# Patient Record
Sex: MALE | Race: OTHER | Employment: OTHER | URBAN - METROPOLITAN AREA
[De-identification: names, ages, dates, MRNs, and addresses within clinical notes are randomized per-mention and may not be internally consistent; named-entity substitution may affect disease eponyms.]

---

## 2017-03-14 ENCOUNTER — HOSPITAL ENCOUNTER (OUTPATIENT)
Dept: NON INVASIVE DIAGNOSTICS | Age: 62
Discharge: HOME | End: 2017-03-14
Payer: COMMERCIAL

## 2017-03-14 PROCEDURE — 93306 TTE W/DOPPLER COMPLETE: CPT

## 2017-05-11 ENCOUNTER — OFFICE VISIT (OUTPATIENT)
Dept: GASTROENTEROLOGY | Age: 62
End: 2017-05-11

## 2017-05-11 DIAGNOSIS — Z80.0 FAMILY HISTORY OF COLON CANCER: ICD-10-CM

## 2017-05-11 DIAGNOSIS — Z86.010 PERSONAL HISTORY OF COLONIC POLYPS: Primary | ICD-10-CM

## 2017-05-11 DIAGNOSIS — Z83.71 FAMILY HISTORY OF COLONIC POLYPS: ICD-10-CM

## 2017-05-11 PROCEDURE — 99203 OFFICE O/P NEW LOW 30 MIN: CPT | Performed by: INTERNAL MEDICINE

## 2017-05-16 VITALS — DIASTOLIC BLOOD PRESSURE: 89 MMHG | HEART RATE: 66 BPM | WEIGHT: 232 LBS | SYSTOLIC BLOOD PRESSURE: 152 MMHG

## 2017-05-17 ENCOUNTER — HOSPITAL ENCOUNTER (OUTPATIENT)
Age: 62
Setting detail: OUTPATIENT SURGERY
Discharge: HOME | End: 2017-05-17
Attending: INTERNAL MEDICINE | Admitting: INTERNAL MEDICINE
Payer: COMMERCIAL

## 2017-05-17 ENCOUNTER — ANESTHESIA (OUTPATIENT)
Dept: ENDOSCOPY | Age: 62
End: 2017-05-17
Payer: COMMERCIAL

## 2017-05-17 ENCOUNTER — ANESTHESIA EVENT (OUTPATIENT)
Dept: ENDOSCOPY | Age: 62
End: 2017-05-17
Payer: COMMERCIAL

## 2017-05-17 VITALS
DIASTOLIC BLOOD PRESSURE: 85 MMHG | TEMPERATURE: 98.6 F | RESPIRATION RATE: 14 BRPM | SYSTOLIC BLOOD PRESSURE: 140 MMHG | OXYGEN SATURATION: 97 %

## 2017-05-17 VITALS
WEIGHT: 230 LBS | SYSTOLIC BLOOD PRESSURE: 140 MMHG | BODY MASS INDEX: 38.32 KG/M2 | TEMPERATURE: 97.9 F | OXYGEN SATURATION: 99 % | HEIGHT: 65 IN | HEART RATE: 68 BPM | RESPIRATION RATE: 16 BRPM | DIASTOLIC BLOOD PRESSURE: 90 MMHG

## 2017-05-17 PROCEDURE — 88305 TISSUE EXAM BY PATHOLOGIST: CPT

## 2017-05-17 PROCEDURE — 2500000003 HC RX 250 WO HCPCS: Performed by: NURSE ANESTHETIST, CERTIFIED REGISTERED

## 2017-05-17 PROCEDURE — 3609027000 HC COLONOSCOPY: Performed by: INTERNAL MEDICINE

## 2017-05-17 PROCEDURE — 2580000003 HC RX 258: Performed by: STUDENT IN AN ORGANIZED HEALTH CARE EDUCATION/TRAINING PROGRAM

## 2017-05-17 PROCEDURE — 3700000001 HC ADD 15 MINUTES (ANESTHESIA): Performed by: INTERNAL MEDICINE

## 2017-05-17 PROCEDURE — 7100000010 HC PHASE II RECOVERY - FIRST 15 MIN: Performed by: INTERNAL MEDICINE

## 2017-05-17 PROCEDURE — 3700000000 HC ANESTHESIA ATTENDED CARE: Performed by: INTERNAL MEDICINE

## 2017-05-17 PROCEDURE — 6360000002 HC RX W HCPCS: Performed by: NURSE ANESTHETIST, CERTIFIED REGISTERED

## 2017-05-17 RX ORDER — NAPROXEN 500 MG/1
500 TABLET ORAL 2 TIMES DAILY WITH MEALS
Status: ON HOLD | COMMUNITY
End: 2019-04-22 | Stop reason: SDDI

## 2017-05-17 RX ORDER — SODIUM CHLORIDE 9 MG/ML
INJECTION, SOLUTION INTRAVENOUS CONTINUOUS
Status: DISCONTINUED | OUTPATIENT
Start: 2017-05-17 | End: 2017-05-17 | Stop reason: HOSPADM

## 2017-05-17 RX ORDER — PROPOFOL 10 MG/ML
INJECTION, EMULSION INTRAVENOUS PRN
Status: DISCONTINUED | OUTPATIENT
Start: 2017-05-17 | End: 2017-05-17 | Stop reason: SDUPTHER

## 2017-05-17 RX ORDER — LIDOCAINE HYDROCHLORIDE 20 MG/ML
INJECTION, SOLUTION EPIDURAL; INFILTRATION; INTRACAUDAL; PERINEURAL PRN
Status: DISCONTINUED | OUTPATIENT
Start: 2017-05-17 | End: 2017-05-17 | Stop reason: SDUPTHER

## 2017-05-17 RX ADMIN — PROPOFOL 30 MG: 10 INJECTION, EMULSION INTRAVENOUS at 11:06

## 2017-05-17 RX ADMIN — PROPOFOL 20 MG: 10 INJECTION, EMULSION INTRAVENOUS at 11:13

## 2017-05-17 RX ADMIN — PROPOFOL 20 MG: 10 INJECTION, EMULSION INTRAVENOUS at 11:09

## 2017-05-17 RX ADMIN — SODIUM CHLORIDE: 9 INJECTION, SOLUTION INTRAVENOUS at 10:08

## 2017-05-17 RX ADMIN — PROPOFOL 20 MG: 10 INJECTION, EMULSION INTRAVENOUS at 11:10

## 2017-05-17 RX ADMIN — LIDOCAINE HYDROCHLORIDE 40 MG: 20 INJECTION, SOLUTION EPIDURAL; INFILTRATION; INTRACAUDAL; PERINEURAL at 11:04

## 2017-05-17 RX ADMIN — PROPOFOL 110 MG: 10 INJECTION, EMULSION INTRAVENOUS at 11:04

## 2017-05-17 RX ADMIN — PROPOFOL 20 MG: 10 INJECTION, EMULSION INTRAVENOUS at 11:07

## 2017-05-17 RX ADMIN — PROPOFOL 20 MG: 10 INJECTION, EMULSION INTRAVENOUS at 11:05

## 2017-05-17 RX ADMIN — PROPOFOL 30 MG: 10 INJECTION, EMULSION INTRAVENOUS at 11:08

## 2017-05-17 RX ADMIN — PROPOFOL 20 MG: 10 INJECTION, EMULSION INTRAVENOUS at 11:11

## 2017-05-17 RX ADMIN — PROPOFOL 20 MG: 10 INJECTION, EMULSION INTRAVENOUS at 11:15

## 2017-05-17 ASSESSMENT — PAIN - FUNCTIONAL ASSESSMENT: PAIN_FUNCTIONAL_ASSESSMENT: 0-10

## 2017-05-17 ASSESSMENT — COPD QUESTIONNAIRES: CAT_SEVERITY: NO INTERVAL CHANGE

## 2017-11-21 ENCOUNTER — OFFICE VISIT (OUTPATIENT)
Dept: PULMONOLOGY | Age: 62
End: 2017-11-21

## 2017-11-21 VITALS
DIASTOLIC BLOOD PRESSURE: 80 MMHG | OXYGEN SATURATION: 97 % | HEIGHT: 65 IN | BODY MASS INDEX: 36.65 KG/M2 | HEART RATE: 64 BPM | SYSTOLIC BLOOD PRESSURE: 130 MMHG | WEIGHT: 220 LBS | TEMPERATURE: 96.6 F

## 2017-11-21 DIAGNOSIS — F17.200 SMOKING: ICD-10-CM

## 2017-11-21 DIAGNOSIS — J44.9 CHRONIC OBSTRUCTIVE PULMONARY DISEASE, UNSPECIFIED COPD TYPE (HCC): Primary | ICD-10-CM

## 2017-11-21 PROCEDURE — 3023F SPIROM DOC REV: CPT | Performed by: INTERNAL MEDICINE

## 2017-11-21 PROCEDURE — G8417 CALC BMI ABV UP PARAM F/U: HCPCS | Performed by: INTERNAL MEDICINE

## 2017-11-21 PROCEDURE — G8484 FLU IMMUNIZE NO ADMIN: HCPCS | Performed by: INTERNAL MEDICINE

## 2017-11-21 PROCEDURE — G8926 SPIRO NO PERF OR DOC: HCPCS | Performed by: INTERNAL MEDICINE

## 2017-11-21 PROCEDURE — 4004F PT TOBACCO SCREEN RCVD TLK: CPT | Performed by: INTERNAL MEDICINE

## 2017-11-21 PROCEDURE — 99214 OFFICE O/P EST MOD 30 MIN: CPT | Performed by: INTERNAL MEDICINE

## 2017-11-21 PROCEDURE — 3017F COLORECTAL CA SCREEN DOC REV: CPT | Performed by: INTERNAL MEDICINE

## 2017-11-21 PROCEDURE — G8427 DOCREV CUR MEDS BY ELIG CLIN: HCPCS | Performed by: INTERNAL MEDICINE

## 2017-11-21 RX ORDER — TIOTROPIUM BROMIDE AND OLODATEROL 3.124; 2.736 UG/1; UG/1
SPRAY, METERED RESPIRATORY (INHALATION)
Refills: 5 | COMMUNITY
Start: 2017-11-03 | End: 2018-03-21 | Stop reason: SDUPTHER

## 2017-11-21 RX ORDER — GUAIFENESIN 1200 MG/1
TABLET, EXTENDED RELEASE ORAL
Refills: 2 | COMMUNITY
Start: 2017-11-06 | End: 2018-08-30 | Stop reason: SDUPTHER

## 2017-11-21 RX ORDER — SIMVASTATIN 40 MG
40 TABLET ORAL DAILY
Refills: 2 | Status: ON HOLD | COMMUNITY
Start: 2017-11-03 | End: 2019-04-22 | Stop reason: SDDI

## 2017-11-21 ASSESSMENT — ENCOUNTER SYMPTOMS
VOMITING: 0
SHORTNESS OF BREATH: 1
ABDOMINAL PAIN: 0
VOICE CHANGE: 0
EYE ITCHING: 0
RHINORRHEA: 0
COUGH: 1
WHEEZING: 0
SORE THROAT: 0
NAUSEA: 0
DIARRHEA: 0
CHEST TIGHTNESS: 0

## 2017-11-21 NOTE — PROGRESS NOTES
shortness of breath. Negative for chest tightness and wheezing. Cardiovascular: Negative. Negative for chest pain, palpitations and leg swelling. Gastrointestinal: Negative for abdominal pain, diarrhea, nausea and vomiting. Genitourinary: Negative for difficulty urinating and hematuria. Musculoskeletal: Negative for arthralgias, joint swelling and myalgias. Skin: Negative for rash. Allergic/Immunologic: Negative for environmental allergies. Neurological: Negative for dizziness, tremors, weakness and headaches. Psychiatric/Behavioral: Negative for behavioral problems and sleep disturbance. Objective:     Vitals:    11/21/17 1136   BP: 130/80   Site: Left Arm   Position: Sitting   Cuff Size: Large Adult   Pulse: 64   Temp: 96.6 °F (35.9 °C)   TempSrc: Temporal   SpO2: 97%   Weight: 220 lb (99.8 kg)   Height: 5' 5\" (1.651 m)         Physical Exam   Constitutional: He is oriented to person, place, and time. He appears well-developed and well-nourished. obesity   HENT:   Head: Normocephalic and atraumatic. Nose: Nose normal.   Mouth/Throat: Oropharynx is clear and moist.   Eyes: Conjunctivae and EOM are normal. Pupils are equal, round, and reactive to light. Neck: No JVD present. No tracheal deviation present. No thyromegaly present. Cardiovascular: Normal rate and regular rhythm. Exam reveals no gallop and no friction rub. No murmur heard. Pulmonary/Chest: Effort normal and breath sounds normal. No respiratory distress. He has no wheezes. He has no rales. He exhibits no tenderness. Abdominal: He exhibits no distension. Musculoskeletal: Normal range of motion. Lymphadenopathy:     He has no cervical adenopathy. Neurological: He is alert and oriented to person, place, and time. No cranial nerve deficit. Skin: Skin is warm and dry. No rash noted. Psychiatric: He has a normal mood and affect.  His behavior is normal.       Current Outpatient Prescriptions   Medication Sig WITH PRE AND POST; Future    2. Smoking  Patient advised try to quit smoking. Risks related to smoking explained to the patient. Return in about 4 months (around 3/21/2018) for COPD.       Kayleigh Salgado MD

## 2017-12-21 ENCOUNTER — HOSPITAL ENCOUNTER (OUTPATIENT)
Dept: PULMONOLOGY | Age: 62
Discharge: HOME | End: 2017-12-21
Payer: COMMERCIAL

## 2017-12-21 DIAGNOSIS — J44.9 CHRONIC OBSTRUCTIVE PULMONARY DISEASE, UNSPECIFIED COPD TYPE (HCC): ICD-10-CM

## 2017-12-21 PROCEDURE — 94726 PLETHYSMOGRAPHY LUNG VOLUMES: CPT

## 2017-12-21 PROCEDURE — 94060 EVALUATION OF WHEEZING: CPT

## 2017-12-21 PROCEDURE — 94729 DIFFUSING CAPACITY: CPT

## 2017-12-21 PROCEDURE — 6360000002 HC RX W HCPCS: Performed by: INTERNAL MEDICINE

## 2017-12-21 RX ORDER — ALBUTEROL SULFATE 2.5 MG/3ML
2.5 SOLUTION RESPIRATORY (INHALATION) ONCE
Status: COMPLETED | OUTPATIENT
Start: 2017-12-21 | End: 2017-12-21

## 2017-12-21 RX ADMIN — ALBUTEROL SULFATE 2.5 MG: 2.5 SOLUTION RESPIRATORY (INHALATION) at 12:31

## 2017-12-26 NOTE — PROCEDURES
[address]                       [address], [address] [facility]                                PULMONARY FUNCTION    PATIENT NAME: Marisa Chicas                      :        1955  MED REC NO:   [id number]                            ROOM:  ACCOUNT NO:   [de-identified]                           ADMIT DATE: 2017  PROVIDER:     Cass Knox MD    DATE OF PROCEDURE:  2017    PFTs were done on this 68-year-old patient who is 5 feet 5 inches, weighs  225 pounds with 47-year smoking history, presenting with mild dyspnea and  nonproductive cough. Spirometry showed a forced vital capacity of 2.77 L which is 70% of  predicted. FEV1 was 2.11 L which is 70% of predicted. FEV1/FVC ratio was  normal at 76%. FEF 25-75% was equal or reduced to 1.78 L per second which  is 71% of predicted. Significant improvement noted in forced vital  capacity, FEV1, and terminal airflow after bronchodilator therapy. MVV was  severely reduced. Lung volumes done by body plethysmography showed a total lung capacity of  6.83 L which is 113% of predicted. Residual volume was 3.62 L which is  182% of predicted. RV/TLC ratio was increased to 53%. Diffusion capacity was normal at 21.29 which is 102% of predicted. Airway resistance was normal.  Specific airway conductance was normal as  well. OVERALL IMPRESSION:  This study shows no definite baseline obstructive  pattern but marked reversibility after bronchodilator therapy suggests  reactive airway disease or mild airflow obstruction with complete  reversibility. This also was confirmed by finding of significant  overinflation on lung volumes. This suggests underlying bronchial asthma  or reactive airway disease. Clinical correlation is needed.       Kiet Morse MD    D: 2017 12:34:19       T: 2017 16:06:35     [email or web address]  Job#: [id number]     Doc#: [id number]    CC:

## 2017-12-27 PROCEDURE — 94060 EVALUATION OF WHEEZING: CPT | Performed by: INTERNAL MEDICINE

## 2017-12-27 PROCEDURE — 94727 GAS DIL/WSHOT DETER LNG VOL: CPT | Performed by: INTERNAL MEDICINE

## 2017-12-27 PROCEDURE — 94729 DIFFUSING CAPACITY: CPT | Performed by: INTERNAL MEDICINE

## 2018-03-12 ENCOUNTER — HOSPITAL ENCOUNTER (OUTPATIENT)
Dept: GENERAL RADIOLOGY | Age: 63
Discharge: HOME | End: 2018-03-14
Payer: COMMERCIAL

## 2018-03-12 DIAGNOSIS — M25.552 LEFT HIP PAIN: ICD-10-CM

## 2018-03-12 PROCEDURE — 73502 X-RAY EXAM HIP UNI 2-3 VIEWS: CPT

## 2018-03-19 ENCOUNTER — HOSPITAL ENCOUNTER (OUTPATIENT)
Dept: WOMENS IMAGING | Age: 63
Discharge: HOME | End: 2018-03-21
Payer: COMMERCIAL

## 2018-03-19 DIAGNOSIS — M19.90 GENERALIZED ARTHRITIS: ICD-10-CM

## 2018-03-19 PROCEDURE — 77080 DXA BONE DENSITY AXIAL: CPT

## 2018-03-21 ENCOUNTER — HOSPITAL ENCOUNTER (OUTPATIENT)
Dept: GENERAL RADIOLOGY | Age: 63
Discharge: HOME | End: 2018-03-23
Payer: COMMERCIAL

## 2018-03-21 ENCOUNTER — OFFICE VISIT (OUTPATIENT)
Dept: PULMONOLOGY | Age: 63
End: 2018-03-21
Payer: COMMERCIAL

## 2018-03-21 VITALS
TEMPERATURE: 96.7 F | HEIGHT: 65 IN | WEIGHT: 227.4 LBS | DIASTOLIC BLOOD PRESSURE: 80 MMHG | BODY MASS INDEX: 37.89 KG/M2 | HEART RATE: 97 BPM | OXYGEN SATURATION: 97 % | SYSTOLIC BLOOD PRESSURE: 126 MMHG

## 2018-03-21 DIAGNOSIS — J44.9 CHRONIC OBSTRUCTIVE PULMONARY DISEASE, UNSPECIFIED COPD TYPE (HCC): ICD-10-CM

## 2018-03-21 DIAGNOSIS — J44.9 CHRONIC OBSTRUCTIVE PULMONARY DISEASE, UNSPECIFIED COPD TYPE (HCC): Primary | ICD-10-CM

## 2018-03-21 DIAGNOSIS — Z72.0 TOBACCO ABUSE: ICD-10-CM

## 2018-03-21 DIAGNOSIS — E66.9 OBESITY (BMI 30-39.9): ICD-10-CM

## 2018-03-21 PROCEDURE — G8427 DOCREV CUR MEDS BY ELIG CLIN: HCPCS | Performed by: INTERNAL MEDICINE

## 2018-03-21 PROCEDURE — G8417 CALC BMI ABV UP PARAM F/U: HCPCS | Performed by: INTERNAL MEDICINE

## 2018-03-21 PROCEDURE — 4004F PT TOBACCO SCREEN RCVD TLK: CPT | Performed by: INTERNAL MEDICINE

## 2018-03-21 PROCEDURE — 99214 OFFICE O/P EST MOD 30 MIN: CPT | Performed by: INTERNAL MEDICINE

## 2018-03-21 PROCEDURE — 3023F SPIROM DOC REV: CPT | Performed by: INTERNAL MEDICINE

## 2018-03-21 PROCEDURE — 71046 X-RAY EXAM CHEST 2 VIEWS: CPT

## 2018-03-21 PROCEDURE — 3017F COLORECTAL CA SCREEN DOC REV: CPT | Performed by: INTERNAL MEDICINE

## 2018-03-21 PROCEDURE — G8484 FLU IMMUNIZE NO ADMIN: HCPCS | Performed by: INTERNAL MEDICINE

## 2018-03-21 PROCEDURE — G8926 SPIRO NO PERF OR DOC: HCPCS | Performed by: INTERNAL MEDICINE

## 2018-03-21 RX ORDER — IBUPROFEN 800 MG/1
TABLET ORAL
Refills: 2 | Status: ON HOLD | COMMUNITY
Start: 2018-03-15 | End: 2019-04-22 | Stop reason: SDDI

## 2018-03-21 RX ORDER — TIOTROPIUM BROMIDE AND OLODATEROL 3.124; 2.736 UG/1; UG/1
SPRAY, METERED RESPIRATORY (INHALATION)
Refills: 5 | Status: CANCELLED | OUTPATIENT
Start: 2018-03-21

## 2018-03-21 RX ORDER — TIOTROPIUM BROMIDE AND OLODATEROL 3.124; 2.736 UG/1; UG/1
SPRAY, METERED RESPIRATORY (INHALATION)
Qty: 1 INHALER | Refills: 5 | Status: SHIPPED | OUTPATIENT
Start: 2018-03-21 | End: 2018-08-30 | Stop reason: SDUPTHER

## 2018-03-21 RX ORDER — CYCLOBENZAPRINE HCL 10 MG
TABLET ORAL
Refills: 2 | Status: ON HOLD | COMMUNITY
Start: 2018-03-15 | End: 2019-04-22 | Stop reason: SDDI

## 2018-03-21 ASSESSMENT — ENCOUNTER SYMPTOMS
VOICE CHANGE: 0
CHEST TIGHTNESS: 0
WHEEZING: 0
EYE ITCHING: 0
NAUSEA: 0
SORE THROAT: 0
RHINORRHEA: 0
SHORTNESS OF BREATH: 1
COUGH: 0
ABDOMINAL PAIN: 0
DIARRHEA: 0
VOMITING: 0

## 2018-03-21 NOTE — PROGRESS NOTES
Subjective: Jeanne Alcantar is a 63 y.o. male who complains today of:     Chief Complaint   Patient presents with    COPD     pt here for 4 month f/u for copd       HPI  Patient has PFT done in past, no CXr done  Patient is using stiolto, ventolin HFA and nebulizer with albuterol  C/o shortness of breath  with exertion. No  Wheezing   no Cough with Sputum  No Hemoptysis  No Chest pain or pleuritic pain  No Fever or chills. No Rhinorrhea or postnasal drip.     He is still  smoking 1 ppd  Every 3 rd day. Allergies:  Patient has no known allergies. Past Medical History:   Diagnosis Date    Asthma     Depression     Hypercholesteremia     Hypertension     Osteoarthritis      Past Surgical History:   Procedure Laterality Date    COLONOSCOPY  5/2/14    JARMOSZUK    EYE SURGERY Bilateral     cataracts    FINGER SURGERY Right     [place] COLON CA SCRN NOT HI RSK IND N/A 5/17/2017    [facility] Toshia East [facility]     Family History   Problem Relation Age of Onset    Colon Cancer Mother     High Blood Pressure Father     Other Brother      Sepsis    Other Brother      Killed     Social History     Social History    Marital status:      Spouse name: N/A    Number of children: N/A    Years of education: N/A     Occupational History    Not on file. Social History Main Topics    Smoking status: Current Every Day Smoker     Packs/day: 0.30     Years: 46.00     Types: Cigarettes    Smokeless tobacco: Never Used    Alcohol use No    Drug use: No    Sexual activity: Not on file     Other Topics Concern    Not on file     Social History Narrative    No narrative on file         Review of Systems   Constitutional: Negative for chills, diaphoresis, fatigue and fever. HENT: Negative for congestion, mouth sores, nosebleeds, postnasal drip, rhinorrhea, sneezing, sore throat and voice change. Eyes: Negative for itching and visual disturbance. Respiratory: Positive for shortness of breath. Negative for cough, chest tightness and wheezing. Cardiovascular: Negative. Negative for chest pain, palpitations and leg swelling. Gastrointestinal: Negative for abdominal pain, diarrhea, nausea and vomiting. Genitourinary: Negative for difficulty urinating and hematuria. Musculoskeletal: Negative for arthralgias, joint swelling and myalgias. Skin: Negative for rash. Allergic/Immunologic: Negative for environmental allergies. Neurological: Negative for dizziness, tremors, weakness and headaches. Psychiatric/Behavioral: Negative for behavioral problems and sleep disturbance. Objective:     Vitals:    03/21/18 1500   BP: 126/80   Pulse: 97   Temp: 96.7 °F (35.9 °C)   TempSrc: Tympanic   SpO2: 97%   Weight: 227 lb 6.4 oz (103.1 kg)   Height: 5' 5\" (1.651 m)         Physical Exam  Constitutional: He is oriented to person, place, and time. He appears well-developed and well-nourished. obesity   HENT:   Head: Normocephalic and atraumatic. Nose: Nose normal.   Mouth/Throat: Oropharynx is clear and moist.   Eyes: Conjunctivae and EOM are normal. Pupils are equal, round, and reactive to light. Neck: No JVD present. No tracheal deviation present. No thyromegaly present. Cardiovascular: Normal rate and regular rhythm. Exam reveals no gallop and no friction rub. No murmur heard. Pulmonary/Chest: Effort normal and breath sounds normal. No respiratory distress. He has no wheezes. He has no rales. He exhibits no tenderness. Abdominal: He exhibits no distension. Musculoskeletal: Normal range of motion. Lymphadenopathy:     He has no cervical adenopathy. Neurological: He is alert and oriented to person, place, and time. No cranial nerve deficit. Skin: Skin is warm and dry. No rash noted. Psychiatric: He has a normal mood and affect.  His behavior is normal.       Current Outpatient Prescriptions   Medication Sig Dispense Refill    resistance was normal.  Specific airway conductance was normal as  well.     OVERALL IMPRESSION:  This study shows no definite baseline obstructive  pattern but marked reversibility after bronchodilator therapy suggests  reactive airway disease or mild airflow obstruction with complete  reversibility. This also was confirmed by finding of significant  overinflation on lung volumes. This suggests underlying bronchial asthma  or reactive airway disease. Clinical correlation is needed.        Ruiz Barrow MD      Assessment/Plan:     1. Chronic obstructive pulmonary disease, unspecified COPD type ([address]  C/o shortness of breath  with exertion. No  Wheezing   Patient is using stiolto, ventolin HFA and nebulizer with albuterol  PFT done , reviewed with patient , continue bronchodilator as before. He has no CXR done even requested. He said he will do it today and sent request for CXR today. 2. Tobacco abuse  Patient advised try to quit smoking. Risks related to smoking explained to the patient. Different ways to help him quit smoking were discussed today. 3. Obesity (BMI 30-39. 9)  Patient patient is advised try to lose weight. obesity related risk explained to the patient ,  Current weight:  227 lb 6.4 oz (103.1 kg) Lbs. BMI:  Body mass index is 37.84 kg/m². Return in about 4 months (around 7/21/2018) for COPD.       Skylar Salmeron MD

## 2018-07-25 ENCOUNTER — OFFICE VISIT (OUTPATIENT)
Dept: PULMONOLOGY | Age: 63
End: 2018-07-25
Payer: COMMERCIAL

## 2018-07-25 VITALS
DIASTOLIC BLOOD PRESSURE: 80 MMHG | WEIGHT: 218 LBS | RESPIRATION RATE: 16 BRPM | OXYGEN SATURATION: 95 % | SYSTOLIC BLOOD PRESSURE: 134 MMHG | TEMPERATURE: 97.9 F | HEIGHT: 65 IN | BODY MASS INDEX: 36.32 KG/M2 | HEART RATE: 80 BPM

## 2018-07-25 DIAGNOSIS — E66.9 OBESITY (BMI 30-39.9): ICD-10-CM

## 2018-07-25 DIAGNOSIS — J44.9 ASTHMA-COPD OVERLAP SYNDROME (HCC): Primary | ICD-10-CM

## 2018-07-25 DIAGNOSIS — Z72.0 TOBACCO ABUSE: ICD-10-CM

## 2018-07-25 PROCEDURE — G8417 CALC BMI ABV UP PARAM F/U: HCPCS | Performed by: INTERNAL MEDICINE

## 2018-07-25 PROCEDURE — 99214 OFFICE O/P EST MOD 30 MIN: CPT | Performed by: INTERNAL MEDICINE

## 2018-07-25 PROCEDURE — 3017F COLORECTAL CA SCREEN DOC REV: CPT | Performed by: INTERNAL MEDICINE

## 2018-07-25 PROCEDURE — G8926 SPIRO NO PERF OR DOC: HCPCS | Performed by: INTERNAL MEDICINE

## 2018-07-25 PROCEDURE — G8427 DOCREV CUR MEDS BY ELIG CLIN: HCPCS | Performed by: INTERNAL MEDICINE

## 2018-07-25 PROCEDURE — 4004F PT TOBACCO SCREEN RCVD TLK: CPT | Performed by: INTERNAL MEDICINE

## 2018-07-25 PROCEDURE — 3023F SPIROM DOC REV: CPT | Performed by: INTERNAL MEDICINE

## 2018-07-25 ASSESSMENT — ENCOUNTER SYMPTOMS
CHEST TIGHTNESS: 0
ABDOMINAL PAIN: 0
VOMITING: 0
NAUSEA: 0
VOICE CHANGE: 0
RHINORRHEA: 0
SHORTNESS OF BREATH: 1
SORE THROAT: 0
EYE ITCHING: 0
DIARRHEA: 0
WHEEZING: 0
COUGH: 0

## 2018-07-25 NOTE — PROGRESS NOTES
Subjective: Mindy Edgar is a 66 y.o. male who complains today of:     Chief Complaint   Patient presents with    Follow-up     four month f/u for Chest Xray and PFT results. HPI  Patient is using stiolto, ventolin HFA and nebulizer with albuterol  C/o shortness of breath , worse with exertion. No  Wheezing   No Cough with  Sputum  No Hemoptysis  No Chest tightness   No Chest pain with radiation  or pleuritic pain  No Fever or chills. No Rhinorrhea and postnasal drip. CXR done      patient has c/o snoring  and daytime tiredness   occasionally takes nap    he wakes up with gasping for air. Allergies:  Patient has no known allergies. Past Medical History:   Diagnosis Date    Asthma     Depression     Hypercholesteremia     Hypertension     Osteoarthritis      Past Surgical History:   Procedure Laterality Date    COLONOSCOPY  5/2/14    JARMOSZUK    EYE SURGERY Bilateral     cataracts    FINGER SURGERY Right     [place] COLON CA SCRN NOT HI RSK IND N/A 5/17/2017    COLONOSCOPY performed by Celi Valiente MD at [facility]     Family History   Problem Relation Age of Onset    Colon Cancer Mother     High Blood Pressure Father     Other Brother         Sepsis    Other Brother         Killed     Social History     Social History    Marital status:      Spouse name: N/A    Number of children: N/A    Years of education: N/A     Occupational History    Not on file. Social History Main Topics    Smoking status: Current Every Day Smoker     Packs/day: 0.30     Years: 46.00     Types: Cigarettes    Smokeless tobacco: Never Used    Alcohol use No    Drug use: No    Sexual activity: Not on file     Other Topics Concern    Not on file     Social History Narrative    No narrative on file         Review of Systems   Constitutional: Negative for chills, diaphoresis, fatigue and fever.    HENT: Negative for congestion, mouth sores, nosebleeds, postnasal drip,

## 2018-08-10 ENCOUNTER — HOSPITAL ENCOUNTER (OUTPATIENT)
Dept: SLEEP CENTER | Age: 63
Discharge: HOME | End: 2018-08-12
Payer: COMMERCIAL

## 2018-08-10 PROCEDURE — 95810 POLYSOM 6/> YRS 4/> PARAM: CPT

## 2018-08-30 ENCOUNTER — OFFICE VISIT (OUTPATIENT)
Dept: PULMONOLOGY | Age: 63
End: 2018-08-30
Payer: COMMERCIAL

## 2018-08-30 VITALS
RESPIRATION RATE: 16 BRPM | HEIGHT: 65 IN | BODY MASS INDEX: 36.49 KG/M2 | OXYGEN SATURATION: 98 % | SYSTOLIC BLOOD PRESSURE: 136 MMHG | WEIGHT: 219 LBS | DIASTOLIC BLOOD PRESSURE: 80 MMHG | HEART RATE: 73 BPM | TEMPERATURE: 97.2 F

## 2018-08-30 DIAGNOSIS — Z72.0 TOBACCO ABUSE: ICD-10-CM

## 2018-08-30 DIAGNOSIS — E66.9 OBESITY (BMI 30-39.9): ICD-10-CM

## 2018-08-30 DIAGNOSIS — J44.9 CHRONIC OBSTRUCTIVE PULMONARY DISEASE, UNSPECIFIED COPD TYPE (HCC): ICD-10-CM

## 2018-08-30 DIAGNOSIS — G47.33 OSA (OBSTRUCTIVE SLEEP APNEA): Primary | ICD-10-CM

## 2018-08-30 PROCEDURE — G8417 CALC BMI ABV UP PARAM F/U: HCPCS | Performed by: INTERNAL MEDICINE

## 2018-08-30 PROCEDURE — 3023F SPIROM DOC REV: CPT | Performed by: INTERNAL MEDICINE

## 2018-08-30 PROCEDURE — G8427 DOCREV CUR MEDS BY ELIG CLIN: HCPCS | Performed by: INTERNAL MEDICINE

## 2018-08-30 PROCEDURE — 4004F PT TOBACCO SCREEN RCVD TLK: CPT | Performed by: INTERNAL MEDICINE

## 2018-08-30 PROCEDURE — 99214 OFFICE O/P EST MOD 30 MIN: CPT | Performed by: INTERNAL MEDICINE

## 2018-08-30 PROCEDURE — G8926 SPIRO NO PERF OR DOC: HCPCS | Performed by: INTERNAL MEDICINE

## 2018-08-30 PROCEDURE — 3017F COLORECTAL CA SCREEN DOC REV: CPT | Performed by: INTERNAL MEDICINE

## 2018-08-30 RX ORDER — TIOTROPIUM BROMIDE AND OLODATEROL 3.124; 2.736 UG/1; UG/1
SPRAY, METERED RESPIRATORY (INHALATION)
Qty: 1 INHALER | Refills: 5 | Status: SHIPPED | OUTPATIENT
Start: 2018-08-30

## 2018-08-30 RX ORDER — GUAIFENESIN 1200 MG/1
TABLET, EXTENDED RELEASE ORAL
Qty: 14 TABLET | Refills: 2 | Status: ON HOLD | OUTPATIENT
Start: 2018-08-30 | End: 2019-04-22 | Stop reason: SDDI

## 2018-08-30 RX ORDER — ALBUTEROL SULFATE 2.5 MG/3ML
2.5 SOLUTION RESPIRATORY (INHALATION) EVERY 6 HOURS PRN
Qty: 120 EACH | Refills: 5 | Status: SHIPPED | OUTPATIENT
Start: 2018-08-30

## 2018-08-30 ASSESSMENT — ENCOUNTER SYMPTOMS
RHINORRHEA: 0
DIARRHEA: 0
SORE THROAT: 0
COUGH: 0
SHORTNESS OF BREATH: 1
CHEST TIGHTNESS: 0
NAUSEA: 0
EYE ITCHING: 0
VOMITING: 0
VOICE CHANGE: 0
WHEEZING: 0
ABDOMINAL PAIN: 0

## 2018-08-30 NOTE — PROGRESS NOTES
chills, diaphoresis, fatigue and fever. HENT: Negative for congestion, mouth sores, nosebleeds, postnasal drip, rhinorrhea, sneezing, sore throat and voice change. Eyes: Negative for itching and visual disturbance. Respiratory: Positive for shortness of breath. Negative for cough, chest tightness and wheezing. Cardiovascular: Negative. Negative for chest pain, palpitations and leg swelling. Gastrointestinal: Negative for abdominal pain, diarrhea, nausea and vomiting. Genitourinary: Negative for difficulty urinating and hematuria. Musculoskeletal: Negative for arthralgias, joint swelling and myalgias. Skin: Negative for rash. Allergic/Immunologic: Negative for environmental allergies. Neurological: Negative for dizziness, tremors, weakness and headaches. Psychiatric/Behavioral: Positive for sleep disturbance. Negative for behavioral problems. Objective:     Vitals:    08/30/18 1456   BP: 136/80   Pulse: 73   Resp: 16   Temp: 97.2 °F (36.2 °C)   TempSrc: Tympanic   SpO2: 98%   Weight: 219 lb (99.3 kg)   Height: 5' 5\" (1.651 m)         Physical Exam   Constitutional: He is oriented to person, place, and time. He appears well-developed and well-nourished. obesity   HENT:   Head: Normocephalic and atraumatic. Nose: Nose normal.   Mouth/Throat: Oropharynx is clear and moist.   Eyes: Pupils are equal, round, and reactive to light. Conjunctivae and EOM are normal.   Neck: No JVD present. No tracheal deviation present. No thyromegaly present. Cardiovascular: Normal rate and regular rhythm. Exam reveals no gallop and no friction rub. No murmur heard. Pulmonary/Chest: Effort normal and breath sounds normal. No respiratory distress. He has no wheezes. He has no rales. He exhibits no tenderness. diminished Breath sound bilaterally. Abdominal: He exhibits no distension. Musculoskeletal: Normal range of motion. Lymphadenopathy:     He has no cervical adenopathy.    Neurological: He is alert and oriented to person, place, and time. No cranial nerve deficit. Skin: Skin is warm and dry. No rash noted. Psychiatric: He has a normal mood and affect. His behavior is normal.       Current Outpatient Prescriptions   Medication Sig Dispense Refill    albuterol (PROVENTIL) (2.5 MG/3ML) 0.083% nebulizer solution Take 3 mLs by nebulization every 6 hours as needed for Wheezing 120 each 5    STIOLTO RESPIMAT 2.5-2.5 MCG/ACT AERS Inhale 2 puff in AM 1 Inhaler 5    MUCINEX MAXIMUM STRENGTH 1200 MG TB12 1 tab BID 14 tablet 2    ipratropium (ATROVENT) 0.02 % nebulizer solution QID prn 2.5 mL 5    ibuprofen (ADVIL;MOTRIN) 800 MG tablet TK 1 T PO TID WF  2    cyclobenzaprine (FLEXERIL) 10 MG tablet TK 1 T PO BID  2    simvastatin (ZOCOR) 40 MG tablet   2    naproxen (NAPROSYN) 500 MG tablet Take 500 mg by mouth 2 times daily (with meals)      guaiFENesin 100 MG PACK Take by mouth      OLANZapine (ZYPREXA PO) Take by mouth      lisinopril (PRINIVIL;ZESTRIL) 20 MG tablet       aspirin 325 MG tablet Take 325 mg by mouth daily. No current facility-administered medications for this visit. Results for orders placed during the hospital encounter of 03/21/18   XR CHEST STANDARD (2 VW)    Narrative EXAMINATION: XR CHEST (2 VW)     CLINICAL HISTORY: J44.9 Chronic obstructive pulmonary disease, unspecified COPD type ([facility] [address] ICD10    COMPARISONS: September 17, 2013     FINDINGS:    Two views of the chest are submitted. The cardiac silhouette is of normal size configuration. The mediastinum is unremarkable. Pulmonary vascular unremarkable. Right sided trachea. No focal infiltrates. No effusions. No Pneumothoraces.                                                                                     Impression NO ACUTE ACTIVE CARDIOPULMONARY PROCESS       Assessment/Plan:     1. RANDI (obstructive sleep apnea)  Patient has sleep study done on August 10, 2018 shows sleep apnea with AHI of 6.7 and

## 2018-08-31 ENCOUNTER — TELEPHONE (OUTPATIENT)
Dept: PULMONOLOGY | Age: 63
End: 2018-08-31

## 2018-09-10 ENCOUNTER — HOSPITAL ENCOUNTER (OUTPATIENT)
Dept: SLEEP CENTER | Age: 63
Discharge: HOME | End: 2018-09-12
Payer: COMMERCIAL

## 2018-09-10 PROCEDURE — 95811 POLYSOM 6/>YRS CPAP 4/> PARM: CPT

## 2018-09-17 DIAGNOSIS — G47.33 OSA (OBSTRUCTIVE SLEEP APNEA): ICD-10-CM

## 2018-10-10 ENCOUNTER — OFFICE VISIT (OUTPATIENT)
Dept: PULMONOLOGY | Age: 63
End: 2018-10-10
Payer: COMMERCIAL

## 2018-10-10 VITALS
BODY MASS INDEX: 37.15 KG/M2 | SYSTOLIC BLOOD PRESSURE: 132 MMHG | WEIGHT: 223 LBS | HEART RATE: 90 BPM | OXYGEN SATURATION: 97 % | HEIGHT: 65 IN | DIASTOLIC BLOOD PRESSURE: 60 MMHG | RESPIRATION RATE: 16 BRPM | TEMPERATURE: 98.2 F

## 2018-10-10 DIAGNOSIS — J44.9 CHRONIC OBSTRUCTIVE PULMONARY DISEASE, UNSPECIFIED COPD TYPE (HCC): ICD-10-CM

## 2018-10-10 DIAGNOSIS — E66.9 OBESITY (BMI 30-39.9): ICD-10-CM

## 2018-10-10 DIAGNOSIS — G47.33 OSA (OBSTRUCTIVE SLEEP APNEA): Primary | ICD-10-CM

## 2018-10-10 PROCEDURE — 4004F PT TOBACCO SCREEN RCVD TLK: CPT | Performed by: INTERNAL MEDICINE

## 2018-10-10 PROCEDURE — G8926 SPIRO NO PERF OR DOC: HCPCS | Performed by: INTERNAL MEDICINE

## 2018-10-10 PROCEDURE — 3023F SPIROM DOC REV: CPT | Performed by: INTERNAL MEDICINE

## 2018-10-10 PROCEDURE — 3017F COLORECTAL CA SCREEN DOC REV: CPT | Performed by: INTERNAL MEDICINE

## 2018-10-10 PROCEDURE — 99214 OFFICE O/P EST MOD 30 MIN: CPT | Performed by: INTERNAL MEDICINE

## 2018-10-10 PROCEDURE — G8427 DOCREV CUR MEDS BY ELIG CLIN: HCPCS | Performed by: INTERNAL MEDICINE

## 2018-10-10 PROCEDURE — G8484 FLU IMMUNIZE NO ADMIN: HCPCS | Performed by: INTERNAL MEDICINE

## 2018-10-10 PROCEDURE — G8417 CALC BMI ABV UP PARAM F/U: HCPCS | Performed by: INTERNAL MEDICINE

## 2018-10-10 ASSESSMENT — ENCOUNTER SYMPTOMS
WHEEZING: 0
COUGH: 0
EYE ITCHING: 0
ABDOMINAL PAIN: 0
VOICE CHANGE: 0
NAUSEA: 0
SHORTNESS OF BREATH: 1
DIARRHEA: 0
SORE THROAT: 0
CHEST TIGHTNESS: 0
RHINORRHEA: 0
VOMITING: 0

## 2018-12-17 ENCOUNTER — HOSPITAL ENCOUNTER (OUTPATIENT)
Dept: GENERAL RADIOLOGY | Age: 63
Discharge: HOME | End: 2018-12-19
Payer: COMMERCIAL

## 2018-12-17 VITALS — DIASTOLIC BLOOD PRESSURE: 107 MMHG | HEART RATE: 76 BPM | SYSTOLIC BLOOD PRESSURE: 161 MMHG

## 2018-12-17 DIAGNOSIS — M16.12 OSTEOARTHRITIS OF LEFT HIP, UNSPECIFIED OSTEOARTHRITIS TYPE: ICD-10-CM

## 2018-12-17 PROCEDURE — 6360000004 HC RX CONTRAST MEDICATION: Performed by: ORTHOPAEDIC SURGERY

## 2018-12-17 PROCEDURE — 20610 DRAIN/INJ JOINT/BURSA W/O US: CPT

## 2018-12-17 PROCEDURE — 2500000003 HC RX 250 WO HCPCS: Performed by: ORTHOPAEDIC SURGERY

## 2018-12-17 PROCEDURE — 6360000002 HC RX W HCPCS: Performed by: ORTHOPAEDIC SURGERY

## 2018-12-17 RX ORDER — LIDOCAINE HYDROCHLORIDE 20 MG/ML
15 INJECTION, SOLUTION INFILTRATION; PERINEURAL ONCE
Status: COMPLETED | OUTPATIENT
Start: 2018-12-17 | End: 2018-12-17

## 2018-12-17 RX ORDER — BUPIVACAINE HYDROCHLORIDE 5 MG/ML
30 INJECTION, SOLUTION EPIDURAL; INTRACAUDAL ONCE
Status: COMPLETED | OUTPATIENT
Start: 2018-12-17 | End: 2018-12-17

## 2018-12-17 RX ORDER — TRIAMCINOLONE ACETONIDE 40 MG/ML
80 INJECTION, SUSPENSION INTRA-ARTICULAR; INTRAMUSCULAR ONCE
Status: COMPLETED | OUTPATIENT
Start: 2018-12-17 | End: 2018-12-17

## 2018-12-17 RX ADMIN — LIDOCAINE HYDROCHLORIDE 10 ML: 20 INJECTION, SOLUTION INFILTRATION; PERINEURAL at 10:15

## 2018-12-17 RX ADMIN — IOVERSOL 3 ML: 678 INJECTION INTRA-ARTERIAL; INTRAVENOUS at 10:15

## 2018-12-17 RX ADMIN — BUPIVACAINE HYDROCHLORIDE 4 ML: 5 INJECTION, SOLUTION EPIDURAL; INTRACAUDAL at 10:15

## 2018-12-17 RX ADMIN — TRIAMCINOLONE ACETONIDE 80 MG: 40 INJECTION, SUSPENSION INTRA-ARTICULAR; INTRAMUSCULAR at 10:15

## 2018-12-17 ASSESSMENT — PAIN SCALES - GENERAL: PAINLEVEL_OUTOF10: 0

## 2019-01-09 ENCOUNTER — OFFICE VISIT (OUTPATIENT)
Dept: PULMONOLOGY | Age: 64
End: 2019-01-09
Payer: COMMERCIAL

## 2019-01-09 VITALS
OXYGEN SATURATION: 100 % | HEIGHT: 65 IN | SYSTOLIC BLOOD PRESSURE: 138 MMHG | RESPIRATION RATE: 16 BRPM | TEMPERATURE: 97.8 F | HEART RATE: 80 BPM | BODY MASS INDEX: 37.15 KG/M2 | DIASTOLIC BLOOD PRESSURE: 80 MMHG | WEIGHT: 223 LBS

## 2019-01-09 DIAGNOSIS — G47.33 OSA ON CPAP: Primary | ICD-10-CM

## 2019-01-09 DIAGNOSIS — E66.9 OBESITY (BMI 30-39.9): ICD-10-CM

## 2019-01-09 DIAGNOSIS — J44.9 CHRONIC OBSTRUCTIVE PULMONARY DISEASE, UNSPECIFIED COPD TYPE (HCC): ICD-10-CM

## 2019-01-09 DIAGNOSIS — Z99.89 OSA ON CPAP: Primary | ICD-10-CM

## 2019-01-09 PROCEDURE — 4004F PT TOBACCO SCREEN RCVD TLK: CPT | Performed by: INTERNAL MEDICINE

## 2019-01-09 PROCEDURE — G8484 FLU IMMUNIZE NO ADMIN: HCPCS | Performed by: INTERNAL MEDICINE

## 2019-01-09 PROCEDURE — 3017F COLORECTAL CA SCREEN DOC REV: CPT | Performed by: INTERNAL MEDICINE

## 2019-01-09 PROCEDURE — G8417 CALC BMI ABV UP PARAM F/U: HCPCS | Performed by: INTERNAL MEDICINE

## 2019-01-09 PROCEDURE — 99214 OFFICE O/P EST MOD 30 MIN: CPT | Performed by: INTERNAL MEDICINE

## 2019-01-09 PROCEDURE — G8427 DOCREV CUR MEDS BY ELIG CLIN: HCPCS | Performed by: INTERNAL MEDICINE

## 2019-01-09 PROCEDURE — 3023F SPIROM DOC REV: CPT | Performed by: INTERNAL MEDICINE

## 2019-01-09 PROCEDURE — G8926 SPIRO NO PERF OR DOC: HCPCS | Performed by: INTERNAL MEDICINE

## 2019-01-09 RX ORDER — AMOXICILLIN AND CLAVULANATE POTASSIUM 875; 125 MG/1; MG/1
TABLET, FILM COATED ORAL
Refills: 0 | Status: ON HOLD | COMMUNITY
Start: 2018-12-27 | End: 2019-04-22 | Stop reason: SDDI

## 2019-01-09 ASSESSMENT — ENCOUNTER SYMPTOMS
VOMITING: 0
SHORTNESS OF BREATH: 1
VOICE CHANGE: 0
WHEEZING: 1
ABDOMINAL PAIN: 0
DIARRHEA: 0
NAUSEA: 0
RHINORRHEA: 0
EYE ITCHING: 0
CHEST TIGHTNESS: 0
SORE THROAT: 0
COUGH: 0

## 2019-02-15 ENCOUNTER — TELEPHONE (OUTPATIENT)
Dept: PULMONOLOGY | Age: 64
End: 2019-02-15

## 2019-02-28 ENCOUNTER — OFFICE VISIT (OUTPATIENT)
Dept: PULMONOLOGY | Age: 64
End: 2019-02-28
Payer: COMMERCIAL

## 2019-02-28 VITALS
DIASTOLIC BLOOD PRESSURE: 80 MMHG | TEMPERATURE: 98.1 F | RESPIRATION RATE: 16 BRPM | HEIGHT: 65 IN | HEART RATE: 86 BPM | BODY MASS INDEX: 37.15 KG/M2 | OXYGEN SATURATION: 97 % | WEIGHT: 223 LBS | SYSTOLIC BLOOD PRESSURE: 136 MMHG

## 2019-02-28 DIAGNOSIS — Z72.0 TOBACCO ABUSE: ICD-10-CM

## 2019-02-28 DIAGNOSIS — Z99.89 OSA ON CPAP: Primary | ICD-10-CM

## 2019-02-28 DIAGNOSIS — E66.9 OBESITY (BMI 30-39.9): ICD-10-CM

## 2019-02-28 DIAGNOSIS — G47.33 OSA ON CPAP: Primary | ICD-10-CM

## 2019-02-28 DIAGNOSIS — J44.9 CHRONIC OBSTRUCTIVE PULMONARY DISEASE, UNSPECIFIED COPD TYPE (HCC): ICD-10-CM

## 2019-02-28 PROCEDURE — 4004F PT TOBACCO SCREEN RCVD TLK: CPT | Performed by: INTERNAL MEDICINE

## 2019-02-28 PROCEDURE — G8417 CALC BMI ABV UP PARAM F/U: HCPCS | Performed by: INTERNAL MEDICINE

## 2019-02-28 PROCEDURE — 3017F COLORECTAL CA SCREEN DOC REV: CPT | Performed by: INTERNAL MEDICINE

## 2019-02-28 PROCEDURE — G8427 DOCREV CUR MEDS BY ELIG CLIN: HCPCS | Performed by: INTERNAL MEDICINE

## 2019-02-28 PROCEDURE — 99214 OFFICE O/P EST MOD 30 MIN: CPT | Performed by: INTERNAL MEDICINE

## 2019-02-28 PROCEDURE — G8926 SPIRO NO PERF OR DOC: HCPCS | Performed by: INTERNAL MEDICINE

## 2019-02-28 PROCEDURE — G8484 FLU IMMUNIZE NO ADMIN: HCPCS | Performed by: INTERNAL MEDICINE

## 2019-02-28 PROCEDURE — 3023F SPIROM DOC REV: CPT | Performed by: INTERNAL MEDICINE

## 2019-02-28 RX ORDER — FENOFIBRATE 150 MG/1
CAPSULE ORAL
Refills: 2 | Status: ON HOLD | COMMUNITY
Start: 2019-02-13 | End: 2019-04-22 | Stop reason: SDDI

## 2019-02-28 RX ORDER — LISINOPRIL 40 MG/1
TABLET ORAL
Refills: 2 | COMMUNITY
Start: 2019-02-13 | End: 2019-02-28 | Stop reason: DRUGHIGH

## 2019-02-28 RX ORDER — OLANZAPINE 10 MG/1
TABLET ORAL
Refills: 2 | Status: ON HOLD | COMMUNITY
Start: 2019-02-18 | End: 2019-04-22 | Stop reason: SDDI

## 2019-02-28 RX ORDER — HYDROCHLOROTHIAZIDE 25 MG/1
TABLET ORAL
Refills: 2 | Status: ON HOLD | COMMUNITY
Start: 2019-02-13 | End: 2019-04-22 | Stop reason: SDDI

## 2019-02-28 ASSESSMENT — ENCOUNTER SYMPTOMS
ABDOMINAL PAIN: 0
RHINORRHEA: 0
SHORTNESS OF BREATH: 1
VOICE CHANGE: 0
VOMITING: 0
NAUSEA: 0
EYE ITCHING: 0
SORE THROAT: 0
WHEEZING: 1
DIARRHEA: 0
COUGH: 0
CHEST TIGHTNESS: 0

## 2019-04-09 ENCOUNTER — HOSPITAL ENCOUNTER (OUTPATIENT)
Dept: GENERAL RADIOLOGY | Age: 64
Discharge: HOME | End: 2019-04-11
Payer: COMMERCIAL

## 2019-04-09 ENCOUNTER — HOSPITAL ENCOUNTER (OUTPATIENT)
Dept: PREADMISSION TESTING | Age: 64
Discharge: HOME | End: 2019-04-13
Payer: COMMERCIAL

## 2019-04-09 VITALS
OXYGEN SATURATION: 98 % | SYSTOLIC BLOOD PRESSURE: 162 MMHG | HEART RATE: 62 BPM | DIASTOLIC BLOOD PRESSURE: 93 MMHG | HEIGHT: 65 IN | RESPIRATION RATE: 16 BRPM | WEIGHT: 213 LBS | TEMPERATURE: 97.6 F | BODY MASS INDEX: 35.49 KG/M2

## 2019-04-09 DIAGNOSIS — J40 BRONCHITIS, NOT SPECIFIED AS ACUTE OR CHRONIC: ICD-10-CM

## 2019-04-09 DIAGNOSIS — Z99.89 OSA ON CPAP: Chronic | ICD-10-CM

## 2019-04-09 DIAGNOSIS — Z72.0 TOBACCO USE: Chronic | ICD-10-CM

## 2019-04-09 DIAGNOSIS — G47.33 OSA ON CPAP: Chronic | ICD-10-CM

## 2019-04-09 DIAGNOSIS — J11.1 INFLUENZA: Chronic | ICD-10-CM

## 2019-04-09 PROBLEM — M16.12 OSTEOARTHRITIS OF LEFT HIP: Status: ACTIVE | Noted: 2019-04-09

## 2019-04-09 LAB
ABO/RH: NORMAL
ANION GAP SERPL CALCULATED.3IONS-SCNC: 13 MEQ/L (ref 9–15)
ANTIBODY SCREEN: NORMAL
BILIRUBIN URINE: NEGATIVE
BLOOD, URINE: NEGATIVE
BUN BLDV-MCNC: 11 MG/DL (ref 8–23)
CALCIUM SERPL-MCNC: 9.9 MG/DL (ref 8.5–9.9)
CHLORIDE BLD-SCNC: 94 MEQ/L (ref 95–107)
CLARITY: CLEAR
CO2: 27 MEQ/L (ref 20–31)
COLOR: YELLOW
CREAT SERPL-MCNC: 0.78 MG/DL (ref 0.7–1.2)
EKG ATRIAL RATE: 60 BPM
EKG P AXIS: 30 DEGREES
EKG P-R INTERVAL: 160 MS
EKG Q-T INTERVAL: 384 MS
EKG QRS DURATION: 104 MS
EKG QTC CALCULATION (BAZETT): 384 MS
EKG R AXIS: -11 DEGREES
EKG T AXIS: 36 DEGREES
EKG VENTRICULAR RATE: 60 BPM
GFR AFRICAN AMERICAN: >60
GFR NON-AFRICAN AMERICAN: >60
GLUCOSE BLD-MCNC: 102 MG/DL (ref 70–99)
GLUCOSE URINE: NEGATIVE MG/DL
HCT VFR BLD CALC: 41.1 % (ref 42–52)
HEMOGLOBIN: 13.6 G/DL (ref 14–18)
INR BLD: 1
KETONES, URINE: NEGATIVE MG/DL
LEUKOCYTE ESTERASE, URINE: NEGATIVE
MCH RBC QN AUTO: 32.7 PG (ref 27–31.3)
MCHC RBC AUTO-ENTMCNC: 33.2 % (ref 33–37)
MCV RBC AUTO: 98.7 FL (ref 80–100)
NITRITE, URINE: NEGATIVE
PDW BLD-RTO: 13.4 % (ref 11.5–14.5)
PH UA: 7.5 (ref 5–9)
PLATELET # BLD: 370 K/UL (ref 130–400)
POTASSIUM SERPL-SCNC: 4.2 MEQ/L (ref 3.4–4.9)
PROTEIN UA: NEGATIVE MG/DL
PROTHROMBIN TIME: 10 SEC (ref 9–11.5)
RBC # BLD: 4.17 M/UL (ref 4.7–6.1)
SODIUM BLD-SCNC: 134 MEQ/L (ref 135–144)
SPECIFIC GRAVITY UA: 1.01 (ref 1–1.03)
URINE REFLEX TO CULTURE: NORMAL
UROBILINOGEN, URINE: 0.2 E.U./DL
WBC # BLD: 12.7 K/UL (ref 4.8–10.8)

## 2019-04-09 PROCEDURE — 86900 BLOOD TYPING SEROLOGIC ABO: CPT

## 2019-04-09 PROCEDURE — 71046 X-RAY EXAM CHEST 2 VIEWS: CPT

## 2019-04-09 PROCEDURE — 93005 ELECTROCARDIOGRAM TRACING: CPT

## 2019-04-09 PROCEDURE — 81003 URINALYSIS AUTO W/O SCOPE: CPT

## 2019-04-09 PROCEDURE — 80048 BASIC METABOLIC PNL TOTAL CA: CPT

## 2019-04-09 PROCEDURE — 86850 RBC ANTIBODY SCREEN: CPT

## 2019-04-09 PROCEDURE — 85027 COMPLETE CBC AUTOMATED: CPT

## 2019-04-09 PROCEDURE — 85610 PROTHROMBIN TIME: CPT

## 2019-04-09 PROCEDURE — 86901 BLOOD TYPING SEROLOGIC RH(D): CPT

## 2019-04-09 RX ORDER — SODIUM CHLORIDE 0.9 % (FLUSH) 0.9 %
10 SYRINGE (ML) INJECTION EVERY 12 HOURS SCHEDULED
Status: CANCELLED | OUTPATIENT
Start: 2019-04-22

## 2019-04-09 RX ORDER — SODIUM CHLORIDE, SODIUM LACTATE, POTASSIUM CHLORIDE, CALCIUM CHLORIDE 600; 310; 30; 20 MG/100ML; MG/100ML; MG/100ML; MG/100ML
INJECTION, SOLUTION INTRAVENOUS CONTINUOUS
Status: CANCELLED | OUTPATIENT
Start: 2019-04-22

## 2019-04-09 RX ORDER — SODIUM CHLORIDE 0.9 % (FLUSH) 0.9 %
10 SYRINGE (ML) INJECTION PRN
Status: CANCELLED | OUTPATIENT
Start: 2019-04-22

## 2019-04-09 RX ORDER — DOXYCYCLINE 100 MG/1
100 TABLET ORAL DAILY
COMMUNITY
Start: 2019-04-04 | End: 2019-04-11

## 2019-04-09 RX ORDER — LIDOCAINE HYDROCHLORIDE 10 MG/ML
1 INJECTION, SOLUTION EPIDURAL; INFILTRATION; INTRACAUDAL; PERINEURAL
Status: CANCELLED | OUTPATIENT
Start: 2019-04-22 | End: 2019-04-22

## 2019-04-09 RX ORDER — BENZONATATE 100 MG/1
200 CAPSULE ORAL PRN
COMMUNITY
Start: 2019-04-04 | End: 2019-04-09

## 2019-04-09 NOTE — PROGRESS NOTES
Hibiclens wash neck to toes, front & back, left hip on Friday 4/19/2019, Saturday 4/20/2019, Sunday 4/21/2019. TXA Therapy protocol completed & paper copy on chart. H & PE / medical clearance done by Dr. Pippa Morales 3/22/2019 & paper copy on chart.

## 2019-04-10 PROCEDURE — 93010 ELECTROCARDIOGRAM REPORT: CPT | Performed by: INTERNAL MEDICINE

## 2019-04-21 ENCOUNTER — ANESTHESIA EVENT (OUTPATIENT)
Dept: OPERATING ROOM | Age: 64
DRG: 301 | End: 2019-04-21
Payer: COMMERCIAL

## 2019-04-21 ASSESSMENT — COPD QUESTIONNAIRES: CAT_SEVERITY: MODERATE

## 2019-04-22 ENCOUNTER — APPOINTMENT (OUTPATIENT)
Dept: GENERAL RADIOLOGY | Age: 64
DRG: 301 | End: 2019-04-22
Attending: ORTHOPAEDIC SURGERY
Payer: COMMERCIAL

## 2019-04-22 ENCOUNTER — HOSPITAL ENCOUNTER (INPATIENT)
Age: 64
LOS: 2 days | Discharge: HOME HEALTH | DRG: 301 | End: 2019-04-24
Attending: ORTHOPAEDIC SURGERY | Admitting: ORTHOPAEDIC SURGERY
Payer: COMMERCIAL

## 2019-04-22 ENCOUNTER — ANESTHESIA (OUTPATIENT)
Dept: OPERATING ROOM | Age: 64
DRG: 301 | End: 2019-04-22
Payer: COMMERCIAL

## 2019-04-22 VITALS — OXYGEN SATURATION: 100 % | TEMPERATURE: 64.8 F | DIASTOLIC BLOOD PRESSURE: 57 MMHG | SYSTOLIC BLOOD PRESSURE: 95 MMHG

## 2019-04-22 DIAGNOSIS — Z96.642 STATUS POST TOTAL HIP REPLACEMENT, LEFT: Primary | ICD-10-CM

## 2019-04-22 DIAGNOSIS — M16.12 PRIMARY OSTEOARTHRITIS OF LEFT HIP: ICD-10-CM

## 2019-04-22 LAB
ABO/RH: NORMAL
ANTIBODY SCREEN: NORMAL

## 2019-04-22 PROCEDURE — 2500000003 HC RX 250 WO HCPCS: Performed by: NURSE ANESTHETIST, CERTIFIED REGISTERED

## 2019-04-22 PROCEDURE — 97166 OT EVAL MOD COMPLEX 45 MIN: CPT

## 2019-04-22 PROCEDURE — 94664 DEMO&/EVAL PT USE INHALER: CPT

## 2019-04-22 PROCEDURE — 3600000004 HC SURGERY LEVEL 4 BASE: Performed by: ORTHOPAEDIC SURGERY

## 2019-04-22 PROCEDURE — C1713 ANCHOR/SCREW BN/BN,TIS/BN: HCPCS | Performed by: ORTHOPAEDIC SURGERY

## 2019-04-22 PROCEDURE — 3700000000 HC ANESTHESIA ATTENDED CARE: Performed by: ORTHOPAEDIC SURGERY

## 2019-04-22 PROCEDURE — 1210000000 HC MED SURG R&B

## 2019-04-22 PROCEDURE — 6370000000 HC RX 637 (ALT 250 FOR IP): Performed by: ORTHOPAEDIC SURGERY

## 2019-04-22 PROCEDURE — 3600000014 HC SURGERY LEVEL 4 ADDTL 15MIN: Performed by: ORTHOPAEDIC SURGERY

## 2019-04-22 PROCEDURE — 6360000002 HC RX W HCPCS: Performed by: NURSE ANESTHETIST, CERTIFIED REGISTERED

## 2019-04-22 PROCEDURE — 2709999900 HC NON-CHARGEABLE SUPPLY: Performed by: ORTHOPAEDIC SURGERY

## 2019-04-22 PROCEDURE — 86901 BLOOD TYPING SEROLOGIC RH(D): CPT

## 2019-04-22 PROCEDURE — 6360000002 HC RX W HCPCS: Performed by: ORTHOPAEDIC SURGERY

## 2019-04-22 PROCEDURE — 0SRB0JA REPLACEMENT OF LEFT HIP JOINT WITH SYNTHETIC SUBSTITUTE, UNCEMENTED, OPEN APPROACH: ICD-10-PCS | Performed by: ORTHOPAEDIC SURGERY

## 2019-04-22 PROCEDURE — 94150 VITAL CAPACITY TEST: CPT

## 2019-04-22 PROCEDURE — 97535 SELF CARE MNGMENT TRAINING: CPT

## 2019-04-22 PROCEDURE — 2500000003 HC RX 250 WO HCPCS: Performed by: STUDENT IN AN ORGANIZED HEALTH CARE EDUCATION/TRAINING PROGRAM

## 2019-04-22 PROCEDURE — 73502 X-RAY EXAM HIP UNI 2-3 VIEWS: CPT

## 2019-04-22 PROCEDURE — 7100000001 HC PACU RECOVERY - ADDTL 15 MIN: Performed by: ORTHOPAEDIC SURGERY

## 2019-04-22 PROCEDURE — 2580000003 HC RX 258: Performed by: NURSE ANESTHETIST, CERTIFIED REGISTERED

## 2019-04-22 PROCEDURE — 7100000000 HC PACU RECOVERY - FIRST 15 MIN: Performed by: ORTHOPAEDIC SURGERY

## 2019-04-22 PROCEDURE — 3700000001 HC ADD 15 MINUTES (ANESTHESIA): Performed by: ORTHOPAEDIC SURGERY

## 2019-04-22 PROCEDURE — 72170 X-RAY EXAM OF PELVIS: CPT

## 2019-04-22 PROCEDURE — 97162 PT EVAL MOD COMPLEX 30 MIN: CPT

## 2019-04-22 PROCEDURE — 6360000002 HC RX W HCPCS: Performed by: NURSE PRACTITIONER

## 2019-04-22 PROCEDURE — 86850 RBC ANTIBODY SCREEN: CPT

## 2019-04-22 PROCEDURE — C1776 JOINT DEVICE (IMPLANTABLE): HCPCS | Performed by: ORTHOPAEDIC SURGERY

## 2019-04-22 PROCEDURE — 86900 BLOOD TYPING SEROLOGIC ABO: CPT

## 2019-04-22 PROCEDURE — 2580000003 HC RX 258: Performed by: ORTHOPAEDIC SURGERY

## 2019-04-22 PROCEDURE — 2580000003 HC RX 258: Performed by: NURSE PRACTITIONER

## 2019-04-22 PROCEDURE — 2500000003 HC RX 250 WO HCPCS: Performed by: ORTHOPAEDIC SURGERY

## 2019-04-22 PROCEDURE — 6360000002 HC RX W HCPCS: Performed by: STUDENT IN AN ORGANIZED HEALTH CARE EDUCATION/TRAINING PROGRAM

## 2019-04-22 DEVICE — HEAD FEM DIA36MM -2.5MM OFFSET HIP BIOLOX DELT CERAMIC TAPR: Type: IMPLANTABLE DEVICE | Site: HIP | Status: FUNCTIONAL

## 2019-04-22 DEVICE — SHELL ACET SZ E DIA52MM HIP TRIDENT HA CLUS H HMSPHR MOD 2: Type: IMPLANTABLE DEVICE | Site: HIP | Status: FUNCTIONAL

## 2019-04-22 DEVICE — SCREW BNE L20MM DIA6.5MM STD CANC HIP TI HMSPHR THRD DRVR: Type: IMPLANTABLE DEVICE | Site: HIP | Status: FUNCTIONAL

## 2019-04-22 DEVICE — COMPONENT TOT HIP CAPPED ADV STRYHIPA] STRYKER CORP]: Type: IMPLANTABLE DEVICE | Site: HIP | Status: FUNCTIONAL

## 2019-04-22 DEVICE — LINER ACET SZ E ID36MM THK5.9MM 0DEG HIP X3 LOK RNG FOR: Type: IMPLANTABLE DEVICE | Site: HIP | Status: FUNCTIONAL

## 2019-04-22 DEVICE — SCREW BNE CANC STD 6.5X30 MM HIP ST PART THRD CANN NLCK TORX: Type: IMPLANTABLE DEVICE | Site: HIP | Status: FUNCTIONAL

## 2019-04-22 DEVICE — STEM FEM SZ 5 L108MM NK L35MM 40MM OFFSET 132DEG HIP TI: Type: IMPLANTABLE DEVICE | Site: HIP | Status: FUNCTIONAL

## 2019-04-22 RX ORDER — PROPOFOL 10 MG/ML
INJECTION, EMULSION INTRAVENOUS PRN
Status: DISCONTINUED | OUTPATIENT
Start: 2019-04-22 | End: 2019-04-22 | Stop reason: SDUPTHER

## 2019-04-22 RX ORDER — SODIUM CHLORIDE 0.9 % (FLUSH) 0.9 %
10 SYRINGE (ML) INJECTION EVERY 12 HOURS SCHEDULED
Status: DISCONTINUED | OUTPATIENT
Start: 2019-04-22 | End: 2019-04-22 | Stop reason: HOSPADM

## 2019-04-22 RX ORDER — SODIUM CHLORIDE 0.9 % (FLUSH) 0.9 %
10 SYRINGE (ML) INJECTION PRN
Status: DISCONTINUED | OUTPATIENT
Start: 2019-04-22 | End: 2019-04-24 | Stop reason: HOSPADM

## 2019-04-22 RX ORDER — ONDANSETRON 2 MG/ML
4 INJECTION INTRAMUSCULAR; INTRAVENOUS
Status: DISCONTINUED | OUTPATIENT
Start: 2019-04-22 | End: 2019-04-22 | Stop reason: HOSPADM

## 2019-04-22 RX ORDER — HYDROCODONE BITARTRATE AND ACETAMINOPHEN 5; 325 MG/1; MG/1
1 TABLET ORAL PRN
Status: DISCONTINUED | OUTPATIENT
Start: 2019-04-22 | End: 2019-04-22 | Stop reason: HOSPADM

## 2019-04-22 RX ORDER — MAGNESIUM HYDROXIDE 1200 MG/15ML
LIQUID ORAL CONTINUOUS PRN
Status: COMPLETED | OUTPATIENT
Start: 2019-04-22 | End: 2019-04-22

## 2019-04-22 RX ORDER — NAPROXEN 500 MG/1
500 TABLET ORAL 2 TIMES DAILY WITH MEALS
Status: DISCONTINUED | OUTPATIENT
Start: 2019-04-22 | End: 2019-04-22

## 2019-04-22 RX ORDER — ASPIRIN 325 MG
325 TABLET ORAL DAILY
Status: DISCONTINUED | OUTPATIENT
Start: 2019-04-22 | End: 2019-04-22

## 2019-04-22 RX ORDER — SODIUM CHLORIDE, SODIUM LACTATE, POTASSIUM CHLORIDE, CALCIUM CHLORIDE 600; 310; 30; 20 MG/100ML; MG/100ML; MG/100ML; MG/100ML
INJECTION, SOLUTION INTRAVENOUS CONTINUOUS
Status: DISCONTINUED | OUTPATIENT
Start: 2019-04-22 | End: 2019-04-22

## 2019-04-22 RX ORDER — ALBUTEROL SULFATE 2.5 MG/3ML
2.5 SOLUTION RESPIRATORY (INHALATION) EVERY 6 HOURS PRN
Status: DISCONTINUED | OUTPATIENT
Start: 2019-04-22 | End: 2019-04-24 | Stop reason: HOSPADM

## 2019-04-22 RX ORDER — HYDROCHLOROTHIAZIDE 25 MG/1
25 TABLET ORAL DAILY
Status: DISCONTINUED | OUTPATIENT
Start: 2019-04-22 | End: 2019-04-24 | Stop reason: HOSPADM

## 2019-04-22 RX ORDER — DIAZEPAM 5 MG/1
5 TABLET ORAL EVERY 6 HOURS PRN
Status: DISCONTINUED | OUTPATIENT
Start: 2019-04-22 | End: 2019-04-24 | Stop reason: HOSPADM

## 2019-04-22 RX ORDER — FENTANYL CITRATE 50 UG/ML
50 INJECTION, SOLUTION INTRAMUSCULAR; INTRAVENOUS EVERY 10 MIN PRN
Status: DISCONTINUED | OUTPATIENT
Start: 2019-04-22 | End: 2019-04-22 | Stop reason: HOSPADM

## 2019-04-22 RX ORDER — PROPOFOL 10 MG/ML
INJECTION, EMULSION INTRAVENOUS CONTINUOUS PRN
Status: DISCONTINUED | OUTPATIENT
Start: 2019-04-22 | End: 2019-04-22 | Stop reason: SDUPTHER

## 2019-04-22 RX ORDER — DIPHENHYDRAMINE HYDROCHLORIDE 50 MG/ML
12.5 INJECTION INTRAMUSCULAR; INTRAVENOUS
Status: DISCONTINUED | OUTPATIENT
Start: 2019-04-22 | End: 2019-04-22 | Stop reason: HOSPADM

## 2019-04-22 RX ORDER — GLYCOPYRROLATE 1 MG/5 ML
SYRINGE (ML) INTRAVENOUS PRN
Status: DISCONTINUED | OUTPATIENT
Start: 2019-04-22 | End: 2019-04-22 | Stop reason: SDUPTHER

## 2019-04-22 RX ORDER — TRANEXAMIC ACID 100 MG/ML
INJECTION, SOLUTION INTRAVENOUS PRN
Status: DISCONTINUED | OUTPATIENT
Start: 2019-04-22 | End: 2019-04-22 | Stop reason: SDUPTHER

## 2019-04-22 RX ORDER — HYDRALAZINE HYDROCHLORIDE 20 MG/ML
10 INJECTION INTRAMUSCULAR; INTRAVENOUS EVERY 6 HOURS PRN
Status: DISCONTINUED | OUTPATIENT
Start: 2019-04-22 | End: 2019-04-24 | Stop reason: HOSPADM

## 2019-04-22 RX ORDER — MORPHINE SULFATE 2 MG/ML
2 INJECTION, SOLUTION INTRAMUSCULAR; INTRAVENOUS
Status: DISCONTINUED | OUTPATIENT
Start: 2019-04-22 | End: 2019-04-24 | Stop reason: HOSPADM

## 2019-04-22 RX ORDER — HYDROCODONE BITARTRATE AND ACETAMINOPHEN 5; 325 MG/1; MG/1
2 TABLET ORAL PRN
Status: DISCONTINUED | OUTPATIENT
Start: 2019-04-22 | End: 2019-04-22 | Stop reason: HOSPADM

## 2019-04-22 RX ORDER — LIDOCAINE HYDROCHLORIDE 10 MG/ML
1 INJECTION, SOLUTION EPIDURAL; INFILTRATION; INTRACAUDAL; PERINEURAL
Status: DISCONTINUED | OUTPATIENT
Start: 2019-04-22 | End: 2019-04-22 | Stop reason: HOSPADM

## 2019-04-22 RX ORDER — ASPIRIN 81 MG/1
81 TABLET ORAL 2 TIMES DAILY
Status: DISCONTINUED | OUTPATIENT
Start: 2019-04-23 | End: 2019-04-24 | Stop reason: HOSPADM

## 2019-04-22 RX ORDER — BUPIVACAINE HYDROCHLORIDE 5 MG/ML
INJECTION, SOLUTION EPIDURAL; INTRACAUDAL PRN
Status: DISCONTINUED | OUTPATIENT
Start: 2019-04-22 | End: 2019-04-22 | Stop reason: SDUPTHER

## 2019-04-22 RX ORDER — SIMVASTATIN 40 MG
40 TABLET ORAL DAILY
Status: DISCONTINUED | OUTPATIENT
Start: 2019-04-22 | End: 2019-04-24 | Stop reason: HOSPADM

## 2019-04-22 RX ORDER — ONDANSETRON 2 MG/ML
4 INJECTION INTRAMUSCULAR; INTRAVENOUS EVERY 6 HOURS PRN
Status: DISCONTINUED | OUTPATIENT
Start: 2019-04-22 | End: 2019-04-24 | Stop reason: HOSPADM

## 2019-04-22 RX ORDER — ACETAMINOPHEN 325 MG/1
650 TABLET ORAL EVERY 6 HOURS SCHEDULED
Status: DISCONTINUED | OUTPATIENT
Start: 2019-04-22 | End: 2019-04-24 | Stop reason: HOSPADM

## 2019-04-22 RX ORDER — OXYCODONE HYDROCHLORIDE 5 MG/1
5 TABLET ORAL EVERY 4 HOURS PRN
Status: DISCONTINUED | OUTPATIENT
Start: 2019-04-22 | End: 2019-04-24 | Stop reason: HOSPADM

## 2019-04-22 RX ORDER — MEPERIDINE HYDROCHLORIDE 25 MG/ML
12.5 INJECTION INTRAMUSCULAR; INTRAVENOUS; SUBCUTANEOUS EVERY 5 MIN PRN
Status: DISCONTINUED | OUTPATIENT
Start: 2019-04-22 | End: 2019-04-22 | Stop reason: HOSPADM

## 2019-04-22 RX ORDER — MIDAZOLAM HYDROCHLORIDE 1 MG/ML
INJECTION INTRAMUSCULAR; INTRAVENOUS PRN
Status: DISCONTINUED | OUTPATIENT
Start: 2019-04-22 | End: 2019-04-22 | Stop reason: SDUPTHER

## 2019-04-22 RX ORDER — MORPHINE SULFATE 4 MG/ML
4 INJECTION, SOLUTION INTRAMUSCULAR; INTRAVENOUS
Status: DISCONTINUED | OUTPATIENT
Start: 2019-04-22 | End: 2019-04-24 | Stop reason: HOSPADM

## 2019-04-22 RX ORDER — METOCLOPRAMIDE HYDROCHLORIDE 5 MG/ML
10 INJECTION INTRAMUSCULAR; INTRAVENOUS
Status: DISCONTINUED | OUTPATIENT
Start: 2019-04-22 | End: 2019-04-22 | Stop reason: HOSPADM

## 2019-04-22 RX ORDER — SENNA AND DOCUSATE SODIUM 50; 8.6 MG/1; MG/1
1 TABLET, FILM COATED ORAL 2 TIMES DAILY
Status: DISCONTINUED | OUTPATIENT
Start: 2019-04-22 | End: 2019-04-24 | Stop reason: HOSPADM

## 2019-04-22 RX ORDER — TRAMADOL HYDROCHLORIDE 50 MG/1
50 TABLET ORAL EVERY 6 HOURS SCHEDULED
Status: DISCONTINUED | OUTPATIENT
Start: 2019-04-22 | End: 2019-04-24 | Stop reason: HOSPADM

## 2019-04-22 RX ORDER — SODIUM CHLORIDE, SODIUM LACTATE, POTASSIUM CHLORIDE, CALCIUM CHLORIDE 600; 310; 30; 20 MG/100ML; MG/100ML; MG/100ML; MG/100ML
INJECTION, SOLUTION INTRAVENOUS CONTINUOUS PRN
Status: DISCONTINUED | OUTPATIENT
Start: 2019-04-22 | End: 2019-04-22 | Stop reason: SDUPTHER

## 2019-04-22 RX ORDER — SODIUM CHLORIDE 0.9 % (FLUSH) 0.9 %
10 SYRINGE (ML) INJECTION PRN
Status: DISCONTINUED | OUTPATIENT
Start: 2019-04-22 | End: 2019-04-22 | Stop reason: HOSPADM

## 2019-04-22 RX ORDER — PHENYLEPHRINE HYDROCHLORIDE 10 MG/ML
INJECTION INTRAVENOUS PRN
Status: DISCONTINUED | OUTPATIENT
Start: 2019-04-22 | End: 2019-04-22 | Stop reason: SDUPTHER

## 2019-04-22 RX ORDER — KETOROLAC TROMETHAMINE 30 MG/ML
30 INJECTION, SOLUTION INTRAMUSCULAR; INTRAVENOUS EVERY 6 HOURS
Status: COMPLETED | OUTPATIENT
Start: 2019-04-22 | End: 2019-04-22

## 2019-04-22 RX ORDER — OLANZAPINE 10 MG/1
10 TABLET ORAL NIGHTLY
Status: DISCONTINUED | OUTPATIENT
Start: 2019-04-22 | End: 2019-04-24 | Stop reason: HOSPADM

## 2019-04-22 RX ORDER — MELOXICAM 7.5 MG/1
7.5 TABLET ORAL DAILY
Status: DISCONTINUED | OUTPATIENT
Start: 2019-04-23 | End: 2019-04-24 | Stop reason: HOSPADM

## 2019-04-22 RX ORDER — TRAMADOL HYDROCHLORIDE 50 MG/1
50 TABLET ORAL 2 TIMES DAILY
Status: ON HOLD | COMMUNITY
End: 2019-04-24 | Stop reason: HOSPADM

## 2019-04-22 RX ORDER — SODIUM CHLORIDE 9 MG/ML
INJECTION, SOLUTION INTRAVENOUS CONTINUOUS
Status: DISCONTINUED | OUTPATIENT
Start: 2019-04-22 | End: 2019-04-24 | Stop reason: HOSPADM

## 2019-04-22 RX ORDER — LISINOPRIL 20 MG/1
40 TABLET ORAL DAILY
Status: DISCONTINUED | OUTPATIENT
Start: 2019-04-22 | End: 2019-04-24 | Stop reason: HOSPADM

## 2019-04-22 RX ORDER — SODIUM CHLORIDE 0.9 % (FLUSH) 0.9 %
10 SYRINGE (ML) INJECTION EVERY 12 HOURS SCHEDULED
Status: DISCONTINUED | OUTPATIENT
Start: 2019-04-22 | End: 2019-04-24 | Stop reason: HOSPADM

## 2019-04-22 RX ADMIN — MIDAZOLAM HYDROCHLORIDE 2 MG: 1 INJECTION, SOLUTION INTRAMUSCULAR; INTRAVENOUS at 07:29

## 2019-04-22 RX ADMIN — TRANEXAMIC ACID 1000 MG: 1 INJECTION, SOLUTION INTRAVENOUS at 10:00

## 2019-04-22 RX ADMIN — BUPIVACAINE HYDROCHLORIDE 10 MG: 5 INJECTION, SOLUTION EPIDURAL; INTRACAUDAL; PERINEURAL at 07:44

## 2019-04-22 RX ADMIN — PHENYLEPHRINE HYDROCHLORIDE 100 MCG: 10 INJECTION INTRAVENOUS at 09:16

## 2019-04-22 RX ADMIN — MEPERIDINE HYDROCHLORIDE 12.5 MG: 25 INJECTION INTRAMUSCULAR; INTRAVENOUS; SUBCUTANEOUS at 09:55

## 2019-04-22 RX ADMIN — SODIUM CHLORIDE: 9 INJECTION, SOLUTION INTRAVENOUS at 12:12

## 2019-04-22 RX ADMIN — TRAMADOL HYDROCHLORIDE 50 MG: 50 TABLET, FILM COATED ORAL at 18:57

## 2019-04-22 RX ADMIN — SODIUM CHLORIDE, POTASSIUM CHLORIDE, SODIUM LACTATE AND CALCIUM CHLORIDE: 600; 310; 30; 20 INJECTION, SOLUTION INTRAVENOUS at 07:12

## 2019-04-22 RX ADMIN — SENNOSIDES,DOCUSATE SODIUM 1 TABLET: 50; 8.6 TABLET, FILM COATED ORAL at 20:01

## 2019-04-22 RX ADMIN — PROPOFOL 10 MG: 10 INJECTION, EMULSION INTRAVENOUS at 07:36

## 2019-04-22 RX ADMIN — LISINOPRIL 40 MG: 20 TABLET ORAL at 16:15

## 2019-04-22 RX ADMIN — KETOROLAC TROMETHAMINE 30 MG: 30 INJECTION, SOLUTION INTRAMUSCULAR at 12:11

## 2019-04-22 RX ADMIN — PHENYLEPHRINE HYDROCHLORIDE 100 MCG: 10 INJECTION INTRAVENOUS at 08:25

## 2019-04-22 RX ADMIN — Medication 0.2 MG: at 10:08

## 2019-04-22 RX ADMIN — KETOROLAC TROMETHAMINE 30 MG: 30 INJECTION, SOLUTION INTRAMUSCULAR at 18:57

## 2019-04-22 RX ADMIN — SODIUM CHLORIDE, POTASSIUM CHLORIDE, SODIUM LACTATE AND CALCIUM CHLORIDE: 600; 310; 30; 20 INJECTION, SOLUTION INTRAVENOUS at 07:08

## 2019-04-22 RX ADMIN — Medication 2 G: at 07:39

## 2019-04-22 RX ADMIN — CEFAZOLIN SODIUM 2 G: 1 INJECTION, SOLUTION INTRAVENOUS at 16:19

## 2019-04-22 RX ADMIN — ACETAMINOPHEN 650 MG: 325 TABLET ORAL at 12:11

## 2019-04-22 RX ADMIN — PHENYLEPHRINE HYDROCHLORIDE 100 MCG: 10 INJECTION INTRAVENOUS at 08:20

## 2019-04-22 RX ADMIN — PROPOFOL 10 MG: 10 INJECTION, EMULSION INTRAVENOUS at 07:42

## 2019-04-22 RX ADMIN — PHENYLEPHRINE HYDROCHLORIDE 100 MCG: 10 INJECTION INTRAVENOUS at 08:13

## 2019-04-22 RX ADMIN — OXYCODONE HYDROCHLORIDE 5 MG: 5 TABLET ORAL at 16:15

## 2019-04-22 RX ADMIN — SODIUM CHLORIDE, POTASSIUM CHLORIDE, SODIUM LACTATE AND CALCIUM CHLORIDE: 600; 310; 30; 20 INJECTION, SOLUTION INTRAVENOUS at 08:20

## 2019-04-22 RX ADMIN — DIAZEPAM 5 MG: 5 TABLET ORAL at 13:24

## 2019-04-22 RX ADMIN — SIMVASTATIN 40 MG: 40 TABLET, FILM COATED ORAL at 20:01

## 2019-04-22 RX ADMIN — SODIUM CHLORIDE, POTASSIUM CHLORIDE, SODIUM LACTATE AND CALCIUM CHLORIDE: 600; 310; 30; 20 INJECTION, SOLUTION INTRAVENOUS at 09:32

## 2019-04-22 RX ADMIN — PHENYLEPHRINE HYDROCHLORIDE 100 MCG: 10 INJECTION INTRAVENOUS at 07:57

## 2019-04-22 RX ADMIN — TRAMADOL HYDROCHLORIDE 50 MG: 50 TABLET, FILM COATED ORAL at 12:48

## 2019-04-22 RX ADMIN — OLANZAPINE 10 MG: 10 TABLET, FILM COATED ORAL at 20:01

## 2019-04-22 RX ADMIN — MEPERIDINE HYDROCHLORIDE 12.5 MG: 25 INJECTION INTRAMUSCULAR; INTRAVENOUS; SUBCUTANEOUS at 10:05

## 2019-04-22 RX ADMIN — DIAZEPAM 5 MG: 5 TABLET ORAL at 20:01

## 2019-04-22 RX ADMIN — PROPOFOL 40 MCG/KG/MIN: 10 INJECTION, EMULSION INTRAVENOUS at 07:53

## 2019-04-22 RX ADMIN — OXYCODONE HYDROCHLORIDE 5 MG: 5 TABLET ORAL at 21:14

## 2019-04-22 RX ADMIN — ACETAMINOPHEN 650 MG: 325 TABLET ORAL at 18:57

## 2019-04-22 RX ADMIN — TRANEXAMIC ACID 1000 MG: 1 INJECTION, SOLUTION INTRAVENOUS at 07:59

## 2019-04-22 RX ADMIN — PROPOFOL 10 MG: 10 INJECTION, EMULSION INTRAVENOUS at 07:32

## 2019-04-22 ASSESSMENT — PULMONARY FUNCTION TESTS
PIF_VALUE: 1
PIF_VALUE: 0
PIF_VALUE: 1
PIF_VALUE: 0
PIF_VALUE: 1
PIF_VALUE: 0
PIF_VALUE: 1
PIF_VALUE: 3
PIF_VALUE: 1
PIF_VALUE: 0
PIF_VALUE: 1
PIF_VALUE: 0
PIF_VALUE: 1
PIF_VALUE: 0
PIF_VALUE: 1

## 2019-04-22 ASSESSMENT — PAIN SCALES - GENERAL
PAINLEVEL_OUTOF10: 6
PAINLEVEL_OUTOF10: 7
PAINLEVEL_OUTOF10: 6
PAINLEVEL_OUTOF10: 0
PAINLEVEL_OUTOF10: 0
PAINLEVEL_OUTOF10: 7
PAINLEVEL_OUTOF10: 6
PAINLEVEL_OUTOF10: 6
PAINLEVEL_OUTOF10: 2

## 2019-04-22 ASSESSMENT — PAIN DESCRIPTION - LOCATION
LOCATION: HIP
LOCATION: HIP

## 2019-04-22 ASSESSMENT — PAIN DESCRIPTION - PAIN TYPE
TYPE: CHRONIC PAIN
TYPE: CHRONIC PAIN

## 2019-04-22 ASSESSMENT — PAIN DESCRIPTION - DESCRIPTORS: DESCRIPTORS: ACHING;SORE

## 2019-04-22 ASSESSMENT — PAIN DESCRIPTION - ORIENTATION
ORIENTATION: LEFT
ORIENTATION: LEFT

## 2019-04-22 ASSESSMENT — PAIN - FUNCTIONAL ASSESSMENT: PAIN_FUNCTIONAL_ASSESSMENT: 0-10

## 2019-04-22 NOTE — PROGRESS NOTES
[facility] OCCUPATIONAL THERAPY EVALUATION - ACUTE     Date: 2019  Patient Name: Adrienne Mcintyre        MRN: [id number]  Account: [de-identified]   : 1955  (66 y.o.)  Room: [place] [address]    Chart Review:  Diagnosis:  The primary encounter diagnosis was Status post total hip replacement, left. A diagnosis of Primary osteoarthritis of left hip was also pertinent to this visit. Past Medical History:   Diagnosis Date    Asthma     COPD (chronic obstructive pulmonary disease) ([address]     Depression     Hypercholesteremia     past trx > 5 yrs    Hypertension     meds > 3 yrs    Lung disease     Osteoarthritis     left hip     Past Surgical History:   Procedure Laterality Date    COLONOSCOPY  14    JARMOSELENITA    ENDOSCOPY, COLON, DIAGNOSTIC      EYE SURGERY Bilateral     cataracts    FINGER SURGERY Right  ? index finger laceration repair    HERNIA REPAIR  2004    repair [facility]     [place] COLON CA SCRN NOT  [address] [place] [address] N/A 2017    COLONOSCOPY performed by Chelly Choe MD at [facility]       Restrictions  Restrictions/Precautions: Weight Bearing  Lower Extremity Weight Bearing Restrictions  Left Lower Extremity Weight Bearing: Weight Bearing As Tolerated  Position Activity Restriction  Hip Precautions: Posterior hip precautions     Safety Devices: Safety Devices  Safety Devices in place: Yes  Type of devices:  All fall risk precautions in place    Subjective  Pre Treatment Pain Screening  Pain at present: 6  Scale Used: Numeric Score  Intervention List: Patient able to continue with treatment, Patient declined any intervention    Pain Reassessment:   Pain Assessment  Patient Currently in Pain: Yes  Pain Assessment: 0-10  Pain Level: 6  Pain Type: Chronic pain  Pain Location: Hip  Pain Orientation: Left  Pain Descriptors: Aching, Sore       Orientation  Orientation  Overall Orientation Status: Within Functional Limits    Prior Level of Function:  Social/Functional History  Lives With: Alone  Type of Home: Apartment  Home Layout: One level  Home Access: Level entry  Bathroom Shower/Tub: Walk-in shower  Bathroom Toilet: Standard  Bathroom Equipment: Grab bars in shower, Shower chair  ADL Assistance: Independent  Homemaking Assistance: Independent  Homemaking Responsibilities: Yes  Ambulation Assistance: Independent(no AD)  Transfer Assistance: Independent  Active : Yes  Occupation: On disability    OBJECTIVE:     Orientation Status:  Orientation  Overall Orientation Status: Within Functional Limits    Observation:  Observation/Palpation  Posture: Good  Observation: Pt. alert and attentive    Cognition Status:  Cognition  Overall Cognitive Status: WFL    Perception Status:  Perception  Overall Perceptual Status: WFL    Sensation Status:  Sensation  Overall Sensation Status: WFL    Vision and Hearing Status:  Vision  Vision: Within Functional Limits  Hearing  Hearing: Within functional limits     ROM:   LUE AROM (degrees)  LUE AROM : WFL  Left Hand AROM (degrees)  Left Hand AROM: WFL  RUE AROM (degrees)  RUE AROM : WFL  Right Hand AROM (degrees)  Right Hand AROM: WFL    Strength:  LUE Strength  Gross LUE Strength: WFL  L Hand Grasp: 4/5  L Hand Release: 4/5  LUE Strength Comment: 4/5 all planes  RUE Strength  Gross RUE Strength: WFL  R Hand Grasp: 4/5  R Hand Release: 4/5  RUE Strength Comment: 4/5 all planes    Coordination, Tone, Quality of Movement: Tone RUE  RUE Tone: Normotonic  Tone LUE  LUE Tone: Normotonic  Coordination  Movements Are Fluid And Coordinated: No  Coordination and Movement description: Fine motor impairments, Tremors, Decreased speed, Decreased accuracy, Right UE, Left UE    Hand Dominance:  Hand Dominance  Hand Dominance: Right    ADL Status:  ADL  Feeding: Independent  Grooming: Setup  UE Bathing: Setup  LE Bathing: Moderate assistance  UE Dressing: Setup  LE Dressing: Maximum assistance  Toileting: Moderate assistance  Additional Comments: Simulated ADLs as above. Educated pt. on how to manage ADLs while maintaining hip precautions. Pt. with F understanding demo'd on hip precautions; limited due to limited [place] proficiency.  phone not available at this time. Toilet Transfers  Toilet Transfer: Unable to assess  Toilet Transfers Comments: Anticipate CGA, based on other mobility       Functional Mobility:  Functional Mobility  Functional - Mobility Device: Rolling Walker  Activity: Other  Assist Level: Contact guard assistance  Functional Mobility Comments: Pt. demonstrates F balance, heavy use of UEs during ambulation. Transfers  Sit to stand: Contact guard assistance  Stand to sit: Contact guard assistance  Transfer Comments: Verbal and visual cues for maintaining hip precautions    Bed Mobility  Bed mobility  Supine to Sit: Minimal assistance  Sit to Supine: Unable to assess(DNT- up to chair)    Seated and Standing Balance:  Balance  Sitting Balance: Modified independent   Standing Balance: Contact guard assistance    Functional Endurance:  Activity Tolerance  Activity Tolerance: Patient Tolerated treatment well     Educated pt. on hip precautions for dressing and bathing. Pt. reports understanding but may be limited due to decreased English. No interpretation service available at time of eval. Pt. educated on AE availability. D/C Recommendations:    Equipment Recommendations:  Hip kit    OT Follow Up:  OT D/C RECOMMENDATIONS  REQUIRES OT FOLLOW UP: Yes       Assessment/Discharge Disposition:  Assessment: Pt. is a 66year old man from home alone who presents to [facility] with the above deficits which impact his ability to perform his ADLs and IADLs. Pt. would benefit from skilled OT to maximize independence and safety for ADL tasks.    Performance deficits / Impairments: Decreased functional mobility , Decreased ADL status, Decreased endurance, Decreased balance, Decreased high-level IADLs, Decreased fine motor control, Decreased coordination, Decreased safe

## 2019-04-22 NOTE — PROGRESS NOTES
Physical Therapy Med Surg Initial Assessment  Facility/Department: [facility]  Room: N223/N223-       NAME: Cassius Shook  : 1955 (66 y.o.)  MRN: [id number]  CODE STATUS: Full Code    Date of Service: 2019    Patient Diagnosis(es): Osteoarthritis of left hip, unspecified osteoarthritis type [M16.12]   No chief complaint on file. Patient Active Problem List    Diagnosis Date Noted    Tobacco use 2019    Influenza 2019    RANDI on CPAP 2019    Osteoarthritis of left hip 2019    Chronic obstructive pulmonary disease ([facility] [address] 2017    Osteoarthritis     Hypertension     Hypercholesteremia     Depression     Asthma         Past Medical History:   Diagnosis Date    Asthma     COPD (chronic obstructive pulmonary disease) ([facility] [address]     Depression     Hypercholesteremia     past trx > 5 yrs    Hypertension     meds > 3 yrs    Lung disease     Osteoarthritis     left hip     Past Surgical History:   Procedure Laterality Date    COLONOSCOPY  14    JENNIFER    ENDOSCOPY, COLON, DIAGNOSTIC      EYE SURGERY Bilateral     cataracts    FINGER SURGERY Right  ?     index finger laceration repair    HERNIA REPAIR  2004    repair [facility]     AR COLON CA SCRN NOT  [address] [place] [address] N/A 2017    COLONOSCOPY performed by Edilson Werner MD at [facility]       Chart Reviewed: Yes  Patient assessed for rehabilitation services?: Yes  Family / Caregiver Present: No  General Comment  Comments: Pt awake in bed, agreeable to PT eval    Restrictions:  Restrictions/Precautions: Weight Bearing  Lower Extremity Weight Bearing Restrictions  Left Lower Extremity Weight Bearing: Weight Bearing As Tolerated  Position Activity Restriction  Hip Precautions: Posterior hip precautions     SUBJECTIVE:    Pre Treatment Pain Screening  Pain at present: 6  Intervention List: Patient able to continue with treatment    Post Treatment Pain Screening:   Pain Screening  Patient Currently in high  Exam: high  Clinical Presentation: medium    Prognosis: Good  Patient Education: PT POC, hip precautions, HEP, falls precautions and use of call light    DISCHARGE RECOMMENDATIONS:  Discharge Recommendations: Continue to assess pending progress, Patient would benefit from continued therapy after discharge    Assessment: Pt demonstrates the above deficits and decline in functional mobility s/p L NANO placing them at increased risk for falls. Pt would benefit from physical therapy to address above deficits and allow for safe return home at highest level of function, decrease risk for falls, and improve QOL.     REQUIRES PT FOLLOW UP: Yes      PLAN OF CARE:  Plan  Times per week: 5-7  Times per day: Twice a day  Current Treatment Recommendations: Strengthening, Functional Mobility Training, Neuromuscular Re-education, Home Exercise Program, Equipment Evaluation, Education, & procurement, Transfer Training, Gait Training, Safety Education & Training, Modalities, Balance Training, Endurance Training, Stair training, Pain Management, Patient/Caregiver Education & Training  Safety Devices  Type of devices: Call light within reach, Chair alarm in place, Nurse notified    Goals:  Patient goals : agreeable to PT POC  Short term goals  Short term goal 1: indep with HEP  Short term goal 2: recall 3/3 hip precautions  Long term goals  Long term goal 1: supervision bed mobility  Long term goal 2: supervision with transfers within hip precautions  Long term goal 3: pt to ambulate 150 ft FWW supervision    [facility] (6 CLICK) [id number] Mobility Raw Score : 17     Therapy Time:   Individual   Time In 1335   Time Out 1358   [address] PT, 04/22/19 at 2:13 PM

## 2019-04-22 NOTE — PROGRESS NOTES
[facility] [place] Respiratory Therapy Evaluation   Current Order:  Albuterol q6 prn  Home Regimen: yes     Ordering Physician:  yahaira  Re-evaluation Date:  no    Diagnosis: post op     Patient Status: Stable / Unstable + Physician notified    The following MDI Criteria must be met in order to convert aerosol to MDI with spacer. If unable to meet, MDI will be converted to aerosol:  []  Patient able to demonstrate the ability to use MDI effectively  []  Patient alert and cooperative  []  Patient able to take deep breath with 5-10 second hold  []  Medication(s) available in this delivery method   []  Peak flow greater than or equal to 200 ml/min            Current Order Substituted To  (same drug, same frequency)   Aerosol to MDI [] Albuterol Sulfate 0.083% unit dose by aerosol Albuterol Sulfate MDI 2 puffs by inhalation with spacer    [] Levalbuterol 1.25 mg unit dose by aerosol Levalbuterol MDI 2 puffs by inhalation with spacer    [] Levalbuterol 0.63 mg unit dose by aerosol Levalbuterol MDI 2 puffs by inhalation with spacer    [] Ipratropium Bromide 0.02% unit dose by aerosol Ipratropium Bromide MDI 2 puffs by inhalation with spacer    [] Duoneb (Ipratropium + Albuterol) unit dose by aerosol Ipratropium MDI + Albuterol MDI 2 puffs by inhalation w/spacer   MDI to Aerosol [] Albuterol Sulfate MDI Albuterol Sulfate 0.083% unit dose by aerosol    [] Levalbuterol MDI 2 puffs by inhalation Levalbuterol 1.25 mg unit dose by aerosol    [] Ipratropium Bromide MDI by inhalation Ipratropium Bromide 0.02% unit dose by aerosol    [] Combivent (Ipratropium + Albuterol) MDI by inhalation Duoneb (Ipratropium + Albuterol) unit dose by aerosol   Treatment Assessment [Frequency/Schedule]:  Change frequency to: _________no change_________________________________________per Protocol, P&T, MEC      Points 0 1 2 3 4   Pulmonary Status  Non-Smoker  []   Smoking history   < 20 pack years  []   Smoking history  ?  20 pack years  [x]   Pulmonary

## 2019-04-22 NOTE — H&P
History and physical note from Dr. Ariana Donaldson on 3/22/2019 was reviewed. The patient was re-evaluated and there are no changes to the exam or medical history. The procedure and expected postoperative course were discussed and reviewed with the patient. The risks and benefits were discussed and reviewed. The consent was reviewed and confirmed.     Gill Hernandez MD

## 2019-04-22 NOTE — ANESTHESIA PROCEDURE NOTES
Spinal Block    Patient location during procedure: OR  Start time: 4/22/2019 7:33 AM  End time: 4/22/2019 7:44 AM  Reason for block: primary anesthetic  Staffing  Anesthesiologist: Jose Willams DO  Performed: anesthesiologist   Preanesthetic Checklist  Completed: patient identified, site marked, surgical consent, pre-op evaluation, timeout performed, IV checked, risks and benefits discussed, monitors and equipment checked, anesthesia consent given, oxygen available and patient being monitored  Spinal Block  Patient position: sitting  Prep: Betadine  Patient monitoring: cardiac monitor, continuous pulse ox and frequent blood pressure checks  Approach: right paramedian  Location: L4/L5  Provider prep: mask and sterile gloves  Local infiltration: lidocaine  Agent: bupivacaine (Isobaric bupivicain 0.5%)  Dose: 2  Dose: 2  Needle  Needle type: Qu[place]ke   Needle gauge: 24 G  Needle length: 5 in  Assessment  Sensory level: T6  Events: cerebrospinal fluid  Lysis level: CSF aspirated. CSF: clear  Attempts: 3+  Hemodynamics: stable  Additional Notes  Free flowing CSF. Negative heme. Negative paresthesia. Difficult positioning for spinal.  Changed approach to far right paramedian and 5 in 22G [place]. Gail Camacho

## 2019-04-22 NOTE — CARE COORDINATION
I MET WITH PATIENT WHO STATES HE IS FROM HOME ALONE IN AN APARTMENT. HE STATES HE HAS A CANE. HE LIKELY WILL NEED A WALKER. DC PLAN WILL BE TO RETURN HOME WHEN MEDICALLY CLEARED.   CARE TEAM WILL CONTINUE TO FOLLOW

## 2019-04-22 NOTE — PLAN OF CARE
See OT evaluation for all goals and OT POC.  Electronically signed by JOSE ANGEL Clements/L on 4/22/2019 at 2:15 PM

## 2019-04-22 NOTE — ANESTHESIA POSTPROCEDURE EVALUATION
Department of Anesthesiology  Postprocedure Note    Patient: Zonia Devine  MRN: [id number]  YOB: 1955  Date of evaluation: 4/22/2019  Time:  2:52 PM     Procedure Summary     Date:  04/22/19 Room / Location:  [place] [address] / [facility] OR    Anesthesia Start:  0729 Anesthesia Stop:      Procedure:  LEFT HIP TOTAL HIP ARTHROPLASTY, LATERAL DECUB, GRACIE (Left ) Diagnosis:  (LEFT HIP OSTEOARTHRITIS)    Surgeon:  Gabrielle Shaver MD Responsible Provider:  Miriam Pérez DO    Anesthesia Type:  spinal ASA Status:  3          Anesthesia Type: spinal    Edson Phase I: Edson Score: 9    Edson Phase II:      Last vitals: Reviewed and per EMR flowsheets. Anesthesia Post Evaluation    Patient location during evaluation: PACU  Patient participation: complete - patient participated  Level of consciousness: awake and alert  Pain score: 0  Airway patency: patent  Nausea & Vomiting: no nausea and no vomiting  Complications: no  Cardiovascular status: blood pressure returned to baseline and hemodynamically stable  Respiratory status: acceptable and face mask  Hydration status: euvolemic  Comments: Patient acutely bradycardic in PACU. TXA stopped.   Glycopyrolate 0.2 mg.  HR now 74

## 2019-04-22 NOTE — ANESTHESIA PRE PROCEDURE
500 mg by mouth 2 times daily (with meals)    Historical Provider, MD   guaiFENesin 100 MG PACK Take by mouth daily     Historical Provider, MD   aspirin 325 MG tablet Take 325 mg by mouth daily. Historical Provider, MD       Current medications:    Current Facility-Administered Medications   Medication Dose Route Frequency Provider Last Rate Last Dose    lactated ringers infusion   Intravenous Continuous Cristela Vikram APRN -  mL/hr at 04/22/19 0708      lidocaine PF 1 % injection 1 mL  1 mL Intradermal Once PRN Cristela Feeler, APRN - CNP        sodium chloride flush 0.9 % injection 10 mL  10 mL Intravenous 2 times per day Cristela Feelemmanuel, APRN - CNP        sodium chloride flush 0.9 % injection 10 mL  10 mL Intravenous PRN Cristela Sue APRN - CNP        ceFAZolin (ANCEF) 2 g in dextrose 5 % 100 mL IVPB  2 g Intravenous Once Cristela Feeler, APRN - CNP           Allergies:  No Known Allergies    Problem List:    Patient Active Problem List   Diagnosis Code    Osteoarthritis M19.90    Hypertension I10    Hypercholesteremia E78.00    Depression F32.9    Asthma J45.909    Chronic obstructive pulmonary disease ([facility] [address] J44.9    Tobacco use Z72.0    Influenza J11.1    RANDI on CPAP G47.33, Z99.89    Osteoarthritis of left hip M16.12       Past Medical History:        Diagnosis Date    Asthma     COPD (chronic obstructive pulmonary disease) ([facility] [address]     Depression     Hypercholesteremia     past trx > 5 yrs    Hypertension     meds > 3 yrs    Lung disease     Osteoarthritis     left hip       Past Surgical History:        Procedure Laterality Date    COLONOSCOPY  5/2/14    JARMOSZULETY    ENDOSCOPY, COLON, DIAGNOSTIC      EYE SURGERY Bilateral     cataracts    FINGER SURGERY Right 1999 ?     index finger laceration repair    HERNIA REPAIR  2004    repair [facility]     CT COLON CA SCRN NOT  [address]  IND N/A 5/17/2017    COLONOSCOPY performed [facility] Arsenio Miller MD [facility] Date    PROTIME 10.0 04/09/2019    INR 1.0 04/09/2019    APTT 28.3 09/17/2013       HCG (If Applicable): No results found for: PREGTESTUR, PREGSERUM, HCG, HCGQUANT     ABGs: No results found for: PHART, PO2ART, PPF8VYP, LSY7TNL, BEART, H8APLJOQ     Type & Screen (If Applicable):  No results found for: LABABO, [address]    Anesthesia Evaluation  Patient summary reviewed and Nursing notes reviewed no history of anesthetic complications:   Airway: Mallampati: II  TM distance: >3 FB   Neck ROM: full  Mouth opening: > = 3 FB Dental: normal exam         Pulmonary:normal exam  breath sounds clear to auscultation  (+) COPD: moderate,  sleep apnea:  asthma:                            Cardiovascular:  Exercise tolerance: good (>4 METS),   (+) hypertension:, hyperlipidemia      ECG reviewed  Rhythm: regular  Rate: normal           Beta Blocker:  Not on Beta Blocker      ROS comment: Normal sinus rhythm  Incomplete right bundle branch block  Borderline ECG  No previous ECGs available     Neuro/Psych:   (+) psychiatric history:            GI/Hepatic/Renal: Neg GI/Hepatic/Renal ROS            Endo/Other: Negative Endo/Other ROS   (+) : arthritis: OA., . Pt had PAT visit. Abdominal:           Vascular: negative vascular ROS. Anesthesia Plan      spinal     ASA 3     (Bupivicaine 0.5% 10 mg )      MIPS: Prophylactic antiemetics administered. Anesthetic plan and risks discussed with patient. Plan discussed with CRNA.     Attending anesthesiologist reviewed and agrees with DO Jamel   4/22/2019

## 2019-04-22 NOTE — ADDENDUM NOTE
Addendum  created 04/22/19 1450 by Stephani Wright,     Actions taken from a BestPractice Advisory, Order sets accessed, Sign clinical note

## 2019-04-22 NOTE — H&P
Orthopaedic Surgery History and Physical Note    No chief complaint on file. History of Present Illness: Malaika Thacker is a 66year-old male with medical history significant for COPD and tobacco abuse. Severe pain in the left hip with ambulation, improved with injection and rest. Denies any numbness or tingling. Physical Exam:    General:  Alert and oriented x 3, NAD, well kempt, and of stated age. Vital Signs:  BP (!) 133/94   Pulse 79   Temp 98.2 °F (36.8 °C) (Temporal)   Resp 18   Ht 5' 5\" (1.651 m)   Wt 219 lb (99.3 kg)   SpO2 97%   BMI 36.44 kg/m²  , Body mass index is 36.44 kg/m². HEENT:  Head is normocephalic and atraumatic. Extraocular muscles are grossly intact. Neck:  Supple, no visible swelling. Cardiovascular:  Hemodynamically stable. Respiratory:  No audible wheezing, unlabored respirations. Skin:  Clean and dry, well kempt. Psychiatric:  Good affect, stable, cooperative  Left hip: Skin intact with mild trochanteric tenderness. Painful hip ROM. Positive Stinchfield. Motor and sensory intact distally. Toes warm and perfused. Assessment/Plan: 66year-old male with left hip osteoarthritis. 1. Plan for total hip arthroplasty today. Preoperative medical clearance provided by Dr. Jade Hebert on 3/22/2019.  2. Please refer to office note for further details. Past Medical History    Past Medical History:   Diagnosis Date    Asthma     COPD (chronic obstructive pulmonary disease) ([facility] [address]     Depression     Hypercholesteremia     past trx > 5 yrs    Hypertension     meds > 3 yrs    Lung disease     Osteoarthritis     left hip       Past Surgical History    Past Surgical History:   Procedure Laterality Date    COLONOSCOPY  5/2/14    JENNIFER    ENDOSCOPY, COLON, DIAGNOSTIC      EYE SURGERY Bilateral     cataracts    FINGER SURGERY Right 1999 ?     index finger laceration repair    HERNIA REPAIR  2004    repair [facility]     [place] COLON [place] SCRN NOT  [address]  [place] N/A 5/17/2017  No Known Problems Sister     No Known Problems Sister     No Known Problems Son        Social History:    Social History     Socioeconomic History    Marital status:      Spouse name: Not on file    Number of children: Not on file    Years of education: Not on file    Highest education level: Not on file   Occupational History    Not on file   Social Needs    Financial resource strain: Not on file    Food insecurity:     Worry: Not on file     Inability: Not on file    Transportation needs:     Medical: Not on file     Non-medical: Not on file   Tobacco Use    Smoking status: Former Smoker     Packs/day: 1.50     Years: 46.00     Pack years: 69.00     Types: Cigarettes     Last attempt to quit: 3/20/2019     Years since quittin.0    Smokeless tobacco: Never Used   Substance and Sexual Activity    Alcohol use: Yes     Comment: last beer > 1 month    Drug use: No    Sexual activity: Not on file   Lifestyle    Physical activity:     Days per week: Not on file     Minutes per session: Not on file    Stress: Not on file   Relationships    Social connections:     Talks on phone: Not on file     Gets together: Not on file     Attends Confucianism service: Not on file     Active member of club or organization: Not on file     Attends meetings of clubs or organizations: Not on file     Relationship status: Not on file    Intimate partner violence:     Fear of current or ex partner: Not on file     Emotionally abused: Not on file     Physically abused: Not on file     Forced sexual activity: Not on file   Other Topics Concern    Not on file   Social History Narrative    Not on file       Review of Systems:  A 14 point review of systems was completed, reviewed and placed in the patients chart. It is otherwise negative except for the pertinent information in the HPI. Constitutional: Negative for fever, appetite change and unexpected weight change.    HENT: Negative for hearing loss, trouble normal size configuration. The mediastinum is unremarkable. Pulmonary vascular unremarkable. Right sided trachea. No focal infiltrates. No effusions. No Pneumothoraces. NO ACUTE ACTIVE CARDIOPULMONARY PROCESS        Problem List    Patient Active Problem List   Diagnosis    Osteoarthritis    Hypertension    Hypercholesteremia    Depression    Asthma    Chronic obstructive pulmonary disease ([facility] [address]    Tobacco use    Influenza    RANDI on CPAP    Osteoarthritis of left hip          Note - This record has been created using [employer]. Chart creation errors have been sought, but may not always have been located. Such creation errors do not reflect on the standard of medical care.

## 2019-04-22 NOTE — OP NOTE
Orthopaedic Surgery Operative Note         Date of Surgery: 4/22/2019    Admit Date: 4/22/2019    Location: [place] [address]    Performing Service: Orthopaedic Surgery    Patient Name:  Prasanna Whitney    Preoperative Diagnosis:  Left hip osteoarthritis    Postoperative Diagnosis:  Same    Operative Procedure:  Left total hip arthroplasty    Surgeon:  Karishma Dove MD    Assistant:  Reema Bautista PA-C    Anesthesia: Spinal    Estimated Blood Loss: 250 mL    Specimens: * No specimens in log *     Drains: None    Implants:   Implant Name Type Inv. Item Serial No.  Lot No. LRB No. Used Action   IMPL HIP FEM SHLL ACET TRIDENT ISMA 52MM Hip IMPL HIP FEM SHLL ACET TRIDENT ISMA 52MM  GRACIE: ORTHOPA[employer]ICS [id number] Left 1 Implanted   SCREW HIP CANC SLFTP PTHRD 6.5X30MM Screw/Plate/Nail/Randall SCREW HIP CANC SLFTP PTHRD 6.5X30MM  GRACIE: ORTHOPAEDICS [id number] Left 1 Implanted   SCREW HIP CANC SLFTP PTHRD 6.5X20MM Screw/Plate/Nail/Randall SCREW HIP CANC SLFTP PTHRD 6.5X20MM  GRACIE: ORTHOPAEDICS [id number] Left 1 Implanted   IMPL HIP FEM HEAD NECK ALUMINA -2.5MM Hip IMPL HIP FEM HEAD NECK ALUMINA 2.5MM  GRACIE: ORTHOPAEDICS [id number] Left 1 Implanted   STEM HIP NO 5 [id number] Hip STEM HIP NO 5 [id number]  GRACIE: ORTHOPAEDICS [id number] Left 1 Implanted   IMPL HIP FEM INSRT ACET TRIDENT X3 0DEG Hip IMPL HIP FEM INSRT ACET TRIDENT X3 0DEG  GRACIE: ORTHOPAEDICS [id number] Left 1 Implanted       Complications: None known    Disposition: To PACU in stable condition. Indications: Prasanna Whitney is a 66 y.o. male with left hip osteoarthritis. The patient had attempted conservative treatment which ultimately failed to control their symptoms and they were having persistent dysfunction with activities of daily living. Benefits and risks of operative and nonoperative management were discussed prior to surgery.  The risks, benefits, alternatives, and possible complications of the procedure including but not limited to the risk of infection, bleeding, injury to nerves, tendons, and surrounding structures, and anesthesia risks were discussed. The possibilities of persistent pain, fracture, dislocation, component loosening, limb length discrepancy, deep venous thrombosis, pulmonary embolism, need for further surgery, and loss of life or limb were also discussed. The patient indicated an understanding of these risks, benefits, alternatives, and complications of the procedure and provided written consent. Please see the office note for full details regarding consent. Technique: The patient was identified in the preoperative holding area where the surgical site was marked with indelible ink. All preoperative questions were addressed and answered to the patient's satisfaction. The patient was then transferred to the operating room and positioned appropriately on the operative bed. Spinal anesthetic was administered by the anesthesiologist. Preoperative antibiotics were given for prophylaxis. Tranexamic acid was provided preoperatively. The patient was placed into the right lateral decubitus position and bony prominences were well-padded. An axillary roll was placed. The pelvis was secured. The left lower extremity was prepped and draped in a standard sterile fashion. Preoperative timeout was called and correct patient, correct procedure, correct operative site, and correct surgeon were confirmed by all present. Posterior approach to the hip was utilized. Dissected sharply through skin and Bovie electrocautery was used for hemostasis. The fascia was encountered and split in line with the incision. External rotators were identified and dissected off the femur and a posterior capsulotomy was performed. A tag suture was placed. The hip was dislocated. Femoral neck cut was performed based on preoperative templating. Version of the femur was slightly increased than expected.  The acetabulum was then exposed and the anterior and posteroinferior acetabular retractors were placed. The labrum and foveal tissue were removed. Progressive reaming was performed up to a size 51 mm reamer. There was good bleeding bone all around. The acetabulum was thoroughly irrigated. A 52 mm Trident cup was impacted into position with good press-fit. Two bone screws were placed with good purchase. Curved osteotome was used to remove anterior and posterior osteophytes. A trial liner was placed and the femur was produced. Box osteotome was used to access the intramedullary canal and a canal finder was placed. Progressive broaching up to a size 4 broach was performed with a good fit. Trial components were placed and the hip was reduced. There was good stability in all positions with a 127 degree neck and a +2.5 mm head, although the hip did feel tight. An intraoperative x-ray was performed and it did appear that leg lengths were reasonable in comparison to preoperative imaging, perhaps slightly long on the operative side. Additionally I felt that I could fit a size 5 femur. Trials were removed and the acetabular shell was irrigated and the 36 mm polyethylene component was impacted into position. There was some axial instability with the size 4 broach, so the femur was broached up to a size 5 and the calcar planar was utilized. There was good stability this time with a 132 degree stem and -2.5 mm head trial. The femur was then irrigated and the size 5 132 degree femoral stem was press-fit into position. The trunnion was irrigated and dried and the final -2.5 mm head was impacted onto the Gricelda Cheeks taper. The cup was thoroughly irrigated and the hip was reduced. There was good stability in the sleeper position to greater than 85° of internal rotation and at least 80° of internal rotation at 90° of flexion. There was no external impingement. Leg lengths felt appropriate. There was no significant shuck with axial traction. Combined version was approximately 30°.  Periarticular anesthetic injection was placed. Dilute Betadine lavage was performed. The hip was copiously irrigated with normal saline. #5 Ticron suture was used to repair the posterior capsule and external rotators to the femur through a drill hole as well as to the abductor tendon. The fascia was closed with #1 looped Maxon suture. The subcutaneous tissue was thoroughly irrigated. The subcutaneous layer was closed with 3-0 Vicryl in an interrupted fashion. The skin was closed with 4-0 Monocryl suture. All counts were determined to be correct. Sterile Aquacel dressing was applied. Anesthesia was reversed. A hip abduction pillow was placed. The patient was brought to the PACU in stable condition having tolerated the procedure well. Sonal Quiroga PA-C was present throughout the procedure and was integral in patient positioning and draping, surgical assisting and limb manipulation, component placement, and wound closure. Postoperative Plan:  The patient will be transferred to the general medical-surgical unit. They will be weight-bearing as tolerated on the left lower extremity with posterior hip precautions. Postoperative DVT prophylaxis and postoperative antibiotics prophylaxis will be provided. Follow up in the office in 10-14 days for wound check and X-ray.       Sharon Hernandez   Date: 4/22/2019  Time: 9:17 AM

## 2019-04-22 NOTE — CONSULTS
Department of Internal Medicine  General Internal Medicine  Attending Consult Note      Reason for Consult:  Hypertension, hyperlipidemia, copd  Requesting Physician:  Mary Johnson    CHIEF COMPLAINT:  Left hip pain    History Obtained From:  patient    HISTORY OF PRESENT ILLNESS:                The patient is a 66 y.o. male with significant past medical history of hypertension, hyperlipidemia, COPD, depression, arthritis who presents with a 10 month history of left hip pain. He was admitted for planned left hip arthroplasty. He is postop day 0. Past Medical History:        Diagnosis Date    Asthma     COPD (chronic obstructive pulmonary disease) ([address]     Depression     Hypercholesteremia     past trx > 5 yrs    Hypertension     meds > 3 yrs    Lung disease     Osteoarthritis     left hip     Past Surgical History:        Procedure Laterality Date    COLONOSCOPY  5/2/14    JENNIFER    ENDOSCOPY, COLON, DIAGNOSTIC      EYE SURGERY Bilateral     cataracts    FINGER SURGERY Right 1999 ?     index finger laceration repair    HERNIA REPAIR  2004    repair [facility]     IN COLON CA SCRN NOT  [address] [place] [address] N/A 5/17/2017    COLON[facility]COPY performed by Darell Wesley MD at [facility]     Current Medications:    Current Facility-Administered Medications: lisinopril (PRINIVIL;ZESTRIL) tablet 40 mg, 40 mg, Oral, Daily  simvastatin (ZOCOR) tablet 40 mg, 40 mg, Oral, Daily  albuterol (PROVENTIL) nebulizer solution 2.5 mg, 2.5 mg, Nebulization, Q6H PRN  glycopyrrolate-formoterol (BEVESPI) 9-4.8 MCG/ACT inhaler 2 puff, 2 puff, Inhalation, Daily  ipratropium (ATROVENT) 0.02 % nebulizer solution 0.5 mg, 0.5 mg, Nebulization, 4x daily  OLANZapine (ZYPREXA) tablet 10 mg, 10 mg, Oral, Nightly  hydrochlorothiazide (HYDRODIURIL) tablet 25 mg, 25 mg, Oral, Daily  0.9 % sodium chloride infusion, , Intravenous, Continuous  sodium chloride flush 0.9 % injection 10 mL, 10 mL, Intravenous, 2 times per day  sodium chloride flush 0.9 % injection 10 mL, 10 mL, Intravenous, PRN  ceFAZolin (ANCEF) 2 g in dextrose 5 % 100 mL IVPB, 2 g, Intravenous, Q8H  acetaminophen (TYLENOL) tablet 650 mg, 650 mg, Oral, 4 times per day  oxyCODONE (ROXICODONE) immediate release tablet 5 mg, 5 mg, Oral, Q4H PRN  morphine (PF) injection 2 mg, 2 mg, Intravenous, Q2H PRN **OR** morphine injection 4 mg, 4 mg, Intravenous, Q2H PRN  sennosides-docusate sodium (SENOKOT-S) 8.6-50 MG tablet 1 tablet, 1 tablet, Oral, BID  magnesium hydroxide (MILK OF MAGNESIA) 400 MG/5ML suspension 30 mL, 30 mL, Oral, Daily PRN  ondansetron (ZOFRAN) injection 4 mg, 4 mg, Intravenous, Q6H PRN  [START ON 4/23/2019] aspirin EC tablet 81 mg, 81 mg, Oral, BID  traMADol (ULTRAM) tablet 50 mg, 50 mg, Oral, 4 times per day  diazepam (VALIUM) tablet 5 mg, 5 mg, Oral, Q6H PRN  [START ON 4/23/2019] meloxicam (MOBIC) tablet 7.5 mg, 7.5 mg, Oral, Daily  ketorolac (TORADOL) injection 30 mg, 30 mg, Intravenous, Q6H  Allergies:  Patient has no known allergies. Social History:    TOBACCO:  Former smoker. Type of tobacco used:  Cigarettes. Quantity per day:  1 pack(s). Duration of tobacco use:  30+ years. Approximate Quit Date:  Less than one year ago, 1 month ago. Smoking cessation education provided?  not applicable. Family History:       Problem Relation Age of Onset    Colon Cancer Mother     High Blood Pressure Father     Diabetes Sister     No Known Problems Brother     No Known Problems Brother     No Known Problems Brother     Other Brother         Sepsis    Other Brother         Killed    No Known Problems Sister     No Known Problems Sister     No Known Problems Son      REVIEW OF SYSTEMS:    10 point systems reviewed and negative, other than left hip pain and headache.   PHYSICAL EXAM:      Vitals:    /88   Pulse 92   Temp 97.7 °F (36.5 °C) (Temporal)   Resp (!) 6   Ht 5' 5\" (1.651 m)   Wt 219 lb (99.3 kg)   SpO2 100%   BMI 36.44 kg/m²     CONSTITUTIONAL: awake, alert, cooperative, no apparent distress, and appears stated age  EYES:  Lids and lashes normal, pupils equal, round and reactive to light, extra ocular muscles intact, sclera clear, conjunctiva normal  ENT:  Normocephalic, without obvious abnormality, atraumatic, sinuses nontender on palpation, external ears without lesions, oral pharynx with moist mucus membranes, tonsils without erythema or exudates, gums normal and good dentition. NECK:  Supple, symmetrical, trachea midline, no adenopathy, thyroid symmetric, not enlarged and no tenderness, skin normal  LUNGS:  No increased work of breathing, good air exchange, clear to auscultation bilaterally, no crackles or wheezing  CARDIOVASCULAR:  Normal apical impulse, regular rate and rhythm, normal S1 and S2, no S3 or S4, and no murmur noted  ABDOMEN:  No scars, normal bowel sounds, soft, non-distended, non-tender, no masses palpated, no hepatosplenomegally  MUSCULOSKELETAL:  there is no redness, warmth, or swelling of the joints  LEFT HIP:  range of motion decreased s/p surgery  NEUROLOGIC:  Awake, alert, oriented to name, place and time. Cranial nerves II-XII are grossly intact. Motor is 5 out of 5 bilaterally. Cerebellar finger to nose, heel to shin intact. Sensory is intact. Babinski down going, Romberg negative, and gait is normal.  SKIN:  no bruising or bleeding, normal skin color, texture, turgor and no redness, warmth, or swelling  DATA:    Labs reviewed    IMPRESSION/RECOMMENDATIONS:      1. Hypertension-patient denies taking medications at home. Home medications will be held, hydralazine when necessary will be given for systolic blood pressure greater than 180.    2.  COPD-patient denies taking medications at home and is on room air at baseline. Monitor respiratory status. 3. Hyperlipidemia-patient currently not on medication. Outpatient follow-up    4.   Left hip arthroplasty-physical and occupational therapy, DVT prophylaxis, discharge planning. Orthopedic surgery to manage. Thank you for allowing us to participate in this patient's care.

## 2019-04-23 LAB
ANION GAP SERPL CALCULATED.3IONS-SCNC: 11 MEQ/L (ref 9–15)
BUN BLDV-MCNC: 8 MG/DL (ref 8–23)
CALCIUM SERPL-MCNC: 9.3 MG/DL (ref 8.5–9.9)
CHLORIDE BLD-SCNC: 103 MEQ/L (ref 95–107)
CO2: 25 MEQ/L (ref 20–31)
CREAT SERPL-MCNC: 0.82 MG/DL (ref 0.7–1.2)
GFR AFRICAN AMERICAN: >60
GFR NON-AFRICAN AMERICAN: >60
GLUCOSE BLD-MCNC: 110 MG/DL (ref 70–99)
HCT VFR BLD CALC: 30.4 % (ref 42–52)
HEMOGLOBIN: 10.1 G/DL (ref 14–18)
MCH RBC QN AUTO: 32.5 PG (ref 27–31.3)
MCHC RBC AUTO-ENTMCNC: 33.2 % (ref 33–37)
MCV RBC AUTO: 97.7 FL (ref 80–100)
PDW BLD-RTO: 13.3 % (ref 11.5–14.5)
PLATELET # BLD: 254 K/UL (ref 130–400)
POTASSIUM REFLEX MAGNESIUM: 4.8 MEQ/L (ref 3.4–4.9)
RBC # BLD: 3.11 M/UL (ref 4.7–6.1)
SODIUM BLD-SCNC: 139 MEQ/L (ref 135–144)
WBC # BLD: 12.7 K/UL (ref 4.8–10.8)

## 2019-04-23 PROCEDURE — 80048 BASIC METABOLIC PNL TOTAL CA: CPT

## 2019-04-23 PROCEDURE — 6370000000 HC RX 637 (ALT 250 FOR IP): Performed by: ORTHOPAEDIC SURGERY

## 2019-04-23 PROCEDURE — 85027 COMPLETE CBC AUTOMATED: CPT

## 2019-04-23 PROCEDURE — 6360000002 HC RX W HCPCS: Performed by: ORTHOPAEDIC SURGERY

## 2019-04-23 PROCEDURE — 94640 AIRWAY INHALATION TREATMENT: CPT

## 2019-04-23 PROCEDURE — 36415 COLL VENOUS BLD VENIPUNCTURE: CPT

## 2019-04-23 PROCEDURE — 97535 SELF CARE MNGMENT TRAINING: CPT

## 2019-04-23 PROCEDURE — 97116 GAIT TRAINING THERAPY: CPT

## 2019-04-23 PROCEDURE — 2580000003 HC RX 258: Performed by: ORTHOPAEDIC SURGERY

## 2019-04-23 PROCEDURE — 1210000000 HC MED SURG R&B

## 2019-04-23 RX ADMIN — ASPIRIN 81 MG: 81 TABLET, COATED ORAL at 08:27

## 2019-04-23 RX ADMIN — OXYCODONE HYDROCHLORIDE 5 MG: 5 TABLET ORAL at 15:12

## 2019-04-23 RX ADMIN — ASPIRIN 81 MG: 81 TABLET, COATED ORAL at 21:10

## 2019-04-23 RX ADMIN — ACETAMINOPHEN 650 MG: 325 TABLET ORAL at 19:11

## 2019-04-23 RX ADMIN — ACETAMINOPHEN 650 MG: 325 TABLET ORAL at 06:26

## 2019-04-23 RX ADMIN — LISINOPRIL 40 MG: 20 TABLET ORAL at 08:27

## 2019-04-23 RX ADMIN — OXYCODONE HYDROCHLORIDE 5 MG: 5 TABLET ORAL at 02:20

## 2019-04-23 RX ADMIN — TRAMADOL HYDROCHLORIDE 50 MG: 50 TABLET, FILM COATED ORAL at 19:11

## 2019-04-23 RX ADMIN — DIAZEPAM 5 MG: 5 TABLET ORAL at 23:03

## 2019-04-23 RX ADMIN — DIAZEPAM 5 MG: 5 TABLET ORAL at 04:50

## 2019-04-23 RX ADMIN — Medication 10 ML: at 21:10

## 2019-04-23 RX ADMIN — MELOXICAM 7.5 MG: 7.5 TABLET ORAL at 08:27

## 2019-04-23 RX ADMIN — ACETAMINOPHEN 650 MG: 325 TABLET ORAL at 12:27

## 2019-04-23 RX ADMIN — SENNOSIDES,DOCUSATE SODIUM 1 TABLET: 50; 8.6 TABLET, FILM COATED ORAL at 08:28

## 2019-04-23 RX ADMIN — OLANZAPINE 10 MG: 10 TABLET, FILM COATED ORAL at 21:10

## 2019-04-23 RX ADMIN — OXYCODONE HYDROCHLORIDE 5 MG: 5 TABLET ORAL at 21:33

## 2019-04-23 RX ADMIN — GLYCOPYRROLATE AND FORMOTEROL FUMARATE 2 PUFF: 9; 4.8 AEROSOL, METERED RESPIRATORY (INHALATION) at 07:25

## 2019-04-23 RX ADMIN — ACETAMINOPHEN 650 MG: 325 TABLET ORAL at 00:28

## 2019-04-23 RX ADMIN — SENNOSIDES,DOCUSATE SODIUM 1 TABLET: 50; 8.6 TABLET, FILM COATED ORAL at 21:10

## 2019-04-23 RX ADMIN — TRAMADOL HYDROCHLORIDE 50 MG: 50 TABLET, FILM COATED ORAL at 12:27

## 2019-04-23 RX ADMIN — OXYCODONE HYDROCHLORIDE 5 MG: 5 TABLET ORAL at 06:28

## 2019-04-23 RX ADMIN — TRAMADOL HYDROCHLORIDE 50 MG: 50 TABLET, FILM COATED ORAL at 00:28

## 2019-04-23 RX ADMIN — Medication 10 ML: at 08:28

## 2019-04-23 RX ADMIN — CEFAZOLIN SODIUM 2 G: 1 INJECTION, SOLUTION INTRAVENOUS at 00:28

## 2019-04-23 RX ADMIN — SIMVASTATIN 40 MG: 40 TABLET, FILM COATED ORAL at 21:10

## 2019-04-23 RX ADMIN — TRAMADOL HYDROCHLORIDE 50 MG: 50 TABLET, FILM COATED ORAL at 06:26

## 2019-04-23 RX ADMIN — HYDROCHLOROTHIAZIDE 25 MG: 25 TABLET ORAL at 08:27

## 2019-04-23 ASSESSMENT — PAIN SCALES - GENERAL
PAINLEVEL_OUTOF10: 8
PAINLEVEL_OUTOF10: 8
PAINLEVEL_OUTOF10: 7
PAINLEVEL_OUTOF10: 6
PAINLEVEL_OUTOF10: 6
PAINLEVEL_OUTOF10: 7
PAINLEVEL_OUTOF10: 6
PAINLEVEL_OUTOF10: 10
PAINLEVEL_OUTOF10: 7
PAINLEVEL_OUTOF10: 10

## 2019-04-23 ASSESSMENT — PAIN DESCRIPTION - DESCRIPTORS: DESCRIPTORS: ACHING;SORE

## 2019-04-23 ASSESSMENT — PAIN DESCRIPTION - ORIENTATION
ORIENTATION: LEFT

## 2019-04-23 ASSESSMENT — PAIN DESCRIPTION - PAIN TYPE
TYPE: SURGICAL PAIN

## 2019-04-23 ASSESSMENT — PAIN DESCRIPTION - PROGRESSION: CLINICAL_PROGRESSION: GRADUALLY IMPROVING

## 2019-04-23 ASSESSMENT — PAIN DESCRIPTION - LOCATION
LOCATION: HIP

## 2019-04-23 ASSESSMENT — PAIN DESCRIPTION - FREQUENCY: FREQUENCY: CONTINUOUS

## 2019-04-23 NOTE — CARE COORDINATION
MET WITH PATIENT DURING QUALITY ROUNDS AND CONFIRMED DC PLAN OF HOME WITH [facility]. HE WILL NEED A WALKER.   PLAN IS DISCHARGE HOME TOMORROW

## 2019-04-23 NOTE — PROGRESS NOTES
Score  Intervention List: Patient able to continue with treatment  Pain Screening  Patient Currently in Pain: Yes       Post Pain Assessment:   Pain Assessment  Pain Assessment: 0-10  Pain Level: 6  Pain Type: Surgical pain  Pain Location: Hip  Pain Orientation: Left       OBJECTIVE:         Bed mobility  Comment: DNT pt in chair before and after tx. Transfers  Sit to Stand: Stand by assistance  Stand to sit: Stand by assistance  Comment: pt does great job of following precautions during transfers. Ambulation  Ambulation?: Yes  Ambulation 1  Surface: level tile  Device: Rolling Walker  Assistance: Contact guard assistance  Quality of Gait: antalgic gait, step-to pattern, decreased LLE clearance, slight circumduction on LLE. Distance: 150'   Comments: pt steady. Stairs/Curb  Stairs?: No(pt has level entry and a one level home. )          Exercises  Knee Long Arc Quad: x 15  Ankle Pumps: x 20  Comments: pt given written HEP for supine and seated therex. Education given on technique dosage and frequency, pt and family both verbalize understanding. ASSESSMENT:  Body structures, Functions, Activity limitations: Decreased functional mobility ; Decreased strength;Decreased endurance;Decreased balance; Increased Pain    Assessment: pt shows good technique of maintaining hip precautions throughout tx.      Activity Tolerance  Activity Tolerance: Patient Tolerated treatment well       Discharge Recommendations:  Continue to assess pending progress, Patient would benefit from continued therapy after discharge    Goals:  Short term goals  Short term goal 1: indep with HEP  Short term goal 2: recall 3/3 hip precautions  Long term goals  Long term goal 1: supervision bed mobility  Long term goal 2: supervision with transfers within hip precautions  Long term goal 3: pt to ambulate 150 ft FWW supervision  Patient Goals   Patient goals : agreeable to PT POC    PLAN:   Plan  Times per week: 5-7  Times per day: Twice a day  Current Treatment Recommendations: Strengthening, Functional Mobility Training, Neuromuscular Re-education, Home Exercise Program, Equipment Evaluation, Education, & procurement, Transfer Training, Gait Training, Safety Education & Training, Modalities, Balance Training, Endurance Training, Stair training, Pain Management, Patient/Caregiver Education & Training  Safety Devices  Type of devices:  All fall risk precautions in place, Call light within reach, Chair alarm in place, Left in chair     [facility] (6 CLICK) [address] Raw Score : 18      Therapy Time   Individual   Time In 0955   Time Out 1018   Minutes 23      Gait: 12  Trsf: 8  Therex: 118 SRyan Gross, PTA, 04/23/19 at 10:25 AM

## 2019-04-23 NOTE — PROGRESS NOTES
Physical Therapy Missed Treatment   Facility/Department: [facility] MED SURG N223/N223-01    NAME: China Wells    : 1955 (66 y.o.)  MRN: [id number]    Account: [de-identified]  Gender: male    Chart reviewed, attempted PT at 08:35. Patient unavailable 2° to:    [] Hold per nsg request    [] Pt declined     [] Pt. . off floor for test/procedure. [x] Pt. Unavailable pt working with OT       Will attempt PT treatment again at earliest convenience.       Electronically signed by Doe Rodrigues PTA on 19 at 8:42 AM

## 2019-04-23 NOTE — PROGRESS NOTES
Physical Therapy Med Surg Daily Treatment Note  Facility/Department: [facility] NEURO  Room: [facility]/I429-68       NAME: Yolis Chapman  : 1955 (66 y.o.)  MRN: [id number]  CODE STATUS: Full Code    Date of Service: 2019    Patient Diagnosis(es): Osteoarthritis of left hip, unspecified osteoarthritis type [M16.12]   No chief complaint on file. Patient Active Problem List    Diagnosis Date Noted    Tobacco use 2019    Influenza 2019    RANDI on CPAP 2019    Osteoarthritis of left hip 2019    Chronic obstructive pulmonary disease ([facility] 2017    Osteoarthritis     Hypertension     Hypercholesteremia     Depression     Asthma         Past Medical History:   Diagnosis Date    Asthma     COPD (chronic obstructive pulmonary disease) ([facility]     Depression     Hypercholesteremia     past trx > 5 yrs    Hypertension     meds > 3 yrs    Lung disease     Osteoarthritis     left hip     Past Surgical History:   Procedure Laterality Date    COLONOSCOPY  14    JARMOTOMAS    ENDOSCOPY, COLON, DIAGNOSTIC      EYE SURGERY Bilateral     cataracts    FINGER SURGERY Right  ? index finger laceration repair    HERNIA REPAIR  2004    repair [facility]     CA COLON CA SCRN NOT  W [address] [place] [address] N/A 2017    COLONOSCOPY performed by Michaela Miller MD at [address] [place] [address] Left 2019    LEFT HIP TOTAL HIP ARTHROPLASTY, LATERAL DECUB, GRACIE performed by Good Feliciano MD at [facility]          Restrictions:  Restrictions/Precautions: Weight Bearing  Lower Extremity Weight Bearing Restrictions  Left Lower Extremity Weight Bearing: Weight Bearing As Tolerated  Position Activity Restriction  Hip Precautions: Posterior hip precautions    SUBJECTIVE:  General  Chart Reviewed: Yes  Family / Caregiver Present: No  Subjective  Subjective: Okay, I can walk.    General Comment  Comments: pt able to converse in [address] and seems to understand vast majority of what I say to him. Pre Pain Assessment:  Pre Treatment Pain Screening  Pain at present: 6  Scale Used: Numeric Score  Intervention List: Patient able to continue with treatment  Pain Screening  Patient Currently in Pain: Yes       Post Pain Assessment:   Pain Assessment  Pain Assessment: 0-10  Pain Level: 6  Pain Type: Surgical pain  Pain Location: Hip  Pain Orientation: Left       OBJECTIVE:         Bed mobility  Supine to Sit: Contact guard assistance  Sit to Supine: Contact guard assistance  Comment: pt does good job maintaining hip precautions throughout. Transfers  Sit to Stand: Stand by assistance  Stand to sit: Stand by assistance  Comment: pt does great job of following precautions during transfers. Ambulation  Ambulation?: Yes  Ambulation 1  Surface: level tile  Device: Rolling Walker  Assistance: Contact guard assistance  Quality of Gait: antalgic gait, step-through pattern, decreased LLE clearance, slight circumduction on LLE. Distance: 150'   Comments: pt steady. Stairs/Curb  Stairs?: No(pt has level entry and a one level home. )          Exercises  Comments: pt given written HEP for supine and seated therex. Education given on technique dosage and frequency, pt verbalizes understanding. ASSESSMENT:  Body structures, Functions, Activity limitations: Decreased functional mobility ; Decreased strength;Decreased endurance;Decreased balance; Increased Pain    Assessment: pt shows good technique of maintaining hip precautions throughout tx. no issues getting in or out of the bed.      Activity Tolerance  Activity Tolerance: Patient Tolerated treatment well       Discharge Recommendations:  Continue to assess pending progress, Patient would benefit from continued therapy after discharge    Goals:  Short term goals  Short term goal 1: indep with HEP  Short term goal 2: recall 3/3 hip precautions  Long term goals  Long term goal 1: supervision bed mobility  Long term goal 2:

## 2019-04-23 NOTE — DISCHARGE INSTR - COC
Continuity of Care Form    Patient Name: Aracely Robles   :  1955  MRN:  [id number]    Admit date:  2019  Discharge date:  2019    Code Status Order: Full Code   Advance Directives:   [facility] Documentation     Date/Time Healthcare Directive Type of Healthcare Directive Copy in [facility] [address] Agent's Name Healthcare Agent's Phone Number    19 1236  No, patient does not have an advance directive for healthcare treatment -- -- -- -- --    19 0607  No, patient does not have an advance directive for healthcare treatment -- -- -- -- --          Admitting Physician:  Megan Alfaro MD  PCP: Cody Murphy MD    Discharging Nurse: [facility] Unit/Room#: E677/O325-10  Discharging Unit Phone Number: [phone]    Emergency Contact:   Extended Emergency Contact Information  Primary Emergency Contact: [facility]  Address: [address] Phone: [phone]  Work Phone: [phone]  Mobile Phone: [phone]  Relation: Spouse  Secondary Emergency Contact: [address] [place] [address] Phone: [phone]  Work Phone: [phone]  Mobile Phone: [phone]  Relation: Other    Past Surgical History:  Past Surgical History:   Procedure Laterality Date    COLONOSCOPY  14    JENNIFER    ENDOSCOPY, COLON, DIAGNOSTIC      EYE SURGERY Bilateral     cataracts    FINGER SURGERY Right  ? index finger laceration repair    HERNIA REPAIR  2004    repair [facility]     [place] COLON CA SCRN NOT  W [address] [place] [address] IND N/A 2017    COLONOSCOPY performed by Alejandra Zavala MD at [facility]       Immunization History: There is no immunization history on file for this patient.     Active Problems:  Patient Active Problem List   Diagnosis Code    Osteoarthritis M19.90    Hypertension I10    Hypercholesteremia E78.00    Depression F32.9    Asthma J45.909    Chronic obstructive pulmonary disease (HCC) J44.9    Tobacco use Z72.0    Influenza J11.1    RANDI on CPAP G47.33, Z99.89    Osteoarthritis of left hip M16.12       Isolation/Infection:   Isolation          No Isolation            Nurse Assessment:  Last Vital Signs: BP (!) 165/90   Pulse 67   Temp 98.1 °F (36.7 °C)   Resp 16   Ht 5' 5\" (1.651 m)   Wt 219 lb (99.3 kg)   SpO2 100%   BMI 36.44 kg/m²     Last documented pain score (0-10 scale): Pain Level: 10  Last Weight:   Wt Readings from Last 1 Encounters:   04/22/19 219 lb (99.3 kg)     Mental Status:  oriented and alert    IV Access:  - None    Nursing Mobility/ADLs:  Walking   Assisted  Transfer  Assisted  Bathing  Assisted  Dressing  Assisted  Toileting  Assisted  Feeding  Assisted  Med Admin  Assisted  Med Delivery   whole    Wound Care Documentation and Therapy:        Elimination:  Continence:   · Bowel: Yes  · Bladder: Yes  Urinary Catheter: None   Colostomy/Ileostomy/Ileal Conduit: No       Date of Last BM: 4/24/2019    Intake/Output Summary (Last 24 hours) at 4/23/2019 0707  Last data filed at 4/22/2019 1100  Gross per 24 hour   Intake 2400 ml   Output --   Net 2400 ml     I/O last 3 completed shifts: In: 2400 [I.V.:2300; IV Piggyback:100]  Out: -     Safety Concerns: At Risk for Falls    Impairments/Disabilities:      None    Nutrition Therapy:  Current Nutrition Therapy:   - Oral Diet:  General    Routes of Feeding: Oral  Liquids: Thin Liquids  Daily Fluid Restriction: no  Last Modified Barium Swallow with Video (Video Swallowing Test): not done    Treatments at the Time of Hospital Discharge:   Respiratory Treatments: ***  Oxygen Therapy:  is not on home oxygen therapy.   Ventilator:    - No ventilator support    Rehab Therapies: Physical Therapy and Occupational Therapy  Weight Bearing Status/Restrictions: No weight bearing restirctions  Other Medical Equipment (for information only, NOT a DME order):  walker  Other Treatments: ***    Patient's personal belongings (please select all that are sent with patient):  {CHP DME Belongings:605799019}    RN SIGNATURE:  {Esignature:973153395}    CASE MANAGEMENT/SOCIAL WORK SECTION    Inpatient Status Date: ***    Readmission Risk Assessment Score:  Readmission Risk              Risk of Unplanned Readmission:        6           Discharging to Facility/ Agency   · Name:   · Address:  · Phone:  · Fax:    Dialysis Facility (if applicable)   · Name:  · Address:  · Dialysis Schedule:  · Phone:  · Fax:    / signature: {Esignature:427534367}    PHYSICIAN SECTION    Prognosis: {Prognosis:8736562823}    Condition at Discharge: [facility] Patient Condition:861794458}    Rehab Potential (if transferring to Rehab): {Prognosis:5824371621}    Recommended Labs or Other Treatments After Discharge: ***    Physician Certification: I certify the above information and transfer of Everet Dear  is necessary for the continuing treatment of the diagnosis listed and that he requires {Admit to Appropriate Level of Care:94008} for {GREATER/LESS:887299307} 30 days.      Update Admission H&P: [facility]}    PHYSICIAN SIGNATURE:  Electronically signed by Elijah Cooper MD on 4/23/19 at 7:07 AM

## 2019-04-23 NOTE — PROGRESS NOTES
Hip surgery    Progress Note    Subjective:     Post-Operative Day: 1 Status Post left Hip Arthroplasty  Systemic or Specific Complaints:No Complaints    Objective:     CURRENT VITALS:  BP (!) 146/90   Pulse 77   Temp 98.2 °F (36.8 °C)   Resp 16   Ht 5' 5\" (1.651 m)   Wt 219 lb (99.3 kg)   SpO2 99%   BMI 36.44 kg/m²     General: alert, appears stated age and cooperative   Wound: Wound clean and dry no evidence of infection. Motion: Painful range of Motion   DVT Exam: No evidence of DVT seen on physical exam.       NVI in lower extremity. Thigh swollen but soft. Moving foot and ankle. Data Review  Recent Labs     04/23/19  0526   WBC 12.7*   RBC 3.11*   HGB 10.1*   HCT 30.4*   MCV 97.7   MCH 32.5*   MCHC 33.2   RDW 13.3          Assessment:     Status Post left Hip Arthroplasty. Doing well postoperatively. Pt does live home alone and he is Setswana speaking mainly.  We spoke with him and family- we will monitor pt's responses to pain meds- and safety of ambulation- plan [place] [address] tomorrow if pt continues to progress through recovery    Plan:      1: Continues current post-op course :  2:  Continue Deep venous thrombosis prophylaxis  3:  Continue physical therapy  4:  Continue Pain Control

## 2019-04-23 NOTE — PROGRESS NOTES
% injection 10 mL, 10 mL, Intravenous, 2 times per day  sodium chloride flush 0.9 % injection 10 mL, 10 mL, Intravenous, PRN  acetaminophen (TYLENOL) tablet 650 mg, 650 mg, Oral, 4 times per day  oxyCODONE (ROXICODONE) immediate release tablet 5 mg, 5 mg, Oral, Q4H PRN  morphine (PF) injection 2 mg, 2 mg, Intravenous, Q2H PRN **OR** morphine injection 4 mg, 4 mg, Intravenous, Q2H PRN  sennosides-docusate sodium (SENOKOT-S) 8.6-50 MG tablet 1 tablet, 1 tablet, Oral, BID  magnesium hydroxide (MILK OF MAGNESIA) 400 MG/5ML suspension 30 mL, 30 mL, Oral, Daily PRN  ondansetron (ZOFRAN) injection 4 mg, 4 mg, Intravenous, Q6H PRN  aspirin EC tablet 81 mg, 81 mg, Oral, BID  traMADol (ULTRAM) tablet 50 mg, 50 mg, Oral, 4 times per day  diazepam (VALIUM) tablet 5 mg, 5 mg, Oral, Q6H PRN  meloxicam (MOBIC) tablet 7.5 mg, 7.5 mg, Oral, Daily  hydrALAZINE (APRESOLINE) injection 10 mg, 10 mg, Intravenous, Q6H PRN  ipratropium (ATROVENT) 0.02 % nebulizer solution 0.5 mg, 0.5 mg, Nebulization, 4x Daily PRN  Allergies:  Patient has no known allergies. Social History:    TOBACCO:  Former smoker. Type of tobacco used:  Cigarettes. Quantity per day:  1 pack(s). Duration of tobacco use:  30+ years. Approximate Quit Date:  Less than one year ago, 1 month ago. Smoking cessation education provided?  not applicable. Family History:       Problem Relation Age of Onset    Colon Cancer Mother     High Blood Pressure Father     Diabetes Sister     No Known Problems Brother     No Known Problems Brother     No Known Problems Brother     Other Brother         Sepsis    Other Brother         Killed    No Known Problems Sister     No Known Problems Sister     No Known Problems Son      REVIEW OF SYSTEMS:    10 point systems reviewed and negative, other than left hip pain and headache.   PHYSICAL EXAM:      Vitals:    /81   Pulse 91   Temp 98.2 °F (36.8 °C) (Oral)   Resp 18   Ht 5' 5\" (1.651 m)   Wt 219 lb (99.3 kg)

## 2019-04-23 NOTE — PROGRESS NOTES
[facility] OCCUPATIONAL THERAPY ORTHOPEDIC TREATMENT NOTE     Date: 2019  Patient Name: Rosemarie Umana        MRN: [id number]  Account: [de-identified]   : 1955  (66 y.o.)  Room: [place] [address]    Chart Review:  Diagnosis:  The primary encounter diagnosis was Status post total hip replacement, left. A diagnosis of Primary osteoarthritis of left hip was also pertinent to this visit. Restrictions:    Restrictions/Precautions  Restrictions/Precautions: Weight Bearing  Position Activity Restriction  Hip Precautions: Posterior hip precautions      Subjective:  Patient states: \"The shower really helped. \"   Pain:  Start of tx:  Pre Treatment Pain Screening  Pain at present: 8  Scale Used: Numeric Score  Intervention List: Patient able to continue with treatment  Comments / Details: Pt recieved pain medication at start of session     End of tx:  Pain Assessment  Patient Currently in Pain: Yes  Pain Assessment: 0-10  Pain Level: 6  Pain Type: Surgical pain  Pain Location: Hip  Pain Orientation: Left  Pain Descriptors: Aching, Sore  Pain Frequency: Continuous  Clinical Progression: Gradually improving  Response to Pain Intervention: Patient Satisfied    Objective:    ADL: ADL shower session completed as follows. ADL  Equipment Provided: Reacher, Sock aid, Dressing stick  Grooming: Setup (to perform oral care and shave while seated at sink )  UE Bathing: Modified independent (use of detachable shower head )  LE Bathing: Supervision, Verbal cueing (VCs to maintain precautions )  UE Dressing: Setup (to don t-shirt )  LE Dressing: Stand by assistance, Verbal cueing (Educated pt in use of AE to don LB clothing. Pt was able to return demonstration with no difficulty.)      Shower Transfers  Shower - Transfer From: Pati Sell - Transfer Type: To and From  Shower - Transfer To:  Other(shower bench built into the wall )  Shower - Technique: Ambulating  Shower Transfers: Supervision  Shower Transfers Comments: Pt used grab bars during transfer        1 LOB incurred during mobility in bathroom with pt able to correct. Pt reports that his foot got caught on something. Pt demonstrated Fair understanding of precautions and frequently verbalized them throughout session. Pt required occasional verbal cues ti maintain precautions when pt attempted to bend to wash his non surgical foot.      SAHRA Hose donned:  [x]  Yes  []  No       Treatment consisted of:   [x] ADL Training  [] Strengthening   [x] Transfer Training    [] DME Education  [] HEP   [] Manual Therapy   [x] Patient Education  [] Other:      Assessment/Discharge Disposition:  Performance deficits / Impairments: Decreased functional mobility , Decreased ADL status, Decreased endurance, Decreased balance, Decreased high-level IADLs, Decreased fine motor control, Decreased coordination, Decreased safe awareness  Prognosis: Good  Discharge Recommendations: Continue to assess pending progress  History: Pt's medical history is moderately complex  Exam: Pt. has 9 performance deficits  Assistance / Modification: Pt. requires max A    [employer]  AM-PAC Daily Activity Inpatient   How much help for putting on and taking off regular lower body clothing?: A Little  How much help for Bathing?: A Little  How much help for Toileting?: None  How much help for putting on and taking off regular upper body clothing?: None  How much help for taking care of personal grooming?: None  How much help for eating meals?: None  AM-[facility] Inpatient Daily Activity Raw Score: 22  AM-PAC Inpatient ADL T-Scale Score : 47.1  ADL Inpatient CMS 0-100% Score: 25.8  ADL Inpatient CMS G-Code Modifier : CJ    Plan:  [x] Continue OT per POC  [] D/C OT  [] Other:     Goals/Plan of care addressed during this session:   [] Improve balance  [] Improve Strength   [x] Improve [place] with functional transfers   [x]  Improve [place] with ADLs     Minutes:  OT Individual Minutes  Time In: 0830  Time Out: 0915  Minutes: 45      Electronically signed by:    SEGUNDO Causey    4/23/2019, 9:24 AM

## 2019-04-23 NOTE — HOME CARE
Active with [facility]  End of Cert: 9/4/26  Active Disciplines SN PT  Specific care being provided:   Concerns/Special Considerations:1 FLOOR RANCH WHERE HE  LIVES ALONE.   HE HAS 1 THRESHOLD STEP TO ENTER AND HIS S.O. LIVES A FEW HOUSES AWAY AND ASSISTS MULTIPLE TIMES DAILY

## 2019-04-23 NOTE — PROGRESS NOTES
Monitor room called to notify RN of patients heart rate in the 130s. Notified Dr Akira Trammell order placed to increase NS to 75/HR Will continue to monitor patient.

## 2019-04-24 VITALS
WEIGHT: 219 LBS | DIASTOLIC BLOOD PRESSURE: 88 MMHG | BODY MASS INDEX: 36.49 KG/M2 | OXYGEN SATURATION: 98 % | HEIGHT: 65 IN | SYSTOLIC BLOOD PRESSURE: 149 MMHG | TEMPERATURE: 98.4 F | HEART RATE: 81 BPM | RESPIRATION RATE: 20 BRPM

## 2019-04-24 LAB
HCT VFR BLD CALC: 28.6 % (ref 42–52)
HEMOGLOBIN: 9.6 G/DL (ref 14–18)
MCH RBC QN AUTO: 32.7 PG (ref 27–31.3)
MCHC RBC AUTO-ENTMCNC: 33.5 % (ref 33–37)
MCV RBC AUTO: 97.6 FL (ref 80–100)
PDW BLD-RTO: 13.5 % (ref 11.5–14.5)
PLATELET # BLD: 255 K/UL (ref 130–400)
RBC # BLD: 2.93 M/UL (ref 4.7–6.1)
WBC # BLD: 11.1 K/UL (ref 4.8–10.8)

## 2019-04-24 PROCEDURE — 97535 SELF CARE MNGMENT TRAINING: CPT

## 2019-04-24 PROCEDURE — 97116 GAIT TRAINING THERAPY: CPT

## 2019-04-24 PROCEDURE — 2580000003 HC RX 258: Performed by: ORTHOPAEDIC SURGERY

## 2019-04-24 PROCEDURE — 6370000000 HC RX 637 (ALT 250 FOR IP): Performed by: ORTHOPAEDIC SURGERY

## 2019-04-24 PROCEDURE — 36415 COLL VENOUS BLD VENIPUNCTURE: CPT

## 2019-04-24 PROCEDURE — 85027 COMPLETE CBC AUTOMATED: CPT

## 2019-04-24 PROCEDURE — 94640 AIRWAY INHALATION TREATMENT: CPT

## 2019-04-24 RX ORDER — OLANZAPINE 10 MG/1
10 TABLET ORAL NIGHTLY
Qty: 30 TABLET | Refills: 3 | Status: ON HOLD | OUTPATIENT
Start: 2019-04-24 | End: 2020-02-25 | Stop reason: SDUPTHER

## 2019-04-24 RX ORDER — LISINOPRIL 20 MG/1
40 TABLET ORAL DAILY
Qty: 30 TABLET | Refills: 3 | Status: ON HOLD | OUTPATIENT
Start: 2019-04-24 | End: 2022-07-22

## 2019-04-24 RX ORDER — HYDROCHLOROTHIAZIDE 25 MG/1
25 TABLET ORAL DAILY
Qty: 30 TABLET | Refills: 3 | Status: ON HOLD | OUTPATIENT
Start: 2019-04-25 | End: 2020-03-19 | Stop reason: HOSPADM

## 2019-04-24 RX ORDER — SENNA AND DOCUSATE SODIUM 50; 8.6 MG/1; MG/1
1 TABLET, FILM COATED ORAL 2 TIMES DAILY
Qty: 60 TABLET | Refills: 0 | Status: SHIPPED | OUTPATIENT
Start: 2019-04-24 | End: 2019-05-24

## 2019-04-24 RX ORDER — ASPIRIN 81 MG/1
81 TABLET ORAL 2 TIMES DAILY
Qty: 60 TABLET | Refills: 0 | Status: SHIPPED | OUTPATIENT
Start: 2019-04-24 | End: 2019-05-24

## 2019-04-24 RX ORDER — TRAMADOL HYDROCHLORIDE 50 MG/1
50 TABLET ORAL EVERY 6 HOURS PRN
Qty: 28 TABLET | Refills: 0 | Status: SHIPPED | OUTPATIENT
Start: 2019-04-24 | End: 2019-05-01

## 2019-04-24 RX ORDER — DIAZEPAM 5 MG/1
5 TABLET ORAL EVERY 6 HOURS PRN
Qty: 28 TABLET | Refills: 0 | Status: SHIPPED | OUTPATIENT
Start: 2019-04-24 | End: 2019-05-01

## 2019-04-24 RX ORDER — OXYCODONE HYDROCHLORIDE 5 MG/1
5 TABLET ORAL EVERY 4 HOURS PRN
Qty: 40 TABLET | Refills: 0 | Status: SHIPPED | OUTPATIENT
Start: 2019-04-24 | End: 2019-05-01

## 2019-04-24 RX ADMIN — ACETAMINOPHEN 650 MG: 325 TABLET ORAL at 06:52

## 2019-04-24 RX ADMIN — MAGNESIUM HYDROXIDE 30 ML: 400 SUSPENSION ORAL at 03:22

## 2019-04-24 RX ADMIN — ACETAMINOPHEN 650 MG: 325 TABLET ORAL at 14:41

## 2019-04-24 RX ADMIN — SENNOSIDES,DOCUSATE SODIUM 1 TABLET: 50; 8.6 TABLET, FILM COATED ORAL at 07:46

## 2019-04-24 RX ADMIN — TRAMADOL HYDROCHLORIDE 50 MG: 50 TABLET, FILM COATED ORAL at 06:52

## 2019-04-24 RX ADMIN — ASPIRIN 81 MG: 81 TABLET, COATED ORAL at 07:47

## 2019-04-24 RX ADMIN — MELOXICAM 7.5 MG: 7.5 TABLET ORAL at 07:46

## 2019-04-24 RX ADMIN — Medication 10 ML: at 07:49

## 2019-04-24 RX ADMIN — DIAZEPAM 5 MG: 5 TABLET ORAL at 05:07

## 2019-04-24 RX ADMIN — OXYCODONE HYDROCHLORIDE 5 MG: 5 TABLET ORAL at 08:08

## 2019-04-24 RX ADMIN — HYDROCHLOROTHIAZIDE 25 MG: 25 TABLET ORAL at 07:47

## 2019-04-24 RX ADMIN — ACETAMINOPHEN 650 MG: 325 TABLET ORAL at 01:09

## 2019-04-24 RX ADMIN — DIAZEPAM 5 MG: 5 TABLET ORAL at 11:03

## 2019-04-24 RX ADMIN — TRAMADOL HYDROCHLORIDE 50 MG: 50 TABLET, FILM COATED ORAL at 01:09

## 2019-04-24 RX ADMIN — GLYCOPYRROLATE AND FORMOTEROL FUMARATE 2 PUFF: 9; 4.8 AEROSOL, METERED RESPIRATORY (INHALATION) at 08:02

## 2019-04-24 RX ADMIN — OXYCODONE HYDROCHLORIDE 5 MG: 5 TABLET ORAL at 02:55

## 2019-04-24 RX ADMIN — LISINOPRIL 40 MG: 20 TABLET ORAL at 07:46

## 2019-04-24 RX ADMIN — OXYCODONE HYDROCHLORIDE 5 MG: 5 TABLET ORAL at 14:41

## 2019-04-24 ASSESSMENT — PAIN SCALES - GENERAL
PAINLEVEL_OUTOF10: 6
PAINLEVEL_OUTOF10: 4
PAINLEVEL_OUTOF10: 5
PAINLEVEL_OUTOF10: 7
PAINLEVEL_OUTOF10: 6
PAINLEVEL_OUTOF10: 6
PAINLEVEL_OUTOF10: 7
PAINLEVEL_OUTOF10: 7

## 2019-04-24 ASSESSMENT — PAIN DESCRIPTION - ORIENTATION
ORIENTATION: LEFT
ORIENTATION: LEFT

## 2019-04-24 ASSESSMENT — PAIN DESCRIPTION - PAIN TYPE: TYPE: SURGICAL PAIN

## 2019-04-24 ASSESSMENT — PAIN DESCRIPTION - DESCRIPTORS: DESCRIPTORS: ACHING

## 2019-04-24 ASSESSMENT — PAIN DESCRIPTION - LOCATION
LOCATION: HIP
LOCATION: HIP

## 2019-04-24 NOTE — PROGRESS NOTES
% injection 10 mL, 10 mL, Intravenous, 2 times per day  sodium chloride flush 0.9 % injection 10 mL, 10 mL, Intravenous, PRN  acetaminophen (TYLENOL) tablet 650 mg, 650 mg, Oral, 4 times per day  oxyCODONE (ROXICODONE) immediate release tablet 5 mg, 5 mg, Oral, Q4H PRN  morphine (PF) injection 2 mg, 2 mg, Intravenous, Q2H PRN **OR** morphine injection 4 mg, 4 mg, Intravenous, Q2H PRN  sennosides-docusate sodium (SENOKOT-S) 8.6-50 MG tablet 1 tablet, 1 tablet, Oral, BID  magnesium hydroxide (MILK OF MAGNESIA) 400 MG/5ML suspension 30 mL, 30 mL, Oral, Daily PRN  ondansetron (ZOFRAN) injection 4 mg, 4 mg, Intravenous, Q6H PRN  aspirin EC tablet 81 mg, 81 mg, Oral, BID  traMADol (ULTRAM) tablet 50 mg, 50 mg, Oral, 4 times per day  diazepam (VALIUM) tablet 5 mg, 5 mg, Oral, Q6H PRN  meloxicam (MOBIC) tablet 7.5 mg, 7.5 mg, Oral, Daily  hydrALAZINE (APRESOLINE) injection 10 mg, 10 mg, Intravenous, Q6H PRN  ipratropium (ATROVENT) 0.02 % nebulizer solution 0.5 mg, 0.5 mg, Nebulization, 4x Daily PRN  Allergies:  Patient has no known allergies. Social History:    TOBACCO:  Former smoker. Type of tobacco used:  Cigarettes. Quantity per day:  1 pack(s). Duration of tobacco use:  30+ years. Approximate Quit Date:  Less than one year ago, 1 month ago. Smoking cessation education provided?  not applicable. Family History:       Problem Relation Age of Onset    Colon Cancer Mother     High Blood Pressure Father     Diabetes Sister     No Known Problems Brother     No Known Problems Brother     No Known Problems Brother     Other Brother         Sepsis    Other Brother         Killed    No Known Problems Sister     No Known Problems Sister     No Known Problems Son      REVIEW OF SYSTEMS:    10 point systems reviewed and negative, other than left hip pain and headache.   PHYSICAL EXAM:      Vitals:    BP (!) 149/88   Pulse 81   Temp 98.4 °F (36.9 °C) (Oral)   Resp 20   Ht 5' 5\" (1.651 m)   Wt 219 lb (99.3 kg)   SpO2 98%   BMI 36.44 kg/m²     CONSTITUTIONAL:  awake, alert, cooperative, no apparent distress, and appears stated age  EYES:  Lids and lashes normal, pupils equal, round and reactive to light, extra ocular muscles intact, sclera clear, conjunctiva normal  ENT:  Normocephalic, without obvious abnormality, atraumatic, sinuses nontender on palpation, external ears without lesions, oral pharynx with moist mucus membranes, tonsils without erythema or exudates, gums normal and good dentition. NECK:  Supple, symmetrical, trachea midline, no adenopathy, thyroid symmetric, not enlarged and no tenderness, skin normal  LUNGS:  No increased work of breathing, good air exchange, clear to auscultation bilaterally, no crackles or wheezing  CARDIOVASCULAR:  Normal apical impulse, regular rate and rhythm, normal S1 and S2, no S3 or S4, and no murmur noted  ABDOMEN:  No scars, normal bowel sounds, soft, non-distended, non-tender, no masses palpated, no hepatosplenomegally  MUSCULOSKELETAL:  there is no redness, warmth, or swelling of the joints  LEFT HIP:  range of motion decreased s/p surgery  NEUROLOGIC:  Awake, alert, oriented to name, place and time. Cranial nerves II-XII are grossly intact. Motor is 5 out of 5 bilaterally. Cerebellar finger to nose, heel to shin intact. Sensory is intact. Babinski down going, Romberg negative, and gait is normal.  SKIN:  no bruising or bleeding, normal skin color, texture, turgor and no redness, warmth, or swelling  DATA:    Labs reviewed    IMPRESSION/RECOMMENDATIONS:      1. Hypertension-patient denies taking medications at home. Home medications will be held, hydralazine when necessary will be given for systolic blood pressure greater than 180.  - BP controlled at this time    2. COPD-patient denies taking medications at home and is on room air at baseline. Monitor respiratory status. 3. Hyperlipidemia-patient currently not on medication. Outpatient follow-up    4. Left hip arthroplasty-physical and occupational therapy, DVT prophylaxis, discharge planning. Orthopedic surgery to manage. 4/24 - plan is for orthopedic surgery to discharge home today. Thank you for allowing us to participate in this patient's care.

## 2019-04-24 NOTE — PROGRESS NOTES
Physical Therapy Med Surg Daily Treatment Note  [facility]  [facility]       NAME: Devin Freeman  : 1955 (66 y.o.)  MRN: [id number]  CODE STATUS: Full Code    Date of Service: 2019    Patient Diagnosis(es): Osteoarthritis of left hip, unspecified osteoarthritis type [M16.12]   No chief complaint on file. Patient Active Problem List    Diagnosis Date Noted    Tobacco use 2019    Influenza 2019    RANDI on CPAP 2019    Osteoarthritis of left hip 2019    Chronic obstructive pulmonary disease ([facility] [address] 2017    Osteoarthritis     Hypertension     Hypercholesteremia     Depression     Asthma         Past Medical History:   Diagnosis Date    Asthma     COPD [facility]chronic obstructive pulmonary disease) ([facility]     Depression     Hypercholesteremia     past trx > 5 yrs    Hypertension     meds > 3 yrs    Lung disease     Osteoarthritis     left hip     Past Surgical History:   Procedure Laterality Date    COLONOSCOPY  14    JENNIFER    ENDOSCOPY, COLON, DIAGNOSTIC      EYE SURGERY Bilateral     cataracts    FINGER SURGERY Right  ? index finger laceration repair    HERNIA REPAIR  2004    repair [facility]     [place] COLON CA SCRN NOT  W [address] [place] [address] N/A 2017    COLONOSCOPY performed by Cindy Daily MD at [address] [facility] [address] Left 2019    LEFT HIP TOTAL HIP ARTHROPLASTY, LATERAL DECUB, GRACIE performed by Kelsie Perdomo MD at [address] [place] [address] Rd:  Restrictions/Precautions: Weight Bearing  Lower Extremity Weight Bearing Restrictions  Left Lower Extremity Weight Bearing: Weight Bearing As Tolerated  Position Activity Restriction  Hip Precautions: Posterior hip precautions    SUBJECTIVE:  General  Chart Reviewed: Yes  Family / Caregiver Present: No  Subjective  Subjective: I get to go home today.    General Comment  Comments: pt able to converse in [place] and seems to understand vast majority of what I say to him. Pre Pain Assessment:  Pre Treatment Pain Screening  Pain at present: 6  Scale Used: Numeric Score  Intervention List: Patient able to continue with treatment  Pain Screening  Patient Currently in Pain: Yes       Post Pain Assessment:   Pain Assessment  Pain Assessment: 0-10  Pain Level: 6  Pain Type: Surgical pain  Pain Location: Hip  Pain Orientation: Left       OBJECTIVE:         Bed mobility  Supine to Sit: Stand by assistance  Sit to Supine: Stand by assistance(uses compensatory strategy to complete. )  Comment: good job maintaining hip precautions. Transfers  Sit to Stand: Stand by assistance  Stand to sit: Stand by assistance  Car Transfer: Stand by assistance  Comment: follows precautions without cues. Ambulation  Ambulation?: Yes  Ambulation 1  Surface: level tile  Device: Rolling Walker  Assistance: Stand by assistance  Quality of Gait: antalgic gait, step-through pattern, decreased LLE clearance, slight circumduction on LLE. Distance: 300'  Comments: pt steady. pt's arms fatigue quickly while using [employer]  Stairs/Curb  Stairs?: No(pt has level entry and a one level home. )          Exercises  Comments: pt demos independence with HEP                     ASSESSMENT:  Body structures, Functions, Activity limitations: Decreased functional mobility ; Decreased strength;Decreased endurance;Decreased balance; Increased Pain    Assessment: pt shows good technique of maintaining hip precautions throughout tx. no issues getting in or out of the car.     Activity Tolerance  Activity Tolerance: Patient Tolerated treatment well       Discharge Recommendations:  Continue to assess pending progress, Patient would benefit from continued therapy after discharge    Goals:  Short term goals  Short term goal 1: indep with HEP  Short term goal 2: recall 3/3 hip precautions  Long term goals  Long term goal 1: supervision bed mobility  Long term goal 2: supervision with transfers within hip precautions  Long term goal 3: pt to ambulate 150 ft FWW supervision  Patient Goals   Patient goals : agreeable to PT POC    PLAN:   Plan  Times per week: 5-7  Times per day: Twice a day  Current Treatment Recommendations: Strengthening, Functional Mobility Training, Neuromuscular Re-education, Home Exercise Program, Equipment Evaluation, Education, & procurement, Transfer Training, Gait Training, Safety Education & Training, Modalities, Balance Training, Endurance Training, Stair training, Pain Management, Patient/Caregiver Education & Training  Safety Devices  Type of devices:  All fall risk precautions in place, Bed alarm in place, Left in bed, Call light within reach     [facility] (6 CLICK) [address] Raw Score : 18      Therapy Time   Individual   Time In 1300   Time Out 1315   Minutes 15      Gait: 10  BM/Trsf: 5        Aparna Pires PTA, 04/24/19 at 1:17 PM

## 2019-04-24 NOTE — PROGRESS NOTES
Occupational Therapy  Facility/Department: [facility]  Daily Treatment Note  NAME: Mike Daugherty  : 1955  MRN: [id number]    Date of Service: 2019    Discharge Recommendations:  Continue to assess pending progress       Assessment      Safety Devices  Safety Devices in place: Yes  Type of devices: All fall risk precautions in place         Patient Diagnosis(es): The primary encounter diagnosis was Status post total hip replacement, left. A diagnosis of Primary osteoarthritis of left hip was also pertinent to this visit. has a past medical history of Asthma, COPD (chronic obstructive pulmonary disease) ([address] Depression, Hypercholesteremia, Hypertension, Lung disease, and Osteoarthritis. has a past surgical history that includes Colonoscopy (14); Finger surgery (Right,  ?); pr colon ca scrn not hi rsk ind (N/A, 2017); Endoscopy, colon, diagnostic; eye surgery (Bilateral); hernia repair (); and Total hip arthroplasty (Left, 2019). Restrictions  Restrictions/Precautions  Restrictions/Precautions: Weight Bearing  Lower Extremity Weight Bearing Restrictions  Left Lower Extremity Weight Bearing: Weight Bearing As Tolerated  Position Activity Restriction  Hip Precautions: Posterior hip precautions  Subjective   General  Chart Reviewed: Yes  Patient assessed for rehabilitation services?: Yes  Response to previous treatment: Patient with no complaints from previous session  Family / Caregiver Present: No  Pain Assessment  Pain Level: 5  Pain Location: Hip  Pain Orientation: Left  Pain Descriptors: Aching   Orientation     Objective    ADL  LE Dressing: Other   Therapist answered pt's call light. Pt requested B compression socks be donned. Pt unable to recall location of compression socks. Therapist searched and located them underneath the pt. Therapist donned pt's B thigh high compression socks. Pt able to reach to B knees to complete pulling up.   Therapist donned pt's B non slip socks 2° sock aid unavailable at the time. Pt left seated EOB, call light in reach, all needs met.                Plan   Plan  Times per week: 1-3x  Plan weeks: Length of acute stay  Current Treatment Recommendations: Balance Training, Functional Mobility Training, Endurance Training, Pain Management, Safety Education & Training, Patient/Caregiver Education & Training, Equipment Evaluation, Education, & procurement, Self-Care / ADL, Home Management Training  G-Code     OutComes Score                                                  AM-PAC Score             Goals  Patient Goals   Patient goals : \"I want to get moving, go home\"       Therapy Time   Individual Concurrent Group Co-treatment   Time In 1139         Time Out 1151         Minutes 12               Electronically signed by SEGUNDO Stauffer on 4/24/19 at 12:13 PM    SEGUNDO Stauffer

## 2019-04-24 NOTE — CARE COORDINATION
PATIENT CHOSE [facility]. REFERRAL CALLED TO Asha Ochoa. SHE IS AWARE THAT PATIENT TO BE DISCHARGED TODAY. THEY HAVE HAD PATIENT IN THE PAST. I VERIFIED THAT ADDRESS IN Epic CORRECT.

## 2019-04-24 NOTE — PROGRESS NOTES
Physical Therapy Med Surg Daily Treatment Note  Facility/Department: [facility]  Room: E300/Y124-47       NAME: Malaika Thacker  : 1955 (66 y.o.)  MRN: [id number]  CODE STATUS: Full Code    Date of Service: 2019    Patient Diagnosis(es): Osteoarthritis of left hip, unspecified osteoarthritis type [M16.12]   No chief complaint on file. Patient Active Problem List    Diagnosis Date Noted    Tobacco use 2019    Influenza 2019    RANDI on CPAP 2019    Osteoarthritis of left hip 2019    Chronic obstructive pulmonary disease ([facility] 2017    Osteoarthritis     Hypertension     Hypercholesteremia     Depression     Asthma         Past Medical History:   Diagnosis Date    Asthma     COPD (chronic obstructive pulmonary disease) ([facility]     Depression     Hypercholesteremia     past trx > 5 yrs    Hypertension     meds > 3 yrs    Lung disease     Osteoarthritis     left hip     Past Surgical History:   Procedure Laterality Date    COLONOSCOPY  14    JENNIFER    ENDOSCOPY, COLON, DIAGNOSTIC      EYE SURGERY Bilateral     cataracts    FINGER SURGERY Right  ? index finger laceration repair    HERNIA REPAIR  2004    repair [facility]     SD COLON CA SCRN NOT  W [address] [place] [address] N/A 2017    COLONOSCOPY performed by Conner Cardenas MD at [address] Left 2019    LEFT HIP TOTAL HIP ARTHROPLASTY, LATERAL DECUB, GRACIE performed by Aida Cartwright MD at [address]:  Restrictions/Precautions: Weight Bearing  Lower Extremity Weight Bearing Restrictions  Left Lower Extremity Weight Bearing: Weight Bearing As Tolerated  Position Activity Restriction  Hip Precautions: Posterior hip precautions    SUBJECTIVE:  General  Chart Reviewed: Yes  Family / Caregiver Present: No  Subjective  Subjective: I get to go home today.    General Comment  Comments: pt able to converse in [address] and seems to understand vast majority of what I say to him. Pre Pain Assessment:  Pre Treatment Pain Screening  Pain at present: 6  Scale Used: Numeric Score  Intervention List: Patient able to continue with treatment  Pain Screening  Patient Currently in Pain: Yes       Post Pain Assessment:   Pain Assessment  Pain Assessment: 0-10  Pain Level: 6  Pain Type: Surgical pain  Pain Location: Hip  Pain Orientation: Left       OBJECTIVE:         Bed mobility  Supine to Sit: Stand by assistance  Sit to Supine: (up to chair)  Comment: good job maintaining hip precautions. Transfers  Sit to Stand: Stand by assistance  Stand to sit: Stand by assistance  Comment: follows precautions without cues. Ambulation  Ambulation?: Yes  Ambulation 1  Surface: level tile  Device: Rolling Walker  Assistance: Stand by assistance  Quality of Gait: antalgic gait, step-through pattern, decreased LLE clearance, slight circumduction on LLE. Distance: 300'  Comments: pt steady. pt's arms fatigue quickly while using [employer]  Stairs/Curb  Stairs?: No(pt has level entry and a one level home. )          Exercises  Comments: pt demos independence with HEP                     ASSESSMENT:  Body structures, Functions, Activity limitations: Decreased functional mobility ; Decreased strength;Decreased endurance;Decreased balance; Increased Pain    Assessment: pt shows good technique of maintaining hip precautions throughout tx. no issues getting in or out of the bed. Able to increase ambulation distance without issue.      Activity Tolerance  Activity Tolerance: Patient Tolerated treatment well       Discharge Recommendations:  Continue to assess pending progress, Patient would benefit from continued therapy after discharge    Goals:  Short term goals  Short term goal 1: indep with HEP  Short term goal 2: recall 3/3 hip precautions  Long term goals  Long term goal 1: supervision bed mobility  Long term goal 2: supervision with transfers within hip precautions  Long term goal 3: pt to ambulate 150 ft FWW supervision  Patient Goals   Patient goals : agreeable to PT POC    PLAN:   Plan  Times per week: 5-7  Times per day: Twice a day  Current Treatment Recommendations: Strengthening, Functional Mobility Training, Neuromuscular Re-education, Home Exercise Program, Equipment Evaluation, Education, & procurement, Transfer Training, Gait Training, Safety Education & Training, Modalities, Balance Training, Endurance Training, Stair training, Pain Management, Patient/Caregiver Education & Training  Safety Devices  Type of devices:  All fall risk precautions in place, Call light within reach, Chair alarm in place, Left in chair     [facility] (6 CLICK) [address] Raw Score : 18      Therapy Time   Individual   Time In 0832   Time Out 0843   Minutes 11      Gait: [facility], 04/24/19 at 8:47 AM

## 2019-04-24 NOTE — PROGRESS NOTES
Hip surgery    Progress Note    Subjective:     Post-Operative Day: 2 Status Post left Hip Arthroplasty  Systemic or Specific Complaints:No Complaints    Objective:     CURRENT VITALS:  BP (!) 138/91   Pulse 73   Temp 98.2 °F (36.8 °C) (Oral)   Resp 18   Ht 5' 5\" (1.651 m)   Wt 219 lb (99.3 kg)   SpO2 100%   BMI 36.44 kg/m²     General: alert, appears stated age and cooperative   Wound: Wound clean and dry no evidence of infection. Motion: Painful range of Motion   DVT Exam: No evidence of DVT seen on physical exam.       NVI in lower extremity. Thigh swollen but soft. Moving foot and ankle. Data Review  Recent Labs     04/23/19  0526 04/24/19  0534   WBC 12.7* 11.1*   RBC 3.11* 2.93*   HGB 10.1* 9.6*   HCT 30.4* 28.6*   MCV 97.7 97.6   MCH 32.5* 32.7*   MCHC 33.2 33.5   RDW 13.3 13.5    255       Assessment:     Status Post left Hip Arthroplasty. Doing well postoperatively. Pt progressing through recovery well. Spoke with family who will be helping pt, he does live home alone- but he is moving well at this time/. He did receive a laxative and stool softens and has been ambulating to bathroom multiple times this am. His pain is  Controlled.     Plan:      1: Discharge today, Return to Clinic:  :  2:  Continue Deep venous thrombosis prophylaxis  3:  Continue physical therapy  4:  Continue Pain Control

## 2019-04-25 NOTE — FLOWSHEET NOTE
Hep lock d/c'ed earlier today, pt given discharge instructions and scripts, pt and family verbalize understanding,  spoke to wife on phone she picked up walker earlier today, awaiting wheelchair for discharge.

## 2019-06-06 ENCOUNTER — APPOINTMENT (OUTPATIENT)
Dept: CT IMAGING | Age: 64
End: 2019-06-06
Payer: COMMERCIAL

## 2019-06-06 ENCOUNTER — HOSPITAL ENCOUNTER (EMERGENCY)
Age: 64
Discharge: HOME | End: 2019-06-06
Attending: EMERGENCY MEDICINE
Payer: COMMERCIAL

## 2019-06-06 ENCOUNTER — APPOINTMENT (OUTPATIENT)
Dept: ULTRASOUND IMAGING | Age: 64
End: 2019-06-06
Payer: COMMERCIAL

## 2019-06-06 VITALS
OXYGEN SATURATION: 98 % | DIASTOLIC BLOOD PRESSURE: 82 MMHG | HEART RATE: 68 BPM | RESPIRATION RATE: 16 BRPM | BODY MASS INDEX: 36.44 KG/M2 | WEIGHT: 219 LBS | SYSTOLIC BLOOD PRESSURE: 142 MMHG | TEMPERATURE: 98.7 F

## 2019-06-06 DIAGNOSIS — R60.0 LEG EDEMA: Primary | ICD-10-CM

## 2019-06-06 DIAGNOSIS — S29.019A: ICD-10-CM

## 2019-06-06 LAB
ALBUMIN SERPL-MCNC: 4.7 G/DL (ref 3.5–4.6)
ALP BLD-CCNC: 56 U/L (ref 35–104)
ALT SERPL-CCNC: 15 U/L (ref 0–41)
ANION GAP SERPL CALCULATED.3IONS-SCNC: 14 MEQ/L (ref 9–15)
AST SERPL-CCNC: 20 U/L (ref 0–40)
BASOPHILS ABSOLUTE: 0.1 K/UL (ref 0–0.2)
BASOPHILS RELATIVE PERCENT: 0.6 %
BILIRUB SERPL-MCNC: 0.3 MG/DL (ref 0.2–0.7)
BUN BLDV-MCNC: 11 MG/DL (ref 8–23)
CALCIUM SERPL-MCNC: 9.6 MG/DL (ref 8.5–9.9)
CHLORIDE BLD-SCNC: 95 MEQ/L (ref 95–107)
CO2: 23 MEQ/L (ref 20–31)
CREAT SERPL-MCNC: 0.79 MG/DL (ref 0.7–1.2)
EOSINOPHILS ABSOLUTE: 0.1 K/UL (ref 0–0.7)
EOSINOPHILS RELATIVE PERCENT: 0.8 %
GFR AFRICAN AMERICAN: >60
GFR NON-AFRICAN AMERICAN: >60
GLOBULIN: 2.6 G/DL (ref 2.3–3.5)
GLUCOSE BLD-MCNC: 121 MG/DL (ref 70–99)
HCT VFR BLD CALC: 36.4 % (ref 42–52)
HEMOGLOBIN: 12.7 G/DL (ref 14–18)
LYMPHOCYTES ABSOLUTE: 1.1 K/UL (ref 1–4.8)
LYMPHOCYTES RELATIVE PERCENT: 11.7 %
MCH RBC QN AUTO: 33.1 PG (ref 27–31.3)
MCHC RBC AUTO-ENTMCNC: 34.8 % (ref 33–37)
MCV RBC AUTO: 95.2 FL (ref 80–100)
MONOCYTES ABSOLUTE: 0.9 K/UL (ref 0.2–0.8)
MONOCYTES RELATIVE PERCENT: 9.3 %
NEUTROPHILS ABSOLUTE: 7.4 K/UL (ref 1.4–6.5)
NEUTROPHILS RELATIVE PERCENT: 77.6 %
PDW BLD-RTO: 14.1 % (ref 11.5–14.5)
PLATELET # BLD: 317 K/UL (ref 130–400)
POC CREATININE WHOLE BLOOD: 0.8
POTASSIUM SERPL-SCNC: 4.3 MEQ/L (ref 3.4–4.9)
RBC # BLD: 3.83 M/UL (ref 4.7–6.1)
SODIUM BLD-SCNC: 132 MEQ/L (ref 135–144)
TOTAL PROTEIN: 7.3 G/DL (ref 6.3–8)
WBC # BLD: 9.6 K/UL (ref 4.8–10.8)

## 2019-06-06 PROCEDURE — 96375 TX/PRO/DX INJ NEW DRUG ADDON: CPT

## 2019-06-06 PROCEDURE — 93971 EXTREMITY STUDY: CPT

## 2019-06-06 PROCEDURE — 6370000000 HC RX 637 (ALT 250 FOR IP): Performed by: EMERGENCY MEDICINE

## 2019-06-06 PROCEDURE — 6360000002 HC RX W HCPCS: Performed by: EMERGENCY MEDICINE

## 2019-06-06 PROCEDURE — 6360000004 HC RX CONTRAST MEDICATION: Performed by: EMERGENCY MEDICINE

## 2019-06-06 PROCEDURE — 85025 COMPLETE CBC W/AUTO DIFF WBC: CPT

## 2019-06-06 PROCEDURE — 74177 CT ABD & PELVIS W/CONTRAST: CPT

## 2019-06-06 PROCEDURE — 2580000003 HC RX 258: Performed by: EMERGENCY MEDICINE

## 2019-06-06 PROCEDURE — 36415 COLL VENOUS BLD VENIPUNCTURE: CPT

## 2019-06-06 PROCEDURE — 99284 EMERGENCY DEPT VISIT MOD MDM: CPT

## 2019-06-06 PROCEDURE — 96374 THER/PROPH/DIAG INJ IV PUSH: CPT

## 2019-06-06 PROCEDURE — 80053 COMPREHEN METABOLIC PANEL: CPT

## 2019-06-06 RX ORDER — MORPHINE SULFATE 2 MG/ML
4 INJECTION, SOLUTION INTRAMUSCULAR; INTRAVENOUS ONCE
Status: COMPLETED | OUTPATIENT
Start: 2019-06-06 | End: 2019-06-06

## 2019-06-06 RX ORDER — 0.9 % SODIUM CHLORIDE 0.9 %
1000 INTRAVENOUS SOLUTION INTRAVENOUS ONCE
Status: COMPLETED | OUTPATIENT
Start: 2019-06-06 | End: 2019-06-06

## 2019-06-06 RX ORDER — DIAZEPAM 5 MG/1
5 TABLET ORAL EVERY 12 HOURS PRN
Qty: 10 TABLET | Refills: 0 | Status: SHIPPED | OUTPATIENT
Start: 2019-06-06 | End: 2019-06-11

## 2019-06-06 RX ORDER — ACETAMINOPHEN 500 MG
1000 TABLET ORAL ONCE
Status: COMPLETED | OUTPATIENT
Start: 2019-06-06 | End: 2019-06-06

## 2019-06-06 RX ORDER — ONDANSETRON 2 MG/ML
4 INJECTION INTRAMUSCULAR; INTRAVENOUS ONCE
Status: COMPLETED | OUTPATIENT
Start: 2019-06-06 | End: 2019-06-06

## 2019-06-06 RX ADMIN — IOPAMIDOL 100 ML: 755 INJECTION, SOLUTION INTRAVENOUS at 10:51

## 2019-06-06 RX ADMIN — MORPHINE SULFATE 4 MG: 2 INJECTION, SOLUTION INTRAMUSCULAR; INTRAVENOUS at 10:30

## 2019-06-06 RX ADMIN — ACETAMINOPHEN 1000 MG: 500 TABLET ORAL at 12:15

## 2019-06-06 RX ADMIN — SODIUM CHLORIDE 1000 ML: 9 INJECTION, SOLUTION INTRAVENOUS at 10:30

## 2019-06-06 RX ADMIN — ONDANSETRON 4 MG: 2 INJECTION INTRAMUSCULAR; INTRAVENOUS at 10:30

## 2019-06-06 ASSESSMENT — PAIN SCALES - GENERAL
PAINLEVEL_OUTOF10: 6
PAINLEVEL_OUTOF10: 10
PAINLEVEL_OUTOF10: 6
PAINLEVEL_OUTOF10: 4
PAINLEVEL_OUTOF10: 10

## 2019-06-06 ASSESSMENT — ENCOUNTER SYMPTOMS
VOMITING: 0
COUGH: 1
NAUSEA: 0

## 2019-06-06 NOTE — ED NOTES
Pt continues to refuse to  Leave without more pain medication. And insists on returning to [facility].  Pt calling  Mr Granados Hamper himself     Maricruz Carmen RN  06/06/19 5455

## 2019-06-06 NOTE — ED PROVIDER NOTES
reviewed and negative. PAST MEDICAL HISTORY     Past Medical History:   Diagnosis Date    Asthma     [facility] ([facility] disease) [address]     Depression     Hypercholesteremia     past trx > 5 yrs    Hypertension     meds > 3 yrs    Lung disease     Osteoarthritis     left hip         SURGICAL HISTORY       Past Surgical History:   Procedure Laterality Date    COLONOSCOPY  5/2/14    JENNIFER    ENDOSCOPY, COLON, DIAGNOSTIC      EYE SURGERY Bilateral     cataracts    FINGER SURGERY Right 1999 ? index finger laceration repair    HERNIA REPAIR  2004    repair [facility]     [facility] N/A 5/17/2017    COLONOSCOPY performed by Alejandra Zavala MD at [address] [facility] Left 4/22/2019    LEFT HIP TOTAL HIP ARTHROPLASTY, LATERAL DECUB, GRACIE performed by Megan Alfaro MD at [address] [facility]     Previous Medications    ALBUTEROL (PROVENTIL) (2.5 MG/3ML) 0.083% NEBULIZER SOLUTION    Take 3 mLs by nebulization every 6 hours as needed for Wheezing    ASPIRIN 81 MG EC TABLET    Take 1 tablet by mouth 2 times daily    CPAP MACHINE MISC    by Does not apply route New CPAP with 6 cm    HYDROCHLOROTHIAZIDE (HYDRODIURIL) 25 MG TABLET    Take 1 tablet by mouth daily    IPRATROPIUM (ATROVENT) 0.02 % NEBULIZER SOLUTION    QID prn    LISINOPRIL (PRINIVIL;ZESTRIL) 20 MG TABLET    Take 2 tablets by mouth daily    OLANZAPINE (ZYPREXA) 10 MG TABLET    Take 1 tablet by mouth nightly    RESPIRATORY THERAPY SUPPLIES SANDRA    Full face CPAP mask and supplies    STIOLTO RESPIMAT 2.5-2.5 MCG/ACT AERS    Inhale 2 puff in AM    TRAZODONE HCL PO    Take by mouth       ALLERGIES     Patient has no known allergies.     FAMILY HISTORY       Family History   Problem Relation Age of Onset    Colon Cancer Mother     High Blood Pressure Father     Diabetes Sister     No Known Problems Brother     No Known Problems Brother     No Known Problems Brother     Other Brother         Sepsis    Other Brother         Killed    No Known Problems Sister     No Known Problems Sister     No Known Problems Son           SOCIAL HISTORY       Social History     Socioeconomic History    Marital status:      Spouse name: None    Number of children: None    Years of education: None    Highest education level: None   Occupational History    None   Social Needs    Financial resource strain: None    Food insecurity:     Worry: None     Inability: None    Transportation needs:     Medical: None     Non-medical: None   Tobacco Use    Smoking status: Former Smoker     Packs/day: 1.50     Years: 46.00     Pack years: 69.00     Types: Cigarettes     Last attempt to quit: 3/20/2019     Years since quittin.2    Smokeless tobacco: Never Used   Substance and Sexual Activity    Alcohol use: Yes     Comment: last beer > 1 month    Drug use: No    Sexual activity: None   Lifestyle    Physical activity:     Days per week: None     Minutes per session: None    Stress: None   Relationships    Social connections:     Talks on phone: None     Gets together: None     Attends Jehovah's witness service: None     Active member of club or organization: None     Attends meetings of clubs or organizations: None     Relationship status: None    Intimate partner violence:     Fear of current or ex partner: None     Emotionally abused: None     Physically abused: None     Forced sexual activity: None   Other Topics Concern    None   Social History Narrative    None       SCREENINGS             PHYSICAL EXAM    (up to 7 for level 4, 8 or more for level 5)     ED Triage Vitals [19 0948]   BP Temp Temp Source Pulse Resp SpO2 Height Weight   (!) 162/93 98.7 °F (37.1 °C) Oral 77 20 99 % -- 219 lb (99.3 kg)       Physical Exam   Constitutional: He appears well-developed and well-nourished. No distress. HENT:   Head: Normocephalic and atraumatic.    Mouth/Throat: Oropharynx is clear and moist.   Eyes: Conjunctivae are normal.   Pupils are equally round and reacting normally. Extraoccular muscles are grossly intact. Neck: Normal range of motion. No tracheal deviation present. Cardiovascular: Normal rate, regular rhythm, normal heart sounds and intact distal pulses. Exam reveals no friction rub. No murmur heard. Pulmonary/Chest: Effort normal and breath sounds normal. No stridor. No respiratory distress. He has no wheezes. He has no rales. He exhibits tenderness. Abdominal: Soft. He exhibits no distension and no mass. There is no tenderness. There is no rebound and no guarding. Musculoskeletal: Normal range of motion. He exhibits edema and tenderness. He exhibits no deformity. Patient has point tenderness just above the left costovertebral angle in between ribs 10 and 11. There is no swelling or hematoma noted. Patient does have edema in the left lower extremity, it is brawny in nature, it is nonpitting. Good distal pulses noted in both lower extremities   Neurological: He is alert. Answering questions appropriately. No gaze deficit. No gait abnormality. Moving all extremities. Skin: Skin is warm and dry. Capillary refill takes 2 to 3 seconds. Psychiatric: He has a normal mood and affect. Judgment normal.   Nursing note and vitals reviewed. EMERGENCY DEPARTMENT COURSE and DIFFERENTIAL DIAGNOSIS/MDM:   Vitals:    Vitals:    06/06/19 0948 06/06/19 1035 06/06/19 1144 06/06/19 1250   BP: (!) 162/93 (!) 127/93 (!) 149/89 (!) 142/82   Pulse: 77 87 72 68   Resp: 20 18 18 16   Temp: 98.7 °F (37.1 °C)      TempSrc: Oral      SpO2: 99% 99% 99% 98%   Weight: 219 lb (99.3 kg)          Patient presents to the emergency department with the complaint described above. Vital signs show hypertension but are otherwise unremarkable. Patient has point tenderness on the left lower ribs and I suspect most likely he pulled a muscle when changing a tire.   Patient does have some swelling in his left leg he did recently have surgery. In speaking with [facility] [place], it appears that he left without being seen when he was there other day and there was no workup done. At this time, I am going to get some routine labs, I will get a contrast enhanced CT of the abdomen and pelvis, I'm going to ultrasound the left lower extremity. I will give the patient some IV pain medication and I will then reevaluate. DIAGNOSTIC RESULTS     LABS:  Labs Reviewed   CBC WITH AUTO DIFFERENTIAL - Abnormal; Notable for the following components:       Result Value    RBC 3.83 (*)     Hemoglobin 12.7 (*)     Hematocrit 36.4 (*)     MCH 33.1 (*)     Neutrophils # 7.4 (*)     Monocytes # 0.9 (*)     All other components within normal limits   COMPREHENSIVE METABOLIC PANEL - Abnormal; Notable for the following components:    Sodium 132 (*)     Glucose 121 (*)     Alb 4.7 (*)     All other components within normal limits   POCT CREATININE - Normal       All other labs were within normal range or not returned as of this dictation. RADIOLOGY:  CT ABDOMEN PELVIS W IV CONTRAST Additional Contrast? None   Final Result      No acute intra-abdominal process. Bilateral hydroceles. All CT scans at this facility use dose modulation, iterative reconstruction, and/or weight based dosing when appropriate to reduce radiation dose to as low as reasonably achievable. US DUP LOWER EXTREMITY LEFT NASH    (Results Pending)       CT was unremarkable, ultrasound negative and lab work unremarkable. I reviewed the patient's record as well as his prescription drug report. He has been following up with orthopedics, it appears they've been tapering him down from Percocet, to [address] [facility] [facility], now he is on tramadol which was just filled a few days ago. Pain control seems to be the patient's major issue. I have discussed with him what I'm going to do to help and the importance of follow-up.   At this time on going to add

## 2019-06-06 NOTE — ED NOTES
Patient left AMA from [facility] prior to testing.       Karyn Velazquez RN  06/06/19 1016

## 2019-06-06 NOTE — ED NOTES
Pt given  Discharge instructions but refusing to leave. Pt states  He  [facility] to come get him so that he may speak to his dr again and get admitted for the pain.   This RN spoke with Mr Gwynne Oppenheim at the  [facility] and he verified that the  Pt does not  Need to return there and that he can go home     Maricruz Thompson RN  06/06/19 5593

## 2019-06-06 NOTE — ED NOTES
Dr Zenon Lozano at bedside. Patient assisted into gown. Patient is alert and oriented, patient is mainly Italian speaking. Patient has history of left hip surgery on 4/23. Incision is clean, dry, and intact. Left leg is swollen, warm, skin is taught, and painful to the touch. Right leg has 2+ edema. Patient was seen at [facility] on 6/4 for a left flank injury.          Edith Lizarraga RN  06/06/19 1007

## 2019-06-06 NOTE — ED NOTES
Patient continues to moan out. Patient talking to wife on phone.       Tenzin Olivarez RN  06/06/19 9336

## 2019-06-06 NOTE — ED TRIAGE NOTES
information received with  Use of [employer]  Interpretor michael [id number]. Pt states he  Had a surgery to his left  Hip 1 1/2 months ago and  Although he was ok in the hospital, he has been unable to  Sleep  More than 2 hours  Since his discharge d/t pain  In left leg and foot.   Pt asks that he  Be admitted to the hospital to find out  What is wrong with him

## 2019-06-06 NOTE — ED NOTES
Pt again  Told  By [facility] staff that he does not  Need to return to them today and needs to go home.  Pt called his wife to pick him up     April Roland Cordero RN  06/06/19 9900

## 2019-06-08 LAB
GFR AFRICAN AMERICAN: >60
GFR NON-AFRICAN AMERICAN: >60
PERFORMED ON: NORMAL
POC CREATININE: 0.8 MG/DL (ref 0.8–1.3)
POC SAMPLE TYPE: NORMAL

## 2019-10-10 ENCOUNTER — OFFICE VISIT (OUTPATIENT)
Dept: GASTROENTEROLOGY | Age: 64
End: 2019-10-10

## 2019-10-10 VITALS
WEIGHT: 233.4 LBS | RESPIRATION RATE: 16 BRPM | BODY MASS INDEX: 38.84 KG/M2 | OXYGEN SATURATION: 98 % | HEART RATE: 68 BPM

## 2019-10-10 DIAGNOSIS — Z12.11 ENCOUNTER FOR SCREENING COLONOSCOPY: Primary | ICD-10-CM

## 2019-10-10 PROCEDURE — 99999 PR OFFICE/OUTPT VISIT,PROCEDURE ONLY: CPT | Performed by: SPECIALIST

## 2019-10-10 RX ORDER — POLYETHYLENE GLYCOL 3350, SODIUM CHLORIDE, SODIUM BICARBONATE, POTASSIUM CHLORIDE 420; 11.2; 5.72; 1.48 G/4L; G/4L; G/4L; G/4L
4000 POWDER, FOR SOLUTION ORAL ONCE
Qty: 1 BOTTLE | Refills: 0 | Status: SHIPPED | OUTPATIENT
Start: 2019-10-10 | End: 2019-10-10

## 2019-10-10 ASSESSMENT — ENCOUNTER SYMPTOMS
RECTAL PAIN: 0
RESPIRATORY NEGATIVE: 1
EYES NEGATIVE: 1
ABDOMINAL DISTENTION: 0
NAUSEA: 0
CONSTIPATION: 0
DIARRHEA: 0
ABDOMINAL PAIN: 0
GASTROINTESTINAL NEGATIVE: 1
ANAL BLEEDING: 0
BLOOD IN STOOL: 0
VOMITING: 0

## 2019-11-10 ENCOUNTER — APPOINTMENT (OUTPATIENT)
Dept: CT IMAGING | Age: 64
End: 2019-11-10
Payer: COMMERCIAL

## 2019-11-10 ENCOUNTER — HOSPITAL ENCOUNTER (EMERGENCY)
Age: 64
Discharge: HOME | End: 2019-11-10
Payer: COMMERCIAL

## 2019-11-10 VITALS
HEART RATE: 76 BPM | BODY MASS INDEX: 37.49 KG/M2 | HEIGHT: 65 IN | OXYGEN SATURATION: 97 % | DIASTOLIC BLOOD PRESSURE: 92 MMHG | SYSTOLIC BLOOD PRESSURE: 155 MMHG | WEIGHT: 225 LBS | TEMPERATURE: 98.8 F | RESPIRATION RATE: 18 BRPM

## 2019-11-10 DIAGNOSIS — G93.89 BRAIN MASS: ICD-10-CM

## 2019-11-10 DIAGNOSIS — R19.7 DIARRHEA, UNSPECIFIED TYPE: Primary | ICD-10-CM

## 2019-11-10 LAB
ACETAMINOPHEN LEVEL: <5 UG/ML (ref 10–30)
ALBUMIN SERPL-MCNC: 4.3 G/DL (ref 3.5–4.6)
ALP BLD-CCNC: 51 U/L (ref 35–104)
ALT SERPL-CCNC: 19 U/L (ref 0–41)
ANION GAP SERPL CALCULATED.3IONS-SCNC: 10 MEQ/L (ref 9–15)
AST SERPL-CCNC: 20 U/L (ref 0–40)
BASOPHILS ABSOLUTE: 0.1 K/UL (ref 0–0.2)
BASOPHILS RELATIVE PERCENT: 1.1 %
BILIRUB SERPL-MCNC: 0.5 MG/DL (ref 0.2–0.7)
BILIRUBIN URINE: NEGATIVE
BLOOD, URINE: NEGATIVE
BUN BLDV-MCNC: 8 MG/DL (ref 8–23)
CALCIUM SERPL-MCNC: 9.1 MG/DL (ref 8.5–9.9)
CHLORIDE BLD-SCNC: 94 MEQ/L (ref 95–107)
CK MB: 7.4 NG/ML (ref 0–6.7)
CLARITY: CLEAR
CO2: 23 MEQ/L (ref 20–31)
COLOR: YELLOW
CREAT SERPL-MCNC: 0.74 MG/DL (ref 0.7–1.2)
CREATINE KINASE-MB INDEX: 2.9 % (ref 0–3.5)
EOSINOPHILS ABSOLUTE: 0.1 K/UL (ref 0–0.7)
EOSINOPHILS RELATIVE PERCENT: 1.6 %
GFR AFRICAN AMERICAN: >60
GFR NON-AFRICAN AMERICAN: >60
GLOBULIN: 2.4 G/DL (ref 2.3–3.5)
GLUCOSE BLD-MCNC: 102 MG/DL (ref 70–99)
GLUCOSE URINE: NEGATIVE MG/DL
HCT VFR BLD CALC: 37 % (ref 42–52)
HEMOGLOBIN: 12.9 G/DL (ref 14–18)
KETONES, URINE: NEGATIVE MG/DL
LEUKOCYTE ESTERASE, URINE: NEGATIVE
LIPASE: 25 U/L (ref 12–95)
LYMPHOCYTES ABSOLUTE: 1.5 K/UL (ref 1–4.8)
LYMPHOCYTES RELATIVE PERCENT: 15.1 %
MAGNESIUM: 1.9 MG/DL (ref 1.7–2.4)
MCH RBC QN AUTO: 32.3 PG (ref 27–31.3)
MCHC RBC AUTO-ENTMCNC: 34.7 % (ref 33–37)
MCV RBC AUTO: 93.1 FL (ref 80–100)
MONOCYTES ABSOLUTE: 1 K/UL (ref 0.2–0.8)
MONOCYTES RELATIVE PERCENT: 10.7 %
NEUTROPHILS ABSOLUTE: 6.9 K/UL (ref 1.4–6.5)
NEUTROPHILS RELATIVE PERCENT: 71.5 %
NITRITE, URINE: NEGATIVE
PDW BLD-RTO: 14.3 % (ref 11.5–14.5)
PH UA: 7.5 (ref 5–9)
PLATELET # BLD: 293 K/UL (ref 130–400)
POC CREATININE WHOLE BLOOD: 0.7
POTASSIUM SERPL-SCNC: 4.4 MEQ/L (ref 3.4–4.9)
PROTEIN UA: NEGATIVE MG/DL
RBC # BLD: 3.98 M/UL (ref 4.7–6.1)
SALICYLATE, SERUM: <0.3 MG/DL (ref 15–30)
SODIUM BLD-SCNC: 127 MEQ/L (ref 135–144)
SPECIFIC GRAVITY UA: 1 (ref 1–1.03)
TOTAL CK: 254 U/L (ref 0–190)
TOTAL PROTEIN: 6.7 G/DL (ref 6.3–8)
TSH REFLEX: 1.11 UIU/ML (ref 0.44–3.86)
URINE REFLEX TO CULTURE: NORMAL
UROBILINOGEN, URINE: 0.2 E.U./DL
WBC # BLD: 9.6 K/UL (ref 4.8–10.8)

## 2019-11-10 PROCEDURE — 36415 COLL VENOUS BLD VENIPUNCTURE: CPT

## 2019-11-10 PROCEDURE — 85025 COMPLETE CBC W/AUTO DIFF WBC: CPT

## 2019-11-10 PROCEDURE — 83690 ASSAY OF LIPASE: CPT

## 2019-11-10 PROCEDURE — 96374 THER/PROPH/DIAG INJ IV PUSH: CPT

## 2019-11-10 PROCEDURE — 82550 ASSAY OF CK (CPK): CPT

## 2019-11-10 PROCEDURE — 81003 URINALYSIS AUTO W/O SCOPE: CPT

## 2019-11-10 PROCEDURE — 6360000002 HC RX W HCPCS: Performed by: NURSE PRACTITIONER

## 2019-11-10 PROCEDURE — 74177 CT ABD & PELVIS W/CONTRAST: CPT

## 2019-11-10 PROCEDURE — G0480 DRUG TEST DEF 1-7 CLASSES: HCPCS

## 2019-11-10 PROCEDURE — 82553 CREATINE MB FRACTION: CPT

## 2019-11-10 PROCEDURE — 99284 EMERGENCY DEPT VISIT MOD MDM: CPT

## 2019-11-10 PROCEDURE — 80053 COMPREHEN METABOLIC PANEL: CPT

## 2019-11-10 PROCEDURE — 70450 CT HEAD/BRAIN W/O DYE: CPT

## 2019-11-10 PROCEDURE — 6360000004 HC RX CONTRAST MEDICATION: Performed by: NURSE PRACTITIONER

## 2019-11-10 PROCEDURE — 84443 ASSAY THYROID STIM HORMONE: CPT

## 2019-11-10 PROCEDURE — 83735 ASSAY OF MAGNESIUM: CPT

## 2019-11-10 RX ORDER — CIPROFLOXACIN 500 MG/1
500 TABLET, FILM COATED ORAL 2 TIMES DAILY
Qty: 14 TABLET | Refills: 0 | Status: SHIPPED | OUTPATIENT
Start: 2019-11-10 | End: 2019-11-13

## 2019-11-10 RX ORDER — KETOROLAC TROMETHAMINE 30 MG/ML
30 INJECTION, SOLUTION INTRAMUSCULAR; INTRAVENOUS ONCE
Status: COMPLETED | OUTPATIENT
Start: 2019-11-10 | End: 2019-11-10

## 2019-11-10 RX ADMIN — IOPAMIDOL 100 ML: 612 INJECTION, SOLUTION INTRAVENOUS at 14:09

## 2019-11-10 RX ADMIN — KETOROLAC TROMETHAMINE 30 MG: 30 INJECTION, SOLUTION INTRAMUSCULAR; INTRAVENOUS at 13:38

## 2019-11-10 ASSESSMENT — ENCOUNTER SYMPTOMS
BACK PAIN: 0
COUGH: 0
VOMITING: 0
RHINORRHEA: 0
PHOTOPHOBIA: 0
SORE THROAT: 0
DIARRHEA: 1
ABDOMINAL PAIN: 0
EYE PAIN: 0
NAUSEA: 0
SHORTNESS OF BREATH: 0

## 2019-11-10 ASSESSMENT — PAIN SCALES - GENERAL: PAINLEVEL_OUTOF10: 6

## 2019-11-10 ASSESSMENT — PAIN DESCRIPTION - LOCATION: LOCATION: ABDOMEN

## 2019-11-10 ASSESSMENT — PAIN SCALES - WONG BAKER: WONGBAKER_NUMERICALRESPONSE: 4

## 2019-11-13 ENCOUNTER — TELEPHONE (OUTPATIENT)
Dept: GASTROENTEROLOGY | Age: 64
End: 2019-11-13

## 2019-12-03 ENCOUNTER — OFFICE VISIT (OUTPATIENT)
Dept: ENDOCRINOLOGY | Age: 64
End: 2019-12-03
Payer: COMMERCIAL

## 2019-12-03 VITALS
BODY MASS INDEX: 38.32 KG/M2 | HEIGHT: 65 IN | DIASTOLIC BLOOD PRESSURE: 87 MMHG | HEART RATE: 77 BPM | SYSTOLIC BLOOD PRESSURE: 138 MMHG | WEIGHT: 230 LBS

## 2019-12-03 DIAGNOSIS — D35.2 PITUITARY ADENOMA (HCC): ICD-10-CM

## 2019-12-03 DIAGNOSIS — D35.2 PITUITARY ADENOMA (HCC): Primary | ICD-10-CM

## 2019-12-03 DIAGNOSIS — D35.2 PITUITARY MACROADENOMA (HCC): ICD-10-CM

## 2019-12-03 LAB
CORTISOL TOTAL: 11.7 UG/DL
PROLACTIN: 21.7 NG/ML (ref 4–15.2)
T4 FREE: 1.3 NG/DL (ref 0.84–1.68)
TSH SERPL DL<=0.05 MIU/L-ACNC: 1.66 UIU/ML (ref 0.44–3.86)

## 2019-12-03 PROCEDURE — G8427 DOCREV CUR MEDS BY ELIG CLIN: HCPCS | Performed by: INTERNAL MEDICINE

## 2019-12-03 PROCEDURE — G8417 CALC BMI ABV UP PARAM F/U: HCPCS | Performed by: INTERNAL MEDICINE

## 2019-12-03 PROCEDURE — G8484 FLU IMMUNIZE NO ADMIN: HCPCS | Performed by: INTERNAL MEDICINE

## 2019-12-03 PROCEDURE — 99243 OFF/OP CNSLTJ NEW/EST LOW 30: CPT | Performed by: INTERNAL MEDICINE

## 2019-12-03 RX ORDER — FENOFIBRATE 160 MG/1
150 TABLET ORAL DAILY
COMMUNITY

## 2019-12-03 ASSESSMENT — ENCOUNTER SYMPTOMS
EYES NEGATIVE: 1
DIARRHEA: 1
VISUAL CHANGE: 0
ABDOMINAL PAIN: 0
SWOLLEN GLANDS: 0

## 2019-12-04 LAB — ADRENOCORTICOTROPIC HORMONE: 154.1 PG/ML (ref 7.2–63.3)

## 2019-12-05 ENCOUNTER — OFFICE VISIT (OUTPATIENT)
Dept: GASTROENTEROLOGY | Age: 64
End: 2019-12-05
Payer: COMMERCIAL

## 2019-12-05 VITALS
BODY MASS INDEX: 38.15 KG/M2 | HEART RATE: 84 BPM | WEIGHT: 229 LBS | HEIGHT: 65 IN | OXYGEN SATURATION: 96 % | RESPIRATION RATE: 12 BRPM

## 2019-12-05 DIAGNOSIS — K62.5 RECTAL BLEEDING: Primary | ICD-10-CM

## 2019-12-05 LAB — GROWTH HORMONE: 0.2 NG/ML (ref 0.05–3)

## 2019-12-05 PROCEDURE — G8417 CALC BMI ABV UP PARAM F/U: HCPCS | Performed by: SPECIALIST

## 2019-12-05 PROCEDURE — G8427 DOCREV CUR MEDS BY ELIG CLIN: HCPCS | Performed by: SPECIALIST

## 2019-12-05 PROCEDURE — 1036F TOBACCO NON-USER: CPT | Performed by: SPECIALIST

## 2019-12-05 PROCEDURE — 99212 OFFICE O/P EST SF 10 MIN: CPT | Performed by: SPECIALIST

## 2019-12-05 PROCEDURE — 3017F COLORECTAL CA SCREEN DOC REV: CPT | Performed by: SPECIALIST

## 2019-12-05 PROCEDURE — G8484 FLU IMMUNIZE NO ADMIN: HCPCS | Performed by: SPECIALIST

## 2019-12-05 RX ORDER — POLYETHYLENE GLYCOL 3350, SODIUM CHLORIDE, SODIUM BICARBONATE, POTASSIUM CHLORIDE 420; 11.2; 5.72; 1.48 G/4L; G/4L; G/4L; G/4L
4000 POWDER, FOR SOLUTION ORAL ONCE
Qty: 1 BOTTLE | Refills: 0 | Status: SHIPPED | OUTPATIENT
Start: 2019-12-05 | End: 2019-12-05

## 2019-12-05 ASSESSMENT — ENCOUNTER SYMPTOMS
RECTAL PAIN: 0
VOMITING: 0
RESPIRATORY NEGATIVE: 1
NAUSEA: 0
DIARRHEA: 0
ABDOMINAL DISTENTION: 0
EYES NEGATIVE: 1
ANAL BLEEDING: 0
CONSTIPATION: 0
ABDOMINAL PAIN: 0
BLOOD IN STOOL: 1

## 2019-12-06 LAB
IGF-1 (INSULIN-LIKE GROWTH I): 144 NG/ML (ref 49–214)
INSULIN-LIKE GROWTH FACTOR-1 Z-SCORE: 0.6

## 2019-12-12 ENCOUNTER — ANESTHESIA (OUTPATIENT)
Dept: ENDOSCOPY | Age: 64
End: 2019-12-12
Payer: COMMERCIAL

## 2019-12-12 ENCOUNTER — ANESTHESIA EVENT (OUTPATIENT)
Dept: ENDOSCOPY | Age: 64
End: 2019-12-12
Payer: COMMERCIAL

## 2019-12-12 ENCOUNTER — HOSPITAL ENCOUNTER (OUTPATIENT)
Age: 64
Setting detail: OUTPATIENT SURGERY
Discharge: HOME | End: 2019-12-12
Attending: SPECIALIST | Admitting: SPECIALIST
Payer: COMMERCIAL

## 2019-12-12 VITALS
SYSTOLIC BLOOD PRESSURE: 142 MMHG | OXYGEN SATURATION: 100 % | RESPIRATION RATE: 14 BRPM | DIASTOLIC BLOOD PRESSURE: 79 MMHG

## 2019-12-12 VITALS
HEIGHT: 65 IN | TEMPERATURE: 98.1 F | BODY MASS INDEX: 38.32 KG/M2 | SYSTOLIC BLOOD PRESSURE: 139 MMHG | HEART RATE: 73 BPM | WEIGHT: 230 LBS | DIASTOLIC BLOOD PRESSURE: 75 MMHG | OXYGEN SATURATION: 100 % | RESPIRATION RATE: 18 BRPM

## 2019-12-12 PROCEDURE — 3700000000 HC ANESTHESIA ATTENDED CARE: Performed by: SPECIALIST

## 2019-12-12 PROCEDURE — 88342 IMHCHEM/IMCYTCHM 1ST ANTB: CPT

## 2019-12-12 PROCEDURE — 3609017100 HC EGD: Performed by: SPECIALIST

## 2019-12-12 PROCEDURE — 2580000003 HC RX 258: Performed by: NURSE ANESTHETIST, CERTIFIED REGISTERED

## 2019-12-12 PROCEDURE — 6360000002 HC RX W HCPCS: Performed by: NURSE ANESTHETIST, CERTIFIED REGISTERED

## 2019-12-12 PROCEDURE — 3700000001 HC ADD 15 MINUTES (ANESTHESIA): Performed by: SPECIALIST

## 2019-12-12 PROCEDURE — 88305 TISSUE EXAM BY PATHOLOGIST: CPT

## 2019-12-12 PROCEDURE — 43239 EGD BIOPSY SINGLE/MULTIPLE: CPT | Performed by: SPECIALIST

## 2019-12-12 PROCEDURE — 2580000003 HC RX 258: Performed by: SPECIALIST

## 2019-12-12 PROCEDURE — 7100000011 HC PHASE II RECOVERY - ADDTL 15 MIN: Performed by: SPECIALIST

## 2019-12-12 PROCEDURE — 7100000010 HC PHASE II RECOVERY - FIRST 15 MIN: Performed by: SPECIALIST

## 2019-12-12 PROCEDURE — 45380 COLONOSCOPY AND BIOPSY: CPT | Performed by: SPECIALIST

## 2019-12-12 PROCEDURE — 2500000003 HC RX 250 WO HCPCS: Performed by: NURSE ANESTHETIST, CERTIFIED REGISTERED

## 2019-12-12 PROCEDURE — 3609027000 HC COLONOSCOPY: Performed by: SPECIALIST

## 2019-12-12 RX ORDER — SODIUM CHLORIDE 9 MG/ML
INJECTION, SOLUTION INTRAVENOUS CONTINUOUS
Status: DISCONTINUED | OUTPATIENT
Start: 2019-12-12 | End: 2019-12-12 | Stop reason: HOSPADM

## 2019-12-12 RX ORDER — LIDOCAINE HYDROCHLORIDE 20 MG/ML
INJECTION, SOLUTION INFILTRATION; PERINEURAL PRN
Status: DISCONTINUED | OUTPATIENT
Start: 2019-12-12 | End: 2019-12-12 | Stop reason: SDUPTHER

## 2019-12-12 RX ORDER — SODIUM CHLORIDE 9 MG/ML
INJECTION, SOLUTION INTRAVENOUS CONTINUOUS PRN
Status: DISCONTINUED | OUTPATIENT
Start: 2019-12-12 | End: 2019-12-12 | Stop reason: SDUPTHER

## 2019-12-12 RX ORDER — ONDANSETRON 2 MG/ML
4 INJECTION INTRAMUSCULAR; INTRAVENOUS
Status: DISCONTINUED | OUTPATIENT
Start: 2019-12-12 | End: 2019-12-12 | Stop reason: HOSPADM

## 2019-12-12 RX ORDER — ONDANSETRON 2 MG/ML
INJECTION INTRAMUSCULAR; INTRAVENOUS PRN
Status: DISCONTINUED | OUTPATIENT
Start: 2019-12-12 | End: 2019-12-12 | Stop reason: SDUPTHER

## 2019-12-12 RX ORDER — SODIUM CHLORIDE 0.9 % (FLUSH) 0.9 %
10 SYRINGE (ML) INJECTION PRN
Status: DISCONTINUED | OUTPATIENT
Start: 2019-12-12 | End: 2019-12-12 | Stop reason: HOSPADM

## 2019-12-12 RX ORDER — PROPOFOL 10 MG/ML
INJECTION, EMULSION INTRAVENOUS PRN
Status: DISCONTINUED | OUTPATIENT
Start: 2019-12-12 | End: 2019-12-12 | Stop reason: SDUPTHER

## 2019-12-12 RX ORDER — LIDOCAINE HYDROCHLORIDE 10 MG/ML
1 INJECTION, SOLUTION EPIDURAL; INFILTRATION; INTRACAUDAL; PERINEURAL
Status: DISCONTINUED | OUTPATIENT
Start: 2019-12-12 | End: 2019-12-12 | Stop reason: HOSPADM

## 2019-12-12 RX ORDER — LABETALOL 20 MG/4 ML (5 MG/ML) INTRAVENOUS SYRINGE
PRN
Status: DISCONTINUED | OUTPATIENT
Start: 2019-12-12 | End: 2019-12-12 | Stop reason: SDUPTHER

## 2019-12-12 RX ORDER — SODIUM CHLORIDE 0.9 % (FLUSH) 0.9 %
10 SYRINGE (ML) INJECTION EVERY 12 HOURS SCHEDULED
Status: DISCONTINUED | OUTPATIENT
Start: 2019-12-12 | End: 2019-12-12 | Stop reason: HOSPADM

## 2019-12-12 RX ADMIN — ONDANSETRON HYDROCHLORIDE 4 MG: 2 INJECTION, SOLUTION INTRAMUSCULAR; INTRAVENOUS at 09:45

## 2019-12-12 RX ADMIN — SODIUM CHLORIDE: 9 INJECTION, SOLUTION INTRAVENOUS at 08:22

## 2019-12-12 RX ADMIN — SODIUM CHLORIDE: 9 INJECTION, SOLUTION INTRAVENOUS at 08:50

## 2019-12-12 RX ADMIN — LIDOCAINE HYDROCHLORIDE 50 MG: 20 INJECTION, SOLUTION INFILTRATION; PERINEURAL at 09:42

## 2019-12-12 RX ADMIN — PROPOFOL 250 MG: 10 INJECTION, EMULSION INTRAVENOUS at 09:42

## 2019-12-12 RX ADMIN — LABETALOL HYDROCHLORIDE 5 MG: 5 INJECTION, SOLUTION INTRAVENOUS at 09:44

## 2019-12-12 RX ADMIN — PROPOFOL 200 MG: 10 INJECTION, EMULSION INTRAVENOUS at 09:50

## 2019-12-12 ASSESSMENT — PAIN - FUNCTIONAL ASSESSMENT: PAIN_FUNCTIONAL_ASSESSMENT: 0-10

## 2019-12-13 DIAGNOSIS — K62.5 RECTAL BLEEDING: ICD-10-CM

## 2019-12-27 ENCOUNTER — HOSPITAL ENCOUNTER (OUTPATIENT)
Dept: MRI IMAGING | Age: 64
Discharge: HOME | End: 2019-12-29
Payer: COMMERCIAL

## 2019-12-27 ENCOUNTER — TELEPHONE (OUTPATIENT)
Dept: INTERNAL MEDICINE CLINIC | Age: 64
End: 2019-12-27

## 2019-12-27 DIAGNOSIS — D35.2 PITUITARY ADENOMA (HCC): ICD-10-CM

## 2020-01-13 ENCOUNTER — HOSPITAL ENCOUNTER (EMERGENCY)
Age: 65
Discharge: HOME | End: 2020-01-13
Payer: COMMERCIAL

## 2020-01-13 VITALS
RESPIRATION RATE: 19 BRPM | BODY MASS INDEX: 38.27 KG/M2 | SYSTOLIC BLOOD PRESSURE: 143 MMHG | DIASTOLIC BLOOD PRESSURE: 90 MMHG | OXYGEN SATURATION: 97 % | HEART RATE: 87 BPM | TEMPERATURE: 97.9 F | WEIGHT: 230 LBS

## 2020-01-13 PROCEDURE — 6360000002 HC RX W HCPCS: Performed by: PHYSICIAN ASSISTANT

## 2020-01-13 PROCEDURE — 96372 THER/PROPH/DIAG INJ SC/IM: CPT

## 2020-01-13 PROCEDURE — 99283 EMERGENCY DEPT VISIT LOW MDM: CPT

## 2020-01-13 PROCEDURE — 6370000000 HC RX 637 (ALT 250 FOR IP): Performed by: PHYSICIAN ASSISTANT

## 2020-01-13 RX ORDER — TRAMADOL HYDROCHLORIDE 50 MG/1
50 TABLET ORAL EVERY 6 HOURS PRN
Qty: 12 TABLET | Refills: 0 | Status: SHIPPED | OUTPATIENT
Start: 2020-01-13 | End: 2020-01-16

## 2020-01-13 RX ORDER — HYDROCODONE BITARTRATE AND ACETAMINOPHEN 5; 325 MG/1; MG/1
1 TABLET ORAL ONCE
Status: COMPLETED | OUTPATIENT
Start: 2020-01-13 | End: 2020-01-13

## 2020-01-13 RX ORDER — KETOROLAC TROMETHAMINE 30 MG/ML
30 INJECTION, SOLUTION INTRAMUSCULAR; INTRAVENOUS ONCE
Status: COMPLETED | OUTPATIENT
Start: 2020-01-13 | End: 2020-01-13

## 2020-01-13 RX ORDER — NAPROXEN 500 MG/1
500 TABLET ORAL 2 TIMES DAILY WITH MEALS
Qty: 10 TABLET | Refills: 3 | Status: ON HOLD | OUTPATIENT
Start: 2020-01-13 | End: 2020-02-25 | Stop reason: HOSPADM

## 2020-01-13 RX ADMIN — HYDROCODONE BITARTRATE AND ACETAMINOPHEN 1 TABLET: 5; 325 TABLET ORAL at 14:23

## 2020-01-13 RX ADMIN — KETOROLAC TROMETHAMINE 30 MG: 30 INJECTION, SOLUTION INTRAMUSCULAR at 14:23

## 2020-01-13 ASSESSMENT — PAIN DESCRIPTION - ORIENTATION: ORIENTATION: LEFT

## 2020-01-13 ASSESSMENT — PAIN DESCRIPTION - LOCATION: LOCATION: LEG

## 2020-01-13 ASSESSMENT — PAIN SCALES - GENERAL
PAINLEVEL_OUTOF10: 10
PAINLEVEL_OUTOF10: 8

## 2020-01-13 ASSESSMENT — ENCOUNTER SYMPTOMS
EYES NEGATIVE: 1
GASTROINTESTINAL NEGATIVE: 1
RESPIRATORY NEGATIVE: 1

## 2020-01-13 NOTE — ED PROVIDER NOTES
[facility] ED  eMERGENCY dEPARTMENT eNCOUnter      Pt Name: Sarah Razo  MRN: [id number]  [place] 1955  Date of evaluation: 1/13/2020  Provider: Kayy Lopez PA-C      HISTORY OF PRESENT ILLNESS    Sarah Razo is a 60 y.o. male who presents to the Emergency Department with chief complaint of chronic left hip pain that is been worse over the last few days. Patient has a history of a total hip replacement of the left hip performed by Dr. Augustina aaron in spring 2019. Patient states he has never really been pain-free since his surgery. Patient has not been taking any over-the-counter medication for pain on a regular basis. He also states he has not been to pain management. He denies any specific injury or trauma and has no other concerns at this time. REVIEW OF SYSTEMS       Review of Systems   Constitutional: Negative. HENT: Negative. Eyes: Negative. Respiratory: Negative. Cardiovascular: Negative. Gastrointestinal: Negative. Endocrine: Negative. Genitourinary: Negative. Musculoskeletal: Positive for arthralgias. Left hip   Skin: Negative. Neurological: Negative. Psychiatric/Behavioral: Negative.           PAST MEDICAL HISTORY     Past Medical History:   Diagnosis Date    Abdominal pain     Asthma     Bloating symptom     COPD (chronic obstructive pulmonary disease) (HCC)     Depression     Depression     Diarrhea     History of rectal bleeding     Hypercholesteremia     past trx > 5 yrs    Hypercholesteremia     Hypercholesteremia     Hypertension     meds > 3 yrs    Lung disease     RANDI on CPAP     patient relates he doesn't use cpap at this time    Osteoarthritis     left hip    Osteoarthritis     Osteoarthritis of left hip     Pituitary macroadenoma [facility])          SURGICAL HISTORY       Past Surgical History:   Procedure Laterality Date    COLONOSCOPY  5/2/14    JENNIFER    COLONOSCOPY N/A 12/12/2019    COLONOSCOPY DIAGNOSTIC Problems Son           SOCIAL HISTORY       Social History     Socioeconomic History    Marital status:      Spouse name: None    Number of children: None    Years of education: None    Highest education level: None   Occupational History    None   Social Needs    Financial resource strain: None    Food insecurity:     Worry: None     Inability: None    Transportation needs:     Medical: None     Non-medical: None   Tobacco Use    Smoking status: Former Smoker     Packs/day: 1.50     Years: 46.00     Pack years: 69.00     Types: Cigarettes     Last attempt to quit: 3/20/2019     Years since quittin.8    Smokeless tobacco: Never Used   Substance and Sexual Activity    Alcohol use: Yes     Comment: last beer > 1 month    Drug use: No    Sexual activity: None   Lifestyle    Physical activity:     Days per week: None     Minutes per session: None    Stress: None   Relationships    Social connections:     Talks on phone: None     Gets together: None     Attends [employer] service: None     Active member of club or organization: None     Attends meetings of clubs or organizations: None     Relationship status: None    Intimate partner violence:     Fear of current or ex partner: None     Emotionally abused: None     Physically abused: None     Forced sexual activity: None   Other Topics Concern    None   Social History Narrative    None       SCREENINGS      @FLOW([id number])@      PHYSICAL EXAM    (up to 7 for level 4, 8 or more for level 5)     ED Triage Vitals [20 1231]   BP Temp Temp Source Pulse Resp SpO2 Height Weight   (!) 143/90 97.9 °F (36.6 °C) Oral 87 19 97 % -- 230 lb (104.3 kg)       Physical Exam  Constitutional:       General: He is not in acute distress. Appearance: He is well-developed. HENT:      Head: Normocephalic and atraumatic. Eyes:      Conjunctiva/sclera: Conjunctivae normal.      Pupils: Pupils are equal, round, and reactive to light.    Neck: Mynor Palm PA-C  01/13/20 1451

## 2020-02-03 ENCOUNTER — HOSPITAL ENCOUNTER (EMERGENCY)
Age: 65
Discharge: HOME | End: 2020-02-03
Payer: COMMERCIAL

## 2020-02-03 ENCOUNTER — HOSPITAL ENCOUNTER (EMERGENCY)
Age: 65
Discharge: HOME | End: 2020-02-03
Attending: STUDENT IN AN ORGANIZED HEALTH CARE EDUCATION/TRAINING PROGRAM
Payer: COMMERCIAL

## 2020-02-03 VITALS
DIASTOLIC BLOOD PRESSURE: 102 MMHG | WEIGHT: 230 LBS | BODY MASS INDEX: 38.32 KG/M2 | OXYGEN SATURATION: 98 % | SYSTOLIC BLOOD PRESSURE: 154 MMHG | HEIGHT: 65 IN | HEART RATE: 86 BPM | TEMPERATURE: 98 F | RESPIRATION RATE: 18 BRPM

## 2020-02-03 VITALS
RESPIRATION RATE: 17 BRPM | WEIGHT: 230 LBS | OXYGEN SATURATION: 99 % | SYSTOLIC BLOOD PRESSURE: 130 MMHG | HEART RATE: 72 BPM | HEIGHT: 65 IN | TEMPERATURE: 98 F | BODY MASS INDEX: 38.32 KG/M2 | DIASTOLIC BLOOD PRESSURE: 72 MMHG

## 2020-02-03 PROCEDURE — 99282 EMERGENCY DEPT VISIT SF MDM: CPT

## 2020-02-03 PROCEDURE — 96372 THER/PROPH/DIAG INJ SC/IM: CPT

## 2020-02-03 PROCEDURE — 2500000003 HC RX 250 WO HCPCS: Performed by: STUDENT IN AN ORGANIZED HEALTH CARE EDUCATION/TRAINING PROGRAM

## 2020-02-03 PROCEDURE — 4500000002 HC ER NO CHARGE

## 2020-02-03 PROCEDURE — 6360000002 HC RX W HCPCS: Performed by: STUDENT IN AN ORGANIZED HEALTH CARE EDUCATION/TRAINING PROGRAM

## 2020-02-03 RX ORDER — ACETAMINOPHEN 500 MG
500 TABLET ORAL EVERY 6 HOURS PRN
Qty: 30 TABLET | Refills: 0 | Status: ON HOLD | OUTPATIENT
Start: 2020-02-03 | End: 2020-03-18 | Stop reason: HOSPADM

## 2020-02-03 RX ORDER — KETOROLAC TROMETHAMINE 30 MG/ML
30 INJECTION, SOLUTION INTRAMUSCULAR; INTRAVENOUS ONCE
Status: COMPLETED | OUTPATIENT
Start: 2020-02-03 | End: 2020-02-03

## 2020-02-03 RX ORDER — TRAMADOL HYDROCHLORIDE 50 MG/1
50 TABLET ORAL EVERY 6 HOURS PRN
Qty: 12 TABLET | Refills: 0 | Status: SHIPPED | OUTPATIENT
Start: 2020-02-03 | End: 2020-02-06

## 2020-02-03 RX ORDER — KETAMINE HYDROCHLORIDE 50 MG/ML
10 INJECTION, SOLUTION, CONCENTRATE INTRAMUSCULAR; INTRAVENOUS ONCE
Status: COMPLETED | OUTPATIENT
Start: 2020-02-03 | End: 2020-02-03

## 2020-02-03 RX ADMIN — KETAMINE HYDROCHLORIDE 10 MG: 50 INJECTION, SOLUTION INTRAMUSCULAR; INTRAVENOUS at 13:52

## 2020-02-03 RX ADMIN — KETOROLAC TROMETHAMINE 30 MG: 30 INJECTION, SOLUTION INTRAMUSCULAR at 13:52

## 2020-02-03 ASSESSMENT — ENCOUNTER SYMPTOMS
SINUS PRESSURE: 0
BACK PAIN: 0
ABDOMINAL PAIN: 0
VOMITING: 0
TROUBLE SWALLOWING: 0
DIARRHEA: 0
CHEST TIGHTNESS: 0
COUGH: 0
SHORTNESS OF BREATH: 0

## 2020-02-03 ASSESSMENT — PAIN DESCRIPTION - PAIN TYPE
TYPE: ACUTE PAIN
TYPE: CHRONIC PAIN

## 2020-02-03 ASSESSMENT — PAIN SCALES - GENERAL
PAINLEVEL_OUTOF10: 10

## 2020-02-03 ASSESSMENT — PAIN DESCRIPTION - ORIENTATION
ORIENTATION: LEFT
ORIENTATION: LEFT

## 2020-02-03 ASSESSMENT — PAIN DESCRIPTION - LOCATION
LOCATION: HIP
LOCATION: HIP

## 2020-02-03 NOTE — ED PROVIDER NOTES
[facility] ED  eMERGENCY dEPARTMENT eNCOUnter      Pt Name: Wiliam Johnson  MRN: [id number]  [place] 1955  Date of evaluation: 2/3/2020  Provider: Leonila Muñoz DO    CHIEF COMPLAINT       Chief Complaint   Patient presents with    Hip Pain     Chronic, has pain medication at home but states not working. Pt signed out AMA due to not being seen fast enough and now signed back in         HISTORY OF PRESENT ILLNESS   (Location/Symptom, Timing/Onset,Context/Setting, Quality, Duration, Modifying Factors, Severity)  Note limiting factors. Wiliam Johnson is a 60 y.o. male who presents to the emergency department with c/o pain radiating from his left buttock down his leg for more than 1 year. Patient states that his orthopedic surgeon will not write him pain medicines anymore. The patient has not seen pain management. Patient came earlier today but was angry with the provider feeling that she was taking too long and he signed out [address] Patient denies any problems with bowel or bladder control. Patient denies any numbness or tingling to his inner thighs or anogenital region. Patient has chronic low back pain. Patient has had recent x-rays in the past couple of weeks and there is no fracture seen in his hip or his pelvis. Patient denies any fever, chills, cough, nausea or vomiting. He states that he is here for his chronic pain that he always has had. Describes the pain as a dull ache that goes from his left buttocks and he points down the posterior lateral aspect of his thigh and leg down to his foot. There is no weakness of the leg. It is the same pain that he is always had and it is no different. The history is provided by the patient. NursingNotes were reviewed.     REVIEW OF SYSTEMS    (2-9 systems for level 4, 10 or more for level 5)     Review of Systems   Constitutional: Negative for activity change, appetite change, chills, fever and unexpected weight change. HENT: Negative for drooling, ear pain, nosebleeds, sinus pressure and trouble swallowing. Respiratory: Negative for cough, chest tightness and shortness of breath. Cardiovascular: Negative for chest pain and leg swelling. Gastrointestinal: Negative for abdominal pain, diarrhea and vomiting. Endocrine: Negative for polydipsia and polyphagia. Genitourinary: Negative for dysuria, flank pain and frequency. Musculoskeletal: Negative for back pain and myalgias. Skin: Negative for pallor and rash. Neurological: Negative for syncope, weakness and headaches. Hematological: Does not bruise/bleed easily. All other systems reviewed and are negative. Except as noted above the remainder of the review of systems was reviewed and negative. PAST MEDICAL HISTORY     Past Medical History:   Diagnosis Date    Abdominal pain     Asthma     Bloating symptom     COPD (chronic obstructive pulmonary disease) (HCC)     Depression     Depression     Diarrhea     History of rectal bleeding     Hypercholesteremia     past trx > 5 yrs    Hypercholesteremia     Hypercholesteremia     Hypertension     meds > 3 yrs    Lung disease     RANDI on CPAP     patient relates he doesn't use cpap at this time    Osteoarthritis     left hip    Osteoarthritis     Osteoarthritis of left hip     Pituitary macroadenoma ([address]          SURGICALHISTORY       Past Surgical History:   Procedure Laterality Date    COLONOSCOPY  5/2/14    JARMOSMARLOK    COLONOSCOPY N/A 12/12/2019    COLONOSCOPY DIAGNOSTIC performed by Anisa Calvert MD at [address], COLON, DIAGNOSTIC      EYE SURGERY Bilateral     cataracts   [address] Right 1999 ?     index finger laceration repair    HERNIA REPAIR  2004    repair [facility]     JOINT REPLACEMENT Left     left hip    [place] COLON CA SCRN NOT HI RSK IND N/A 5/17/2017    COLONOSCOPY performed by Paolo Marcelo MD at [address] [place] [address] oropharyngeal erythema. Eyes:      General: No scleral icterus. Right eye: No foreign body or discharge. Left eye: No discharge. Extraocular Movements: Extraocular movements intact. Conjunctiva/sclera: Conjunctivae normal.      Left eye: No exudate. Pupils: Pupils are equal, round, and reactive to light. Neck:      Musculoskeletal: Normal range of motion and neck supple. No neck rigidity. Vascular: No JVD. Trachea: No tracheal deviation. Comments: No meningismus. Cardiovascular:      Rate and Rhythm: Normal rate and regular rhythm. Heart sounds: Normal heart sounds. Heart sounds not distant. No murmur. No friction rub. No gallop. Pulmonary:      Effort: Pulmonary effort is normal. No respiratory distress. Breath sounds: Normal breath sounds. No stridor. No wheezing, rhonchi or rales. Chest:      Chest wall: No tenderness. Abdominal:      General: Abdomen is flat. Bowel sounds are normal. There is no distension. Palpations: Abdomen is soft. There is no mass. Tenderness: There is no abdominal tenderness. There is no right CVA tenderness, left CVA tenderness, guarding or rebound. Hernia: No hernia is present. Musculoskeletal: Normal range of motion. General: No swelling, tenderness, deformity or signs of injury. Comments:   No Tenderness to palpation of C, T or L-spine. No step-off of the C, T or L-spine. Positive laying straight leg raise on the left at 30 degrees. Lymphadenopathy:      Head:      Right side of head: No submental adenopathy. Left side of head: No submental adenopathy. Skin:     General: Skin is warm and dry. Capillary Refill: Capillary refill takes less than 2 seconds. Coloration: Skin is not jaundiced or pale. Findings: No erythema, lesion or rash. Neurological:      General: No focal deficit present. Mental Status: He is alert and oriented to person, place, and time. Mental status is at baseline. Cranial Nerves: No cranial nerve deficit. Sensory: No sensory deficit. Motor: No weakness. Coordination: Coordination normal.      Deep Tendon Reflexes: Reflexes are normal and symmetric. Psychiatric:         Mood and Affect: Mood normal.         Behavior: Behavior normal. Behavior is cooperative. Thought Content: Thought content normal.         Judgment: Judgment normal.         DIAGNOSTIC RESULTS     EKG: All EKG's are interpreted by the Emergency Department Physician who either signs or Co-signsthis chart in the absence of a cardiologist.        RADIOLOGY:   Barbcat Lavender such as CT, Ultrasound and MRI are read by the radiologist. Plain radiographic images are visualized and preliminarily interpreted by the emergency physician with the below findings:        Interpretation per the Radiologist below, if available at the time ofthis note:    No orders to display         ED BEDSIDE ULTRASOUND:   Performed by ED Physician - none    LABS:  Labs Reviewed - No data to display    All other labs were within normal range or not returned as of this dictation. EMERGENCY DEPARTMENT COURSE and DIFFERENTIAL DIAGNOSIS/MDM:   Vitals:    Vitals:    02/03/20 1241 02/03/20 1350   BP: 136/75 130/72   Pulse: 75 72   Resp: 16 17   Temp: 98 °F (36.7 °C)    TempSrc: Oral    SpO2: 98% 99%   Weight: 230 lb (104.3 kg)    Height: 5' 5\" (1.651 m)            MDM  The patient has chronic hip pain that is been ongoing for more than a year and is no different. Patient had imaging done less than 1 month ago. No fractures or dislocations. Patient has an orthopedist and he states that the orthopedic surgeon cut him off from writing any further pain medicines. The patient has not followed up with pain management. The patient's last prescription by the OARRS report written by the emergency room within the last 2 weeks.     The patient had already been seen earlier today but felt Isrrael Hernandez, [facility] [phone]    Call today  Call today to make follow up appointment. Clarissa Bob MD  [address] [place] [place] [address]  [phone]    Schedule an appointment as soon as possible for a visit in 1 day        DISCHARGE MEDICATIONS:  New Prescriptions    ACETAMINOPHEN (APAP EXTRA STRENGTH) 500 MG TABLET    Take 1 tablet by mouth every 6 hours as needed for Pain    TRAMADOL (ULTRAM) 50 MG TABLET    Take 1 tablet by mouth every 6 hours as needed for Pain for up to 3 days. Intended supply: 3 days.  Take lowest dose possible to manage pain          (Please note that portions of this note were completed with a voice recognition program.  Efforts were made to edit the dictations but occasionally words are mis-transcribed.)    Kamaljit Merritt DO (electronically signed)  Attending Emergency Physician          Kamaljit Merritt DO  02/03/20 0892

## 2020-02-03 NOTE — CARE COORDINATION
Addendum 2/3/2020 16:15 Set up transportation home with [id number], ETA between now and 19:26, confirmation number [id number]. Pt informed. Remains in ED waiting room.

## 2020-02-05 PROBLEM — F20.0 PARANOID SCHIZOPHRENIA (HCC): Status: ACTIVE | Noted: 2019-09-19

## 2020-02-05 PROBLEM — I10 HTN (HYPERTENSION): Status: ACTIVE | Noted: 2019-09-19

## 2020-02-05 PROBLEM — G93.89 SUPRASELLAR MASS: Status: ACTIVE | Noted: 2019-09-18

## 2020-02-05 PROBLEM — M79.662 PAIN IN LEFT LOWER LEG: Status: ACTIVE | Noted: 2019-05-07

## 2020-02-05 PROBLEM — J43.9 PULMONARY EMPHYSEMA (HCC): Status: ACTIVE | Noted: 2017-11-21

## 2020-02-05 PROBLEM — S23.41XA SPRAIN OF RIBS, INITIAL ENCOUNTER: Status: ACTIVE | Noted: 2019-06-04

## 2020-02-05 PROBLEM — M79.89 OTHER SPECIFIED SOFT TISSUE DISORDERS: Status: ACTIVE | Noted: 2019-05-07

## 2020-02-05 PROBLEM — R41.0 DELIRIUM: Status: ACTIVE | Noted: 2019-09-18

## 2020-02-05 PROBLEM — M16.12 UNILATERAL PRIMARY OSTEOARTHRITIS, LEFT HIP: Status: ACTIVE | Noted: 2018-12-13

## 2020-02-05 PROBLEM — H53.8 BLURRY VISION: Status: ACTIVE | Noted: 2019-09-19

## 2020-02-05 PROBLEM — M17.12 UNILATERAL PRIMARY OSTEOARTHRITIS, LEFT KNEE: Status: ACTIVE | Noted: 2019-05-30

## 2020-02-05 PROBLEM — Z47.1 AFTERCARE FOLLOWING JOINT REPLACEMENT SURGERY: Status: ACTIVE | Noted: 2019-05-30

## 2020-02-05 PROBLEM — E78.00 HYPERCHOLESTEREMIA: Status: ACTIVE | Noted: 2019-09-19

## 2020-02-05 PROBLEM — G89.18 OTHER ACUTE POSTPROCEDURAL PAIN: Status: ACTIVE | Noted: 2019-05-07

## 2020-02-05 PROBLEM — Z96.642 PRESENCE OF LEFT ARTIFICIAL HIP JOINT: Status: ACTIVE | Noted: 2019-05-30

## 2020-02-05 PROBLEM — E66.9 CLASS 2 OBESITY IN ADULT: Status: ACTIVE | Noted: 2019-09-19

## 2020-02-17 RX ORDER — AMOXICILLIN 500 MG/1
1000 CAPSULE ORAL 2 TIMES DAILY
Qty: 56 CAPSULE | Refills: 0 | Status: ON HOLD | OUTPATIENT
Start: 2020-02-17 | End: 2020-02-25 | Stop reason: HOSPADM

## 2020-02-17 RX ORDER — PANTOPRAZOLE SODIUM 40 MG/1
40 TABLET, DELAYED RELEASE ORAL
Qty: 28 TABLET | Refills: 0 | Status: ON HOLD | OUTPATIENT
Start: 2020-02-17 | End: 2020-02-25 | Stop reason: HOSPADM

## 2020-02-17 RX ORDER — CLARITHROMYCIN 500 MG/1
500 TABLET, COATED ORAL 2 TIMES DAILY
Qty: 28 TABLET | Refills: 0 | Status: ON HOLD | OUTPATIENT
Start: 2020-02-17 | End: 2020-02-25 | Stop reason: HOSPADM

## 2020-02-18 ENCOUNTER — APPOINTMENT (OUTPATIENT)
Dept: CT IMAGING | Age: 65
DRG: 426 | End: 2020-02-18
Payer: COMMERCIAL

## 2020-02-18 ENCOUNTER — HOSPITAL ENCOUNTER (INPATIENT)
Age: 65
LOS: 2 days | Discharge: PSYCHIATRIC OR SUBSTANCE USE FACILITY | DRG: 426 | End: 2020-02-20
Attending: INTERNAL MEDICINE | Admitting: INTERNAL MEDICINE
Payer: COMMERCIAL

## 2020-02-18 ENCOUNTER — APPOINTMENT (OUTPATIENT)
Dept: GENERAL RADIOLOGY | Age: 65
DRG: 426 | End: 2020-02-18
Payer: COMMERCIAL

## 2020-02-18 PROBLEM — E87.1 HYPONATREMIA: Status: ACTIVE | Noted: 2020-02-18

## 2020-02-18 LAB
ACETAMINOPHEN LEVEL: <5 UG/ML (ref 10–30)
ALBUMIN SERPL-MCNC: 4.3 G/DL (ref 3.5–4.6)
ALP BLD-CCNC: 58 U/L (ref 35–104)
ALT SERPL-CCNC: 30 U/L (ref 0–41)
AMPHETAMINE SCREEN, URINE: NORMAL
ANION GAP SERPL CALCULATED.3IONS-SCNC: 14 MEQ/L (ref 9–15)
ANISOCYTOSIS: ABNORMAL
AST SERPL-CCNC: 40 U/L (ref 0–40)
BARBITURATE SCREEN URINE: NORMAL
BASOPHILS ABSOLUTE: 0 K/UL (ref 0–0.2)
BASOPHILS RELATIVE PERCENT: 0.8 %
BENZODIAZEPINE SCREEN, URINE: NORMAL
BILIRUB SERPL-MCNC: 0.5 MG/DL (ref 0.2–0.7)
BILIRUBIN URINE: NEGATIVE
BLOOD, URINE: NEGATIVE
BUN BLDV-MCNC: 8 MG/DL (ref 8–23)
CALCIUM SERPL-MCNC: 9.3 MG/DL (ref 8.5–9.9)
CANNABINOID SCREEN URINE: NORMAL
CHLORIDE BLD-SCNC: 79 MEQ/L (ref 95–107)
CK MB: 15.3 NG/ML (ref 0–6.7)
CLARITY: CLEAR
CO2: 26 MEQ/L (ref 20–31)
COCAINE METABOLITE SCREEN URINE: NORMAL
COLOR: YELLOW
CREAT SERPL-MCNC: 0.67 MG/DL (ref 0.7–1.2)
CREATINE KINASE-MB INDEX: 2.1 % (ref 0–3.5)
CREATININE URINE: 24 MG/DL
EOSINOPHILS ABSOLUTE: 0.8 K/UL (ref 0–0.7)
EOSINOPHILS RELATIVE PERCENT: 6 %
ETHANOL PERCENT: NORMAL G/DL
ETHANOL: <10 MG/DL (ref 0–0.08)
GFR AFRICAN AMERICAN: >60
GFR NON-AFRICAN AMERICAN: >60
GLOBULIN: 2.7 G/DL (ref 2.3–3.5)
GLUCOSE BLD-MCNC: 119 MG/DL (ref 70–99)
GLUCOSE BLD-MCNC: 235 MG/DL (ref 60–115)
GLUCOSE URINE: NEGATIVE MG/DL
HCT VFR BLD CALC: 38.1 % (ref 42–52)
HEMOGLOBIN: 12.7 G/DL (ref 14–18)
KETONES, URINE: NEGATIVE MG/DL
LEUKOCYTE ESTERASE, URINE: NEGATIVE
LYMPHOCYTES ABSOLUTE: 1.1 K/UL (ref 1–4.8)
LYMPHOCYTES RELATIVE PERCENT: 8 %
Lab: NORMAL
MAGNESIUM: 2.2 MG/DL (ref 1.7–2.4)
MCH RBC QN AUTO: 31 PG (ref 27–31.3)
MCHC RBC AUTO-ENTMCNC: 33.2 % (ref 33–37)
MCV RBC AUTO: 93.4 FL (ref 80–100)
METHADONE SCREEN, URINE: NORMAL
MONOCYTES ABSOLUTE: 1.2 K/UL (ref 0.2–0.8)
MONOCYTES RELATIVE PERCENT: 9.3 %
NEUTROPHILS ABSOLUTE: 10.2 K/UL (ref 1.4–6.5)
NEUTROPHILS RELATIVE PERCENT: 76 %
NITRITE, URINE: NEGATIVE
OPIATE SCREEN URINE: NORMAL
OSMOLALITY URINE: 129 MOSM/KG (ref 300–900)
OSMOLALITY: 258 MOSM/KG (ref 280–303)
OXYCODONE URINE: NORMAL
PDW BLD-RTO: 13.3 % (ref 11.5–14.5)
PERFORMED ON: ABNORMAL
PH UA: 7.5 (ref 5–9)
PHENCYCLIDINE SCREEN URINE: NORMAL
PLATELET # BLD: 287 K/UL (ref 130–400)
PLATELET SLIDE REVIEW: ADEQUATE
POTASSIUM REFLEX MAGNESIUM: 3 MEQ/L (ref 3.4–4.9)
PROPOXYPHENE SCREEN: NORMAL
PROTEIN UA: NEGATIVE MG/DL
RBC # BLD: 4.09 M/UL (ref 4.7–6.1)
SALICYLATE, SERUM: <0.3 MG/DL (ref 15–30)
SLIDE REVIEW: ABNORMAL
SODIUM BLD-SCNC: 119 MEQ/L (ref 135–144)
SODIUM URINE: <20 MEQ/L
SPECIFIC GRAVITY UA: 1 (ref 1–1.03)
TOTAL CK: 716 U/L (ref 0–190)
TOTAL PROTEIN: 7 G/DL (ref 6.3–8)
TSH SERPL DL<=0.05 MIU/L-ACNC: 0.88 UIU/ML (ref 0.44–3.86)
URINE REFLEX TO CULTURE: NORMAL
UROBILINOGEN, URINE: 0.2 E.U./DL
WBC # BLD: 13.4 K/UL (ref 4.8–10.8)

## 2020-02-18 PROCEDURE — G0480 DRUG TEST DEF 1-7 CLASSES: HCPCS

## 2020-02-18 PROCEDURE — 80307 DRUG TEST PRSMV CHEM ANLYZR: CPT

## 2020-02-18 PROCEDURE — 82570 ASSAY OF URINE CREATININE: CPT

## 2020-02-18 PROCEDURE — 83935 ASSAY OF URINE OSMOLALITY: CPT

## 2020-02-18 PROCEDURE — 84443 ASSAY THYROID STIM HORMONE: CPT

## 2020-02-18 PROCEDURE — 84300 ASSAY OF URINE SODIUM: CPT

## 2020-02-18 PROCEDURE — 1210000000 HC MED SURG R&B

## 2020-02-18 PROCEDURE — 82553 CREATINE MB FRACTION: CPT

## 2020-02-18 PROCEDURE — 71045 X-RAY EXAM CHEST 1 VIEW: CPT

## 2020-02-18 PROCEDURE — 6370000000 HC RX 637 (ALT 250 FOR IP): Performed by: INTERNAL MEDICINE

## 2020-02-18 PROCEDURE — 6360000002 HC RX W HCPCS: Performed by: INTERNAL MEDICINE

## 2020-02-18 PROCEDURE — 83735 ASSAY OF MAGNESIUM: CPT

## 2020-02-18 PROCEDURE — 81003 URINALYSIS AUTO W/O SCOPE: CPT

## 2020-02-18 PROCEDURE — 80053 COMPREHEN METABOLIC PANEL: CPT

## 2020-02-18 PROCEDURE — 85025 COMPLETE CBC W/AUTO DIFF WBC: CPT

## 2020-02-18 PROCEDURE — 99285 EMERGENCY DEPT VISIT HI MDM: CPT

## 2020-02-18 PROCEDURE — 2580000003 HC RX 258: Performed by: INTERNAL MEDICINE

## 2020-02-18 PROCEDURE — 70450 CT HEAD/BRAIN W/O DYE: CPT

## 2020-02-18 PROCEDURE — 82550 ASSAY OF CK (CPK): CPT

## 2020-02-18 PROCEDURE — 83930 ASSAY OF BLOOD OSMOLALITY: CPT

## 2020-02-18 PROCEDURE — 36415 COLL VENOUS BLD VENIPUNCTURE: CPT

## 2020-02-18 RX ORDER — MELOXICAM 15 MG/1
15 TABLET ORAL DAILY
Status: ON HOLD | COMMUNITY
End: 2022-07-22

## 2020-02-18 RX ORDER — ALBUTEROL SULFATE 2.5 MG/3ML
2.5 SOLUTION RESPIRATORY (INHALATION) EVERY 6 HOURS PRN
Status: DISCONTINUED | OUTPATIENT
Start: 2020-02-18 | End: 2020-02-20 | Stop reason: HOSPADM

## 2020-02-18 RX ORDER — OLANZAPINE 10 MG/1
10 TABLET ORAL NIGHTLY
Status: DISCONTINUED | OUTPATIENT
Start: 2020-02-18 | End: 2020-02-20 | Stop reason: HOSPADM

## 2020-02-18 RX ORDER — SODIUM CHLORIDE 0.9 % (FLUSH) 0.9 %
10 SYRINGE (ML) INJECTION PRN
Status: DISCONTINUED | OUTPATIENT
Start: 2020-02-18 | End: 2020-02-20 | Stop reason: HOSPADM

## 2020-02-18 RX ORDER — LANOLIN ALCOHOL/MO/W.PET/CERES
10 CREAM (GRAM) TOPICAL NIGHTLY PRN
Status: ON HOLD | COMMUNITY
End: 2020-03-18 | Stop reason: HOSPADM

## 2020-02-18 RX ORDER — FUROSEMIDE 40 MG/1
40 TABLET ORAL DAILY
Status: ON HOLD | COMMUNITY
End: 2022-07-22

## 2020-02-18 RX ORDER — POTASSIUM CHLORIDE 750 MG/1
10 CAPSULE, EXTENDED RELEASE ORAL DAILY
Status: ON HOLD | COMMUNITY
End: 2022-07-22

## 2020-02-18 RX ORDER — POTASSIUM CHLORIDE 7.45 MG/ML
10 INJECTION INTRAVENOUS PRN
Status: DISCONTINUED | OUTPATIENT
Start: 2020-02-18 | End: 2020-02-20 | Stop reason: HOSPADM

## 2020-02-18 RX ORDER — POTASSIUM CHLORIDE 1.5 G/1.77G
40 POWDER, FOR SOLUTION ORAL PRN
Status: DISCONTINUED | OUTPATIENT
Start: 2020-02-18 | End: 2020-02-20 | Stop reason: HOSPADM

## 2020-02-18 RX ORDER — ACETAMINOPHEN 325 MG/1
650 TABLET ORAL EVERY 4 HOURS PRN
Status: DISCONTINUED | OUTPATIENT
Start: 2020-02-18 | End: 2020-02-20 | Stop reason: HOSPADM

## 2020-02-18 RX ORDER — LISINOPRIL 20 MG/1
40 TABLET ORAL DAILY
Status: DISCONTINUED | OUTPATIENT
Start: 2020-02-19 | End: 2020-02-20 | Stop reason: HOSPADM

## 2020-02-18 RX ORDER — OXYCODONE HYDROCHLORIDE AND ACETAMINOPHEN 5; 325 MG/1; MG/1
1 TABLET ORAL EVERY 4 HOURS PRN
Status: DISCONTINUED | OUTPATIENT
Start: 2020-02-18 | End: 2020-02-20 | Stop reason: HOSPADM

## 2020-02-18 RX ORDER — SODIUM CHLORIDE 9 MG/ML
INJECTION, SOLUTION INTRAVENOUS CONTINUOUS
Status: DISCONTINUED | OUTPATIENT
Start: 2020-02-18 | End: 2020-02-18

## 2020-02-18 RX ORDER — 0.9 % SODIUM CHLORIDE 0.9 %
1000 INTRAVENOUS SOLUTION INTRAVENOUS ONCE
Status: DISCONTINUED | OUTPATIENT
Start: 2020-02-18 | End: 2020-02-18

## 2020-02-18 RX ORDER — NABUMETONE 750 MG/1
750 TABLET, FILM COATED ORAL DAILY
Status: ON HOLD | COMMUNITY
End: 2020-03-19 | Stop reason: HOSPADM

## 2020-02-18 RX ORDER — POTASSIUM CHLORIDE 20 MEQ/1
40 TABLET, EXTENDED RELEASE ORAL PRN
Status: DISCONTINUED | OUTPATIENT
Start: 2020-02-18 | End: 2020-02-20 | Stop reason: HOSPADM

## 2020-02-18 RX ORDER — SODIUM CHLORIDE 0.9 % (FLUSH) 0.9 %
10 SYRINGE (ML) INJECTION EVERY 12 HOURS SCHEDULED
Status: DISCONTINUED | OUTPATIENT
Start: 2020-02-18 | End: 2020-02-20 | Stop reason: HOSPADM

## 2020-02-18 RX ORDER — ONDANSETRON 2 MG/ML
4 INJECTION INTRAMUSCULAR; INTRAVENOUS EVERY 6 HOURS PRN
Status: DISCONTINUED | OUTPATIENT
Start: 2020-02-18 | End: 2020-02-20 | Stop reason: HOSPADM

## 2020-02-18 RX ORDER — SIMVASTATIN 40 MG
40 TABLET ORAL NIGHTLY
COMMUNITY
End: 2022-06-29

## 2020-02-18 RX ORDER — SIMVASTATIN 40 MG
40 TABLET ORAL NIGHTLY
Status: DISCONTINUED | OUTPATIENT
Start: 2020-02-18 | End: 2020-02-20 | Stop reason: HOSPADM

## 2020-02-18 RX ADMIN — OXYCODONE HYDROCHLORIDE AND ACETAMINOPHEN 1 TABLET: 5; 325 TABLET ORAL at 22:15

## 2020-02-18 RX ADMIN — POTASSIUM CHLORIDE 10 MEQ: 7.46 INJECTION, SOLUTION INTRAVENOUS at 22:22

## 2020-02-18 RX ADMIN — POTASSIUM CHLORIDE 10 MEQ: 7.46 INJECTION, SOLUTION INTRAVENOUS at 23:14

## 2020-02-18 RX ADMIN — OLANZAPINE 10 MG: 10 TABLET, FILM COATED ORAL at 23:20

## 2020-02-18 RX ADMIN — ENOXAPARIN SODIUM 40 MG: 40 INJECTION SUBCUTANEOUS at 22:15

## 2020-02-18 RX ADMIN — Medication 10 MG: at 22:15

## 2020-02-18 RX ADMIN — Medication 10 ML: at 22:17

## 2020-02-18 RX ADMIN — SIMVASTATIN 40 MG: 40 TABLET, FILM COATED ORAL at 22:20

## 2020-02-18 ASSESSMENT — PAIN SCALES - GENERAL
PAINLEVEL_OUTOF10: 10
PAINLEVEL_OUTOF10: 10
PAINLEVEL_OUTOF10: 0

## 2020-02-18 ASSESSMENT — PAIN DESCRIPTION - LOCATION: LOCATION: HEAD

## 2020-02-18 ASSESSMENT — PAIN DESCRIPTION - FREQUENCY: FREQUENCY: CONTINUOUS

## 2020-02-18 ASSESSMENT — PAIN DESCRIPTION - PAIN TYPE: TYPE: ACUTE PAIN

## 2020-02-18 ASSESSMENT — PAIN DESCRIPTION - DESCRIPTORS: DESCRIPTORS: ACHING

## 2020-02-18 NOTE — ED PROVIDER NOTES
[address] ENCOUNTER      Pt Name: Snehal Pendleton  MRN: [id number]  [place] 1955  Date of evaluation: 2/18/2020  Provider: MARICRUZ White 6689       Chief Complaint   Patient presents with    Headache     Pt has co headache, cough needs psych eval.          HISTORY OF PRESENT ILLNESS   (Location/Symptom, Timing/Onset,Context/Setting, Quality, Duration, Modifying Factors, Severity)  Note limiting factors. Snehal Pendleton is a 60 y.o. male who presents to the emergency department from [address] [place] [address] for psychiatric evaluation. [facility] clinician reports generalized decline in schizophrenia/delusions for the past months but worse since stopping all meds. Pt with chronic delusions of toxic gas fumes from closet. He also has chronic paranoia. Pt sent for medical clearance and potential admit. Pt denies current c/o. Location/Symptom - mental health evaluation  Onset - chronic  Context/Setting - as above  Quality - schizophrenia  Duration -chronic but with gradual decompensation. Modifying Factors - stopped katrin meds. Severity - mild to moderate        Nursing Notes were reviewed. REVIEW OF SYSTEMS    (2-9 systems for level 4, 10 or more for level 5)     Review of Systems   Unable to perform ROS: Psychiatric disorder       Except as noted above the remainder of the review of systems was reviewed and negative.        PAST MEDICAL HISTORY     Past Medical History:   Diagnosis Date    Abdominal pain     Asthma     Bloating symptom     COPD (chronic obstructive pulmonary disease) (HCC)     Depression     Depression     Diarrhea     Drug-seeking behavior 02/03/2020    History of rectal bleeding     Hypercholesteremia     past trx > 5 yrs    Hypercholesteremia     Hypercholesteremia     Hypertension     meds > 3 yrs    Lung disease     RANDI on CPAP     patient relates he doesn't use cpap at this time    Osteoarthritis     left hip    (*)     All other components within normal limits    Narrative:     [phone]  Potassium & Sodium results called to and read back by Holly Cohen, 02/18/2020  16:57, by DANIELLE   CKMB & RELATIVE PERCENT - Abnormal; Notable for the following components:    CK-MB 15.3 (*)     All other components within normal limits    Narrative:     [phone],  Potassium & Sodium results called to and read back by Holly Cohen, 02/18/2020  16:57, by [facility] W/ REFLEX TO MG FOR LOW K - Abnormal; Notable for the following components:    Sodium 121 (*)     Chloride 81 (*)     Glucose 101 (*)     BUN 7 (*)     All other components within normal limits   BASIC METABOLIC PANEL W/ REFLEX TO MG FOR LOW K - Abnormal; Notable for the following components:    Sodium 126 (*)     Chloride 86 (*)     BUN 7 (*)     All other components within normal limits   CBC WITH AUTO DIFFERENTIAL - Abnormal; Notable for the following components:    WBC 12.2 (*)     RBC 4.15 (*)     Hemoglobin 12.9 (*)     Hematocrit 38.6 (*)     Neutrophils Absolute 9.8 (*)     Monocytes Absolute 1.3 (*)     All other components within normal limits   OSMOLALITY, URINE - Abnormal; Notable for the following components:    Osmolality, Ur 129 (*)     All other components within normal limits   OSMOLALITY - Abnormal; Notable for the following components:    Osmolality 258 (*)     All other components within normal limits   BASIC METABOLIC PANEL W/ REFLEX TO MG FOR LOW K - Abnormal; Notable for the following components:    Sodium 129 (*)     Chloride 89 (*)     Glucose 111 (*)     All other components within normal limits    Narrative:     Collection has been rescheduled by Sal Lynn at 02/19/2020 11:46 Reason:   Retime 1430 q 8 per sr bescak   BASIC METABOLIC PANEL W/ REFLEX TO MG FOR LOW K - Abnormal; Notable for the following components:    Sodium 131 (*)     Chloride 93 (*)     Glucose 136 (*)     All other MDM on exam pt nontoxic and in no distress. No c/o headache, chest pain but does admit to blue phlegm and cough. He is expressing severe paranoia which is apparently worse than baseline per [facility] clinician in ED. Plan is to obtain medical clearance and determine need for psych admit follwing eval.  Labs/rad reviewed and patient cannot be medically cleared for psychiatric evaluation. Found to have hyponatremia. 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 Brain scan does demonstrate pituitary adenoma consistent with past.  Patient has had MRI and plan is for surgical excision at the [facility]. Patient will need further evaluation and management here medically before being evaluated by psychiatry for his psychiatric decompensation. I was able to call discussed the case with Dr. Dennise Lam who accepts the patient to his service. CRITICAL CARE TIME       CONSULTS:  IP CONSULT TO NEPHROLOGY  IP CONSULT TO PSYCHIATRY  IP CONSULT TO NEUROLOGY    PROCEDURES:  Unless otherwise noted below, none     Procedures    FINAL IMPRESSION      1. Non-traumatic rhabdomyolysis    2. Hyponatremia    3. Schizophrenia, unspecified type ([facility] [address]    4.  Brain mass          DISPOSITION/PLAN   DISPOSITION Admitted 02/18/2020 06:10:34 PM      PATIENT REFERRED TO:  Cj Merino, [address] [facility] [address]  [phone]            DISCHARGE MEDICATIONS:  Discharge Medication List as of 2/20/2020  3:04 PM             (Please notethat portions of this note were completed with a voice recognition program.  Efforts were made to edit the dictations but occasionally words are mis-transcribed.)    MARICRUZ Oneal CNP (electronically signed)  Attending Emergency Physician          MARICRUZ Oneal CNP  02/18/20 [address] [place] [address], APRN - CNP  03/02/20 0154

## 2020-02-18 NOTE — ED NOTES
Bed: 10  Expected date: 2/18/20  Expected time:   Means of arrival:   Comments:  80year old  Female with general weakness and sob duonebs x 2 given refused line.  100/64 and 100%     April AMY Thompson RN  02/18/20 4182

## 2020-02-18 NOTE — H&P
CA SCRN NOT HI RSK IND N/A 5/17/2017    COLONOSCOPY performed by Kwame Azar MD at [address] Left 4/22/2019    LEFT HIP TOTAL HIP ARTHROPLASTY, LATERAL DECUB, GRACIE performed by Martin Young MD at [address]  12/12/2019    EGD DIAGNOSTIC ONLY performed by Isaac Allison MD at [facility]       Medications Prior to Admission:    Prior to Admission medications    Medication Sig Start Date End Date Taking?  Authorizing Provider   clarithromycin (BIAXIN) 500 MG tablet Take 1 tablet by mouth 2 times daily for 14 days 2/17/20 3/2/20  Isaac Allison MD   amoxicillin (AMOXIL) 500 MG capsule Take 2 capsules by mouth 2 times daily for 14 days 2/17/20 3/2/20  Isaac Allison MD   pantoprazole (PROTONIX) 40 MG tablet Take 1 tablet by mouth 2 times daily (before meals) for 14 days 2/17/20 3/2/20  Isaac Allison MD   acetaminophen (APAP EXTRA STRENGTH) 500 MG tablet Take 1 tablet by mouth every 6 hours as needed for Pain 2/3/20   Marisela Villela DO   naproxen (EC-NAPROSYN) 500 MG EC tablet Take 1 tablet by mouth 2 times daily (with meals) 1/13/20   Katlin Castro PA-C   fenofibrate 160 MG tablet Take 150 mg by mouth daily    Historical Provider, MD   aspirin 81 MG EC tablet Take 1 tablet by mouth 2 times daily 4/24/19 5/24/19  Ehsan Moraes APRN - CNP   lisinopril (PRINIVIL;ZESTRIL) 20 MG tablet Take 2 tablets by mouth daily 4/24/19   Stuart Judd MD   OLANZapine (ZYPREXA) 10 MG tablet Take 1 tablet by mouth nightly 4/24/19   Stuart Judd MD   hydrochlorothiazide (HYDRODIURIL) 25 MG tablet Take 1 tablet by mouth daily 4/25/19   Stuart Judd MD   TRAZODONE HCL PO Take by mouth    Historical Provider, MD   Respiratory Therapy Supplies SANDRA Full face CPAP mask and supplies 2/28/19   Gabbie Brooks MD   CPAP Machine MISC by Does not apply route New CPAP with 6 cm 10/10/18   Gabbie rBooks MD   albuterol (PROVENTIL) (2.5 MG/3ML) 0.083% nebulizer solution Take 3 mLs by nebulization every 6 hours as needed for Wheezing 8/30/18   Serena Wakefield MD   STIOLTO RESPIMAT 2.5-2.5 MCG/ACT AERS Inhale 2 puff in AM 8/30/18   Serena Wakefield MD   ipratropium (ATROVENT) 0.02 % nebulizer solution QID prn 8/30/18   Serena Wakefield MD       Allergies:  Patient has no known allergies. Social History:   TOBACCO:   reports that he quit smoking about 11 months ago. His smoking use included cigarettes. He has a 69.00 pack-year smoking history. He has never used smokeless tobacco.  ETOH:   reports current alcohol use. Family History:       Problem Relation Age of Onset    Colon Cancer Mother     High Blood Pressure Father     Diabetes Sister     No Known Problems Brother     No Known Problems Brother     No Known Problems Brother     Other Brother         Sepsis    Other Brother         Killed    No Known Problems Sister     No Known Problems Sister     No Known Problems Son        REVIEW OF SYSTEMS:  Ten systems reviewed and negative except for stated in HPI    Physical Exam:    Vitals: BP (!) 161/93   Pulse 88   Temp 98.1 °F (36.7 °C)   Resp 18   Ht 5' 5\" (1.651 m)   Wt 228 lb (103.4 kg)   SpO2 97%   BMI 37.94 kg/m²   General appearance: alert, appears stated age and cooperative  Skin: Skin color, texture, turgor normal. No rashes or lesions  HEENT: Head: Normocephalic, no lesions, without obvious abnormality. . No dental issues   Neck: no adenopathy, no carotid bruit, no JVD, supple, symmetrical, trachea midline and thyroid not enlarged, symmetric, no tenderness/mass/nodules  Lungs: clear to auscultation bilaterally  Heart: regular rate and rhythm, S1, S2 normal, no murmur, click, rub or gallop  Abdomen: soft, non-tender; bowel sounds normal; no masses,  no organomegaly  Extremities: extremities normal, atraumatic, no cyanosis or edema  Neurologic: Mental status: Alert, oriented, thought content appropriate.    CN 2-12 intact     Recent Labs     02/18/20  1611   WBC 13.4*   HGB 12.7*        Recent Labs     02/18/20  1611   *   K 3.0*   CL 79*   CO2 26   BUN 8   CREATININE 0.67*   GLUCOSE 119*   AST 40   ALT 30   BILITOT 0.5   ALKPHOS 58     Troponin T: No results for input(s): TROPONINI in the last 72 hours. ABGs: No results found for: PHART, PO2ART, PCF0AGK  INR: No results for input(s): INR in the last 72 hours. URINALYSIS:  Recent Labs     02/18/20  1545   COLORU Yellow   PHUR 7.5   CLARITYU Clear   SPECGRAV 1.004   LEUKOCYTESUR Negative   UROBILINOGEN 0.2   BILIRUBINUR Negative   BLOODU Negative   GLUCOSEU Negative     -----------------------------------------------------------------   Ct Head Without Contrast    Result Date: 2/18/2020  CT HEAD WO CONTRAST CLINICAL HISTORY:  hallucinations, schizophrenia off meds but need of medical clearance Comparison: November 10, 2019. Clinical note: Please see MRI report from [facility] for additional details. TECHNIQUE: Multiple unenhanced serial axial images of the brain from the vertex of the skull to the base of the skull were performed. Exam is limited as coronal and sagittal reconstructions could not be performed due to CT malfunction. FINDINGS: The ventricles are unchanged in size configuration. . 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 It measures approximately 3.4 x 2.4 cm in the transverse and AP dimension. The cephalocaudad dimension cannot be assessed. Please see CT scan November 10, 2019 sagittal images were refluxed and. The No midline shift. The cisterns are patent. No acute extra-axial findings The visualized osseous structures are unremarkable. The visualized portion of the paranasal sinuses, and mastoids are unremarkable. IMPRESSION 1. AGAIN IDENTIFIED IS A LOBULATED EXPANSILE SUPRASELLAR MASS. THIS IS BEEN PREVIOUSLY IDENTIFIED AT PRIOR CT AND BY OUTSIDE MRI/CCR AS A PROBABLE PITUITARY ADENOMA.  PLEASE SEE MRI REPORT FROM

## 2020-02-19 LAB
ANION GAP SERPL CALCULATED.3IONS-SCNC: 13 MEQ/L (ref 9–15)
ANION GAP SERPL CALCULATED.3IONS-SCNC: 13 MEQ/L (ref 9–15)
ANION GAP SERPL CALCULATED.3IONS-SCNC: 14 MEQ/L (ref 9–15)
ANION GAP SERPL CALCULATED.3IONS-SCNC: 15 MEQ/L (ref 9–15)
BASOPHILS ABSOLUTE: 0 K/UL (ref 0–0.2)
BASOPHILS RELATIVE PERCENT: 0.3 %
BUN BLDV-MCNC: 15 MG/DL (ref 8–23)
BUN BLDV-MCNC: 16 MG/DL (ref 8–23)
BUN BLDV-MCNC: 7 MG/DL (ref 8–23)
BUN BLDV-MCNC: 7 MG/DL (ref 8–23)
CALCIUM SERPL-MCNC: 9.2 MG/DL (ref 8.5–9.9)
CALCIUM SERPL-MCNC: 9.3 MG/DL (ref 8.5–9.9)
CALCIUM SERPL-MCNC: 9.3 MG/DL (ref 8.5–9.9)
CALCIUM SERPL-MCNC: 9.4 MG/DL (ref 8.5–9.9)
CHLORIDE BLD-SCNC: 81 MEQ/L (ref 95–107)
CHLORIDE BLD-SCNC: 86 MEQ/L (ref 95–107)
CHLORIDE BLD-SCNC: 89 MEQ/L (ref 95–107)
CHLORIDE BLD-SCNC: 93 MEQ/L (ref 95–107)
CK MB: 5.1 NG/ML (ref 0–6.7)
CO2: 25 MEQ/L (ref 20–31)
CO2: 25 MEQ/L (ref 20–31)
CO2: 26 MEQ/L (ref 20–31)
CO2: 27 MEQ/L (ref 20–31)
CREAT SERPL-MCNC: 0.7 MG/DL (ref 0.7–1.2)
CREAT SERPL-MCNC: 0.72 MG/DL (ref 0.7–1.2)
CREAT SERPL-MCNC: 0.85 MG/DL (ref 0.7–1.2)
CREAT SERPL-MCNC: 0.88 MG/DL (ref 0.7–1.2)
CREATINE KINASE-MB INDEX: 2 % (ref 0–3.5)
EOSINOPHILS ABSOLUTE: 0 K/UL (ref 0–0.7)
EOSINOPHILS RELATIVE PERCENT: 0.2 %
GFR AFRICAN AMERICAN: >60
GFR NON-AFRICAN AMERICAN: >60
GLUCOSE BLD-MCNC: 101 MG/DL (ref 70–99)
GLUCOSE BLD-MCNC: 111 MG/DL (ref 70–99)
GLUCOSE BLD-MCNC: 136 MG/DL (ref 70–99)
GLUCOSE BLD-MCNC: 96 MG/DL (ref 70–99)
HCT VFR BLD CALC: 38.6 % (ref 42–52)
HEMOGLOBIN: 12.9 G/DL (ref 14–18)
LYMPHOCYTES ABSOLUTE: 1 K/UL (ref 1–4.8)
LYMPHOCYTES RELATIVE PERCENT: 8.3 %
MAGNESIUM: 2.2 MG/DL (ref 1.7–2.4)
MCH RBC QN AUTO: 31.1 PG (ref 27–31.3)
MCHC RBC AUTO-ENTMCNC: 33.3 % (ref 33–37)
MCV RBC AUTO: 93.2 FL (ref 80–100)
MONOCYTES ABSOLUTE: 1.3 K/UL (ref 0.2–0.8)
MONOCYTES RELATIVE PERCENT: 10.4 %
NEUTROPHILS ABSOLUTE: 9.8 K/UL (ref 1.4–6.5)
NEUTROPHILS RELATIVE PERCENT: 80.8 %
PDW BLD-RTO: 13.1 % (ref 11.5–14.5)
PLATELET # BLD: 288 K/UL (ref 130–400)
POTASSIUM REFLEX MAGNESIUM: 3.5 MEQ/L (ref 3.4–4.9)
POTASSIUM REFLEX MAGNESIUM: 3.8 MEQ/L (ref 3.4–4.9)
POTASSIUM REFLEX MAGNESIUM: 3.9 MEQ/L (ref 3.4–4.9)
POTASSIUM REFLEX MAGNESIUM: 4 MEQ/L (ref 3.4–4.9)
POTASSIUM SERPL-SCNC: 3.9 MEQ/L (ref 3.4–4.9)
RBC # BLD: 4.15 M/UL (ref 4.7–6.1)
SODIUM BLD-SCNC: 121 MEQ/L (ref 135–144)
SODIUM BLD-SCNC: 126 MEQ/L (ref 135–144)
SODIUM BLD-SCNC: 129 MEQ/L (ref 135–144)
SODIUM BLD-SCNC: 131 MEQ/L (ref 135–144)
TOTAL CK: 258 U/L (ref 0–190)
WBC # BLD: 12.2 K/UL (ref 4.8–10.8)

## 2020-02-19 PROCEDURE — 99254 IP/OBS CNSLTJ NEW/EST MOD 60: CPT | Performed by: PSYCHIATRY & NEUROLOGY

## 2020-02-19 PROCEDURE — 85025 COMPLETE CBC W/AUTO DIFF WBC: CPT

## 2020-02-19 PROCEDURE — 6370000000 HC RX 637 (ALT 250 FOR IP): Performed by: INTERNAL MEDICINE

## 2020-02-19 PROCEDURE — 82550 ASSAY OF CK (CPK): CPT

## 2020-02-19 PROCEDURE — 2580000003 HC RX 258: Performed by: INTERNAL MEDICINE

## 2020-02-19 PROCEDURE — 94664 DEMO&/EVAL PT USE INHALER: CPT

## 2020-02-19 PROCEDURE — 80048 BASIC METABOLIC PNL TOTAL CA: CPT

## 2020-02-19 PROCEDURE — 6360000002 HC RX W HCPCS: Performed by: INTERNAL MEDICINE

## 2020-02-19 PROCEDURE — 36415 COLL VENOUS BLD VENIPUNCTURE: CPT

## 2020-02-19 PROCEDURE — 82553 CREATINE MB FRACTION: CPT

## 2020-02-19 PROCEDURE — 1210000000 HC MED SURG R&B

## 2020-02-19 RX ORDER — DOXYCYCLINE HYCLATE 50 MG/1
100 CAPSULE ORAL EVERY 12 HOURS SCHEDULED
Status: DISCONTINUED | OUTPATIENT
Start: 2020-02-19 | End: 2020-02-20 | Stop reason: HOSPADM

## 2020-02-19 RX ADMIN — DOXYCYCLINE HYCLATE 100 MG: 50 CAPSULE, GELATIN COATED ORAL at 21:15

## 2020-02-19 RX ADMIN — POTASSIUM CHLORIDE 10 MEQ: 7.46 INJECTION, SOLUTION INTRAVENOUS at 00:17

## 2020-02-19 RX ADMIN — OLANZAPINE 10 MG: 10 TABLET, FILM COATED ORAL at 21:15

## 2020-02-19 RX ADMIN — POTASSIUM CHLORIDE 10 MEQ: 7.46 INJECTION, SOLUTION INTRAVENOUS at 01:21

## 2020-02-19 RX ADMIN — POTASSIUM CHLORIDE 10 MEQ: 7.46 INJECTION, SOLUTION INTRAVENOUS at 03:45

## 2020-02-19 RX ADMIN — Medication 10 ML: at 22:12

## 2020-02-19 RX ADMIN — DOXYCYCLINE HYCLATE 100 MG: 50 CAPSULE, GELATIN COATED ORAL at 11:52

## 2020-02-19 RX ADMIN — LISINOPRIL 40 MG: 20 TABLET ORAL at 08:37

## 2020-02-19 RX ADMIN — Medication 10 ML: at 08:38

## 2020-02-19 RX ADMIN — POTASSIUM CHLORIDE 10 MEQ: 7.46 INJECTION, SOLUTION INTRAVENOUS at 02:24

## 2020-02-19 RX ADMIN — SIMVASTATIN 40 MG: 40 TABLET, FILM COATED ORAL at 21:15

## 2020-02-19 ASSESSMENT — PAIN SCALES - GENERAL
PAINLEVEL_OUTOF10: 0

## 2020-02-19 NOTE — FLOWSHEET NOTE
Resume care of pt at 0715 Pt resting in bed with both eyes closed. Pt was just up to the bathroom and voided. Denied any pain or discomfort but expresses that his mouth is dry and he needs aqua. Explained to pt that he can not have extra fluid until doctor comes in. Pt returned to bed and is resting. Electronically signed by Cheryle Sauce, LPN on 9/79/9450 at 8:14 AM     Pt awake in bed used the urinal but was wet to has pt wash his privates and gave him dry pants and gown to put on. Now pt is sitting up in eating his breakfast..Electronically signed by Cheryle Sauce, LPN on 5/86/7171 at 4:27 AM     Pt consumed all of his breakfast Western omlet, Kinyarwanda toast 2 pieces, oat meal, fruit cup x2, banana,coffee with 1 sugar and 120cc water. Pt continues to ask for more liquid but since on restricted fluids I have to decline him. Pt did take his medication this morning but refused Lovenox. .Electronically signed by Cheryle Sauce, LPN on 4/05/1614 at 6:59 AM     PT REMAINS UP IN CHAIR AND IS EXPECTORATING [facility] [address] . Electronically signed by Cheryle Sauce, LPN on 9/17/5567 at 1:85 AM.      Security brought up pts book and paper with phone numbers to call his . Also cane was given to security to hold until discharge. Numbers dialed on phone were [phone] and then [phone]. Electronically signed by Cheryle Sauce, LPN on 0/13/4307 at 7:89 AM    Pt sitting in chair telling me that people are trying to get his hip pointing to his left side. Pt states that the hip had surgury and everyone telling him it's worth a lot of money. Pt states that's why they are following him around to gain assess to his hip. Electronically signed by Cheryle Sauce, LPN on 8/68/8538 at 1:79 AM     Dr. Aminah Ortez and  into see pt . Pt is to be on restricted fluids of 1500cc a day. Also starting on a Antibiotic for the green sputum.  Pt when resting in bed moves this head side to side constantly he never is still Dr. Fransisco Norris it could be from the zyprex. Pt also wanted to take a shower and it was determined at this time it might not be safe until Pt is cleared by Psychiatry since he has not been on any medication and they do not want him to drink the water. Electronically signed by Cheikh Medley LPN on 7/30/5397 at 51:05 AM     Vibramycin was started at this time and pt took it with 50cc of water Pt was going to take the capsules apart but stopped him and told him to swallow and he did. Pt chewed his pill this AM.Electronically signed by Cheikh Medley LPN on 5/86/5820 at 25:57 AM  Pt was resting in bed with eyes closed and lunch came assisted him up into chair for lunch assisted with set up of tray and opening of packages. Received a  salad and ranch and Kiswahili dressing, tomato soup, with crackers 8x and 3 oatmeal cookies and 1 gator aid. Pt consumed all of it. Electronically signed by Cheikh Medley LPN on 3/11/2244 at 39:25 PM     Dr. Sienna Thompson into see pt States when sodium level normal they will take him upstairs. Electronically signed by Cheikh Medley LPN on 7/56/3036 at 1:43 PM     Pt has been using the phone and calling everyone in the little brown book that he has. Pt has been given ice in small quantity. Electronically signed by Cheikh Medley LPN on 8/59/9642 at 9:61 PM     Pt has been resting in the bed since 3;30 so woke him up for dinner. Pt received fruit plate, tuna sandwich, brownie,and chips milk and glass of Gatorade and ate it all. Electronically signed by Cheikh Medley LPN on 2/74/4030 at 5:87 PM.Electronically signed by Cheikh Medley LPN on 1/94/6506 at 7:78 PM     PT WAS TALKING TO HIS BROTHER YESTERDAY ON THE PHONE HIS BROTHER LIVE OUT OF STATE AND HE IS NOT REALLY IN HIS LIFE BUT Felicity Jewell.   BROTHER STATES THAT WOULD HE NEED TO TAKE HIS BROTHER TO HIS HOUSE OR WHAT NEEDS TO BE

## 2020-02-19 NOTE — CONSULTS
Renal consult  Dictated      Hyponatremia Polydyspsia/Medication NSAID Zyprexia Diuretic not sure if he is consming these no way to know  Pituatary adenoma stable  Hypertension  Hyperlipidemia  DJD with pains left hip  COPD  Schizophrenia    Fluid restriction major treatment  Follow labs q 8 hours  Psych consult today  Following improvement in Na since admission no need for 3% or samsca

## 2020-02-19 NOTE — PROGRESS NOTES
Physician Progress Note    2020   12:34 PM    Name:  Elias Gagnon  MRN:    [id number]     IP Day: 1     Admit Date: 2020  3:04 PM  PCP: Kelton Mcbride MD    Code Status:  Full Code    Subjective:      no new events. Reports feeling thirsty.     Physical Examination:      Vitals:  /69   Pulse 63   Temp 97.9 °F (36.6 °C) (Oral)   Resp 18   Ht 5' 5\" (1.651 m)   Wt 214 lb (97.1 kg)   SpO2 96%   BMI 35.61 kg/m²   Temp (24hrs), Av.8 °F (36.6 °C), Min:97.3 °F (36.3 °C), Max:98.1 °F (36.7 °C)      General appearance: alert, cooperative and no distress  Mental Status: oriented to person, place and time and normal affect  Lungs: clear to auscultation bilaterally, normal effort  Heart: regular rate and rhythm, no murmur  Abdomen: soft, nontender, nondistended, bowel sounds present, no masses  Extremities: no edema, redness, tenderness in the calves  Skin: no gross lesions, rashes    Data:     Labs:  Recent Labs     20  1611 20  0525   WBC 13.4* 12.2*   HGB 12.7* 12.9*    288     Recent Labs     20  2311 20  0525   * 126*   K 3.5 4.0   CL 81* 86*   CO2 26 27   BUN 7* 7*   CREATININE 0.70 0.72   GLUCOSE 101* 96     Recent Labs     20  1611   AST 40   ALT 30   BILITOT 0.5   ALKPHOS 58       Current Facility-Administered Medications   Medication Dose Route Frequency Provider Last Rate Last Dose    doxycycline (VIBRAMYCIN) capsule 100 mg  100 mg Oral 2 times per day Pitney Zora, DO   100 mg at 20 1152    sodium chloride flush 0.9 % injection 10 mL  10 mL Intravenous 2 times per day Pitney Zora, DO   10 mL at 20 0838    sodium chloride flush 0.9 % injection 10 mL  10 mL Intravenous PRN Pitney Zora, DO        magnesium hydroxide (MILK OF MAGNESIA) 400 MG/5ML suspension 30 mL  30 mL Oral Daily PRN Harjit Sanjuanita Fabian, DO        ondansetron [facility]) injection 4 mg  4 mg Intravenous Q6H PRN Harjitmindi Peralta DO        enoxaparin (LOVENOX) injection 40 mg  40 mg Subcutaneous Daily Kenia Rosen, DO   40 mg at 02/18/20 2215    potassium chloride (KLOR-CON M) extended release tablet 40 mEq  40 mEq Oral PRN Kenia Amend, DO        Or    potassium chloride (KLOR-CON) packet 40 mEq  40 mEq Oral PRN Kenia Amend, DO        Or    potassium chloride 10 mEq/100 mL IVPB (Peripheral Line)  10 mEq Intravenous PRN Kenia Amend,  mL/hr at 02/19/20 0345 10 mEq at 02/19/20 0345    acetaminophen (TYLENOL) tablet 650 mg  650 mg Oral Q4H PRN Kenia Amend, DO        oxyCODONE-acetaminophen (PERCOCET) 5-325 MG per tablet 1 tablet  1 tablet Oral Q4H PRN Kenia Rosen, DO   1 tablet at 02/18/20 2215    albuterol (PROVENTIL) nebulizer solution 2.5 mg  2.5 mg Nebulization Q6H PRN Samer John Levin MD        melatonin tablet 10 mg  10 mg Oral Nightly PRN Samer John Levin MD   10 mg at 02/18/20 2215    OLANZapine (ZYPREXA) tablet 10 mg  10 mg Oral Nightly Samer John Levin MD   10 mg at 02/18/20 2320    simvastatin (ZOCOR) tablet 40 mg  40 mg Oral Nightly Samer John Levin MD   40 mg at 02/18/20 2220    lisinopril (PRINIVIL;ZESTRIL) tablet 40 mg  40 mg Oral Daily Samer John Levin MD   40 mg at 02/19/20 4052     Assessment and Plan: Active problems:    1. 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 Appreciate renal recommendations. Follow-up labs frequently, psych consult. 2. Headache-we will consult neurology given history of macroadenoma. 3. History for schizophrenia-patient will need to go back to the  [facility] once medically clear. 4. COPD/RANDI/HTN/HLD-resume home medications. Stable. Monitor. Diet: Regular diet with 1500 cc fluid restriction. DVT ppx: Lovenox             DISCHARGE PLANNING  Pending further improvement in sodium. Likely back to [facility] once medically stable.        Electronically signed by Kenia Rosen DO on 2/19/2020 at 12:34 PM

## 2020-02-19 NOTE — CONSULTS
Department of Psychiatry  Behavioral Health Consult    REASON FOR CONSULT: Psychosis    CONSULTING PHYSICIAN:     History obtained from: patient    HISTORY OF PRESENT ILLNESS: The patient is a 60 y.o. male with significant past psychiatric history of schizohrenia  The patient is a 60 y.o. male with significant past history of Schizoaffective disorder  Pt was found in severely neglected state by calin and he go t pink slipped  Pt Na was very low on admission  Pt has not been compliant with medication  Pt has been drinking excessive water which causes dilutional hyponatremia  Has been feeling paranoid and hallucinating  Pt report that he drink water to get rid of infection and evil things- clean his body and blood  Everybody dies and I want to drink water  Does not believe that excessive water drinking is the cause of low sodium  Difficult to obtain information from him  Stressors:non compliant with medication     The patient is not currently receiving care for the above psychiatric illness.     Medications Prior to Admission:     Prescriptions Prior to Admission   Medications Prior to Admission: naproxen (EC-NAPROSYN) 500 MG EC tablet, Take 1 tablet by mouth 2 times daily (with meals)  furosemide (LASIX) 40 MG tablet, Take 40 mg by mouth daily  melatonin 3 MG TABS tablet, Take 10 mg by mouth nightly as needed  meloxicam (MOBIC) 15 MG tablet, Take 15 mg by mouth daily  potassium chloride (MICRO-K) 10 MEQ extended release capsule, Take 10 mEq by mouth daily  simvastatin (ZOCOR) 40 MG tablet, Take 40 mg by mouth nightly  nabumetone (RELAFEN) 750 MG tablet, Take 750 mg by mouth daily  clarithromycin (BIAXIN) 500 MG tablet, Take 1 tablet by mouth 2 times daily for 14 days  amoxicillin (AMOXIL) 500 MG capsule, Take 2 capsules by mouth 2 times daily for 14 days  pantoprazole (PROTONIX) 40 MG tablet, Take 1 tablet by mouth 2 times daily (before meals) for 14 days (Patient not taking: Reported on 2/20/2020)  acetaminophen (APAP EXTRA STRENGTH) 500 MG tablet, Take 1 tablet by mouth every 6 hours as needed for Pain  fenofibrate 160 MG tablet, Take 150 mg by mouth daily  aspirin 81 MG EC tablet, Take 1 tablet by mouth 2 times daily  lisinopril (PRINIVIL;ZESTRIL) 20 MG tablet, Take 2 tablets by mouth daily  OLANZapine (ZYPREXA) 10 MG tablet, Take 1 tablet by mouth nightly  hydrochlorothiazide (HYDRODIURIL) 25 MG tablet, Take 1 tablet by mouth daily  TRAZODONE HCL PO, Take by mouth  Respiratory Therapy Supplies SANDRA, Full face CPAP mask and supplies  CPAP Machine MISC, by Does not apply route New CPAP with 6 cm  albuterol (PROVENTIL) (2.5 MG/3ML) 0.083% nebulizer solution, Take 3 mLs by nebulization every 6 hours as needed for Wheezing  STIOLTO RESPIMAT 2.5-2.5 MCG/ACT AERS, Inhale 2 puff in AM  ipratropium (ATROVENT) 0.02 % nebulizer solution, QID prn        Compliance:no     Psychiatric Review of Systems       Depression: no     Annalisa or Hypomania:  no     Panic Attacks:  no     Phobias:  no     Obsessions and Compulsions:  no     PTSD : no     Hallucinations:  yes     Delusions:  yes      Substance Abuse History:  ETOH: no   Marijuana: no  Opiates: no  Other Drugs: no        Past Psychiatric History:  Prior Diagnosis:  SAD  Psychiatrist: calin  Therapist:calin  Hospitalization: yes  Hx of Suicidal Attempts: no  Hx of violence:  no  ECT: no        The patient is not currently receiving care for the above psychiatric illness.       Psychiatric Review of Systems       Depression: no     Annalisa or Hypomania:  no     Panic Attacks:  no     Phobias:  no     Obsessions and Compulsions:  no     PTSD : no     Hallucinations:  yes      Delusions:  yes       Substance Abuse History:  ETOH: no  Marijuana: no  Opiates: no  Other Drugs: no    Past Medical History:        Diagnosis Date    Abdominal pain     Asthma     Bloating symptom     COPD (chronic obstructive pulmonary disease) (HCC)     Depression     Depression     Diarrhea     History of rectal bleeding     Hypercholesteremia     past trx > 5 yrs    Hypercholesteremia     Hypercholesteremia     Hypertension     meds > 3 yrs    Lung disease     RANDI on CPAP     patient relates he doesn't use cpap at this time    Osteoarthritis     left hip    Osteoarthritis     Osteoarthritis of left hip     Pituitary macroadenoma [facility])        Past Surgical History:        Procedure Laterality Date    COLONOSCOPY  5/2/14    JENNIFER    COLONOSCOPY N/A 12/12/2019    COLONOSCOPY DIAGNOSTIC performed by Kareen Cordova MD at [address], COLON, DIAGNOSTIC      EYE SURGERY Bilateral     cataracts   [address] ?     index finger laceration repair    HERNIA REPAIR  2004    repair [address]     JOINT REPLACEMENT Left     left hip    NE COLON CA SCRN NOT HI RSK IND N/A 5/17/2017    COLONOSCOPY performed by Danii Spaulding MD at [address] Left 4/22/2019    LEFT HIP TOTAL HIP ARTHROPLASTY, LATERAL DECUB, GRACIE performed by Elham Cisneros MD at [address] [facility]  12/12/2019    EGD DIAGNOSTIC ONLY performed by Kareen Cordova MD at [facility]       Medications Prior to Admission:   Medications Prior to Admission: furosemide (LASIX) 40 MG tablet, Take 40 mg by mouth daily  melatonin 3 MG TABS tablet, Take 10 mg by mouth nightly as needed  meloxicam (MOBIC) 15 MG tablet, Take 15 mg by mouth daily  potassium chloride (MICRO-K) 10 MEQ extended release capsule, Take 10 mEq by mouth daily  simvastatin (ZOCOR) 40 MG tablet, Take 40 mg by mouth nightly  nabumetone (RELAFEN) 750 MG tablet, Take 750 mg by mouth daily  clarithromycin (BIAXIN) 500 MG tablet, Take 1 tablet by mouth 2 times daily for 14 days  amoxicillin (AMOXIL) 500 MG capsule, Take 2 capsules by mouth 2 times daily for 14 days  pantoprazole (PROTONIX) 40 MG tablet, Take 1 tablet by mouth 2 times daily (before meals) for 14 days  acetaminophen (APAP EXTRA STRENGTH) 500 MG tablet, Take 1 tablet by mouth every 6 hours as needed for Pain  naproxen (EC-NAPROSYN) 500 MG EC tablet, Take 1 tablet by mouth 2 times daily (with meals)  fenofibrate 160 MG tablet, Take 150 mg by mouth daily  aspirin 81 MG EC tablet, Take 1 tablet by mouth 2 times daily  lisinopril (PRINIVIL;ZESTRIL) 20 MG tablet, Take 2 tablets by mouth daily  OLANZapine (ZYPREXA) 10 MG tablet, Take 1 tablet by mouth nightly  hydrochlorothiazide (HYDRODIURIL) 25 MG tablet, Take 1 tablet by mouth daily  TRAZODONE HCL PO, Take by mouth  Respiratory Therapy Supplies SANDRA, Full face CPAP mask and supplies  CPAP Machine MISC, by Does not apply route New CPAP with 6 cm  albuterol (PROVENTIL) (2.5 MG/3ML) 0.083% nebulizer solution, Take 3 mLs by nebulization every 6 hours as needed for Wheezing  STIOLTO RESPIMAT 2.5-2.5 MCG/ACT AERS, Inhale 2 puff in AM  ipratropium (ATROVENT) 0.02 % nebulizer solution, QID prn    Allergies:  Patient has no known allergies.     FAMILY/SOCIAL HISTORY:  Family History   Problem Relation Age of Onset    Colon Cancer Mother     High Blood Pressure Father     Diabetes Sister     No Known Problems Brother     No Known Problems Brother     No Known Problems Brother     Other Brother         Sepsis    Other Brother         Killed    No Known Problems Sister     No Known Problems Sister     No Known Problems Son      Social History     Socioeconomic History    Marital status:      Spouse name: Not on file    Number of children: Not on file    Years of education: Not on file    Highest education level: Not on file   Occupational History    Not on file   Social Needs    Financial resource strain: Not on file    Food insecurity:     Worry: Not on file     Inability: Not on file    Transportation needs:     Medical: Not on file     Non-medical: Not on file   Tobacco Use    Smoking status: Former Smoker     Packs/day: 1.50     Years: 46.00     Pack years: 69.00 Types: Cigarettes     Last attempt to quit: 3/20/2019     Years since quittin.9    Smokeless tobacco: Never Used   Substance and Sexual Activity    Alcohol use: Yes    Drug use: No    Sexual activity: Not on file   Lifestyle    Physical activity:     Days per week: Not on file     Minutes per session: Not on file    Stress: Not on file   Relationships    Social connections:     Talks on phone: Not on file     Gets together: Not on file     Attends Ep[employer] service: Not on file     Active member of club or organization: Not on file     Attends meetings of clubs or organizations: Not on file     Relationship status: Not on file    Intimate partner violence:     Fear of current or ex partner: Not on file     Emotionally abused: Not on file     Physically abused: Not on file     Forced sexual activity: Not on file   Other Topics Concern    Not on file   Social History Narrative    Not on file       REVIEW OF SYSTEMS    Constitutional: [] fever  [] chills  [] weight loss  []weakness [] Other:  Eyes:  [] photophobia  [] discharge [] acuity change   [] Diplopia   [] Other:  HENT:  [] sore throat  [] ear pain [] Tinnitus   [] Other  Respiratory:  [] Cough  [] Shortness of breath   [] Sputum   [] Other:   Cardiac: []Chest pain   []Palpitations []Edema  []PND  [] Other:  GI:  []Abdominal pain   []Nausea  []Vomiting  []Diarrhea  [] Other:  :  [] Dysuria   []Frequency  []Hematuria  []Discharge  [] Other:  Possible Pregnancy: []Yes   []No   LMP:   Musculoskeletal:  []Back pain  []Neck pain  []Recent Injury   Skin:  []Rash  [] Itching  [] Other:  Neurologic:  [] Headache  [] Focal weakness  [] Sensory changes []Other:  Endocrine:  [] Polyuria  [] Polydipsia  [] Hair Loss  [] Other:  Lymphatic:   [] Swollen glands   Psychiatric:  As per HPI      All other systems negative except as marked or mentioned/indicated in the HPI. Albino Ly      PHYSICAL EXAM:  Vitals:  BP (!) 120/58   Pulse 68   Temp 98.2 °F (36.8 °C) (Oral) unenhanced serial axial images of the brain from the vertex of the skull to the base of the skull were performed. Exam is limited as coronal and sagittal reconstructions could not be performed due to CT malfunction. FINDINGS: The ventricles are unchanged in size configuration. . 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 It measures approximately 3.4 x 2.4 cm in the transverse and AP dimension. The cephalocaudad dimension cannot be assessed. Please see CT scan November 10, 2019 sagittal images were refluxed and. The No midline shift. The cisterns are patent. No acute extra-axial findings The visualized osseous structures are unremarkable. The visualized portion of the paranasal sinuses, and mastoids are unremarkable. IMPRESSION 1. AGAIN IDENTIFIED IS A LOBULATED EXPANSILE SUPRASELLAR MASS. THIS IS BEEN PREVIOUSLY IDENTIFIED AT PRIOR CT AND BY OUTSIDE MRI/CCR AS A PROBABLE PITUITARY ADENOMA. PLEASE SEE MRI REPORT FROM OUTSIDE FACILITY FOR ADDITIONAL DETAILS. REFERRAL TO NEUROSURGICAL CONSULT IS RECOMMENDED 2. NO ACUTE EXTRA-AXIAL FINDINGS. CASE WAS DISCUSSED WITH MR. MELÉNDEZ OF THE EMERGENCY ROOM ON FEBRUARY 18, 2019 AT 1658 HOURS All CT scans at this facility use dose modulation, iterative reconstruction, and/or weight based dosing when appropriate to reduce radiation dose to as low as reasonably achievable. Xr Chest Portable    Result Date: 2/18/2020  Portable chest radiograph History: Headache. Cough. Technique: AP portable view of the chest obtained. Comparison: Chest x-rays from April 9, 2019 Findings: The cardiomediastinal silhouette is within normal limits. No pneumothorax, pleural effusion, or consolidation. Bones of the thorax appear intact. No acute intrathoracic process.        EKG: TRACING REVIEWED    RECOMMENDATIONS    Risk Management:  1:1 sitter  Transfer to 3 W when medicallys table  Fluid restriction  Discussed with the treating physician/ team about the patient and treatment plan  Reviewed the chart    Discussed with the patient risk, benefit, alternative and common side effects for the  proposed medication treatment. Patient is consenting to the treatment. Thanks for the consult. Please call me if needed.     Electronically signed by Alexandra Dela Cruz MD on 2/19/2020 at 4:10 PM

## 2020-02-19 NOTE — BH NOTE
Pt seen D/W team  Pt is very psychotic  Increase water consumption as the reason for low sodium  Has not been takign medication regularly    Will need 3 W admission when medically stable    Continue zyprexa 10 mg    Electronically signed by Radha Samano MD on 2/19/2020 at 4:10 PM

## 2020-02-19 NOTE — PROGRESS NOTES
[facility] EMERGENCY [facility] AT [place] Respiratory Therapy Evaluation   Current Order:  Albuterol q6prn    Home Regimen: yes     Ordering Physician: Fabian  Re-evaluation Date:  2/22    Diagnosis: headache      Patient Status: Stable / Unstable + Physician notified    The following MDI Criteria must be met in order to convert aerosol to MDI with spacer. If unable to meet, MDI will be converted to aerosol:  []  Patient able to demonstrate the ability to use MDI effectively  []  Patient alert and cooperative  []  Patient able to take deep breath with 5-10 second hold  []  Medication(s) available in this delivery method   []  Peak flow greater than or equal to 200 ml/min            Current Order Substituted To  (same drug, same frequency)   Aerosol to MDI [] Albuterol Sulfate 0.083% unit dose by aerosol Albuterol Sulfate MDI 2 puffs by inhalation with spacer    [] Levalbuterol 1.25 mg unit dose by aerosol Levalbuterol MDI 2 puffs by inhalation with spacer    [] Levalbuterol 0.63 mg unit dose by aerosol Levalbuterol MDI 2 puffs by inhalation with spacer    [] Ipratropium Bromide 0.02% unit dose by aerosol Ipratropium Bromide MDI 2 puffs by inhalation with spacer    [] Duoneb (Ipratropium + Albuterol) unit dose by aerosol Ipratropium MDI + Albuterol MDI 2 puffs by inhalation w/spacer   MDI to Aerosol [] Albuterol Sulfate MDI Albuterol Sulfate 0.083% unit dose by aerosol    [] Levalbuterol MDI 2 puffs by inhalation Levalbuterol 1.25 mg unit dose by aerosol    [] Ipratropium Bromide MDI by inhalation Ipratropium Bromide 0.02% unit dose by aerosol    [] Combivent (Ipratropium + Albuterol) MDI by inhalation Duoneb (Ipratropium + Albuterol) unit dose by aerosol   Treatment Assessment [Frequency/Schedule]:  Change frequency to: no change __________________________________________________per Protocol, P&T, MEC      Points 0 1 2 3 4   Pulmonary Status  Non-Smoker  []   Smoking history   < 20 pack years  []   Smoking history  ?  20 pack years  [] Pulmonary Disorder  (acute or chronic)  [x]   Severe or Chronic w/ Exacerbation  []     Surgical Status No [x]   Surgeries     General []   Surgery Lower []   Abdominal Thoracic or []   Upper Abdominal Thoracic with  PulmonaryDisorder  []     Chest X-ray Clear/Not  Ordered     [x]  Chronic Changes  Results Pending  []  Infiltrates, atelectasis, pleural effusion, or edema  []  Infiltrates in more than one lobe []  Infiltrate + Atelectasis, &/or pleural effusion  []    Respiratory Pattern Regular,  RR = 12-20 [x]  Increased,  RR = 21-25 []  GUTIERREZ, irregular,  or RR = 26-30 []  Decreased FEV1  or RR = 31-35 []  Severe SOB, use  of accessory muscles, or RR ? 35  []    Mental Status Alert, oriented,  Cooperative [x]  Confused but Follows commands []  Lethargic or unable to follow commands []  Obtunded  []  Comatose  []    Breath Sounds Clear to  auscultation  [x]  Decreased unilaterally or  in bases only []  Decreased  bilaterally  []  Crackles or intermittent wheezes []  Wheezes []    Cough Strong, Spontan., & nonproductive [x]  Strong,  spontaneous, &  productive []  Weak,  Nonproductive []  Weak, productive or  with wheezes []  No spontaneous  cough or may require suctioning []    Level of Activity Ambulatory []  Ambulatory w/ Assist  [x]  Non-ambulatory []  Paraplegic []  Quadriplegic []    Total    Score:__4_____     Triage Score:__5______      Tri       Triage:     1. (>20) Freq: Q3    2. (16-20) Freq: Q4   3. (11-15) Freq: QID & Albuterol Q2 PRN    4. (6-10) Freq: TID & Albuterol Q2 PRN    5. (0-5) Freq Q4prn

## 2020-02-20 ENCOUNTER — HOSPITAL ENCOUNTER (INPATIENT)
Age: 65
LOS: 5 days | Discharge: HOME | DRG: 750 | End: 2020-02-25
Attending: PSYCHIATRY & NEUROLOGY | Admitting: PSYCHIATRY & NEUROLOGY
Payer: COMMERCIAL

## 2020-02-20 VITALS
SYSTOLIC BLOOD PRESSURE: 127 MMHG | WEIGHT: 214 LBS | BODY MASS INDEX: 35.65 KG/M2 | DIASTOLIC BLOOD PRESSURE: 93 MMHG | OXYGEN SATURATION: 100 % | RESPIRATION RATE: 18 BRPM | HEIGHT: 65 IN | HEART RATE: 60 BPM | TEMPERATURE: 98.7 F

## 2020-02-20 PROBLEM — F25.9 SCHIZOAFFECTIVE DISORDER (HCC): Status: ACTIVE | Noted: 2020-02-20

## 2020-02-20 LAB
ANION GAP SERPL CALCULATED.3IONS-SCNC: 10 MEQ/L (ref 9–15)
ANION GAP SERPL CALCULATED.3IONS-SCNC: 12 MEQ/L (ref 9–15)
BUN BLDV-MCNC: 11 MG/DL (ref 8–23)
BUN BLDV-MCNC: 13 MG/DL (ref 8–23)
CALCIUM SERPL-MCNC: 9.3 MG/DL (ref 8.5–9.9)
CALCIUM SERPL-MCNC: 9.5 MG/DL (ref 8.5–9.9)
CHLORIDE BLD-SCNC: 91 MEQ/L (ref 95–107)
CHLORIDE BLD-SCNC: 95 MEQ/L (ref 95–107)
CO2: 26 MEQ/L (ref 20–31)
CO2: 29 MEQ/L (ref 20–31)
CREAT SERPL-MCNC: 0.75 MG/DL (ref 0.7–1.2)
CREAT SERPL-MCNC: 0.76 MG/DL (ref 0.7–1.2)
GFR AFRICAN AMERICAN: >60
GFR AFRICAN AMERICAN: >60
GFR NON-AFRICAN AMERICAN: >60
GFR NON-AFRICAN AMERICAN: >60
GLUCOSE BLD-MCNC: 123 MG/DL (ref 70–99)
GLUCOSE BLD-MCNC: 98 MG/DL (ref 70–99)
POTASSIUM REFLEX MAGNESIUM: 3.9 MEQ/L (ref 3.4–4.9)
POTASSIUM SERPL-SCNC: 3.6 MEQ/L (ref 3.4–4.9)
POTASSIUM SERPL-SCNC: 3.9 MEQ/L (ref 3.4–4.9)
PROLACTIN: 30.6 NG/ML (ref 4–15.2)
SODIUM BLD-SCNC: 129 MEQ/L (ref 135–144)
SODIUM BLD-SCNC: 134 MEQ/L (ref 135–144)

## 2020-02-20 PROCEDURE — 94664 DEMO&/EVAL PT USE INHALER: CPT

## 2020-02-20 PROCEDURE — 6370000000 HC RX 637 (ALT 250 FOR IP): Performed by: INTERNAL MEDICINE

## 2020-02-20 PROCEDURE — 99222 1ST HOSP IP/OBS MODERATE 55: CPT | Performed by: INTERNAL MEDICINE

## 2020-02-20 PROCEDURE — 99222 1ST HOSP IP/OBS MODERATE 55: CPT | Performed by: PSYCHIATRY & NEUROLOGY

## 2020-02-20 PROCEDURE — 84146 ASSAY OF PROLACTIN: CPT

## 2020-02-20 PROCEDURE — 36415 COLL VENOUS BLD VENIPUNCTURE: CPT

## 2020-02-20 PROCEDURE — 80048 BASIC METABOLIC PNL TOTAL CA: CPT

## 2020-02-20 PROCEDURE — 1240000000 HC EMOTIONAL WELLNESS R&B

## 2020-02-20 PROCEDURE — 2580000003 HC RX 258: Performed by: INTERNAL MEDICINE

## 2020-02-20 RX ORDER — TRAZODONE HYDROCHLORIDE 50 MG/1
50 TABLET ORAL NIGHTLY PRN
Status: CANCELLED | OUTPATIENT
Start: 2020-02-20

## 2020-02-20 RX ORDER — TRAZODONE HYDROCHLORIDE 50 MG/1
50 TABLET ORAL NIGHTLY PRN
Status: DISCONTINUED | OUTPATIENT
Start: 2020-02-20 | End: 2020-02-25

## 2020-02-20 RX ORDER — BENZTROPINE MESYLATE 1 MG/ML
2 INJECTION INTRAMUSCULAR; INTRAVENOUS 2 TIMES DAILY PRN
Status: CANCELLED | OUTPATIENT
Start: 2020-02-20

## 2020-02-20 RX ORDER — ACETAMINOPHEN 325 MG/1
650 TABLET ORAL EVERY 4 HOURS PRN
Status: DISCONTINUED | OUTPATIENT
Start: 2020-02-20 | End: 2020-02-25 | Stop reason: HOSPADM

## 2020-02-20 RX ORDER — BENZTROPINE MESYLATE 1 MG/ML
2 INJECTION INTRAMUSCULAR; INTRAVENOUS 2 TIMES DAILY PRN
Status: DISCONTINUED | OUTPATIENT
Start: 2020-02-20 | End: 2020-02-25 | Stop reason: HOSPADM

## 2020-02-20 RX ORDER — DOXYCYCLINE HYCLATE 50 MG/1
100 CAPSULE ORAL EVERY 12 HOURS SCHEDULED
Status: CANCELLED | OUTPATIENT
Start: 2020-02-20 | End: 2020-02-27

## 2020-02-20 RX ORDER — ALBUTEROL SULFATE 2.5 MG/3ML
2.5 SOLUTION RESPIRATORY (INHALATION) EVERY 6 HOURS PRN
Status: DISCONTINUED | OUTPATIENT
Start: 2020-02-20 | End: 2020-02-25 | Stop reason: HOSPADM

## 2020-02-20 RX ORDER — SODIUM CHLORIDE 0.9 % (FLUSH) 0.9 %
10 SYRINGE (ML) INJECTION PRN
Status: CANCELLED | OUTPATIENT
Start: 2020-02-20

## 2020-02-20 RX ORDER — CABERGOLINE 0.5 MG/1
0.5 TABLET ORAL
Status: DISCONTINUED | OUTPATIENT
Start: 2020-02-20 | End: 2020-02-25 | Stop reason: HOSPADM

## 2020-02-20 RX ORDER — DOXYCYCLINE HYCLATE 100 MG/1
100 CAPSULE ORAL EVERY 12 HOURS SCHEDULED
Status: DISCONTINUED | OUTPATIENT
Start: 2020-02-20 | End: 2020-02-25

## 2020-02-20 RX ORDER — POTASSIUM CHLORIDE 20MEQ/15ML
40 LIQUID (ML) ORAL PRN
Status: DISCONTINUED | OUTPATIENT
Start: 2020-02-20 | End: 2020-02-25

## 2020-02-20 RX ORDER — POTASSIUM CHLORIDE 7.45 MG/ML
10 INJECTION INTRAVENOUS PRN
Status: DISCONTINUED | OUTPATIENT
Start: 2020-02-20 | End: 2020-02-25

## 2020-02-20 RX ORDER — OLANZAPINE 5 MG/1
10 TABLET ORAL NIGHTLY
Status: CANCELLED | OUTPATIENT
Start: 2020-02-20

## 2020-02-20 RX ORDER — OXYCODONE HYDROCHLORIDE AND ACETAMINOPHEN 5; 325 MG/1; MG/1
1 TABLET ORAL EVERY 4 HOURS PRN
Status: DISCONTINUED | OUTPATIENT
Start: 2020-02-20 | End: 2020-02-25

## 2020-02-20 RX ORDER — POTASSIUM CHLORIDE 20 MEQ/1
40 TABLET, EXTENDED RELEASE ORAL PRN
Status: CANCELLED | OUTPATIENT
Start: 2020-02-20

## 2020-02-20 RX ORDER — POTASSIUM CHLORIDE 20 MEQ/1
40 TABLET, EXTENDED RELEASE ORAL PRN
Status: DISCONTINUED | OUTPATIENT
Start: 2020-02-20 | End: 2020-02-25

## 2020-02-20 RX ORDER — LISINOPRIL 20 MG/1
40 TABLET ORAL DAILY
Status: CANCELLED | OUTPATIENT
Start: 2020-02-21

## 2020-02-20 RX ORDER — SODIUM CHLORIDE 0.9 % (FLUSH) 0.9 %
10 SYRINGE (ML) INJECTION PRN
Status: DISCONTINUED | OUTPATIENT
Start: 2020-02-20 | End: 2020-02-20

## 2020-02-20 RX ORDER — ONDANSETRON 2 MG/ML
4 INJECTION INTRAMUSCULAR; INTRAVENOUS EVERY 6 HOURS PRN
Status: DISCONTINUED | OUTPATIENT
Start: 2020-02-20 | End: 2020-02-25 | Stop reason: HOSPADM

## 2020-02-20 RX ORDER — SODIUM CHLORIDE 0.9 % (FLUSH) 0.9 %
10 SYRINGE (ML) INJECTION EVERY 12 HOURS SCHEDULED
Status: CANCELLED | OUTPATIENT
Start: 2020-02-20

## 2020-02-20 RX ORDER — ACETAMINOPHEN 325 MG/1
650 TABLET ORAL EVERY 4 HOURS PRN
Status: CANCELLED | OUTPATIENT
Start: 2020-02-20

## 2020-02-20 RX ORDER — MAGNESIUM HYDROXIDE/ALUMINUM HYDROXICE/SIMETHICONE 120; 1200; 1200 MG/30ML; MG/30ML; MG/30ML
30 SUSPENSION ORAL PRN
Status: DISCONTINUED | OUTPATIENT
Start: 2020-02-20 | End: 2020-02-25 | Stop reason: HOSPADM

## 2020-02-20 RX ORDER — ONDANSETRON 2 MG/ML
4 INJECTION INTRAMUSCULAR; INTRAVENOUS EVERY 6 HOURS PRN
Status: CANCELLED | OUTPATIENT
Start: 2020-02-20

## 2020-02-20 RX ORDER — OXYCODONE HYDROCHLORIDE AND ACETAMINOPHEN 5; 325 MG/1; MG/1
1 TABLET ORAL EVERY 4 HOURS PRN
Status: CANCELLED | OUTPATIENT
Start: 2020-02-20

## 2020-02-20 RX ORDER — POTASSIUM CHLORIDE 1.5 G/1.77G
40 POWDER, FOR SOLUTION ORAL PRN
Status: CANCELLED | OUTPATIENT
Start: 2020-02-20

## 2020-02-20 RX ORDER — SODIUM CHLORIDE 0.9 % (FLUSH) 0.9 %
10 SYRINGE (ML) INJECTION EVERY 12 HOURS SCHEDULED
Status: DISCONTINUED | OUTPATIENT
Start: 2020-02-20 | End: 2020-02-20

## 2020-02-20 RX ORDER — ACETAMINOPHEN 325 MG/1
650 TABLET ORAL EVERY 4 HOURS PRN
Status: DISCONTINUED | OUTPATIENT
Start: 2020-02-20 | End: 2020-02-20

## 2020-02-20 RX ORDER — POTASSIUM CHLORIDE 7.45 MG/ML
10 INJECTION INTRAVENOUS PRN
Status: CANCELLED | OUTPATIENT
Start: 2020-02-20

## 2020-02-20 RX ORDER — LISINOPRIL 20 MG/1
40 TABLET ORAL DAILY
Status: DISCONTINUED | OUTPATIENT
Start: 2020-02-21 | End: 2020-02-25 | Stop reason: HOSPADM

## 2020-02-20 RX ORDER — OLANZAPINE 10 MG/1
10 TABLET ORAL NIGHTLY
Status: DISCONTINUED | OUTPATIENT
Start: 2020-02-20 | End: 2020-02-25 | Stop reason: HOSPADM

## 2020-02-20 RX ORDER — ALBUTEROL SULFATE 2.5 MG/3ML
2.5 SOLUTION RESPIRATORY (INHALATION) EVERY 6 HOURS PRN
Status: CANCELLED | OUTPATIENT
Start: 2020-02-20

## 2020-02-20 RX ORDER — MAGNESIUM HYDROXIDE/ALUMINUM HYDROXICE/SIMETHICONE 120; 1200; 1200 MG/30ML; MG/30ML; MG/30ML
30 SUSPENSION ORAL PRN
Status: CANCELLED | OUTPATIENT
Start: 2020-02-20

## 2020-02-20 RX ADMIN — CABERGOLINE 0.5 MG: 0.5 TABLET ORAL at 19:18

## 2020-02-20 RX ADMIN — LISINOPRIL 40 MG: 20 TABLET ORAL at 08:17

## 2020-02-20 RX ADMIN — DOXYCYCLINE HYCLATE 100 MG: 100 CAPSULE ORAL at 20:26

## 2020-02-20 RX ADMIN — Medication 10 MG: at 01:18

## 2020-02-20 RX ADMIN — DOXYCYCLINE HYCLATE 100 MG: 50 CAPSULE, GELATIN COATED ORAL at 08:16

## 2020-02-20 RX ADMIN — OLANZAPINE 10 MG: 10 TABLET, FILM COATED ORAL at 20:26

## 2020-02-20 RX ADMIN — Medication 10 ML: at 08:21

## 2020-02-20 RX ADMIN — OXYCODONE HYDROCHLORIDE AND ACETAMINOPHEN 1 TABLET: 5; 325 TABLET ORAL at 01:18

## 2020-02-20 ASSESSMENT — PAIN DESCRIPTION - LOCATION
LOCATION: HIP

## 2020-02-20 ASSESSMENT — PAIN SCALES - GENERAL
PAINLEVEL_OUTOF10: 0
PAINLEVEL_OUTOF10: 7
PAINLEVEL_OUTOF10: 7
PAINLEVEL_OUTOF10: 0
PAINLEVEL_OUTOF10: 7
PAINLEVEL_OUTOF10: 0
PAINLEVEL_OUTOF10: 7
PAINLEVEL_OUTOF10: 0
PAINLEVEL_OUTOF10: 7
PAINLEVEL_OUTOF10: 0
PAINLEVEL_OUTOF10: 7

## 2020-02-20 ASSESSMENT — PAIN DESCRIPTION - DESCRIPTORS
DESCRIPTORS: CONSTANT

## 2020-02-20 ASSESSMENT — PAIN DESCRIPTION - PAIN TYPE
TYPE: CHRONIC PAIN

## 2020-02-20 ASSESSMENT — ENCOUNTER SYMPTOMS
CHOKING: 0
VOMITING: 0
PHOTOPHOBIA: 0
NAUSEA: 0
SHORTNESS OF BREATH: 0
BACK PAIN: 0
TROUBLE SWALLOWING: 0

## 2020-02-20 ASSESSMENT — PAIN DESCRIPTION - ORIENTATION
ORIENTATION: LEFT

## 2020-02-20 ASSESSMENT — PAIN DESCRIPTION - FREQUENCY
FREQUENCY: CONTINUOUS

## 2020-02-20 NOTE — PROGRESS NOTES
Patient did not attend 1600 group despite staff encouragement.  Electronically signed by Don Key on 2/20/2020 at 4:51 PM

## 2020-02-20 NOTE — DISCHARGE SUMMARY
lesions. Neuro: Non Focal. Symetrical motor and tone. Nl Comprehension, Alert,awake and oriented. NL CN. Symetrical tone and reflexes. Capillary Refill: Brisk,< 3 seconds   Peripheral Pulses: +2 palpable, equal bilaterally     Patient was seen by the following consultants while admitted to [facility]:   Consults:  [address] [facility]  IP CONSULT TO NEUROLOGY    Significant Diagnostic Studies:    Refer to chart     Please refer to chart if no studies are shown here    Ct Head Without Contrast    Result Date: 2/18/2020  CT HEAD WO CONTRAST CLINICAL HISTORY:  hallucinations, schizophrenia off meds but need of medical clearance Comparison: November 10, 2019. Clinical note: Please see MRI report from [facility] for additional details. TECHNIQUE: Multiple unenhanced serial axial images of the brain from the vertex of the skull to the base of the skull were performed. Exam is limited as coronal and sagittal reconstructions could not be performed due to CT malfunction. FINDINGS: The ventricles are unchanged in size configuration. . 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 It measures approximately 3.4 x 2.4 cm in the transverse and AP dimension. The cephalocaudad dimension cannot be assessed. Please see CT scan November 10, 2019 sagittal images were refluxed and. The No midline shift. The cisterns are patent. No acute extra-axial findings The visualized osseous structures are unremarkable. The visualized portion of the paranasal sinuses, and mastoids are unremarkable. IMPRESSION 1. AGAIN IDENTIFIED IS A LOBULATED EXPANSILE SUPRASELLAR MASS. THIS IS BEEN PREVIOUSLY IDENTIFIED AT PRIOR CT AND BY OUTSIDE MRI/CCR AS A PROBABLE PITUITARY ADENOMA. PLEASE SEE MRI REPORT FROM OUTSIDE FACILITY FOR ADDITIONAL DETAILS. REFERRAL TO NEUROSURGICAL CONSULT IS RECOMMENDED 2. NO ACUTE EXTRA-AXIAL FINDINGS.  CASE WAS DISCUSSED WITH MR. MELÉNDEZ OF THE EMERGENCY

## 2020-02-20 NOTE — PROGRESS NOTES
RN received report from [facility]. Patient is a 60year old  male with diagnosis of schizophrenia. Patient presented at [facility] as an involuntary admission after being evaluated by the [facility] for increased bizarre behavior and inadequate home environment. Patient recently moved from [facility] and was living in an apartment. Miles Love assessed patient at his apartment which was noted to be unfurnished, no food available, and patient had covered all vents. Patient presented with paranoid behavior. Upon arrival to [facility], patient was discovered to have low sodium and potassium and was treated on [address]. Patient is on current fluid restriction. Patient denies any SI, HI and is reported to respond to internal stimuli. Patient has chronic pain in his LEFT hip. Patient has a mass on his pituitary gland and is currently refusing treatment. Patient on 1:1 on 2 West. Regular diet. Patient's wife lives in [place] in a separate residence.

## 2020-02-20 NOTE — FLOWSHEET NOTE
9909 Handoff complete. This RN assumed care off the pt. Pt is PS and has a 1:1. Juanita PURDY sitting with him today. [address] to do AM med pass. Pt refusing lovenox shot. 1511 PT cleared to go to 3W. GURWINDER Grant to go with pt. Report was given by Cady Simon.

## 2020-02-20 NOTE — CONSULTS
Subjective:      Patient ID: Norma Devlin is a 60 y.o. male who presents today for:  Chief Complaint   Patient presents with    Headache     Pt has co headache, cough needs psych eval.        HPI 60 right-handed gentleman with a history of hyponatremia with polydipsia. 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 Patient was seen by Dr. Sanya Martinez as well. Was recommended to have an MRI but the patient will not have an MRI. I did offer this to him even with sedation he would not go through this. He was seen at [facility] ophthalmology but he walked out before his evaluations were done so we are not quite sure of his visual field defects. He denies any other symptoms. He has schizophrenia and is seen by psychiatry and was recommended to continue with Zyprexa. His sodium is now 131 is not any symptoms from the same. He denies any dizziness has not any major other issues of encephalopathy or seizures with this. Review of Systems   Constitutional: Negative for fever. HENT: Negative for ear pain, tinnitus and trouble swallowing. Eyes: Negative for photophobia and visual disturbance. Respiratory: Negative for choking and shortness of breath. Cardiovascular: Negative for chest pain and palpitations. Gastrointestinal: Negative for nausea and vomiting. Musculoskeletal: Negative for back pain, gait problem, joint swelling, myalgias, neck pain and neck stiffness. Neurological: Negative for dizziness, tremors, seizures, syncope, facial asymmetry, speech difficulty, weakness, light-headedness, numbness and headaches. Psychiatric/Behavioral: Negative for behavioral problems, confusion, hallucinations and sleep disturbance.        Past Medical History:   Diagnosis Date    Abdominal pain     Asthma     Bloating symptom     COPD [facility]chronic obstructive pulmonary disease) ([facility])     Depression     Depression     Diarrhea     History of rectal bleeding      Physical activity:     Days per week: Not on file     Minutes per session: Not on file    Stress: Not on file   Relationships    Social connections:     Talks on phone: Not on file     Gets together: Not on file     Attends Church service: Not on file     Active member of club or organization: Not on file     Attends meetings of clubs or organizations: Not on file     Relationship status: Not on file    Intimate partner violence:     Fear of current or ex partner: Not on file     Emotionally abused: Not on file     Physically abused: Not on file     Forced sexual activity: Not on file   Other Topics Concern    Not on file   Social History Narrative    Not on file     Family History   Problem Relation Age of Onset    Colon Cancer Mother     High Blood Pressure Father     Diabetes Sister     No Known Problems Brother     No Known Problems Brother     No Known Problems Brother     Other Brother         Sepsis    Other Brother         Killed    No Known Problems Sister     No Known Problems Sister     No Known Problems Son      No Known Allergies  Current Facility-Administered Medications   Medication Dose Route Frequency Provider Last Rate Last Dose    doxycycline (VIBRAMYCIN) capsule 100 mg  100 mg Oral 2 times per day Pitney Zora, DO   100 mg at 02/20/20 0816    sodium chloride flush 0.9 % injection 10 mL  10 mL Intravenous 2 times per day Pitney Zora, DO   10 mL at 02/20/20 9398    sodium chloride flush 0.9 % injection 10 mL  10 mL Intravenous PRN Pitney Zora, DO        magnesium hydroxide (MILK OF MAGNESIA) 400 MG/5ML suspension 30 mL  30 mL Oral Daily PRN Pitney Zora, DO        ondansetron [facility]) injection 4 mg  4 mg Intravenous Q6H PRN Avis Peralta, DO        enoxaparin (LOVENOX) injection 40 mg  40 mg Subcutaneous Daily Pitney Zora, DO   40 mg at 02/18/20 6882    potassium chloride (KLOR-CON M) extended release tablet 40 mEq  40 mEq Oral PRN Pitney Zora, cardiomediastinal silhouette is within normal limits. No pneumothorax, pleural effusion, or consolidation. Bones of the thorax appear intact. No acute intrathoracic process. Lab Results   Component Value Date    WBC 12.2 02/19/2020    RBC 4.15 02/19/2020    HGB 12.9 02/19/2020    HCT 38.6 02/19/2020    MCV 93.2 02/19/2020    MCH 31.1 02/19/2020    MCHC 33.3 02/19/2020    RDW 13.1 02/19/2020     02/19/2020    MPV 8.7 04/17/2014     Lab Results   Component Value Date     02/20/2020    K 3.9 02/20/2020    K 3.9 02/20/2020    CL 91 02/20/2020    CO2 26 02/20/2020    BUN 13 02/20/2020    CREATININE 0.75 02/20/2020    GFRAA >60.0 02/20/2020    LABGLOM >60.0 02/20/2020    GLUCOSE 123 02/20/2020    PROT 7.0 02/18/2020    LABALBU 4.3 02/18/2020    CALCIUM 9.3 02/20/2020    BILITOT 0.5 02/18/2020    ALKPHOS 58 02/18/2020    AST 40 02/18/2020    ALT 30 02/18/2020     Lab Results   Component Value Date    PROTIME 10.0 04/09/2019    INR 1.0 04/09/2019     Lab Results   Component Value Date    TSH 0.884 02/18/2020    FERRITIN 172.0 10/17/2013    IRON 73 10/17/2013    TIBC 342 10/17/2013     Lab Results   Component Value Date    TRIG 86 05/05/2016    HDL 73 05/05/2016    LDLCALC 83 05/05/2016     Lab Results   Component Value Date    LABAMPH Neg 02/18/2020    BARBSCNU Neg 02/18/2020    LABBENZ Neg 02/18/2020    LABMETH Neg 02/18/2020    OPIATESCREENURINE Neg 02/18/2020    PHENCYCLIDINESCREENURINE Neg 02/18/2020    ETOH <10 02/18/2020     No results found for: LITHIUM, DILFRTOT, VALPROATE    Assessment:   Pituitary macroadenoma with a lobulated suprasellar mass measuring 3.4 x 2.4 cm. Patient was seen by Orvel Hence and he does have elevated prolactin. Patient may have SIADH with this. Or this just could be psychogenic polydipsia with hyponatremia.   Patient needs an MRI of the brain which she refuses I had a lengthy discussion with him regarding the same and also offered him his complete sedated MRI under anesthesia but he would not want to go through this. At this time we will reconsult  to see if anything else medically would be useful in this situation. Patient appears to be competent and therefore this is his decision. Patient has hyponatremia but no symptoms from the same is not any seizures.       Plan:

## 2020-02-20 NOTE — FLOWSHEET NOTE
19:50 Pt resting in bed with 1:1 sitter at bedside. Pt is calm and cooperative during assessment. Continues to have delusions about his surrounding. States he was speaking to [employer] and needed a bible, which was provided to pt. Denies any needs at this time. Denies SOB/CP/Dizziness at this time. Pt ambulated to bathroom with assist, mild weakness when first standing and can be impulsive however easily redirectable. 21:00 Administered medications without any complications. Pt resting in bed with TV off/lights off. Denies any needs at this time. 23:00 Pt sitting in bed with light off/eyes closed. No changes at this time. 01:00 Pt continues to change position from bed to chair. States having 7/10 pain in his back/hip & unable to sleep. Administered Melatonin c PRN Percocet. Pt sitting up in chair witting notes/reading his bible. 03:00 Pt continues to sit in chair. Denies pain at this time. No changes at this time. 05:00 Pt in bed resting. No changes at this time.

## 2020-02-20 NOTE — PROGRESS NOTES
[facility] AT [place] Respiratory Therapy Evaluation   Current Order:  Q6PRN ALBUTEROL      Home Regimen: PRN      Ordering Physician: DR Yuval Ball  Re-evaluation Date:  NA     Diagnosis: MENTAL      Patient Status: The following MDI Criteria must be met in order to convert aerosol to MDI with spacer. If unable to meet, MDI will be converted to aerosol:  []  Patient able to demonstrate the ability to use MDI effectively  []  Patient alert and cooperative  []  Patient able to take deep breath with 5-10 second hold  []  Medication(s) available in this delivery method   []  Peak flow greater than or equal to 200 ml/min            Current Order Substituted To  (same drug, same frequency)   Aerosol to MDI [] Albuterol Sulfate 0.083% unit dose by aerosol Albuterol Sulfate MDI 2 puffs by inhalation with spacer    [] Levalbuterol 1.25 mg unit dose by aerosol Levalbuterol MDI 2 puffs by inhalation with spacer    [] Levalbuterol 0.63 mg unit dose by aerosol Levalbuterol MDI 2 puffs by inhalation with spacer    [] Ipratropium Bromide 0.02% unit dose by aerosol Ipratropium Bromide MDI 2 puffs by inhalation with spacer    [] Duoneb (Ipratropium + Albuterol) unit dose by aerosol Ipratropium MDI + Albuterol MDI 2 puffs by inhalation w/spacer   MDI to Aerosol [] Albuterol Sulfate MDI Albuterol Sulfate 0.083% unit dose by aerosol    [] Levalbuterol MDI 2 puffs by inhalation Levalbuterol 1.25 mg unit dose by aerosol    [] Ipratropium Bromide MDI by inhalation Ipratropium Bromide 0.02% unit dose by aerosol    [] Combivent (Ipratropium + Albuterol) MDI by inhalation Duoneb (Ipratropium + Albuterol) unit dose by aerosol   Treatment Assessment [Frequency/Schedule]:  Change frequency to: ________________NO CHANGES________________per Protocol, P&T, MEC      Points 0 1 2 3 4   Pulmonary Status  Non-Smoker  [x]   Smoking history   < 20 pack years  []   Smoking history  ?  20 pack years  []   Pulmonary Disorder  (acute or chronic)  []   Severe or Chronic w/ Exacerbation  []     Surgical Status No [x]   Surgeries     General []   Surgery Lower []   Abdominal Thoracic or []   Upper Abdominal Thoracic with  PulmonaryDisorder  []     Chest X-ray Clear/Not  Ordered     [x]  Chronic Changes  Results Pending  []  Infiltrates, atelectasis, pleural effusion, or edema  []  Infiltrates in more than one lobe []  Infiltrate + Atelectasis, &/or pleural effusion  []    Respiratory Pattern Regular,  RR = 12-20 [x]  Increased,  RR = 21-25 []  GUTIERREZ, irregular,  or RR = 26-30 []  Decreased FEV1  or RR = 31-35 []  Severe SOB, use  of accessory muscles, or RR ? 35  []    Mental Status Alert, oriented,  Cooperative [x]  Confused but Follows commands []  Lethargic or unable to follow commands []  Obtunded  []  Comatose  []    Breath Sounds Clear to  auscultation  [x]  Decreased unilaterally or  in bases only []  Decreased  bilaterally  []  Crackles or intermittent wheezes []  Wheezes []    Cough Strong, Spontan., & nonproductive [x]  Strong,  spontaneous, &  productive []  Weak,  Nonproductive []  Weak, productive or  with wheezes []  No spontaneous  cough or may require suctioning []    Level of Activity Ambulatory [x]  Ambulatory w/ Assist  []  Non-ambulatory []  Paraplegic []  Quadriplegic []    Total    Score:___0___     Triage Score:____5____      Tri       Triage:     1. (>20) Freq: Q3    2. (16-20) Freq: Q4   3. (11-15) Freq: QID & Albuterol Q2 PRN    4. (6-10) Freq: TID & Albuterol Q2 PRN    5. (0-5) Freq Q4prn

## 2020-02-20 NOTE — FLOWSHEET NOTE
Pt awake in room prior shift states he has been awake all night requesting extra fluid and shower. Pt has calmed down a little,  explained to him about shower and why he has to have a doctors order to have one. Pt also was explained about his fluids and put amount on his white board in his room so we can keep track. Pt stats he understands and is now calling a lot of his family but only getting voice mails. the patient washed up at the sink and shaved and brushed his teeth and tongue. Electronically signed by Kapil Lamas LPN on 8/22/0210 at 0:79 AM     Pt c/o lt hip pain 7 out of 10 offered him pain medication but refused states the only thing that works is opiates. I offered him tylenol, percocet, or to even call the doctor but he states that they won't give him opiates so don't bother. Pt is now reading bible in bed and doses off now and then. Electronically signed by Kapil Lamas LPN on 7/95/4330 at 4:21 AM     Pt up in chair for breakfast had pancakes and 2 sausages 2 cups of fruit and lemonade and 1/2 cup coffee 1 sugar. Pt ate it all requesting some more to drink so Gatorade with ice given with meds. Pt continues to ask for something to drink but keep refreshing his mind why he is here and what causes it. Pt then voided 400cc of yellow straw looking urine in urinal and decided to lay down for awhile. Electronically signed by Kapil Lamas LPN on 1/95/6166 at 6:27 AM     Dr Jono Dowling into see pt and was telling he needs a MRI and right away pt said NO No machines tried to explain to pt that he would put him to sleep and do the test but pt again said NO. Pt telling us that he had a test MRI before describe the machine to me telling me what it looks like and sounds like and how they put you in the machine and strap you down,  Pt continues to say NO No No  Then turned over and went to sleep. Electronically signed by Kapil Lamas LPN on 0/19/2553 at 30:00 AM     Dr Hyla Kanner into see pt and said that he can go to [address] [place] [address] any time but will still have to be on fluid restrictions. Pt wife called and said he has a appointment with Abner Puentes PCP Monday could we call and cancel his appointment. Kat Bean will do. .Electronically signed by Brando Melchor LPN on 8/98/5395 at 99:83 AM     Pt requesting to use phone placed one call when pt requestion me to call another number (Calvin Eastman) when explained to him that I can make only one call he said forget it and had me call his daughter her that is local.Continues to ask for liquid to drink unable to give now has to wait foe lunch. Electronically signed by Brando Melchor LPN on 1/81/2471 at 50:73 AM     Pt states that he will do the MRI under a few conditions, that he can take a shower, have more fluids , make sure guarantee that h will pull through yadiel MRI and won't die wants Dr. Isabela Nicole to write the letter to his wife stating this  And that he can be put under but the test only last for 20 minutes. Dr.Patels MONTANEZ ([facility])will to talk with pt  About it. Electronically signed by Brando Melchor LPN on 3/06/1822 at 81:51 PM     Pt received his tray  salad with 3 Tamazight and 3 ranch dressings , fruit platter with small cottage cheese,sweet potato with butter and brown sugar,cup of tomato soup and 240cc of Gatorades. Pt consumed the entire tray. Electronically signed by Brando Melchor LPN on 4/30/0811 at 44:43 PM  .  Pt acting a little different  Did not save urine when voided . Flushed the toilet after BM not showing nurse, wants yellow paper and wants to take shower washed up again at sink but caught him drinking the water. Pt is taking to people wo are not here. Pt states he wanted to go next door because he hears someone talking Ugandan and wants to see if he knows them. Pt sitting up in chair call placed to wife for him. Pt states he is going to leave when he gets someone to come here. Electronically signed by Brando Melchor LPN

## 2020-02-20 NOTE — PLAN OF CARE
Ongoing
emotionally comfortable while being cared for will increase  2/20/2020 1459 by Ayaan Keenan RN  Outcome: Completed  2/20/2020 0314 by Roxana Coburn RN  Outcome: Ongoing     Problem: Psychomotor Activity - Altered:  Goal: Absence of psychomotor disturbance signs and symptoms  Description  Absence of psychomotor disturbance signs and symptoms  2/20/2020 1459 by Ayaan Keenan RN  Outcome: Completed  2/20/2020 0314 by Roxana Coburn RN  Outcome: Ongoing     Problem: Sensory Perception - Impaired:  Goal: Demonstrations of improved sensory functioning will increase  Description  Demonstrations of improved sensory functioning will increase  2/20/2020 1459 by Ayaan Keenan RN  Outcome: Completed  2/20/2020 0314 by Roxana Coburn RN  Outcome: Ongoing  Goal: Decrease in sensory misperception frequency  Description  Decrease in sensory misperception frequency  2/20/2020 1459 by Ayaan Keenan RN  Outcome: Completed  2/20/2020 0314 by Roxana Coburn RN  Outcome: Ongoing  Goal: Able to refrain from responding to false sensory perceptions  Description  Able to refrain from responding to false sensory perceptions  2/20/2020 1459 by Ayaan Keenan RN  Outcome: Completed  2/20/2020 0314 by Roxana Coburn RN  Outcome: Ongoing  Goal: Demonstrates accurate environmental perceptions  Description  Demonstrates accurate environmental perceptions  2/20/2020 1459 by Ayaan Keenan RN  Outcome: Completed  2/20/2020 0314 by Roxana Coburn RN  Outcome: Ongoing  Goal: Able to distinguish between reality-based and nonreality-based thinking  Description  Able to distinguish between reality-based and nonreality-based thinking  2/20/2020 1459 by Ayaan Keenan RN  Outcome: Completed  2/20/2020 0314 by Roxana Coburn RN  Outcome: Ongoing  Goal: Able to interrupt nonreality-based thinking  Description  Able to interrupt nonreality-based thinking  2/20/2020 1459 by Ayaan Keenan RN  Outcome: Completed  2/20/2020 0314 by

## 2020-02-20 NOTE — DISCHARGE INSTR - COC
Continuity of Care Form    Patient Name: Cece Tran   :  1955  MRN:  [id number]    Admit date:  2020  Discharge date:  2020    Code Status Order: Full Code   Advance Directives:   [facility] Documentation     Date/Time Healthcare Directive Type of Healthcare Directive Copy in [facility] [address] Agent's Name Healthcare Agent's Phone Number    [phone]  Unknown, patient unable to respond due to medical condition -- -- -- -- --          Admitting Physician:  Dani Blakely DO  PCP: Courtney Rodgers MD    Discharging Nurse: [facility] Unit/Room#: X063/Y921-64  Discharging Unit Phone Number: ***    Emergency Contact:   Extended Emergency Contact Information  Primary Emergency Contact: [address], [facility] [address] Phone: [phone]  Mobile Phone: [phone]  Relation: Brother/Sister   needed? No  Secondary Emergency Contact: Sara Whitlock  Mobile Phone: [phone]  Relation: Brother/Sister   needed? No    Past Surgical History:  Past Surgical History:   Procedure Laterality Date    COLONOSCOPY  14    Jean Isabel    COLONOSCOPY N/A 2019    COLONOSCOPY DIAGNOSTIC performed by Olegario Saldana MD at [address], COLON, DIAGNOSTIC      EYE SURGERY Bilateral     cataracts   [address] ?     index finger laceration repair    HERNIA REPAIR  2004    repair [facility]     JOINT REPLACEMENT Left     left hip    [place] COLON CA SCRN NOT HI RSK IND N/A 2017    COLONOSCOPY performed by Josep Garcia MD at [address] [facility] [address] [facility] [address] Left 2019    LEFT HIP TOTAL HIP ARTHROPLASTY, LATERAL DECUB, GRACIE performed by Keon Cramer MD at [facility]  2019    EGD DIAGNOSTIC ONLY performed by Olegario Saldana MD at [facility]       Immunization History:   Immunization History   Administered Date(s) Administered   Shaneka Encinas Influenza Virus Vaccine 10/15/2013, 09/17/2014, 09/02/2015, 09/10/2016    Influenza, Omayra Taler, IM, PF (6 mo and older Fluzone, Flulaval, Fluarix, and 3 yrs and older Afluria) 09/24/2018, 08/23/2019    Influenza, Triv, inactivated, subunit, adjuvanted, IM (Fluad 65 yrs and older) 09/12/2017    Pneumococcal Polysaccharide (Ogdakytcj33) 12/12/2019    Tdap (Boostrix, Adacel) 09/12/2013       Active Problems:  Patient Active Problem List   Diagnosis Code    Osteoarthritis M19.90    HTN (hypertension) I10    Hypercholesteremia E78.00    Depression F32.9    Asthma J45.909    Pulmonary emphysema ([address] J43.9    Tobacco use Z72.0    Influenza J11.1    RANDI on CPAP G47.33, Z99.89    Unilateral primary osteoarthritis, left hip M16.12    Pituitary macroadenoma ([facility] D35.2    Gastritis without bleeding K29.70    Other specified soft tissue disorders M79.89    Adenomatous polyp of colon D12.6    Rectal bleeding K62.5    Grade I hemorrhoids K64.0    Aftercare following joint replacement surgery Z47.1    Blurry vision H53.8    Class 2 obesity in adult E66.9    Delirium R41.0    Other acute postprocedural pain G89.18    Pain in left lower leg M79.662    Paranoid schizophrenia ([address] F20.0    Presence of left artificial hip joint Z96.642    Sprain of ribs, initial encounter S23.41XA    Suprasellar mass R22.0    Unilateral primary osteoarthritis, left knee M17.12    Hyponatremia E87.1       Isolation/Infection:   Isolation          No Isolation        Patient Infection Status     None to display          Nurse Assessment:  Last Vital Signs: BP (!) 127/93   Pulse 60   Temp 98.7 °F (37.1 °C) (Oral)   Resp 18   Ht 5' 5\" (1.651 m)   Wt 214 lb (97.1 kg)   SpO2 100%   BMI 35.61 kg/m²     Last documented pain score (0-10 scale): Pain Level: 0  Last Weight:   Wt Readings from Last 1 Encounters:   02/19/20 214 lb (97.1 kg)     Mental Status:  disoriented    IV Access:  - None    Nursing Mobility/ADLs:  Walking Assisted  Transfer  Assisted  Bathing  Independent  Dressing  Independent  Toileting  Assisted  Feeding  Independent  Med Admin  Independent  Med Delivery   whole    Wound Care Documentation and Therapy:        Elimination:  Continence:   · Bowel: Yes  · Bladder: Yes  Urinary Catheter: None   Colostomy/Ileostomy/Ileal Conduit: No       Date of Last BM: 2/20/2020      Intake/Output Summary (Last 24 hours) at 2/20/2020 1152  Last data filed at 2/20/2020 0855  Gross per 24 hour   Intake 1460 ml   Output 1950 ml   Net -490 ml     I/O last 3 completed shifts: In: 6047 [P.O.:1450; I.V.:10]  Out: 2450 [Urine:2450]    Safety Concerns:     { KATHARINE Safety Concerns:261392420}    Impairments/Disabilities:      {[facility] Impairments/Disabilities:825587790}    Nutrition Therapy:  Current Nutrition Therapy:   - Oral Diet:  General    Routes of Feeding: Oral  Liquids: No Restrictions  Daily Fluid Restriction: yes - amount 1500  Last Modified Barium Swallow with Video (Video Swallowing Test): not done    Treatments at the Time of Hospital Discharge:   Respiratory Treatments:     Oxygen Therapy:  is not on home oxygen therapy.   Ventilator:    - No ventilator support    Rehab Therapies: {THERAPEUTIC INTERVENTION:8051087994}  Weight Bearing Status/Restrictions: No weight bearing restirctions  Other Medical Equipment (for information only, NOT a DME order):  {EQUIPMENT:328648073}  Other Treatments: ***    Patient's personal belongings (please select all that are sent with patient):  Asha    RN SIGNATURE:   Electronically signed by Shruthi Rowe RN on 2/20/2020 at 3:03 PM      CASE MANAGEMENT/SOCIAL WORK SECTION    Inpatient Status Date: ***    Readmission Risk Assessment Score:  Readmission Risk              Risk of Unplanned Readmission:        15           Discharging to Facility/ Agency   · Name:   · Address:  · Phone:  · Fax:    Dialysis Facility (if applicable)   · Name:  · Address:  · Dialysis Schedule:  · Phone:  · Fax:    / signature: {Esignature:577792537}    PHYSICIAN SECTION    Prognosis: {Prognosis:8739964673}    Condition at Discharge: [place] Patient Condition:015908234}    Rehab Potential (if transferring to Rehab): {Prognosis:9124136058}    Recommended Labs or Other Treatments After Discharge: ***    Physician Certification: I certify the above information and transfer of Adi Iverson  is necessary for the continuing treatment of the diagnosis listed and that he requires {Admit to Appropriate Level of Care:71374} for {GREATER/LESS:953039941} 30 days.      Update Admission H&P: {CHP DME Changes in LWUCR:968338388}    PHYSICIAN SIGNATURE:  {Esignature:543707864}

## 2020-02-20 NOTE — PROGRESS NOTES
Nephrology Progress Note    Assessment:  Hyponatremia resolving  Hypertension  Hyperlipidemia  COPD RANDI      Plan: no change in medications improving    Patient Active Problem List:     Osteoarthritis     HTN (hypertension)     Hypercholesteremia     Depression     Asthma     Pulmonary emphysema (HCC)     Tobacco use     Influenza     RANDI on CPAP     Unilateral primary osteoarthritis, left hip     Pituitary macroadenoma (HCC)     Gastritis without bleeding     Other specified soft tissue disorders     Adenomatous polyp of colon     Rectal bleeding     Grade I hemorrhoids     Aftercare following joint replacement surgery     Blurry vision     Class 2 obesity in adult     Delirium     Other acute postprocedural pain     Pain in left lower leg     Paranoid schizophrenia ([facility] [address]     Presence of left artificial hip joint     Sprain of ribs, initial encounter     Suprasellar mass     Unilateral primary osteoarthritis, left knee     Hyponatremia      Subjective:  Admit Date: 2/18/2020    Interval History: no issues     Medications:  Scheduled Meds:   doxycycline  100 mg Oral 2 times per day    sodium chloride flush  10 mL Intravenous 2 times per day    enoxaparin  40 mg Subcutaneous Daily    OLANZapine  10 mg Oral Nightly    simvastatin  40 mg Oral Nightly    lisinopril  40 mg Oral Daily     Continuous Infusions:    CBC:   Recent Labs     02/18/20  1611 02/19/20  0525   WBC 13.4* 12.2*   HGB 12.7* 12.9*    288     CMP:    Recent Labs     02/19/20  2041 02/20/20  0333 02/20/20  1103   * 129* 134*   K 3.9  3.9 3.9  3.9 3.6   CL 93* 91* 95   CO2 25 26 29   BUN 16 13 11   CREATININE 0.88 0.75 0.76   GLUCOSE 136* 123* 98   CALCIUM 9.4 9.3 9.5   LABGLOM >60.0 >60.0 >60.0     Troponin: No results for input(s): TROPONINI in the last 72 hours. BNP: No results for input(s): BNP in the last 72 hours. INR: No results for input(s): INR in the last 72 hours.   Lipids: No results for input(s): CHOL, LDLDIRECT, TRIG,

## 2020-02-21 PROBLEM — R63.1 PSYCHOGENIC POLYDIPSIA: Status: ACTIVE | Noted: 2020-02-21

## 2020-02-21 PROBLEM — F54 PSYCHOGENIC POLYDIPSIA: Status: ACTIVE | Noted: 2020-02-21

## 2020-02-21 LAB
ANION GAP SERPL CALCULATED.3IONS-SCNC: 12 MEQ/L (ref 9–15)
BUN BLDV-MCNC: 11 MG/DL (ref 8–23)
CALCIUM SERPL-MCNC: 10.1 MG/DL (ref 8.5–9.9)
CHLORIDE BLD-SCNC: 96 MEQ/L (ref 95–107)
CO2: 27 MEQ/L (ref 20–31)
CORTISOL - AM: 12.6 UG/DL (ref 6.2–19.4)
CREAT SERPL-MCNC: 0.82 MG/DL (ref 0.7–1.2)
GFR AFRICAN AMERICAN: >60
GFR NON-AFRICAN AMERICAN: >60
GLUCOSE BLD-MCNC: 85 MG/DL (ref 70–99)
HBA1C MFR BLD: 5.6 % (ref 4.8–5.9)
POTASSIUM SERPL-SCNC: 4.4 MEQ/L (ref 3.4–4.9)
SODIUM BLD-SCNC: 135 MEQ/L (ref 135–144)

## 2020-02-21 PROCEDURE — 83036 HEMOGLOBIN GLYCOSYLATED A1C: CPT

## 2020-02-21 PROCEDURE — 1240000000 HC EMOTIONAL WELLNESS R&B

## 2020-02-21 PROCEDURE — 99223 1ST HOSP IP/OBS HIGH 75: CPT | Performed by: PSYCHIATRY & NEUROLOGY

## 2020-02-21 PROCEDURE — 6370000000 HC RX 637 (ALT 250 FOR IP): Performed by: PSYCHIATRY & NEUROLOGY

## 2020-02-21 PROCEDURE — 36415 COLL VENOUS BLD VENIPUNCTURE: CPT

## 2020-02-21 PROCEDURE — 6370000000 HC RX 637 (ALT 250 FOR IP): Performed by: INTERNAL MEDICINE

## 2020-02-21 PROCEDURE — 80048 BASIC METABOLIC PNL TOTAL CA: CPT

## 2020-02-21 PROCEDURE — 82533 TOTAL CORTISOL: CPT

## 2020-02-21 RX ADMIN — LISINOPRIL 40 MG: 20 TABLET ORAL at 09:47

## 2020-02-21 RX ADMIN — DOXYCYCLINE HYCLATE 100 MG: 100 CAPSULE ORAL at 09:46

## 2020-02-21 RX ADMIN — Medication 10 MG: at 20:32

## 2020-02-21 RX ADMIN — OXYCODONE HYDROCHLORIDE AND ACETAMINOPHEN 1 TABLET: 5; 325 TABLET ORAL at 14:08

## 2020-02-21 RX ADMIN — OXYCODONE HYDROCHLORIDE AND ACETAMINOPHEN 1 TABLET: 5; 325 TABLET ORAL at 20:32

## 2020-02-21 RX ADMIN — OXYCODONE HYDROCHLORIDE AND ACETAMINOPHEN 1 TABLET: 5; 325 TABLET ORAL at 09:56

## 2020-02-21 RX ADMIN — OLANZAPINE 10 MG: 10 TABLET, FILM COATED ORAL at 20:32

## 2020-02-21 RX ADMIN — TRAZODONE HYDROCHLORIDE 50 MG: 50 TABLET ORAL at 20:33

## 2020-02-21 RX ADMIN — DOXYCYCLINE HYCLATE 100 MG: 100 CAPSULE ORAL at 20:32

## 2020-02-21 ASSESSMENT — PAIN SCALES - GENERAL
PAINLEVEL_OUTOF10: 10
PAINLEVEL_OUTOF10: 8
PAINLEVEL_OUTOF10: 8
PAINLEVEL_OUTOF10: 9
PAINLEVEL_OUTOF10: 8

## 2020-02-21 ASSESSMENT — LIFESTYLE VARIABLES: HISTORY_ALCOHOL_USE: NO

## 2020-02-21 NOTE — CARE COORDINATION
BHI Biopsychosocial Assessment  Used interpretor # [id number]  Current Level of Psychosocial Functioning     Independent x  Dependent    Minimal Assist     Comments:  Patient states he lives alone. Patient even with the interpretor had much difficulty understanding. Psychosocial High Risk Factors (check all that apply)    Unable to obtain meds   Chronic illness/pain    Substance abuse   Lack of Family Support   Financial stress   Isolation x  Inadequate Community Resources  Suicide attempt(s)  Not taking medications x  Victim of crime   Developmental Delay  Unable to manage personal needs    Age 72 or older   Homeless  No transportation   Readmission within 30 days  Unemployment  Traumatic Event    Psychiatric Advanced Directive:ABEL    Family to involve in treatment: sisters, children    Sexual Orientation:  Did not identify. Patient Strengths: housing, family     Patient Barriers: language barriers    Opiate education provided:na    Safety plan: completed     CMHC/MH history:was not able to answer this    Plan of Care:  medication management, group/individual therapies, family meetings, psycho -education, treatment team meetings to assist with stabilization    Initial Discharge Plan:  Pablo collateral    Clinical Summary:  Patient even with the interpretor phone had much difficulty understanding and responding to questions and became frustrated quite often. He told the interpretor several times he does not want to repeat things. Patient stated he lives alone and does everything for himself and he is ready to go home. Denies SI/HI, denies A/V hallucinations.    Electronically signed by Venkat Hyatt on 2/21/2020 at 4:04 PM

## 2020-02-21 NOTE — GROUP NOTE
Group Therapy Note    Date: 2/21/2020    Group Start Time: 1000  Group End Time: 1100  Group Topic: Psychoeducation    [facility] 3W I    [facility]        Group Therapy Note    Attendees: 15         Patient's Goal:  \"To go to the groups\"    Notes:  Patient was attentive and work fairly on his project in group. Status After Intervention:  Unchanged    Participation Level: Active Listener    Participation Quality: Attentive      Speech:  Minimal interaction.       Thought Process/Content: Linear      Affective Functioning: Flat      Mood: calm      Level of consciousness:  Alert      Response to Learning: Progressing to goal      Endings: None Reported    Modes of Intervention: Education, Socialization and Activity      Discipline Responsible: Psychoeducational Specialist      Signature:  Katrina Shaw

## 2020-02-21 NOTE — PROGRESS NOTES
Patient did not attend wrap up group despite staff encouragement.  Electronically signed by Nadiya Waters on 2/20/2020 at 9:36 PM

## 2020-02-21 NOTE — PROGRESS NOTES
Pt reports from last note that  pain #100 to left hip and down leg., one 5/325 percocet was explained and givenand that it can be every 4 hours that he has to ask for it, noted to be on fluid restrictions and pca is keeping track of it. Pt is asking about a test he suppose to go to.

## 2020-02-21 NOTE — CONSULTS
[address]                      [address], [address] [facility]                                  CONSULTATION    PATIENT NAME: Ariana Lees                      :        1955  MED REC NO:   [id number]                            ROOM:       W375  ACCOUNT NO:   [de-identified]                           ADMIT DATE: 2020  PROVIDER:     Aleisha Smiley MD    CONSULT DATE:  2020    ENDOCRINE CONSULTATION    REFERRING PROVIDER:  Dr Aide Burns. REASON FOR CONSULT:  History of pituitary macroadenoma. CHIEF COMPLAINT AND HISTORY OF PRESENT ILLNESS:  The patient is a  72-year-old male with known history of pituitary macroadenoma admitted  with headache, cough, and electrolyte abnormality, initially admitted  with hyponatremia for which Renal was consulted. He is to have a CT  scan of his head done, but has refused today, declined MRI because of  claustrophobia, anxiety. He is supposed to have a visual field exam  done, which has not been done so far. According to the patient, he is  to have headaches in the posterior aspect, neck area as well as in the  orbital area. The headache has not been very severe. Initial sodium  was 131. Size of the pituitary macroadenoma is 3.4 x 2.4 cm. Prolactin  level was slightly elevated. Hyponatremia was due to more than likely  psychogenic nature, increased polydipsia. Most current electrolytes; sodium was 134, potassium 3.6, chloride was  95, CO2 was 29, BUN 11, creatinine 0.76, and prolactin level was 30.6. Prolactin level done recently was 21.7. He did have a growth hormone  level of 0.2 in the low normal range in end of December. Cortisol level  was checked 11.7 in early December. ACTH level was 154. We did review  the patient's last visit in our office, which was in December. CT scan  of the brain was showing pituitary adenoma of 3.7 x 3.3 x 2.3 cm. Also  labs from [facility] were reviewed from 2019.   ACTH was 106. Cortisol was normal.  LH was 8.2, prolactin 4.8, testosterone level was  400, TSH was 1.730, and free T4 was 1.4. Most current TSH here was 0.84  and free T4 was 1.30. The patient also was put here on olanzapine and Zyprexa, which could  also be increasing the patient's prolactin level. PAST MEDICAL HISTORY:  Significant for pituitary macroadenoma,  osteoarthritis, history of sleep apnea, hypertension,  hypercholesterolemia, diarrhea, depression, history of rectal bleeding,  COPD, asthma, and abdominal pain. PAST SURGICAL HISTORY:  Upper GI endoscopy, hip arthroplasty, hernia  repair, finger surgery, eye surgery, endoscopy, and colonoscopy. FAMILY HISTORY:  Colon cancer and hypertension. PERSONAL AND SOCIAL HISTORY:  Former smoker. He does use alcohol. ALLERGIES:  None. MEDICATIONS:  Meds here include; Vibramycin, lisinopril, olanzapine, and  Zyprexa. Meds at home included Naproxen, Lasix, melatonin, Mobic,  potassium, Zocor, Relafen, Biaxin, amoxicillin, Protonix, aspirin,  Prinivil, Zyprexa, trazodone, Respimat, and Atrovent. REVIEW OF SYSTEMS:  Other than headache, cough, and electrolyte  abnormality, 14-point review of system was negative. PHYSICAL EXAMINATION:  GENERAL:  The patient is alert, awake, and oriented x3, in no obvious  distress, poor historian otherwise. VITAL SIGNS:  Blood pressure 127/79, pulse rate 84, respiratory rate 18,  and temperature 98. HEENT:  Normocephalic, atraumatic. Pupils equal, reacting to light. Oral mucosa was moist.  NECK:  Supple. Trachea in midline. LUNGS:  Clear to auscultation bilaterally. No wheezing or crackles were  heard. HEART:  Sounds were normal.  ABDOMEN:  Soft, obese. Bowel sounds were present. No organomegaly or  tenderness was present. EXTREMITIES:  Lower extremities reveal 1+ pitting edema in both lower  legs. MUSCULOSKELETAL:  No joint swelling.   NEUROLOGIC:  Moving upper and lower extremities and walking around  without difficulty. Cranial nerves I through XII was intact. PSYCHIATRIC:  Difficulty to obtain, but appears to have depressed  affect. SKIN:  Intact. LABORATORY DATA:  As above. ASSESSMENT:  Hyponatremia, probably due to psychogenic component,  improving; hyperprolactinemia, probably from olanzapine versus possible  compression effects from pituitary macroadenoma. The patient is  refusing to get MRI done. Fluctuating level of glucose between 90s to  123. PLAN:  We would get hemoglobin A1c. We would start the patient on  Dostinex 0.5 mg twice a week. The patient also was referred to  Neurosurgery, but has not been evaluated for possible surgical option. He will also need a visual field evaluation. Continue other supportive  measures in the meantime. Thank you for the consult.         Sarah Bird MD    D: 02/20/2020 22:07:28       T: 02/20/2020 57:18:25     [email or web address]  Job#: [id number]     Doc#: [id number]    CC:

## 2020-02-21 NOTE — H&P
every 6 hours as needed for Pain  fenofibrate 160 MG tablet, Take 150 mg by mouth daily  aspirin 81 MG EC tablet, Take 1 tablet by mouth 2 times daily  lisinopril (PRINIVIL;ZESTRIL) 20 MG tablet, Take 2 tablets by mouth daily  OLANZapine (ZYPREXA) 10 MG tablet, Take 1 tablet by mouth nightly  hydrochlorothiazide (HYDRODIURIL) 25 MG tablet, Take 1 tablet by mouth daily  TRAZODONE HCL PO, Take by mouth  Respiratory Therapy Supplies SANDRA, Full face CPAP mask and supplies  CPAP Machine MISC, by Does not apply route New CPAP with 6 cm  albuterol (PROVENTIL) (2.5 MG/3ML) 0.083% nebulizer solution, Take 3 mLs by nebulization every 6 hours as needed for Wheezing  STIOLTO RESPIMAT 2.5-2.5 MCG/ACT AERS, Inhale 2 puff in AM  ipratropium (ATROVENT) 0.02 % nebulizer solution, QID prn    Compliance:no    Psychiatric Review of Systems       Depression: no     Annalisa or Hypomania:  no     Panic Attacks:  no     Phobias:  no     Obsessions and Compulsions:  no     PTSD : no     Hallucinations:  yes     Delusions:  yes     Substance Abuse History:  ETOH: no   Marijuana: no  Opiates: no  Other Drugs: no      Past Psychiatric History:  Prior Diagnosis:  SAD  Psychiatrist: calin  Therapist:calin  Hospitalization: yes  Hx of Suicidal Attempts: no  Hx of violence:  no  ECT: no  Previous discontinued Psychiatric Med Trials:     Past Medical History:        Diagnosis Date    Abdominal pain     Asthma     Bloating symptom     COPD (chronic obstructive pulmonary disease) (HCC)     Depression     Depression     Diarrhea     History of rectal bleeding     Hypercholesteremia     past trx > 5 yrs    Hypercholesteremia     Hypercholesteremia     Hypertension     meds > 3 yrs    Lung disease     RANDI on CPAP     patient relates he doesn't use cpap at this time    Osteoarthritis     left hip    Osteoarthritis     Osteoarthritis of left hip     Pituitary macroadenoma [facility])        Past Surgical History:        Procedure Laterality Date  COLONOSCOPY  5/2/14    JENNIFER    COLONOSCOPY N/A 12/12/2019    COLONOSCOPY DIAGNOSTIC performed by Mohan Pickens MD at [address], COLON, DIAGNOSTIC      EYE SURGERY Bilateral     cataracts   [address] ? index finger laceration repair    HERNIA REPAIR  2004    repair [facility]     JOINT REPLACEMENT Left     left hip    [place] COLON CA SCRN NOT HI RSK IND N/A 5/17/2017    COLONOSCOPY performed by Micky Tenorio MD at [address] Left 4/22/2019    LEFT HIP TOTAL HIP ARTHROPLASTY, LATERAL DECUB, GRACIE performed by Cece Jones MD at [address] [facility]  12/12/2019    EGD DIAGNOSTIC ONLY performed by Mohan Pickens MD at [facility]       Allergies:   Patient has no known allergies. Family History  Family History   Problem Relation Age of Onset    Colon Cancer Mother     High Blood Pressure Father     Diabetes Sister     No Known Problems Brother     No Known Problems Brother     No Known Problems Brother     Other Brother         Sepsis    Other Brother         Killed    No Known Problems Sister     No Known Problems Sister     No Known Problems Son          Social History:  Born and Raised: [place]  Describes Childhood:   supportive  Education: Grade School  Employment: Disabled  Relationships:  but   Children: 2  Current Support: poor    Legal Hx: none  Access to weapons?:  No      EXAMINATION:    REVIEW OF SYSTEMS:    ROS:  [x] All negative/unchanged except if checked.  Explain positive(checked items) below:  [] Constitutional  [] Eyes  [] Ear/Nose/Mouth/Throat  [] Respiratory  [] CV  [] GI  []   [] Musculoskeletal  [] Skin/Breast  [] Neurological  [] Endocrine  [] Heme/Lymph  [] Allergic/Immunologic    Explanation:     Vitals:  BP (!) 148/97 Comment: RN Norified  Pulse 89   Temp 98 °F (36.7 °C) (Oral)   Resp 18   SpO2 96%      Neurologic Exam:   Muscle Strength & Tone: full ROM  Gait: normal gait   Involuntary Movements: No    Mental Status Examination:    Level of consciousness:  within normal limits   Appearance:  ill-appearing  Behavior/Motor:  responding to internal stimuli  Attitude toward examiner:  evasive and guarded  Speech:  slow   Mood: dysthymic  Affect:  mood incongruent  Thought processes:  slow   Thought content:  Delusions:  paranoid  Perceptual Disturbance:  auditory  Cognition:  oriented to person, place, and time   Concentration poor  Memory intact  Insight poor   Judgement poor   Fund of Knowledge limited    Mini Mental Status 30/30      DIAGNOSIS:     Schizoaffective disorder bipolar    Psychogenic polydypsia       LABS: REVIEWED TODAY:  Recent Labs     02/18/20  1611 02/19/20  0525   WBC 13.4* 12.2*   HGB 12.7* 12.9*    288     Recent Labs     02/20/20  0333 02/20/20  1103 02/21/20  0605   * 134* 135   K 3.9  3.9 3.6 4.4   CL 91* 95 96   CO2 26 29 27   BUN 13 11 11   CREATININE 0.75 0.76 0.82   GLUCOSE 123* 98 85     Recent Labs     02/18/20  1611   BILITOT 0.5   ALKPHOS 58   AST 40   ALT 30     Lab Results   Component Value Date    LABAMPH Neg 02/18/2020    BARBSCNU Neg 02/18/2020    LABBENZ Neg 02/18/2020    LABMETH Neg 02/18/2020    OPIATESCREENURINE Neg 02/18/2020    PHENCYCLIDINESCREENURINE Neg 02/18/2020    ETOH <10 02/18/2020     Lab Results   Component Value Date    TSH 0.884 02/18/2020     No results found for: LITHIUM  No results found for: VALPROATE, CBMZ  No results found for: LITHIUM, VALPROATE    FURTHER LABS ORDERED :      Radiology   Ct Head Without Contrast    Result Date: 2/18/2020  CT HEAD WO CONTRAST CLINICAL HISTORY:  hallucinations, schizophrenia off meds but need of medical clearance Comparison: November 10, 2019. Clinical note: Please see MRI report from [facility] for additional details.  TECHNIQUE: Multiple unenhanced serial axial images of the brain from the vertex of the skull to the base of the to rule out any co-morbid physical condition. Home medication Reconciled       New Medications started during this admission :    Med as ordered  Water restriction  Prn Haldol 5mg and Vistaril 50mg q6hr for extreme agitation. Trazodone as ordered for insomnia  Vistaril as ordered for anxiety  Discussed with the patient risk, benefit, alternative and common side effects for the  proposed medication treatment. Patient is consenting to the treatment. Psychotherapy:   Encourage participation in milieu and group therapy  Individual therapy as needed        Behavioral Services  Medicare Certification      Admission Day 1  I certify that this patient's inpatient psychiatric hospital admission is medically necessary for:     (1) treatment which could reasonably be expected to improve this patient's condition, or     (2) diagnostic study or its equivalent.        Electronically signed by Tal Wray MD on 2/21/2020 at 10:47 AM

## 2020-02-21 NOTE — PROGRESS NOTES
Patient did not attend 1900 group despite staff encouragement.  Electronically signed by Flavio Love on 2/20/2020 at 8:43 PM

## 2020-02-21 NOTE — PROGRESS NOTES
Pt. declined to attend the 0900 community meeting, despite staff encouragement. Electronically signed by Jose Luis Malhotra[address] on 2/21/2020 at 11:26 AM

## 2020-02-22 LAB
ANION GAP SERPL CALCULATED.3IONS-SCNC: 11 MEQ/L (ref 9–15)
BUN BLDV-MCNC: 12 MG/DL (ref 8–23)
CALCIUM SERPL-MCNC: 9.9 MG/DL (ref 8.5–9.9)
CHLORIDE BLD-SCNC: 102 MEQ/L (ref 95–107)
CO2: 25 MEQ/L (ref 20–31)
CREAT SERPL-MCNC: 0.72 MG/DL (ref 0.7–1.2)
GFR AFRICAN AMERICAN: >60
GFR NON-AFRICAN AMERICAN: >60
GLUCOSE BLD-MCNC: 94 MG/DL (ref 70–99)
POTASSIUM SERPL-SCNC: 4.5 MEQ/L (ref 3.4–4.9)
SODIUM BLD-SCNC: 138 MEQ/L (ref 135–144)

## 2020-02-22 PROCEDURE — 6370000000 HC RX 637 (ALT 250 FOR IP): Performed by: PSYCHIATRY & NEUROLOGY

## 2020-02-22 PROCEDURE — 1240000000 HC EMOTIONAL WELLNESS R&B

## 2020-02-22 PROCEDURE — 6370000000 HC RX 637 (ALT 250 FOR IP): Performed by: INTERNAL MEDICINE

## 2020-02-22 PROCEDURE — 80048 BASIC METABOLIC PNL TOTAL CA: CPT

## 2020-02-22 PROCEDURE — 36415 COLL VENOUS BLD VENIPUNCTURE: CPT

## 2020-02-22 RX ADMIN — LISINOPRIL 40 MG: 20 TABLET ORAL at 08:35

## 2020-02-22 RX ADMIN — OLANZAPINE 10 MG: 10 TABLET, FILM COATED ORAL at 20:49

## 2020-02-22 RX ADMIN — TRAZODONE HYDROCHLORIDE 50 MG: 50 TABLET ORAL at 20:49

## 2020-02-22 RX ADMIN — DOXYCYCLINE HYCLATE 100 MG: 100 CAPSULE ORAL at 08:35

## 2020-02-22 RX ADMIN — OXYCODONE HYDROCHLORIDE AND ACETAMINOPHEN 1 TABLET: 5; 325 TABLET ORAL at 10:59

## 2020-02-22 RX ADMIN — DOXYCYCLINE HYCLATE 100 MG: 100 CAPSULE ORAL at 20:49

## 2020-02-22 RX ADMIN — OXYCODONE HYDROCHLORIDE AND ACETAMINOPHEN 1 TABLET: 5; 325 TABLET ORAL at 05:52

## 2020-02-22 ASSESSMENT — PAIN SCALES - GENERAL
PAINLEVEL_OUTOF10: 9

## 2020-02-22 NOTE — GROUP NOTE
Group Therapy Note    Date: 2/22/2020    Group Start Time: 1000  Group End Time: 1100  Group Topic: Psychoeducation    [facility]    Keyonna Ramos        Group Therapy Note    Attendees: 12         Patient's Goal:  \"To relax and take it easy\"    Notes:  Patient was attentive, more relax and work well on his project in group. Status After Intervention:  Improved    Participation Level:  Active Listener    Participation Quality: Appropriate      Speech:  normal      Thought Process/Content: Linear      Affective Functioning: Congruent      Mood: calm      Level of consciousness:  Alert      Response to Learning: Progressing to goal      Endings: None Reported    Modes of Intervention: Education, Socialization and Activity      Discipline Responsible: Psychoeducational Specialist      Signature:  Keyonna Ramos

## 2020-02-22 NOTE — BH NOTE
Pt did not want to return to his room to complete his assessments and was agreeable to answer questions in the dinning room area. He is seeing angels and talks to the jamilah he sees  Pt states he has not had any bad voices in over a year. No delusions, no phobia's, no A/hallucinations, slight paranoia but only with \"bad people in my neighborhood there are some not good people where I live. \"  He answer questions regarding current without a problem noted and his remote memory he has to concentrate more and the question has to be asked a few times before he can concentrate to answer  He does answer questions appropriately. Slight anxiety noted no irritability and he is focused on pain in his left hip and leg. He had surgery a little over a year ago and per pt has a titanium hip. He uses a cane at home and is focused on not having a cane on the unit.   He does have yellow socks on and has been appropriate answering questions asked him

## 2020-02-23 LAB
ANION GAP SERPL CALCULATED.3IONS-SCNC: 15 MEQ/L (ref 9–15)
BUN BLDV-MCNC: 15 MG/DL (ref 8–23)
CALCIUM SERPL-MCNC: 9.9 MG/DL (ref 8.5–9.9)
CHLORIDE BLD-SCNC: 106 MEQ/L (ref 95–107)
CO2: 21 MEQ/L (ref 20–31)
CREAT SERPL-MCNC: 0.79 MG/DL (ref 0.7–1.2)
GFR AFRICAN AMERICAN: >60
GFR NON-AFRICAN AMERICAN: >60
GLUCOSE BLD-MCNC: 97 MG/DL (ref 70–99)
POTASSIUM SERPL-SCNC: 5 MEQ/L (ref 3.4–4.9)
SODIUM BLD-SCNC: 142 MEQ/L (ref 135–144)

## 2020-02-23 PROCEDURE — 80048 BASIC METABOLIC PNL TOTAL CA: CPT

## 2020-02-23 PROCEDURE — 6370000000 HC RX 637 (ALT 250 FOR IP): Performed by: INTERNAL MEDICINE

## 2020-02-23 PROCEDURE — 1240000000 HC EMOTIONAL WELLNESS R&B

## 2020-02-23 PROCEDURE — 36415 COLL VENOUS BLD VENIPUNCTURE: CPT

## 2020-02-23 RX ADMIN — OXYCODONE HYDROCHLORIDE AND ACETAMINOPHEN 1 TABLET: 5; 325 TABLET ORAL at 22:19

## 2020-02-23 RX ADMIN — DOXYCYCLINE HYCLATE 100 MG: 100 CAPSULE ORAL at 21:19

## 2020-02-23 RX ADMIN — DOXYCYCLINE HYCLATE 100 MG: 100 CAPSULE ORAL at 08:25

## 2020-02-23 RX ADMIN — OLANZAPINE 10 MG: 10 TABLET, FILM COATED ORAL at 21:19

## 2020-02-23 RX ADMIN — LISINOPRIL 40 MG: 20 TABLET ORAL at 08:25

## 2020-02-23 RX ADMIN — OXYCODONE HYDROCHLORIDE AND ACETAMINOPHEN 1 TABLET: 5; 325 TABLET ORAL at 05:28

## 2020-02-23 RX ADMIN — OXYCODONE HYDROCHLORIDE AND ACETAMINOPHEN 1 TABLET: 5; 325 TABLET ORAL at 17:51

## 2020-02-23 RX ADMIN — OXYCODONE HYDROCHLORIDE AND ACETAMINOPHEN 1 TABLET: 5; 325 TABLET ORAL at 13:49

## 2020-02-23 ASSESSMENT — PAIN SCALES - GENERAL
PAINLEVEL_OUTOF10: 10

## 2020-02-23 NOTE — GROUP NOTE
Group Therapy Note    Date: 2/23/2020    Group Start Time: 1105  Group End Time: 1150  Group Topic: Cognitive Skills    [facility]    YAMILKA Robert        Group Therapy Note    Attendees: 14         Patient's Goal:  To participate in mood management group. Notes:  Patient learned about the wellness mind. Status After Intervention:  Unchanged    Participation Level: Active Listener    Participation Quality: Attentive      Speech:  normal      Thought Process/Content: Flight of ideas      Affective Functioning: Exaggerated      Mood: anxious      Level of consciousness:  Attentive      Response to Learning: Progressing to goal      Endings: None Reported    Modes of Intervention: Education      Discipline Responsible: /Counselor      Signature:   YAMILKA Robert

## 2020-02-23 NOTE — PROGRESS NOTES
Patient actively participated in the 2100 Wrap Up Group.  Electronically signed by Zafar Proctor on 2/22/2020 at 9:22 PM

## 2020-02-23 NOTE — PROGRESS NOTES
Pt upset after making a phone call. Appears anxious/distressed, has difficulty explaining why. Phone call received from his wife (with [place] code) states that he has repeatedly called threatening to call the police on her for no reason. Wife concerned, stating \"he's really ill. \" Pt denies SI, HI, and A/V hallucinations at this time. Preoccupied with leg pain, declines offer of PRN medication.

## 2020-02-23 NOTE — GROUP NOTE
Group Therapy Note    Date: 2/22/2020    Group Start Time: 1900  Group End Time: 1945  Group Topic: Recreational    [facility]    Savannah Strange        Group Therapy Note    Attendees: 13/21       Patient's Goal:  To actively participate in the 1900 Activity Group by playing Heads Up and engaging with group. Notes:  Patient actively participated in the 1900 Activity Group. Status After Intervention:  Improved    Participation Level:  Active Listener    Participation Quality: Appropriate      Speech:  normal      Thought Process/Content: Logical      Affective Functioning: Congruent      Mood: euthymic      Level of consciousness:  Alert      Response to Learning: Progressing to goal      Endings: None Reported    Modes of Intervention: Activity      Discipline Responsible: [address], [facility]      Signature:  Savannah Strange

## 2020-02-24 LAB
ANION GAP SERPL CALCULATED.3IONS-SCNC: 11 MEQ/L (ref 9–15)
BUN BLDV-MCNC: 16 MG/DL (ref 8–23)
CALCIUM SERPL-MCNC: 10.1 MG/DL (ref 8.5–9.9)
CHLORIDE BLD-SCNC: 102 MEQ/L (ref 95–107)
CO2: 25 MEQ/L (ref 20–31)
CREAT SERPL-MCNC: 0.75 MG/DL (ref 0.7–1.2)
GFR AFRICAN AMERICAN: >60
GFR NON-AFRICAN AMERICAN: >60
GLUCOSE BLD-MCNC: 83 MG/DL (ref 70–99)
POTASSIUM SERPL-SCNC: 5.1 MEQ/L (ref 3.4–4.9)
REASON FOR REJECTION: NORMAL
REJECTED TEST: NORMAL
SODIUM BLD-SCNC: 138 MEQ/L (ref 135–144)

## 2020-02-24 PROCEDURE — 36415 COLL VENOUS BLD VENIPUNCTURE: CPT

## 2020-02-24 PROCEDURE — 1240000000 HC EMOTIONAL WELLNESS R&B

## 2020-02-24 PROCEDURE — 6370000000 HC RX 637 (ALT 250 FOR IP): Performed by: INTERNAL MEDICINE

## 2020-02-24 PROCEDURE — 99232 SBSQ HOSP IP/OBS MODERATE 35: CPT | Performed by: PSYCHIATRY & NEUROLOGY

## 2020-02-24 PROCEDURE — 6370000000 HC RX 637 (ALT 250 FOR IP): Performed by: PSYCHIATRY & NEUROLOGY

## 2020-02-24 PROCEDURE — 80048 BASIC METABOLIC PNL TOTAL CA: CPT

## 2020-02-24 RX ORDER — CABERGOLINE 0.5 MG/1
0.5 TABLET ORAL
Qty: 8 TABLET | Refills: 3 | Status: SHIPPED | OUTPATIENT
Start: 2020-02-27

## 2020-02-24 RX ORDER — ZOLPIDEM TARTRATE 5 MG/1
5 TABLET ORAL NIGHTLY
Status: DISCONTINUED | OUTPATIENT
Start: 2020-02-24 | End: 2020-02-25 | Stop reason: HOSPADM

## 2020-02-24 RX ADMIN — OLANZAPINE 10 MG: 10 TABLET, FILM COATED ORAL at 20:45

## 2020-02-24 RX ADMIN — OXYCODONE HYDROCHLORIDE AND ACETAMINOPHEN 1 TABLET: 5; 325 TABLET ORAL at 20:45

## 2020-02-24 RX ADMIN — OXYCODONE HYDROCHLORIDE AND ACETAMINOPHEN 1 TABLET: 5; 325 TABLET ORAL at 13:30

## 2020-02-24 RX ADMIN — DOXYCYCLINE HYCLATE 100 MG: 100 CAPSULE ORAL at 20:46

## 2020-02-24 RX ADMIN — CABERGOLINE 0.5 MG: 0.5 TABLET ORAL at 08:52

## 2020-02-24 RX ADMIN — DOXYCYCLINE HYCLATE 100 MG: 100 CAPSULE ORAL at 08:53

## 2020-02-24 RX ADMIN — LISINOPRIL 40 MG: 20 TABLET ORAL at 08:53

## 2020-02-24 RX ADMIN — ZOLPIDEM TARTRATE 5 MG: 5 TABLET ORAL at 20:45

## 2020-02-24 RX ADMIN — OXYCODONE HYDROCHLORIDE AND ACETAMINOPHEN 1 TABLET: 5; 325 TABLET ORAL at 04:56

## 2020-02-24 ASSESSMENT — PAIN SCALES - GENERAL
PAINLEVEL_OUTOF10: 7
PAINLEVEL_OUTOF10: 10
PAINLEVEL_OUTOF10: 10
PAINLEVEL_OUTOF10: 8
PAINLEVEL_OUTOF10: 9

## 2020-02-24 NOTE — PROGRESS NOTES
Pt requested his pain med percocet , 1 tab 5-325 mg was given,  Pt is noted to be crunching on the med, despite being asked not to.

## 2020-02-24 NOTE — PROGRESS NOTES
Patient reporting his L hip pain at a level 10/10, patient given his  Oxycodone/Tylenol pain medication as requested.

## 2020-02-24 NOTE — PROGRESS NOTES
Checked with patient to see if his left hip pain was relieved any. Patient reports his pain level has remains the same with the Oxycodone/Tylenol. (After supper, patient has been sitting at the table with peers and one to one staff member playing games, talking and laughing. No outward signs of pain noted such as facial grimacing). Will forward this information to charge nurse.

## 2020-02-24 NOTE — GROUP NOTE
Group Therapy Note    Date: 2/24/2020    Group Start Time: 1000  Group End Time: 1100  Group Topic: Recreational    [facility]    Mary Jo Perkins        Group Therapy Note    Attendees: 11         Patient's Goal:  To go to groups    Notes:  Pt was attentive     Status After Intervention:  Improved    Participation Level: Active Listener    Participation Quality: Appropriate      Speech:  normal      Thought Process/Content: Logical      Affective Functioning: Congruent      Mood: calm    Level of consciousness:  Alert      Response to Learning: Able to verbalize current knowledge/experience      Endings: None Reported    Modes of Intervention: Activity      Discipline Responsible: PCA      Signature:   Roya Ortiz

## 2020-02-24 NOTE — PROGRESS NOTES
Per nurse with the pt on the 1:1, he brushed his teeth approximately 10 times in the last 6 hours. Discussed proper oral hygiene with pt and gum irritation. Verbalizes understanding.

## 2020-02-24 NOTE — GROUP NOTE
Group Therapy Note    Date: 2/23/2020    Group Start Time: 2100  Group End Time: 2130  Group Topic: Wrap-Up    [facility]    Gomez Blend        Group Therapy Note    Attendees: 13/23         Patient's Goal:  Patient did not have a goal for the day. Notes:  Patient enjoyed talking to others and playing [place].     Status After Intervention:  Improved    Participation Level: Interactive    Participation Quality: Appropriate and Attentive      Speech:  normal      Thought Process/Content: Logical      Affective Functioning: Flat      Mood: euthymic      Level of consciousness:  Alert and Attentive      Response to Learning: Progressing to goal      Endings: None Reported    Modes of Intervention: Support      Discipline Responsible: Parking [place]anda      Signature:  Patricia Quiroga

## 2020-02-24 NOTE — PROGRESS NOTES
Department of Psychiatry  Attending Progress Note      SUBJECTIVE:  Patient with sitter  Came in hyponatremic  No change  Visibly irritable  Very little conversation  Says he is fine  inteacts well with sitter    OBJECTIVE: irritable and can get loud  Punches a table if does not vgt his way  Hypersexual  inappropriate    Physical  VITALS:  BP (!) 168/94 Comment: RN notified  Pulse 78   Temp 97 °F (36.1 °C) (Oral)   Resp 20   SpO2 96%   CONSTITUTIONAL:  awake, alert, cooperative, no apparent distress, and appears stated age  Mental Status Examination:  Level of consciousness:  within normal limits  Appearance:  ill-appearing  Behavior/Motor:  agitated  Attitude toward examiner:  argumentative  Speech:  normal rate, rapid and loud  Mood:  irritable  Affect:  anxious  Thought processes:  linear  Thought content:  Homocidal ideation denies  Suicidal Ideation:  passive  Delusions:  paranoid and grandiose  Perceptual Disturbance:  auditory  Cognition:  oriented to person, place, and time    Data  Labs:    CMP:    Lab Results   Component Value Date     02/23/2020    K 5.0 02/23/2020    K 3.9 02/20/2020     02/23/2020    CO2 21 02/23/2020    BUN 15 02/23/2020    CREATININE 0.79 02/23/2020    GFRAA >60.0 02/23/2020    LABGLOM >60.0 02/23/2020    GLUCOSE 97 02/23/2020    PROT 7.0 02/18/2020    LABALBU 4.3 02/18/2020    CALCIUM 9.9 02/23/2020    BILITOT 0.5 02/18/2020    ALKPHOS 58 02/18/2020    AST 40 02/18/2020    ALT 30 02/18/2020       Medications  Current Facility-Administered Medications: acetaminophen (TYLENOL) tablet 650 mg, 650 mg, Oral, Q4H PRN  aluminum & magnesium hydroxide-simethicone (MAALOX) 200-200-20 MG/5ML suspension 30 mL, 30 mL, Oral, PRN  benztropine mesylate (COGENTIN) injection 2 mg, 2 mg, Intramuscular, BID PRN  traZODone (DESYREL) tablet 50 mg, 50 mg, Oral, Nightly PRN  magnesium hydroxide (MILK OF MAGNESIA) 400 MG/5ML suspension 30 mL, 30 mL, Oral, Daily PRN  ondansetron (ZOFRAN) injection 4 mg, 4 mg, Intravenous, Q6H PRN  potassium chloride (KLOR-CON M) extended release tablet 40 mEq, 40 mEq, Oral, PRN **OR** potassium chloride 20 MEQ/15ML (10%) oral solution 40 mEq, 40 mEq, Oral, PRN **OR** potassium chloride 10 mEq/100 mL IVPB (Peripheral Line), 10 mEq, Intravenous, PRN  albuterol (PROVENTIL) nebulizer solution 2.5 mg, 2.5 mg, Nebulization, Q6H PRN  doxycycline hyclate (VIBRAMYCIN) capsule 100 mg, 100 mg, Oral, 2 times per day  lisinopril (PRINIVIL;ZESTRIL) tablet 40 mg, 40 mg, Oral, Daily  melatonin tablet 10 mg, 10 mg, Oral, Nightly PRN  OLANZapine (ZYPREXA) tablet 10 mg, 10 mg, Oral, Nightly  oxyCODONE-acetaminophen (PERCOCET) 5-325 MG per tablet 1 tablet, 1 tablet, Oral, Q4H PRN  cabergoline (DOSTINEX) tablet 0.5 mg, 0.5 mg, Oral, Once per day on Mon Thu    ASSESSMENT AND PLAN    Dx: schiaophrenia  Maintain meds and fluid restriction. Juliette Edward

## 2020-02-24 NOTE — PROGRESS NOTES
mg, 2 mg, Intramuscular, BID PRN, Palomo Littlejohn MD    traZODone (DESYREL) tablet 50 mg, 50 mg, Oral, Nightly PRN, Palomo Littlejohn MD, 50 mg at 02/22/20 2049    magnesium hydroxide (MILK OF MAGNESIA) 400 MG/5ML suspension 30 mL, 30 mL, Oral, Daily PRN, Pitney Zora, DO    [facility]ron [facility]) injection 4 mg, 4 mg, Intravenous, Q6H PRN, Pitney Zora, DO    potassium chloride (KLOR-CON M) extended release tablet 40 mEq, 40 mEq, Oral, PRN **OR** potassium chloride 20 MEQ/15ML (10%) oral solution 40 mEq, 40 mEq, Oral, PRN **OR** potassium chloride 10 mEq/100 mL IVPB (Peripheral Line), 10 mEq, Intravenous, PRN, Pitney Zora, DO    albuterol (PROVENTIL) nebulizer solution 2.5 mg, 2.5 mg, Nebulization, Q6H PRN, Sandra Peralta DO    doxycycline hyclate (VIBRAMYCIN) capsule 100 mg, 100 mg, Oral, 2 times per day, Pitney Zora, DO, 100 mg at 02/24/20 0853    lisinopril (PRINIVIL;ZESTRIL) tablet 40 mg, 40 mg, Oral, Daily, Pitney Zora, DO, 40 mg at 02/24/20 0853    melatonin tablet 10 mg, 10 mg, Oral, Nightly PRN, Pitney Zora, DO, 10 mg at 02/21/20 2032    OLANZapine (ZYPREXA) tablet 10 mg, 10 mg, Oral, Nightly, Pitney Zora, DO, 10 mg at 02/23/20 2119    oxyCODONE-acetaminophen (PERCOCET) 5-325 MG per tablet 1 tablet, 1 tablet, Oral, Q4H PRN, Pitney Zora, DO, 1 tablet at 02/24/20 7336    cabergoline (DOSTINEX) tablet 0.5 mg, 0.5 mg, Oral, Once per day on Mon Thu, Anais Barnett MD, 0.5 mg at 02/24/20 0294      Examination:  BP (!) 149/93   Pulse 71   Temp 98.1 °F (36.7 °C) (Oral)   Resp 18   SpO2 97%   Gait - steady  Medication side effects(SE): no    Mental Status Examination:    Level of consciousness:  within normal limits   Appearance:  fair grooming and fair hygiene  Behavior/Motor:  no abnormalities noted  Attitude toward examiner:  guarded  Speech:  normal rate   Mood: anxious  Affect:  reactive  Thought processes:  slow   Thought content:  Suicidal Ideation:  denies suicidal

## 2020-02-24 NOTE — PROGRESS NOTES
No change in previous assessment however pt is slightly intrusive at the desk frequently asking about belongings that security has. Belongings were obtained and given to pt what was allowed on the unit.

## 2020-02-25 VITALS
OXYGEN SATURATION: 97 % | DIASTOLIC BLOOD PRESSURE: 88 MMHG | HEART RATE: 77 BPM | TEMPERATURE: 98.3 F | RESPIRATION RATE: 20 BRPM | SYSTOLIC BLOOD PRESSURE: 133 MMHG

## 2020-02-25 LAB
ANION GAP SERPL CALCULATED.3IONS-SCNC: 13 MEQ/L (ref 9–15)
BUN BLDV-MCNC: 15 MG/DL (ref 8–23)
CALCIUM SERPL-MCNC: 9.4 MG/DL (ref 8.5–9.9)
CHLORIDE BLD-SCNC: 99 MEQ/L (ref 95–107)
CO2: 24 MEQ/L (ref 20–31)
CREAT SERPL-MCNC: 0.85 MG/DL (ref 0.7–1.2)
GFR AFRICAN AMERICAN: >60
GFR NON-AFRICAN AMERICAN: >60
GLUCOSE BLD-MCNC: 86 MG/DL (ref 70–99)
POTASSIUM SERPL-SCNC: 5.1 MEQ/L (ref 3.4–4.9)
SODIUM BLD-SCNC: 136 MEQ/L (ref 135–144)

## 2020-02-25 PROCEDURE — 99239 HOSP IP/OBS DSCHRG MGMT >30: CPT | Performed by: PSYCHIATRY & NEUROLOGY

## 2020-02-25 PROCEDURE — 36415 COLL VENOUS BLD VENIPUNCTURE: CPT

## 2020-02-25 PROCEDURE — 6370000000 HC RX 637 (ALT 250 FOR IP): Performed by: INTERNAL MEDICINE

## 2020-02-25 PROCEDURE — 80048 BASIC METABOLIC PNL TOTAL CA: CPT

## 2020-02-25 PROCEDURE — 6370000000 HC RX 637 (ALT 250 FOR IP): Performed by: PSYCHIATRY & NEUROLOGY

## 2020-02-25 RX ORDER — OLANZAPINE 10 MG/1
10 TABLET ORAL NIGHTLY
Qty: 15 TABLET | Refills: 3 | Status: ON HOLD | OUTPATIENT
Start: 2020-02-25 | End: 2020-03-18 | Stop reason: HOSPADM

## 2020-02-25 RX ADMIN — OXYCODONE HYDROCHLORIDE AND ACETAMINOPHEN 1 TABLET: 5; 325 TABLET ORAL at 09:46

## 2020-02-25 RX ADMIN — ACETAMINOPHEN 650 MG: 325 TABLET ORAL at 09:48

## 2020-02-25 RX ADMIN — LISINOPRIL 40 MG: 20 TABLET ORAL at 09:48

## 2020-02-25 RX ADMIN — OXYCODONE HYDROCHLORIDE AND ACETAMINOPHEN 1 TABLET: 5; 325 TABLET ORAL at 03:47

## 2020-02-25 RX ADMIN — DOXYCYCLINE HYCLATE 100 MG: 100 CAPSULE ORAL at 09:48

## 2020-02-25 ASSESSMENT — PAIN SCALES - GENERAL
PAINLEVEL_OUTOF10: 10
PAINLEVEL_OUTOF10: 6
PAINLEVEL_OUTOF10: 9

## 2020-02-25 ASSESSMENT — SLEEP AND FATIGUE QUESTIONNAIRES
RESTFUL SLEEP: NO
DIFFICULTY ARISING: NO
AVERAGE NUMBER OF SLEEP HOURS: 4
DIFFICULTY STAYING ASLEEP: YES
DIFFICULTY FALLING ASLEEP: YES
SLEEP PATTERN: DIFFICULTY FALLING ASLEEP;DISTURBED/INTERRUPTED SLEEP
DO YOU HAVE DIFFICULTY SLEEPING: YES

## 2020-02-25 NOTE — GROUP NOTE
Group Therapy Note    Date: 2/25/2020    Group Start Time: 1000  Group End Time: 1100  Group Topic: Psychoeducation    [facility]    Keyonna Ramos        Group Therapy Note    Attendees: 9         Patient's Goal:  \"To have the right meds\"    Notes:  Patient was talkative and work fairly on his task in group. Status After Intervention:  Improved    Participation Level:  Active Listener    Participation Quality: Appropriate      Speech:  talkative      Thought Process/Content: Linear      Affective Functioning: Congruent      Mood: calm      Level of consciousness:  Alert      Response to Learning: Progressing to goal      Endings: None Reported    Modes of Intervention: Education, Socialization and Activity      Discipline Responsible: Psychoeducational Specialist      Signature:  Keyonna Ramos

## 2020-02-25 NOTE — DISCHARGE INSTR - DIET

## 2020-02-25 NOTE — PROGRESS NOTES
Reviewed discharge orders, medications and personal belongings with pt and pt is agreeable. Pt is aware that cab has been called and cannot come until 4 p.m. Denies needs at this time.

## 2020-02-25 NOTE — PROGRESS NOTES
Pt reports his main issues is pain management. States sleeps 3-4 hr per night because pain wakes him.  Denies si,hi,hallucinations

## 2020-02-25 NOTE — DISCHARGE SUMMARY
use cpap at this time    Osteoarthritis     left hip    Osteoarthritis     Osteoarthritis of left hip     Pituitary macroadenoma (HCC)        FAMILY/SOCIAL HISTORY:  Family History   Problem Relation Age of Onset    Colon Cancer Mother     High Blood Pressure Father     Diabetes Sister     No Known Problems Brother     No Known Problems Brother     No Known Problems Brother     Other Brother         Sepsis    Other Brother         Killed    No Known Problems Sister     No Known Problems Sister     No Known Problems Son      Social History     Socioeconomic History    Marital status:      Spouse name: Not on file    Number of children: Not on file    Years of education: Not on file    Highest education level: Not on file   Occupational History    Not on file   Social Needs    Financial resource strain: Not on file    Food insecurity:     Worry: Not on file     Inability: Not on file    Transportation needs:     Medical: Not on file     Non-medical: Not on file   Tobacco Use    Smoking status: Former Smoker     Packs/day: 1.50     Years: 46.00     Pack years: 69.00     Types: Cigarettes     Last attempt to quit: 3/20/2019     Years since quittin.9    Smokeless tobacco: Never Used   Substance and Sexual Activity    Alcohol use:  Yes    Drug use: No    Sexual activity: Not on file   Lifestyle    Physical activity:     Days per week: Not on file     Minutes per session: Not on file    Stress: Not on file   Relationships    Social connections:     Talks on phone: Not on file     Gets together: Not on file     Attends Buddhist service: Not on file     Active member of club or organization: Not on file     Attends meetings of clubs or organizations: Not on file     Relationship status: Not on file    Intimate partner violence:     Fear of current or ex partner: Not on file     Emotionally abused: Not on file     Physically abused: Not on file     Forced sexual activity: Not on file Other Topics Concern    Not on file   Social History Narrative    Not on file       MEDICATIONS:    Current Facility-Administered Medications:     zolpidem (AMBIEN) tablet 5 mg, 5 mg, Oral, Nightly, Florencio Bowers MD, 5 mg at 02/24/20 2045    acetaminophen (TYLENOL) tablet 650 mg, 650 mg, Oral, Q4H PRN, Florencio Bowers MD    aluminum & magnesium hydroxide-simethicone (MAALOX) 200-200-20 MG/5ML suspension 30 mL, 30 mL, Oral, PRN, Florencio Bowers MD    benztropine mesylate (COGENTIN) injection 2 mg, 2 mg, Intramuscular, BID PRN, Florencio Bowers MD    traZODone (DESYREL) tablet 50 mg, 50 mg, Oral, Nightly PRN, Florencio Bowers MD, 50 mg at 02/22/20 2049    magnesium hydroxide (MILK OF MAGNESIA) 400 MG/5ML suspension 30 mL, 30 mL, Oral, Daily PRN, Sandra Sesayin, DO    ondansetron [facility]) injection 4 mg, 4 mg, Intravenous, Q6H PRN, Clinton Morenoa, DO    potassium chloride (KLOR-CON M) extended release tablet 40 mEq, 40 mEq, Oral, PRN **OR** potassium chloride 20 MEQ/15ML (10%) oral solution 40 mEq, 40 mEq, Oral, PRN **OR** potassium chloride 10 mEq/100 mL IVPB (Peripheral Line), 10 mEq, Intravenous, PRN, Clinton Remi, DO    albuterol (PROVENTIL) nebulizer solution 2.5 mg, 2.5 mg, Nebulization, Q6H PRN, Sandra Plazasein, DO    doxycycline hyclate (VIBRAMYCIN) capsule 100 mg, 100 mg, Oral, 2 times per day, Sandra Plazasein, DO, 100 mg at 02/24/20 2046    lisinopril (PRINIVIL;ZESTRIL) tablet 40 mg, 40 mg, Oral, Daily, Clinton Morenoa, DO, 40 mg at 02/24/20 9703    melatonin tablet 10 mg, 10 mg, Oral, Nightly PRN, Harshil Peralta, DO, 10 mg at 02/21/20 2032    OLANZapine (ZYPREXA) tablet 10 mg, 10 mg, Oral, Nightly, Clinton Khalil DO, 10 mg at 02/24/20 2045    oxyCODONE-acetaminophen (PERCOCET) 5-325 MG per tablet 1 tablet, 1 tablet, Oral, Q4H PRN, Clinton Khalil DO, 1 tablet at 02/25/20 0347    cabergoline (DOSTINEX) tablet 0.5 mg, 0.5 mg, Oral, Once per day on Mon Thu, Robyn Sanders MD, 0.5 mg at 02/24/20 0942    Examination:  BP (!) 149/93   Pulse 71   Temp 98.1 °F (36.7 °C) (Oral)   Resp 18   SpO2 97%   Gait - steady    HOSPITAL COURSE[de-identified]  Following admission to the hospital, patient had a complete physical exam and blood work up  Patient was monitored closely with suicide precaution  Patient was started on zyprexa 10 mg po qhs  Was encouraged to participate in group and other milieu activity  Patient started to feel better with this combination of treatment. Significant progress in the symptoms since admission. Mood better, with the score of 2/10 - bad  No AVH or paranoid thoughts  No Hopeless or worthless feeling  No active SI/HI  Appetite:  [x] Normal  [] Increased  [] Decreased    Sleep:       [x] Normal  [] Fair       [] Poor            Energy:    [x] Normal  [] Increased  [] Decreased     SI [] Present  [x] Absent  HI  []Present  [x] Absent   Aggression:  [] yes  [] no  Patient is [x] able  [] unable to CONTRACT FOR SAFETY   Medication side effects(SE):  [x] None(Psych.  Meds.) [] Other    Pt has been drinking excessively and water restriction corrected Na level  Pt was educated about the same and he is agreeable to restrict water intake    Mental Status Examination on discharge:    Level of consciousness:  within normal limits   Appearance:  well-appearing  Behavior/Motor:  no abnormalities noted  Attitude toward examiner:  attentive and good eye contact  Speech:  spontaneous, normal rate and normal volume   Mood: euthymic  Affect:  mood congruent  Thought processes:  goal directed   Thought content:  Suicidal Ideation:  denies suicidal ideation  Delusions:  no evidence of delusions  Perceptual Disturbance:  denies any perceptual disturbance  Cognition:  oriented to person, place, and time   Concentration intact  Memory intact  Insight good   Judgement fair   Fund of Knowledge adequate      ASSESSMENT:  Patient symptoms are:  [] Well controlled  [x] Improving  [] Worsening  [] No

## 2020-02-25 NOTE — PROGRESS NOTES
Patient is a 72-year-old  male with a past medical history of pituitary adenoma, COPD, RANDI, depression, delusions, schizophrenia who was admitted to [facility] ([facility]) for hyponatremia/SIADH and psychiatric evaluation. After hyponatremia was resolved, patient was discharged to behavioral health for psychiatric evaluation requested by JUANITO. Today, patient will be discharged to [facility]. Patient Seen, Chart, Labs, Radiologystudies, and Consults reviewed. Patient denies headache, chest pain, shortness of breath, N/V/D/C, fever/chills. Medications were reviewed and reconciled. Patient aware he will not receive Percocet for arthritic pain. Patient instructed to see primary care or pain management outpatient. # Narcotic Addiction with drug seeking behavior: Pt counseled at length. Discussed MAT and sobriety housing and counseling resources.      Please see ER note from 02/03/2020    Libia Cam CNP

## 2020-02-25 NOTE — CARE COORDINATION
Attempted to call patients wife #[phone] to discuss if she has any concerns about him coming home and review safety in the home. Could not leave a message, VM was not set up.   Electronically signed by Justino Javier [employer] on 2/25/2020 at 1:48 PM

## 2020-02-25 NOTE — PROGRESS NOTES
Pt. attended the 0900 community meeting. Electronically signed by Deangelo Gutiérrez [address] on 2/25/2020 at 9:36 AM

## 2020-02-25 NOTE — GROUP NOTE
Group Therapy Note    Date: 2/25/2020    Group Start Time: 1100  Group End Time: 1200  Group Topic: Psychotherapy    [facility]        Group Therapy Note    Attendees: 9         Patient's Goal:  To participate in group therapy process    Notes:  Patient reported struggle with marijuana until quitting 11 years ago    Status After Intervention:  Improved    Participation Level: Interactive    Participation Quality: Appropriate and sensitive to [facility]      Speech:  normal      Thought Process/Content: Logical      Affective Functioning: Congruent      Mood: euthymic      Level of consciousness:  Alert and Oriented x4      Response to Learning: Able to verbalize current knowledge/experience      Endings: None Reported    Modes of Intervention: Support      Discipline Responsible: /Counselor      Signature:  Liz Murrell

## 2020-03-13 ENCOUNTER — HOSPITAL ENCOUNTER (INPATIENT)
Age: 65
LOS: 6 days | Discharge: HOME | DRG: 750 | End: 2020-03-19
Attending: EMERGENCY MEDICINE | Admitting: PSYCHIATRY & NEUROLOGY
Payer: COMMERCIAL

## 2020-03-13 PROBLEM — F20.9 SCHIZOPHRENIA (HCC): Status: ACTIVE | Noted: 2020-03-13

## 2020-03-13 LAB
ACETAMINOPHEN LEVEL: <5 UG/ML (ref 10–30)
ALBUMIN SERPL-MCNC: 4.6 G/DL (ref 3.5–4.6)
ALP BLD-CCNC: 61 U/L (ref 35–104)
ALT SERPL-CCNC: 26 U/L (ref 0–41)
AMPHETAMINE SCREEN, URINE: NORMAL
ANION GAP SERPL CALCULATED.3IONS-SCNC: 11 MEQ/L (ref 9–15)
AST SERPL-CCNC: 37 U/L (ref 0–40)
BARBITURATE SCREEN URINE: NORMAL
BASOPHILS ABSOLUTE: 0.2 K/UL (ref 0–0.2)
BASOPHILS RELATIVE PERCENT: 2 %
BENZODIAZEPINE SCREEN, URINE: NORMAL
BILIRUB SERPL-MCNC: 0.5 MG/DL (ref 0.2–0.7)
BILIRUBIN URINE: NEGATIVE
BLOOD, URINE: NEGATIVE
BUN BLDV-MCNC: 6 MG/DL (ref 8–23)
CALCIUM SERPL-MCNC: 9.8 MG/DL (ref 8.5–9.9)
CANNABINOID SCREEN URINE: NORMAL
CHLORIDE BLD-SCNC: 94 MEQ/L (ref 95–107)
CK MB: 11.2 NG/ML (ref 0–6.7)
CK MB: 7.8 NG/ML (ref 0–6.7)
CLARITY: CLEAR
CO2: 28 MEQ/L (ref 20–31)
COCAINE METABOLITE SCREEN URINE: NORMAL
COLOR: YELLOW
CREAT SERPL-MCNC: 0.68 MG/DL (ref 0.7–1.2)
CREATINE KINASE-MB INDEX: 0.8 % (ref 0–3.5)
CREATINE KINASE-MB INDEX: 1.1 % (ref 0–3.5)
EOSINOPHILS ABSOLUTE: 0.1 K/UL (ref 0–0.7)
EOSINOPHILS RELATIVE PERCENT: 1.9 %
ETHANOL PERCENT: NORMAL G/DL
ETHANOL: <10 MG/DL (ref 0–0.08)
GFR AFRICAN AMERICAN: >60
GFR NON-AFRICAN AMERICAN: >60
GLOBULIN: 3.1 G/DL (ref 2.3–3.5)
GLUCOSE BLD-MCNC: 88 MG/DL (ref 70–99)
GLUCOSE URINE: NEGATIVE MG/DL
HCT VFR BLD CALC: 41.1 % (ref 42–52)
HEMOGLOBIN: 13.6 G/DL (ref 14–18)
KETONES, URINE: NEGATIVE MG/DL
LEUKOCYTE ESTERASE, URINE: NEGATIVE
LYMPHOCYTES ABSOLUTE: 1.1 K/UL (ref 1–4.8)
LYMPHOCYTES RELATIVE PERCENT: 14.3 %
Lab: NORMAL
MCH RBC QN AUTO: 32.2 PG (ref 27–31.3)
MCHC RBC AUTO-ENTMCNC: 33.2 % (ref 33–37)
MCV RBC AUTO: 96.8 FL (ref 80–100)
METHADONE SCREEN, URINE: NORMAL
MONOCYTES ABSOLUTE: 0.9 K/UL (ref 0.2–0.8)
MONOCYTES RELATIVE PERCENT: 10.9 %
NEUTROPHILS ABSOLUTE: 5.6 K/UL (ref 1.4–6.5)
NEUTROPHILS RELATIVE PERCENT: 70.9 %
NITRITE, URINE: NEGATIVE
OPIATE SCREEN URINE: NORMAL
OXYCODONE URINE: NORMAL
PDW BLD-RTO: 15 % (ref 11.5–14.5)
PH UA: 6.5 (ref 5–9)
PHENCYCLIDINE SCREEN URINE: NORMAL
PLATELET # BLD: 289 K/UL (ref 130–400)
POTASSIUM SERPL-SCNC: 4.5 MEQ/L (ref 3.4–4.9)
PROPOXYPHENE SCREEN: NORMAL
PROTEIN UA: NEGATIVE MG/DL
RBC # BLD: 4.24 M/UL (ref 4.7–6.1)
SALICYLATE, SERUM: <0.3 MG/DL (ref 15–30)
SODIUM BLD-SCNC: 133 MEQ/L (ref 135–144)
SPECIFIC GRAVITY UA: 1.01 (ref 1–1.03)
TOTAL CK: 1056 U/L (ref 0–190)
TOTAL CK: 954 U/L (ref 0–190)
TOTAL PROTEIN: 7.7 G/DL (ref 6.3–8)
TSH SERPL DL<=0.05 MIU/L-ACNC: 1.15 UIU/ML (ref 0.44–3.86)
URINE REFLEX TO CULTURE: NORMAL
UROBILINOGEN, URINE: 0.2 E.U./DL
WBC # BLD: 7.9 K/UL (ref 4.8–10.8)

## 2020-03-13 PROCEDURE — 2580000003 HC RX 258: Performed by: PHYSICIAN ASSISTANT

## 2020-03-13 PROCEDURE — 85025 COMPLETE CBC W/AUTO DIFF WBC: CPT

## 2020-03-13 PROCEDURE — 1240000000 HC EMOTIONAL WELLNESS R&B

## 2020-03-13 PROCEDURE — 2580000003 HC RX 258: Performed by: EMERGENCY MEDICINE

## 2020-03-13 PROCEDURE — 82553 CREATINE MB FRACTION: CPT

## 2020-03-13 PROCEDURE — 6370000000 HC RX 637 (ALT 250 FOR IP): Performed by: EMERGENCY MEDICINE

## 2020-03-13 PROCEDURE — 6360000002 HC RX W HCPCS: Performed by: EMERGENCY MEDICINE

## 2020-03-13 PROCEDURE — 80307 DRUG TEST PRSMV CHEM ANLYZR: CPT

## 2020-03-13 PROCEDURE — 6370000000 HC RX 637 (ALT 250 FOR IP): Performed by: PSYCHIATRY & NEUROLOGY

## 2020-03-13 PROCEDURE — 6370000000 HC RX 637 (ALT 250 FOR IP): Performed by: NURSE PRACTITIONER

## 2020-03-13 PROCEDURE — 36415 COLL VENOUS BLD VENIPUNCTURE: CPT

## 2020-03-13 PROCEDURE — 81003 URINALYSIS AUTO W/O SCOPE: CPT

## 2020-03-13 PROCEDURE — 80053 COMPREHEN METABOLIC PANEL: CPT

## 2020-03-13 PROCEDURE — 82550 ASSAY OF CK (CPK): CPT

## 2020-03-13 PROCEDURE — 96372 THER/PROPH/DIAG INJ SC/IM: CPT

## 2020-03-13 PROCEDURE — 99285 EMERGENCY DEPT VISIT HI MDM: CPT

## 2020-03-13 PROCEDURE — 84443 ASSAY THYROID STIM HORMONE: CPT

## 2020-03-13 PROCEDURE — G0480 DRUG TEST DEF 1-7 CLASSES: HCPCS

## 2020-03-13 RX ORDER — LISINOPRIL 20 MG/1
40 TABLET ORAL DAILY
Status: DISCONTINUED | OUTPATIENT
Start: 2020-03-14 | End: 2020-03-19 | Stop reason: HOSPADM

## 2020-03-13 RX ORDER — HYDROXYZINE PAMOATE 50 MG/1
50 CAPSULE ORAL EVERY 6 HOURS PRN
Status: DISCONTINUED | OUTPATIENT
Start: 2020-03-13 | End: 2020-03-19 | Stop reason: HOSPADM

## 2020-03-13 RX ORDER — ATORVASTATIN CALCIUM 40 MG/1
40 TABLET, FILM COATED ORAL DAILY
Status: DISCONTINUED | OUTPATIENT
Start: 2020-03-14 | End: 2020-03-19 | Stop reason: HOSPADM

## 2020-03-13 RX ORDER — ACETAMINOPHEN 500 MG
1000 TABLET ORAL ONCE
Status: COMPLETED | OUTPATIENT
Start: 2020-03-13 | End: 2020-03-13

## 2020-03-13 RX ORDER — HYDROCHLOROTHIAZIDE 25 MG/1
25 TABLET ORAL DAILY
Status: DISCONTINUED | OUTPATIENT
Start: 2020-03-14 | End: 2020-03-18

## 2020-03-13 RX ORDER — CELECOXIB 200 MG/1
200 CAPSULE ORAL DAILY
Status: DISCONTINUED | OUTPATIENT
Start: 2020-03-14 | End: 2020-03-18

## 2020-03-13 RX ORDER — KETOROLAC TROMETHAMINE 30 MG/ML
30 INJECTION, SOLUTION INTRAMUSCULAR; INTRAVENOUS ONCE
Status: COMPLETED | OUTPATIENT
Start: 2020-03-13 | End: 2020-03-13

## 2020-03-13 RX ORDER — 0.9 % SODIUM CHLORIDE 0.9 %
1000 INTRAVENOUS SOLUTION INTRAVENOUS ONCE
Status: COMPLETED | OUTPATIENT
Start: 2020-03-13 | End: 2020-03-13

## 2020-03-13 RX ORDER — HALOPERIDOL 5 MG/ML
5 INJECTION INTRAMUSCULAR EVERY 6 HOURS PRN
Status: DISCONTINUED | OUTPATIENT
Start: 2020-03-13 | End: 2020-03-19 | Stop reason: HOSPADM

## 2020-03-13 RX ORDER — HYDROXYZINE HYDROCHLORIDE 50 MG/ML
50 INJECTION, SOLUTION INTRAMUSCULAR EVERY 6 HOURS PRN
Status: DISCONTINUED | OUTPATIENT
Start: 2020-03-13 | End: 2020-03-19 | Stop reason: HOSPADM

## 2020-03-13 RX ORDER — HYDRALAZINE HYDROCHLORIDE 50 MG/1
50 TABLET, FILM COATED ORAL ONCE
Status: COMPLETED | OUTPATIENT
Start: 2020-03-13 | End: 2020-03-13

## 2020-03-13 RX ORDER — 0.9 % SODIUM CHLORIDE 0.9 %
2000 INTRAVENOUS SOLUTION INTRAVENOUS ONCE
Status: COMPLETED | OUTPATIENT
Start: 2020-03-13 | End: 2020-03-13

## 2020-03-13 RX ORDER — POLYETHYLENE GLYCOL 3350 17 G/17G
17 POWDER, FOR SOLUTION ORAL DAILY PRN
Status: DISCONTINUED | OUTPATIENT
Start: 2020-03-13 | End: 2020-03-19 | Stop reason: HOSPADM

## 2020-03-13 RX ORDER — FUROSEMIDE 20 MG/1
40 TABLET ORAL DAILY
Status: DISCONTINUED | OUTPATIENT
Start: 2020-03-14 | End: 2020-03-18

## 2020-03-13 RX ORDER — HALOPERIDOL 5 MG
5 TABLET ORAL EVERY 6 HOURS PRN
Status: DISCONTINUED | OUTPATIENT
Start: 2020-03-13 | End: 2020-03-19 | Stop reason: HOSPADM

## 2020-03-13 RX ORDER — POTASSIUM CHLORIDE 750 MG/1
10 TABLET, FILM COATED, EXTENDED RELEASE ORAL DAILY
Status: DISCONTINUED | OUTPATIENT
Start: 2020-03-14 | End: 2020-03-19 | Stop reason: HOSPADM

## 2020-03-13 RX ORDER — TRAZODONE HYDROCHLORIDE 50 MG/1
50 TABLET ORAL NIGHTLY PRN
Status: DISCONTINUED | OUTPATIENT
Start: 2020-03-13 | End: 2020-03-16

## 2020-03-13 RX ORDER — ASPIRIN 81 MG/1
81 TABLET ORAL 2 TIMES DAILY
Status: DISCONTINUED | OUTPATIENT
Start: 2020-03-13 | End: 2020-03-19 | Stop reason: HOSPADM

## 2020-03-13 RX ORDER — FENOFIBRATE 160 MG/1
160 TABLET ORAL DAILY
Status: DISCONTINUED | OUTPATIENT
Start: 2020-03-14 | End: 2020-03-19 | Stop reason: HOSPADM

## 2020-03-13 RX ORDER — OLANZAPINE 10 MG/1
10 TABLET ORAL NIGHTLY
Status: DISCONTINUED | OUTPATIENT
Start: 2020-03-13 | End: 2020-03-14

## 2020-03-13 RX ORDER — MELOXICAM 7.5 MG/1
15 TABLET ORAL DAILY
Status: DISCONTINUED | OUTPATIENT
Start: 2020-03-14 | End: 2020-03-19 | Stop reason: HOSPADM

## 2020-03-13 RX ORDER — ACETAMINOPHEN 325 MG/1
650 TABLET ORAL EVERY 4 HOURS PRN
Status: DISCONTINUED | OUTPATIENT
Start: 2020-03-13 | End: 2020-03-19 | Stop reason: HOSPADM

## 2020-03-13 RX ADMIN — SODIUM CHLORIDE 1000 ML: 9 INJECTION, SOLUTION INTRAVENOUS at 17:40

## 2020-03-13 RX ADMIN — OLANZAPINE 10 MG: 10 TABLET, FILM COATED ORAL at 21:59

## 2020-03-13 RX ADMIN — ACETAMINOPHEN 1000 MG: 500 TABLET ORAL at 12:17

## 2020-03-13 RX ADMIN — ASPIRIN 81 MG: 81 TABLET, COATED ORAL at 21:59

## 2020-03-13 RX ADMIN — SODIUM CHLORIDE 2000 ML: 9 INJECTION, SOLUTION INTRAVENOUS at 16:03

## 2020-03-13 RX ADMIN — KETOROLAC TROMETHAMINE 30 MG: 30 INJECTION, SOLUTION INTRAMUSCULAR at 12:15

## 2020-03-13 RX ADMIN — TRAZODONE HYDROCHLORIDE 50 MG: 50 TABLET ORAL at 21:59

## 2020-03-13 RX ADMIN — HYDRALAZINE HYDROCHLORIDE 50 MG: 50 TABLET, FILM COATED ORAL at 20:08

## 2020-03-13 RX ADMIN — HYDROXYZINE PAMOATE 50 MG: 50 CAPSULE ORAL at 21:59

## 2020-03-13 RX ADMIN — ACETAMINOPHEN 650 MG: 325 TABLET ORAL at 22:08

## 2020-03-13 ASSESSMENT — ENCOUNTER SYMPTOMS
SORE THROAT: 0
VOMITING: 0
BACK PAIN: 0
DIARRHEA: 0
ABDOMINAL PAIN: 0
SHORTNESS OF BREATH: 0
COUGH: 0
NAUSEA: 0

## 2020-03-13 ASSESSMENT — PAIN SCALES - GENERAL
PAINLEVEL_OUTOF10: 10
PAINLEVEL_OUTOF10: 4
PAINLEVEL_OUTOF10: 4

## 2020-03-13 ASSESSMENT — PAIN DESCRIPTION - PAIN TYPE: TYPE: ACUTE PAIN;CHRONIC PAIN

## 2020-03-13 ASSESSMENT — PAIN DESCRIPTION - ORIENTATION: ORIENTATION: LEFT

## 2020-03-13 ASSESSMENT — PAIN DESCRIPTION - LOCATION: LOCATION: LEG

## 2020-03-13 NOTE — ED PROVIDER NOTES
Pituitary macroadenoma [facility])          SURGICAL HISTORY       Past Surgical History:   Procedure Laterality Date    COLONOSCOPY  5/2/14    [place]    COLONOSCOPY N/A 12/12/2019    COLONOSCOPY DIAGNOSTIC performed by Alex Lan MD at [address], COLON, DIAGNOSTIC      EYE SURGERY Bilateral     cataracts   [address] ?     index finger laceration repair    HERNIA REPAIR  2004    repair [facility]     JOINT REPLACEMENT Left     left hip    [place] COLON CA SCRN NOT HI RSK IND N/A 5/17/2017    COLONOSCOPY performed by Hans Will MD at [address] Left 4/22/2019    LEFT HIP TOTAL HIP ARTHROPLASTY, LATERAL DECUB, GRACIE performed by Bryan Peter MD at [address] [facility]  12/12/2019    EGD DIAGNOSTIC ONLY performed by Alex Lan MD at [address] [facility]       Current Discharge Medication List      CONTINUE these medications which have NOT CHANGED    Details   OLANZapine (ZYPREXA) 10 MG tablet Take 1 tablet by mouth nightly  Qty: 15 tablet, Refills: 3      furosemide (LASIX) 40 MG tablet Take 40 mg by mouth daily      melatonin 3 MG TABS tablet Take 10 mg by mouth nightly as needed      meloxicam (MOBIC) 15 MG tablet Take 15 mg by mouth daily      potassium chloride (MICRO-K) 10 MEQ extended release capsule Take 10 mEq by mouth daily      simvastatin (ZOCOR) 40 MG tablet Take 40 mg by mouth nightly      nabumetone (RELAFEN) 750 MG tablet Take 750 mg by mouth daily      fenofibrate 160 MG tablet Take 150 mg by mouth daily      lisinopril (PRINIVIL;ZESTRIL) 20 MG tablet Take 2 tablets by mouth daily  Qty: 30 tablet, Refills: 3      hydrochlorothiazide (HYDRODIURIL) 25 MG tablet Take 1 tablet by mouth daily  Qty: 30 tablet, Refills: 3      cabergoline (DOSTINEX) 0.5 MG tablet Take 1 tablet by mouth Twice a Week ON Monday and Thursday  Qty: 8 tablet, Refills: 3      acetaminophen (APAP EXTRA STRENGTH) 500 MG tablet Take 1 tablet by mouth every 6 hours as needed for Pain  Qty: 30 tablet, Refills: 0      aspirin 81 MG EC tablet Take 1 tablet by mouth 2 times daily  Qty: 60 tablet, Refills: 0      Respiratory Therapy Supplies SANDRA Full face CPAP mask and supplies  Qty: 1 Device, Refills: 0      CPAP Machine MISC by Does not apply route New CPAP with 6 cm  Qty: 1 each, Refills: 0      albuterol (PROVENTIL) (2.5 MG/3ML) 0.083% nebulizer solution Take 3 mLs by nebulization every 6 hours as needed for Wheezing  Qty: 120 each, Refills: 5      STIOLTO RESPIMAT 2.5-2.5 MCG/ACT AERS Inhale 2 puff in AM  Qty: 1 Inhaler, Refills: 5             ALLERGIES     Patient has no known allergies. FAMILY HISTORY       Family History   Problem Relation Age of Onset    Colon Cancer Mother     High Blood Pressure Father     Diabetes Sister     No Known Problems Brother     No Known Problems Brother     No Known Problems Brother     Other Brother         Sepsis    Other Brother         Killed    No Known Problems Sister     No Known Problems Sister     No Known Problems Son           SOCIAL HISTORY       Social History     Socioeconomic History    Marital status:      Spouse name: Not on file    Number of children: Not on file    Years of education: Not on file    Highest education level: Not on file   Occupational History    Not on file   Social Needs    Financial resource strain: Not on file    Food insecurity     Worry: Not on file     Inability: Not on file   [employer] needs     Medical: Not on file     Non-medical: Not on file   Tobacco Use    Smoking status: Former Smoker     Packs/day: 1.50     Years: 46.00     Pack years: 69.00     Types: Cigarettes     Last attempt to quit: 3/20/2019     Years since quittin.9    Smokeless tobacco: Never Used   Substance and Sexual Activity    Alcohol use:  Yes    Drug use: No    Sexual activity: Not on file   Lifestyle    Physical afebrile, hemodynamically stable. Pt given PO tylenol, IM toradol with moderate relief. Labs remarkable for Hb 13.6, CK 1056. UA, tox negative. Pt given 3 L NS in the ED and repeat . Pt is medically clear for psych evaluation. Case discussed with Dr. Vance Lawson who recommended admission. Pt admitted to psych in stable condition. FINAL IMPRESSION      1.  Schizophrenia, unspecified type [facility])          DISPOSITION/PLAN   DISPOSITION Admitted 03/13/2020 07:03:55 PM        DISCHARGE MEDICATIONS:  [unfilled]         Albino Galvan MD(electronically signed)  Attending Emergency Physician            Albino Galvan MD  03/14/20 [id number]

## 2020-03-13 NOTE — PROGRESS NOTES
Report received from [facility] in the [facility]FFILIATE OF [facility]. The pt is coming to [address] [place] [address] voluntarily with diagnosis of Schizophrenia. The pt is here after being assessed at [address] [place] [address], where he triggered their depression scale. He has been non-med compliant since his discharge from [address] [place] [address] on 2/20, and not sleeping well. He is paranoid and believes people are breaking into his apartment and drilling holes in his closet, so he has been avoiding being there and walking around town instead. He is also preoccupied with the pain in his legs. He was given Toradol, Tylenol and NS around noon. He has a pituitary macroadenoma that he will not have removed so his sodium does get low. He also has a hx of COPD, Arthritis, Asthma, COPD, HTN, and hyperlipidemia.

## 2020-03-13 NOTE — ED TRIAGE NOTES
Pt was seen at [facility] presenting for pain. Referred to [facility] for an assessment for positive depression scale screening. Per [employer] stating his new medication Zyprexa making things worse. Pt complaining not getting enough sleep.

## 2020-03-14 LAB
ANION GAP SERPL CALCULATED.3IONS-SCNC: 13 MEQ/L (ref 9–15)
BUN BLDV-MCNC: 12 MG/DL (ref 8–23)
CALCIUM SERPL-MCNC: 9.4 MG/DL (ref 8.5–9.9)
CHLORIDE BLD-SCNC: 98 MEQ/L (ref 95–107)
CO2: 26 MEQ/L (ref 20–31)
CREAT SERPL-MCNC: 0.77 MG/DL (ref 0.7–1.2)
GFR AFRICAN AMERICAN: >60
GFR NON-AFRICAN AMERICAN: >60
GLUCOSE BLD-MCNC: 104 MG/DL (ref 70–99)
POTASSIUM SERPL-SCNC: 4.6 MEQ/L (ref 3.4–4.9)
SODIUM BLD-SCNC: 137 MEQ/L (ref 135–144)

## 2020-03-14 PROCEDURE — 80048 BASIC METABOLIC PNL TOTAL CA: CPT

## 2020-03-14 PROCEDURE — 99222 1ST HOSP IP/OBS MODERATE 55: CPT | Performed by: PSYCHIATRY & NEUROLOGY

## 2020-03-14 PROCEDURE — 1240000000 HC EMOTIONAL WELLNESS R&B

## 2020-03-14 PROCEDURE — 6370000000 HC RX 637 (ALT 250 FOR IP): Performed by: PHYSICIAN ASSISTANT

## 2020-03-14 PROCEDURE — 36415 COLL VENOUS BLD VENIPUNCTURE: CPT

## 2020-03-14 PROCEDURE — 6370000000 HC RX 637 (ALT 250 FOR IP): Performed by: PSYCHIATRY & NEUROLOGY

## 2020-03-14 RX ORDER — IBUPROFEN 600 MG/1
600 TABLET ORAL EVERY 6 HOURS PRN
Status: DISCONTINUED | OUTPATIENT
Start: 2020-03-14 | End: 2020-03-19 | Stop reason: HOSPADM

## 2020-03-14 RX ORDER — PALIPERIDONE 3 MG/1
3 TABLET, EXTENDED RELEASE ORAL DAILY
Status: DISCONTINUED | OUTPATIENT
Start: 2020-03-14 | End: 2020-03-16

## 2020-03-14 RX ADMIN — LISINOPRIL 40 MG: 20 TABLET ORAL at 08:40

## 2020-03-14 RX ADMIN — FUROSEMIDE 40 MG: 20 TABLET ORAL at 08:39

## 2020-03-14 RX ADMIN — ACETAMINOPHEN 650 MG: 325 TABLET ORAL at 12:27

## 2020-03-14 RX ADMIN — IBUPROFEN 600 MG: 600 TABLET, FILM COATED ORAL at 21:34

## 2020-03-14 RX ADMIN — POTASSIUM CHLORIDE 10 MEQ: 750 TABLET, FILM COATED, EXTENDED RELEASE ORAL at 08:39

## 2020-03-14 RX ADMIN — ASPIRIN 81 MG: 81 TABLET, COATED ORAL at 20:56

## 2020-03-14 RX ADMIN — PALIPERIDONE 3 MG: 3 TABLET, EXTENDED RELEASE ORAL at 10:27

## 2020-03-14 RX ADMIN — MELOXICAM 15 MG: 7.5 TABLET ORAL at 08:40

## 2020-03-14 RX ADMIN — ATORVASTATIN CALCIUM 40 MG: 40 TABLET, FILM COATED ORAL at 08:39

## 2020-03-14 RX ADMIN — ACETAMINOPHEN 650 MG: 325 TABLET ORAL at 05:18

## 2020-03-14 RX ADMIN — HYDROCHLOROTHIAZIDE 25 MG: 25 TABLET ORAL at 08:39

## 2020-03-14 RX ADMIN — ASPIRIN 81 MG: 81 TABLET, COATED ORAL at 08:39

## 2020-03-14 RX ADMIN — TRAZODONE HYDROCHLORIDE 50 MG: 50 TABLET ORAL at 21:34

## 2020-03-14 RX ADMIN — CELECOXIB 200 MG: 200 CAPSULE ORAL at 08:39

## 2020-03-14 RX ADMIN — FENOFIBRATE 160 MG: 160 TABLET ORAL at 08:39

## 2020-03-14 ASSESSMENT — PAIN SCALES - GENERAL
PAINLEVEL_OUTOF10: 10
PAINLEVEL_OUTOF10: 10
PAINLEVEL_OUTOF10: 9
PAINLEVEL_OUTOF10: 7

## 2020-03-14 ASSESSMENT — SLEEP AND FATIGUE QUESTIONNAIRES
DO YOU USE A SLEEP AID: NO
AVERAGE NUMBER OF SLEEP HOURS: 4
DIFFICULTY FALLING ASLEEP: NO
SLEEP PATTERN: DISTURBED/INTERRUPTED SLEEP;EARLY AWAKENING
RESTFUL SLEEP: NO
DIFFICULTY STAYING ASLEEP: YES
DIFFICULTY ARISING: NO
DO YOU HAVE DIFFICULTY SLEEPING: YES

## 2020-03-14 ASSESSMENT — LIFESTYLE VARIABLES: HISTORY_ALCOHOL_USE: NO

## 2020-03-14 NOTE — PROGRESS NOTES
Leisure assessment completed with the help of  #[id number]. Pt. reports poor sleep and fair appetite. Has no friends and family is \"crazy. \"   but  \"she lives in [facility] and she ruined all my credit. \"  Denies ETOH and does not smoke marijuana or use any other drugs. \"I used to use acid 20 years ago. \"  Denies AH/VH and denies si/hi. Is convinced that that there is someone in his closet and the police are not helping him. Has lived in [place] years.  Stressors include  1.not being able to get this sudhakar out of my house  *read the Bible, listen to music, watch tv sometimes  Electronically signed by ROCKY Gonzales on 3/14/2020 at 1:16 PM

## 2020-03-14 NOTE — PROGRESS NOTES
Pt. attended the 0900 community meeting.  Electronically signed by Neil Tracey on 3/14/2020 at 9:53 AM

## 2020-03-14 NOTE — PROGRESS NOTES
The pt was cooperative and conversational throughout admission assessment. He states he has lived in [place] [address] for over 25 years, though only in [facility] [place] for the last year. He does not like his current residence because he doesn't feel comfortable and safe there and he is working with his [facility]  to find alternative housing. He mentions holes that he says he knows are gas leaks because the air lit up when he took a lighter to them. He has one friend that he's willing to share information with, but reports otherwise being estranged from his family, including his wife in [facility], whom he has been  with for some time now. He denies SI, HI, and A/V hallucinations. He does appear somewhat paranoid/ guarded about certain information, which he says is do to having several relatives incarcerated/mistrust of the legal system. He states \"there's nothing wrong with my brain, you know,\" regarding his mental health. Admits to only sleeping 3-4 hours a night. He falls asleep, but is then unable to stay asleep. Does not seem well-informed regarding his diagnosis of pituitary macroadenoma when questioned about it. He remains preoccupied with the pain in his leg, which he says he's had for a year now, following surgical repair of his hip. There were some difficulties in conversation due to Thai being the patients primary language, but he declined the assistance of the interpretor phone when offered.

## 2020-03-14 NOTE — CARE COORDINATION
BHI Biopsychosocial Assessment    Current Level of Psychosocial Functioning     Independent x  Dependent    Minimal Assist     Comments:  Patient reported independent in daily activities; patient reported adequate social support from family; patient demonstrated having positive social skills    Psychosocial High Risk Factors (check all that apply)    Unable to obtain meds   Chronic illness/pain  x  Substance abuse   Lack of Family Support   Financial stress   Isolation   Inadequate Community Resources  Suicide attempt(s)  Not taking medications x as reported by Alex  Victim of crime   Developmental Delay  Unable to manage personal needs    Age 74 or older   Homeless  No transportation   Readmission within 30 days  Unemployment x  Traumatic Event    Psychiatric Advanced Directive: none reported    Family to involve in treatment: siblings    Sexual Orientation: patient reported opposite sex attraction     Patient Strengths: cooperative; adequate social support    Patient Barriers: Not taking medications; physical issues    Opiate education provided: na    Safety plan: Not completed at this time    CMHC/MH history: MATT SALCIDO [facility] of Care:  medication management, group/individual therapies, family meetings, psycho -education, treatment team meetings to assist with stabilization    Initial Discharge Plan: To return home    Clinical Summary:      Patient admitted to [facility] from [facility] ED. Patient was pink slipped by JUANITO. During assessment patient maintained adequate eye contact, was cooperative and consistent in answering questions.  Patient denied SI/HI and also denied A/V hallucinations    Electronically signed by Renato Yost on 3/14/2020 at 10:20 AM

## 2020-03-14 NOTE — GROUP NOTE
Group Therapy Note    Date: 3/14/2020    Group Start Time: 1900  Group End Time: 1937  Group Topic: Recreational    [facility] 3W I    Lois Monroy        Group Therapy Note    Attendees: 11/16         Patient's Goal:  To participate in the 1900 Activity Group.     Notes:  Patient actively participated in the 1900 Activity Group.     Status After Intervention:  Improved    Participation Level: Interactive    Participation Quality: Attentive      Speech:  normal      Thought Process/Content: Logical      Affective Functioning: Congruent      Mood: euthymic      Level of consciousness:  Attentive      Response to Learning: Able to verbalize current knowledge/experience      Endings: None Reported    Modes of Intervention: Activity      Discipline Responsible: [address], [employer]      Signature:  Lois Monroy

## 2020-03-14 NOTE — H&P
[address] MEDICINE    HISTORY AND PHYSICAL EXAM    PATIENT NAME:  Victorino Baer    MRN:  [id number]  SERVICE DATE:  3/14/2020   SERVICE TIME:  2:35 PM    Primary Care Physician: Flores Larkin MD         SUBJECTIVE  CHIEF COMPLAINT:  Medical clear for inpatient psychiatry admission. Consult for medical H/P encounter. HPI:  This is a 60 y.o. male who is admitted to [address] [place] [address] for paranoid thoughts, not eating and not taking his medications for days pta. Denies cp sob ha rash n v d f    PAST MEDICAL HISTORY:    Past Medical History:   Diagnosis Date    Abdominal pain     Asthma     Bloating symptom     COPD (chronic obstructive pulmonary disease) (HCC)     Depression     Depression     Diarrhea     Drug-seeking behavior 02/03/2020    History of rectal bleeding     Hypercholesteremia     past trx > 5 yrs    Hypercholesteremia     Hypercholesteremia     Hypertension     meds > 3 yrs    Lung disease     RANDI on CPAP     patient relates he doesn't use cpap at this time    Osteoarthritis     left hip    Osteoarthritis     Osteoarthritis of left hip     Pituitary macroadenoma ([address]      PAST SURGICAL HISTORY:    Past Surgical History:   Procedure Laterality Date    COLONOSCOPY  5/2/14    [place]    COLONOSCOPY N/A 12/12/2019    COLONOSCOPY DIAGNOSTIC performed by Cedrick Griffin MD at [address], COLON, DIAGNOSTIC      EYE SURGERY Bilateral     cataracts   [address] ?     index finger laceration repair    HERNIA REPAIR  2004    [facility]air [facility]     JOINT REPLACEMENT Left     left hip    [place] COLON CA SCRN NOT HI RSK IND N/A 5/17/2017    COLONOSCOPY performed by Juan Jain MD at [address] Left 4/22/2019    LEFT HIP TOTAL HIP ARTHROPLASTY, LATERAL DECUB, GRACIE performed by Fredis Rai MD at [address] [facility]  12/12/2019    EGD DIAGNOSTIC ONLY performed by Cedrick Griffin

## 2020-03-14 NOTE — H&P
Department of Psychiatry  History and Physical - Adult     CHIEF COMPLAINT:  Psychosis    History obtained from:  patient    Patient was seen after discussing with the treatment team and reviewing the chart\    HISTORY OF PRESENT ILLNESS:      The patient is a 60 y.o. male with significant past history of Schizoaffective disorder  Pt presenting again with paranoid thoughts  Pt believe that someone is putting holes through the wall  Gas through the pipe, see car being parked outside by a stranger  People are planning to plot against him, poisoning him, afraid of eating  People following him wherever he goes  Denies drinking lot of fluids  Na level was 133 on admission  Pt live with his wife     The patient is not currently receiving care for the above psychiatric illness.     Medications Prior to Admission:   Medications Prior to Admission: OLANZapine (ZYPREXA) 10 MG tablet, Take 1 tablet by mouth nightly  furosemide (LASIX) 40 MG tablet, Take 40 mg by mouth daily  melatonin 3 MG TABS tablet, Take 10 mg by mouth nightly as needed  meloxicam (MOBIC) 15 MG tablet, Take 15 mg by mouth daily  potassium chloride (MICRO-K) 10 MEQ extended release capsule, Take 10 mEq by mouth daily  simvastatin (ZOCOR) 40 MG tablet, Take 40 mg by mouth nightly  nabumetone (RELAFEN) 750 MG tablet, Take 750 mg by mouth daily  fenofibrate 160 MG tablet, Take 150 mg by mouth daily  lisinopril (PRINIVIL;ZESTRIL) 20 MG tablet, Take 2 tablets by mouth daily  hydrochlorothiazide (HYDRODIURIL) 25 MG tablet, Take 1 tablet by mouth daily  cabergoline (DOSTINEX) 0.5 MG tablet, Take 1 tablet by mouth Twice a Week ON Monday and Thursday  acetaminophen (APAP EXTRA STRENGTH) 500 MG tablet, Take 1 tablet by mouth every 6 hours as needed for Pain  aspirin 81 MG EC tablet, Take 1 tablet by mouth 2 times daily  Respiratory Therapy Supplies SANDRA, Full face CPAP mask and supplies  CPAP Machine MISC, by Does not apply route New CPAP with 6 cm  albuterol (PROVENTIL) (2.5 MG/3ML) 0.083% nebulizer solution, Take 3 mLs by nebulization every 6 hours as needed for Wheezing  STIOLTO RESPIMAT 2.5-2.5 MCG/ACT AERS, Inhale 2 puff in AM    Compliance:no    Psychiatric Review of Systems       Depression: no     Annalisa or Hypomania:  no     Panic Attacks:  no     Phobias:  no     Obsessions and Compulsions:  no     PTSD : no     Hallucinations:  yes     Delusions:  yes     Substance Abuse History:  ETOH: no   Marijuana: no  Opiates: no  Other Drugs: no      Past Psychiatric History:  Prior Diagnosis:  SAD  Psychiatrist: calin  Therapist:calin  Hospitalization: yes  Hx of Suicidal Attempts: no  Hx of violence:  no  ECT: no  Previous discontinued Psychiatric Med Trials:     Past Medical History:        Diagnosis Date    Abdominal pain     Asthma     Bloating symptom     COPD ([facility]ronic obstructive pulmonary disease) [address] [address]     Depression     Depression     Diarrhea     Drug-seeking behavior 02/03/2020    History of rectal bleeding     Hypercholesteremia     past trx > 5 yrs    Hypercholesteremia     Hypercholesteremia     Hypertension     meds > 3 yrs    Lung disease     RANDI on CPAP     patient relates he doesn't use cpap at this time    Osteoarthritis     left hip    Osteoarthritis     Osteoarthritis of left hip     Pituitary macroadenoma [facility])        Past Surgical History:        Procedure Laterality Date    COLONOSCOPY  5/2/14    JENNIFER    COLONOSCOPY N/A 12/12/2019    COLONOSCOPY DIAGNOSTIC performed by Dudley Zavala MD at [address], COLON, DIAGNOSTIC      EYE SURGERY Bilateral     cataracts   [address] [facility] [address] 1999 ?     index finger laceration repair    HERNIA REPAIR  2004    repair [facility]     JOINT REPLACEMENT Left     left hip    [place] COLON CA SCRN NOT HI RSK IND N/A 5/17/2017    COLONOSCOPY performed by Jarad Archer MD at [address] [facility] Left 4/22/2019    LEFT HIP TOTAL HIP ARTHROPLASTY, LATERAL DECUB, GRACIE performed by Justice Boateng MD at [address]  12/12/2019    EGD DIAGNOSTIC ONLY performed by Hugh Vieyra MD at [facility]       Allergies:   Patient has no known allergies. Family History  Family History   Problem Relation Age of Onset    Colon Cancer Mother     High Blood Pressure Father     Diabetes Sister     No Known Problems Brother     No Known Problems Brother     No Known Problems Brother     Other Brother         Sepsis    Other Brother         Killed    No Known Problems Sister     No Known Problems Sister     No Known Problems Son          Social History:  Born and Raised: [place]  Describes Childhood:   supportive  Education: Grade School  Employment: Disabled  Relationships:  but   Children: 2  Current Support: poor    Legal Hx: none  Access to weapons?:  No      EXAMINATION:    REVIEW OF SYSTEMS:    ROS:  [x] All negative/unchanged except if checked.  Explain positive(checked items) below:  [] Constitutional  [] Eyes  [] Ear/Nose/Mouth/Throat  [] Respiratory  [] CV  [] GI  []   [] Musculoskeletal  [] Skin/Breast  [] Neurological  [] Endocrine  [] Heme/Lymph  [] Allergic/Immunologic    Explanation:     Vitals:  BP (!) 183/108   Pulse 85   Temp 97 °F (36.1 °C)   Resp 20   Ht 5' 5\" (1.651 m)   Wt 218 lb (98.9 kg)   SpO2 96%   BMI 36.28 kg/m²      Neurologic Exam:   Muscle Strength & Tone: full ROM  Gait: normal gait   Involuntary Movements: No    Mental Status Examination:    Level of consciousness:  within normal limits   Appearance:  ill-appearing  Behavior/Motor:  responding to internal stimuli  Attitude toward examiner:  evasive and guarded  Speech:  slow   Mood: dysthymic  Affect:  mood incongruent  Thought processes:  slow   Thought content:  Delusions:  paranoid  Perceptual Disturbance:  auditory  Cognition:  oriented to person, place, and time   Concentration poor  Memory intact  Insight poor Judgement poor   Fund of Knowledge limited    Mini Mental Status 30/30      DIAGNOSIS:     Schizoaffective disorder bipolar    Psychogenic polydypsia       LABS: REVIEWED TODAY:  Recent Labs     03/13/20  1223   WBC 7.9   HGB 13.6*        Recent Labs     03/13/20  1223   *   K 4.5   CL 94*   CO2 28   BUN 6*   CREATININE 0.68*   GLUCOSE 88     Recent Labs     03/13/20  1223   BILITOT 0.5   ALKPHOS 61   AST 37   ALT 26     Lab Results   Component Value Date    LABAMPH Neg 03/13/2020    BARBSCNU Neg 03/13/2020    LABBENZ Neg 03/13/2020    LABMETH Neg 03/13/2020    OPIATESCREENURINE Neg 03/13/2020    PHENCYCLIDINESCREENURINE Neg 03/13/2020    ETOH <10 03/13/2020     Lab Results   Component Value Date    TSH 1.150 03/13/2020     No results found for: LITHIUM  No results found for: VALPROATE, CBMZ  No results found for: LITHIUM, VALPROATE    FURTHER LABS ORDERED :      Radiology   Ct Head Without Contrast    Result Date: 2/18/2020  CT HEAD WO CONTRAST CLINICAL HISTORY:  hallucinations, schizophrenia off meds but need of medical clearance Comparison: November 10, 2019. Clinical note: Please see MRI report from [facility] for additional details. TECHNIQUE: Multiple unenhanced serial axial images of the brain from the vertex of the skull to the base of the skull were performed. Exam is limited as coronal and sagittal reconstructions could not be performed due to CT malfunction. FINDINGS: The ventricles are unchanged in size configuration. . 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 It measures approximately 3.4 x 2.4 cm in the transverse and AP dimension. The cephalocaudad dimension cannot be assessed. Please see CT scan November 10, 2019 sagittal images were refluxed and. The No midline shift. The cisterns are patent. No acute extra-axial findings The visualized osseous structures are unremarkable. The visualized portion of the paranasal sinuses, and mastoids are unremarkable. that this patient's inpatient psychiatric hospital admission is medically necessary for:     (1) treatment which could reasonably be expected to improve this patient's condition, or     (2) diagnostic study or its equivalent.        Electronically signed by Esa Marino MD on 3/14/2020 at 9:43 AM

## 2020-03-14 NOTE — CARE COORDINATION
Patient did not attend group despite staff encouragement.   Electronically signed by Ariel Ferreira on 3/14/2020 at 1:53 PM

## 2020-03-14 NOTE — GROUP NOTE
Group Therapy Note    Date: 3/14/2020    Group Start Time: 1600  Group End Time: 1625  Group Topic: Healthy Living/Wellness    [facility] 3W I    Kee Guthrie        Group Therapy Note    Attendees: 6/16       Patient's Goal:  To participate in the 1600 Healthy Living Group and learn about healthy coping skills. Notes:  Patient actively participated in the [address] [place] [address],[address].     Status After Intervention:  Improved    Participation Level: Interactive    Participation Quality: Appropriate      Speech:  normal      Thought Process/Content: Logical      Affective Functioning: Congruent      Mood: euthymic      Level of consciousness:  Attentive      Response to Learning: Progressing to goal      Endings: None Reported    Modes of Intervention: Education      Discipline Responsible: [address], [facility] [employer]      Signature:  Kee Guthrie

## 2020-03-14 NOTE — CARE COORDINATION
Brief Intervention and Referral to Treatment Summary    Patient was provided PHQ-9, AUDIT and DAST Screening:      PHQ-9 Score: 0  AUDIT Score:  0  DAST Score:  0    Patients substance use is considered     Low Risk/Healthy x  Moderate Risk  Harmful  Dependent    Patients depression is considered:     Minimal x  Mild   Moderate  Moderately Severe  Severe    Brief Education Was Provided    Patient was not receptive to educational materials at this time    Brief Intervention Is Provided (Only for AUDIT or DAST)     Patient denied AOD use and denies readiness to decrease and/or stop use and a plan was not discussed    Recommendations/Referrals for Brief and/or Specialized Treatment Provided to Patient     medication management, group/individual therapies, family meetings, psycho -education, treatment team meetings to assist with stabilization    Electronically signed by Johanna Tate on 3/14/2020 at 10:21 AM

## 2020-03-15 PROCEDURE — 6370000000 HC RX 637 (ALT 250 FOR IP): Performed by: PSYCHIATRY & NEUROLOGY

## 2020-03-15 PROCEDURE — 99232 SBSQ HOSP IP/OBS MODERATE 35: CPT | Performed by: PSYCHIATRY & NEUROLOGY

## 2020-03-15 PROCEDURE — 1240000000 HC EMOTIONAL WELLNESS R&B

## 2020-03-15 RX ORDER — CARBOXYMETHYLCELLULOSE SODIUM 5 MG/ML
1 SOLUTION/ DROPS OPHTHALMIC 4 TIMES DAILY PRN
Status: DISCONTINUED | OUTPATIENT
Start: 2020-03-15 | End: 2020-03-19 | Stop reason: HOSPADM

## 2020-03-15 RX ADMIN — FENOFIBRATE 160 MG: 160 TABLET ORAL at 08:36

## 2020-03-15 RX ADMIN — POTASSIUM CHLORIDE 10 MEQ: 750 TABLET, FILM COATED, EXTENDED RELEASE ORAL at 08:35

## 2020-03-15 RX ADMIN — ATORVASTATIN CALCIUM 40 MG: 40 TABLET, FILM COATED ORAL at 08:36

## 2020-03-15 RX ADMIN — HYDROCHLOROTHIAZIDE 25 MG: 25 TABLET ORAL at 08:36

## 2020-03-15 RX ADMIN — ACETAMINOPHEN 650 MG: 325 TABLET ORAL at 04:04

## 2020-03-15 RX ADMIN — HYDROXYZINE PAMOATE 50 MG: 50 CAPSULE ORAL at 04:06

## 2020-03-15 RX ADMIN — FUROSEMIDE 40 MG: 20 TABLET ORAL at 08:36

## 2020-03-15 RX ADMIN — Medication 10 MG: at 22:53

## 2020-03-15 RX ADMIN — PALIPERIDONE 3 MG: 3 TABLET, EXTENDED RELEASE ORAL at 08:35

## 2020-03-15 RX ADMIN — LISINOPRIL 40 MG: 20 TABLET ORAL at 08:35

## 2020-03-15 RX ADMIN — ASPIRIN 81 MG: 81 TABLET, COATED ORAL at 08:35

## 2020-03-15 RX ADMIN — ASPIRIN 81 MG: 81 TABLET, COATED ORAL at 20:38

## 2020-03-15 RX ADMIN — MELOXICAM 15 MG: 7.5 TABLET ORAL at 08:36

## 2020-03-15 RX ADMIN — TRAZODONE HYDROCHLORIDE 50 MG: 50 TABLET ORAL at 22:54

## 2020-03-15 RX ADMIN — CELECOXIB 200 MG: 200 CAPSULE ORAL at 08:35

## 2020-03-15 ASSESSMENT — PAIN SCALES - GENERAL
PAINLEVEL_OUTOF10: 10
PAINLEVEL_OUTOF10: 5

## 2020-03-15 NOTE — PROGRESS NOTES
Pt c/o pain above right eye, left ear, right leg. Pt requesting opiates. Pt irritable when explained to pt that there is tylenol or motrin available. Pt states they do not work. Encouraged pt to try medication available. Pt accepted PRN tylenol 650 mg PO and PRN vistaril 50 mg PO @ 0406 for pains and for anxiety. Pt then states \"thank you for nothing\" and walks away from desk.

## 2020-03-15 NOTE — PROGRESS NOTES
within normal limits   Appearance:  fair grooming and fair hygiene  Behavior/Motor:  no abnormalities noted  Attitude toward examiner:  guarded  Speech:  normal rate   Mood: anxious  Affect:  reactive  Thought processes:  Disorganized  Paranoid ++  Thought content:  Suicidal Ideation:  denies suicidal ideation  Cognition:  oriented to person, place, and time   Concentration intact  Insight fair   Judgement fair     ASSESSMENT:   Patient symptoms are:  [] Well controlled  [] Improving  [] Worsening  [] No change      Diagnosis:   Active Problems:    Schizophrenia ([facility] [address]  Resolved Problems:    * No resolved hospital problems. *      LABS:    Recent Labs     03/13/20  1223   WBC 7.9   HGB 13.6*        Recent Labs     03/13/20  1223 03/14/20  1401   * 137   K 4.5 4.6   CL 94* 98   CO2 28 26   BUN 6* 12   CREATININE 0.68* 0.77   GLUCOSE 88 104*     Recent Labs     03/13/20  1223   BILITOT 0.5   ALKPHOS 61   AST 37   ALT 26     Lab Results   Component Value Date    LABAMPH Neg 03/13/2020    BARBSCNU Neg 03/13/2020    LABBENZ Neg 03/13/2020    LABMETH Neg 03/13/2020    OPIATESCREENURINE Neg 03/13/2020    PHENCYCLIDINESCREENURINE Neg 03/13/2020    ETOH <10 03/13/2020     Lab Results   Component Value Date    TSH 1.150 03/13/2020     No results found for: LITHIUM  No results found for: VALPROATE, CBMZ    RISK ASSESSMENT:     Treatment Plan:  Reviewed current Medications with the patient. Medication as ordered  Risks, benefits, side effects, drug-to-drug interactions and alternatives to treatment were discussed. Collateral information:   CD evaluation  Encourage patient to attend group and other milieu activities.   Discharge planning discussed with the patient and treatment team.    PSYCHOTHERAPY/COUNSELING:  [x] Therapeutic interview  [x] Supportive  [] CBT  [] Ongoing  [] Other    [x] Patient continues to need, on a daily basis, active treatment furnished directly by or requiring the supervision of inpatient psychiatric personnel      Anticipated Length of stay:            Electronically signed by Tal Wray MD on 3/15/2020 at 11:10 AM

## 2020-03-15 NOTE — PROGRESS NOTES
Patient accepted PRN motrin for left leg pain 10/10 and PRN trazodone for sleep.  Electronically signed by Isabel Caballero RN on 3/14/20 at 9:41 PM EDT

## 2020-03-15 NOTE — PROGRESS NOTES
60year old male who is complaining of having dry eyes. He is asking for lubricating eyed drops    1. Dry eyes-will order refresh eye gtt  2. Asthma/COPD  3. Tobacco abuse  4.  HTN      Tonaernon Stager AMY

## 2020-03-15 NOTE — CARE COORDINATION
3/15/2020 @ [facility] -  approached patient regarding completing collateral contact. When asked about family members to call, patient stated he did not have anyone for  to reach out to on his behalf. Patient did offer to have his [facility] CM contacted. He was not sure of the name and suggested  staff call to retrieve the name/contact information. As a result, no collateral contact could be completed at this time.      Electronically signed by Arun Mccracken on 3/15/2020 at 1:03 PM

## 2020-03-15 NOTE — GROUP NOTE
Group Therapy Note    Date: 3/15/2020    Group Start Time: 1000  Group End Time: 1100  Group Topic: Psychoeducation    [facility] WILLIAN Carrington, CTRS        Group Therapy Note    Attendees: 12         Patient's Goal:  To get help    Notes:  Pt. attended the 1000 group. Worked on project with interest. Followed directions fairly well. Engaged in the group discussion especially with other Danish speaking pts. Status After Intervention:  Improved    Participation Level:  Active Listener and Interactive    Participation Quality: Appropriate, Attentive and Sharing      Speech:  normal      Thought Process/Content: Logical      Affective Functioning: Congruent      Mood: calm      Level of consciousness:  Alert, Oriented x4 and Attentive      Response to Learning: Able to verbalize current knowledge/experience and Progressing to goal      Endings: None Reported    Modes of Intervention: Education, Support, Socialization and Activity      Discipline Responsible: Psychoeducational Specialist      Signature:  Michelle Wynn

## 2020-03-15 NOTE — PROGRESS NOTES
Pt calm and cooperative with care, pt reports he has no problems and can go home any time the Doctor is ready. Pt denies SI,HI,AVH, Denies anxiety and depression. Pt social with peers. Good eye contact with assessment. Pt reports eating well, no excessive fluids noted by staff.

## 2020-03-16 PROCEDURE — 6370000000 HC RX 637 (ALT 250 FOR IP): Performed by: PSYCHIATRY & NEUROLOGY

## 2020-03-16 PROCEDURE — 99232 SBSQ HOSP IP/OBS MODERATE 35: CPT | Performed by: PSYCHIATRY & NEUROLOGY

## 2020-03-16 PROCEDURE — 1240000000 HC EMOTIONAL WELLNESS R&B

## 2020-03-16 RX ORDER — TRAZODONE HYDROCHLORIDE 100 MG/1
100 TABLET ORAL NIGHTLY
Status: DISCONTINUED | OUTPATIENT
Start: 2020-03-16 | End: 2020-03-17

## 2020-03-16 RX ORDER — PALIPERIDONE 6 MG/1
6 TABLET, EXTENDED RELEASE ORAL DAILY
Status: DISCONTINUED | OUTPATIENT
Start: 2020-03-17 | End: 2020-03-19 | Stop reason: HOSPADM

## 2020-03-16 RX ADMIN — CELECOXIB 200 MG: 200 CAPSULE ORAL at 09:27

## 2020-03-16 RX ADMIN — HYDROCHLOROTHIAZIDE 25 MG: 25 TABLET ORAL at 09:26

## 2020-03-16 RX ADMIN — FUROSEMIDE 40 MG: 20 TABLET ORAL at 09:26

## 2020-03-16 RX ADMIN — LISINOPRIL 40 MG: 20 TABLET ORAL at 09:26

## 2020-03-16 RX ADMIN — PALIPERIDONE 3 MG: 3 TABLET, EXTENDED RELEASE ORAL at 09:26

## 2020-03-16 RX ADMIN — ASPIRIN 81 MG: 81 TABLET, COATED ORAL at 21:39

## 2020-03-16 RX ADMIN — ATORVASTATIN CALCIUM 40 MG: 40 TABLET, FILM COATED ORAL at 09:27

## 2020-03-16 RX ADMIN — MELOXICAM 15 MG: 7.5 TABLET ORAL at 09:26

## 2020-03-16 RX ADMIN — TRAZODONE HYDROCHLORIDE 100 MG: 100 TABLET ORAL at 21:39

## 2020-03-16 RX ADMIN — FENOFIBRATE 160 MG: 160 TABLET ORAL at 09:27

## 2020-03-16 RX ADMIN — ASPIRIN 81 MG: 81 TABLET, COATED ORAL at 09:27

## 2020-03-16 ASSESSMENT — PAIN SCALES - GENERAL: PAINLEVEL_OUTOF10: 6

## 2020-03-16 NOTE — GROUP NOTE
Group Therapy Note    Date: 3/16/2020    Group Start Time: 1000  Group End Time: 1100  Group Topic: Psychoeducation    [facility] I    Rosangela Rivas        Group Therapy Note    Attendees: 11         Patient's Goal:  \"Talk to the Doctor about my medicine\"    Notes:  Patient was talkative and expressive in group. Patient work adequately on his project.     Status After Intervention:  Unchanged    Participation Level: Adequate    Participation Quality: Appropriate      Speech:  Talkative      Thought Process/Content: Linear      Affective Functioning: Flat      Mood: calm      Level of consciousness:  Alert      Response to Learning: Progressing to goal      Endings: None Reported    Modes of Intervention: Education, Socialization and Activity      Discipline Responsible: Psychoeducational Specialist      Signature:  Rosangela Rivas

## 2020-03-16 NOTE — FLOWSHEET NOTE
Pt is out and social with peers. Pt somatic when speaking with him and continues to have various medication requests. Pt denies SI, HI, and AVH. Pt continues to have depression and anxiety. Pt very demanding at times with requests. Pt ate well for meals and drinking adequate. Pt attends groups. Pt continues to be paranoid. Pt educated to wash hands and cover mouth if coughing or sneezing. Pt reports that has not slept well for quite sometime, pt slept 2 hrs last night.

## 2020-03-16 NOTE — PROGRESS NOTES
BEHAVIORAL HEALTH FOLLOW-UP NOTE     3/16/2020     Patient was seen and examined in person, Chart reviewed   Patient's case discussed with staff/team    Chief Complaint: Psychosis    Interim History:     Minimal change in presentation  Pt is feeling less paranoid  Poor sleep last night according to patient  Trazodone 50 mg is not working  Pt seen interacting with other peers  Pt thoughts are still disorganized  Appetite:   [] Normal/Unchanged  [] Increased  [x] Decreased      Sleep:       [] Normal/Unchanged  [] Fair       [x] Poor              Energy:    [] Normal/Unchanged  [x] Increased  [] Decreased        SI [] Present  [] Absent    HI  []Present  [] Absent     Aggression:  [] yes  [] no    Patient is [] able  [x] unable to CONTRACT FOR SAFETY     PAST MEDICAL/PSYCHIATRIC HISTORY:   Past Medical History:   Diagnosis Date    Abdominal pain     Asthma     Bloating symptom     COPD [facility]chronic obstructive pulmonary disease) ([facility] [address]     Depression     Depression     Diarrhea     Drug-seeking behavior 02/03/2020    History of rectal bleeding     Hypercholesteremia     past trx > 5 yrs    Hypercholesteremia     Hypercholesteremia     Hypertension     meds > 3 yrs    Lung disease     RANID on CPAP     patient relates he doesn't use cpap at this time    Osteoarthritis     left hip    Osteoarthritis     Osteoarthritis of left hip     Pituitary macroadenoma ([facility] [address]        FAMILY/SOCIAL HISTORY:  Family History   Problem Relation Age of Onset    Colon Cancer Mother     High Blood Pressure Father     Diabetes Sister     No Known Problems Brother     No Known Problems Brother     No Known Problems Brother     Other Brother         Sepsis    Other Brother         Killed    No Known Problems Sister     No Known Problems Sister     No Known Problems Son      Social History     Socioeconomic History    Marital status:      Spouse name: Not on file    Number of children: Not on file    Years of education: Not on file    Highest education level: Not on file   Occupational History    Not on file   Social Needs    Financial resource strain: Not on file    Food insecurity     Worry: Not on file     Inability: Not on file    Transportation needs     Medical: Not on file     Non-medical: Not on file   Tobacco Use    Smoking status: Former Smoker     Packs/day: 1.50     Years: 46.00     Pack years: 69.00     Types: Cigarettes     Last attempt to quit: 3/20/2019     Years since quittin.9    Smokeless tobacco: Never Used   Substance and Sexual Activity    Alcohol use: Yes    Drug use: No    Sexual activity: Not on file   Lifestyle    Physical activity     Days per week: Not on file     Minutes per session: Not on file    Stress: Not on file   Relationships    Social connections     Talks on phone: Not on file     Gets together: Not on file     Attends Sabianism service: Not on file     Active member of club or organization: Not on file     Attends meetings of clubs or organizations: Not on file     Relationship status: Not on file    Intimate partner violence     Fear of current or ex partner: Not on file     Emotionally abused: Not on file     Physically abused: Not on file     Forced sexual activity: Not on file   Other Topics Concern    Not on file   Social History Narrative    Not on file           ROS:  [x] All negative/unchanged except if checked.  Explain positive(checked items) below:  [] Constitutional  [] Eyes  [] Ear/Nose/Mouth/Throat  [] Respiratory  [] CV  [] GI  []   [] Musculoskeletal  [] Skin/Breast  [] Neurological  [] Endocrine  [] Heme/Lymph  [] Allergic/Immunologic    Explanation:     MEDICATIONS:    Current Facility-Administered Medications:     [START ON 3/17/2020] paliperidone (INVEGA) extended release tablet 6 mg, 6 mg, Oral, Daily, Inés Pendleton MD    traZODone (DESYREL) tablet 100 mg, 100 mg, Oral, Nightly, Inés Pendleton MD    carboxymethylcellulose PF

## 2020-03-16 NOTE — PROGRESS NOTES
Group Therapy Note    Date: 3/16/2020  Start Time:1425  End Time:  1500    Number of Participants: 8    Type of Group: Cognitive Skills    Patient's Goal:  To participate in mood management group. Notes: Patient declined to attend psychoeducation group [id number] despite encouragement by staff.      Discipline Responsible: /Counselor    YAMILKA Newton

## 2020-03-17 PROCEDURE — 6370000000 HC RX 637 (ALT 250 FOR IP): Performed by: PHYSICIAN ASSISTANT

## 2020-03-17 PROCEDURE — 6370000000 HC RX 637 (ALT 250 FOR IP): Performed by: PSYCHIATRY & NEUROLOGY

## 2020-03-17 PROCEDURE — 1240000000 HC EMOTIONAL WELLNESS R&B

## 2020-03-17 PROCEDURE — 99232 SBSQ HOSP IP/OBS MODERATE 35: CPT | Performed by: PSYCHIATRY & NEUROLOGY

## 2020-03-17 RX ORDER — ZOLPIDEM TARTRATE 5 MG/1
5 TABLET ORAL NIGHTLY
Status: DISCONTINUED | OUTPATIENT
Start: 2020-03-17 | End: 2020-03-19 | Stop reason: HOSPADM

## 2020-03-17 RX ADMIN — HYDROCHLOROTHIAZIDE 25 MG: 25 TABLET ORAL at 08:31

## 2020-03-17 RX ADMIN — CARBOXYMETHYLCELLULOSE SODIUM 1 DROP: 0.5 SOLUTION/ DROPS OPHTHALMIC at 12:55

## 2020-03-17 RX ADMIN — PALIPERIDONE 6 MG: 6 TABLET, FILM COATED, EXTENDED RELEASE ORAL at 08:31

## 2020-03-17 RX ADMIN — LISINOPRIL 40 MG: 20 TABLET ORAL at 08:31

## 2020-03-17 RX ADMIN — ASPIRIN 81 MG: 81 TABLET, COATED ORAL at 08:32

## 2020-03-17 RX ADMIN — FUROSEMIDE 40 MG: 20 TABLET ORAL at 08:31

## 2020-03-17 RX ADMIN — CELECOXIB 200 MG: 200 CAPSULE ORAL at 08:31

## 2020-03-17 RX ADMIN — ZOLPIDEM TARTRATE 5 MG: 5 TABLET ORAL at 22:14

## 2020-03-17 RX ADMIN — POTASSIUM CHLORIDE 10 MEQ: 750 TABLET, FILM COATED, EXTENDED RELEASE ORAL at 08:31

## 2020-03-17 RX ADMIN — CARBOXYMETHYLCELLULOSE SODIUM 1 DROP: 0.5 SOLUTION/ DROPS OPHTHALMIC at 05:55

## 2020-03-17 RX ADMIN — ASPIRIN 81 MG: 81 TABLET, COATED ORAL at 21:22

## 2020-03-17 RX ADMIN — CARBOXYMETHYLCELLULOSE SODIUM 1 DROP: 0.5 SOLUTION/ DROPS OPHTHALMIC at 20:15

## 2020-03-17 RX ADMIN — ACETAMINOPHEN 650 MG: 325 TABLET ORAL at 05:58

## 2020-03-17 RX ADMIN — FENOFIBRATE 160 MG: 160 TABLET ORAL at 08:31

## 2020-03-17 RX ADMIN — MELOXICAM 15 MG: 7.5 TABLET ORAL at 08:32

## 2020-03-17 RX ADMIN — ATORVASTATIN CALCIUM 40 MG: 40 TABLET, FILM COATED ORAL at 08:32

## 2020-03-17 ASSESSMENT — PAIN SCALES - GENERAL
PAINLEVEL_OUTOF10: 7
PAINLEVEL_OUTOF10: 9
PAINLEVEL_OUTOF10: 10

## 2020-03-17 NOTE — PROGRESS NOTES
Patient out in unit, socializing with peers, bright and cooperative. Says he hasn't been sleeping for awhile because of the leg pain. Denies all. Patient says he feels safe here. Still says that there is something wrong with his place he stays,  Wants another place,  Is seperated from his wife.

## 2020-03-17 NOTE — GROUP NOTE
Group Therapy Note    Date: 3/17/2020    Group Start Time: 1000  Group End Time: 1100  Group Topic: Psychoeducation    [facility]    Dimple Telles        Group Therapy Note    Attendees: 10         Patient's Goal:  \"To get on the right meds\"    Notes:  Patient was anxious but he was able to focus better on his task and participated fairly in group.     Status After Intervention:  Improved    Participation Level: Adequate    Participation Quality: Appropriate      Speech:  quieter      Thought Process/Content: Linear      Affective Functioning: Fair      Mood: Anxious      Level of consciousness:  Alert      Response to Learning: Progressing to goal      Endings: None Reported    Modes of Intervention: Education, Socialization and Activity      Discipline Responsible: Psychoeducational Specialist      Signature:  Zack Hernandes

## 2020-03-17 NOTE — PROGRESS NOTES
 Highest education level: Not on file   Occupational History    Not on file   Social Needs    Financial resource strain: Not on file    Food insecurity     Worry: Not on file     Inability: Not on file    Transportation needs     Medical: Not on file     Non-medical: Not on file   Tobacco Use    Smoking status: Former Smoker     Packs/day: 1.50     Years: 46.00     Pack years: 69.00     Types: Cigarettes     Last attempt to quit: 3/20/2019     Years since quittin.9    Smokeless tobacco: Never Used   Substance and Sexual Activity    Alcohol use: Yes    Drug use: No    Sexual activity: Not on file   Lifestyle    Physical activity     Days per week: Not on file     Minutes per session: Not on file    Stress: Not on file   Relationships    Social connections     Talks on phone: Not on file     Gets together: Not on file     Attends Evangelical service: Not on file     Active member of club or organization: Not on file     Attends meetings of clubs or organizations: Not on file     Relationship status: Not on file    Intimate partner violence     Fear of current or ex partner: Not on file     Emotionally abused: Not on file     Physically abused: Not on file     Forced sexual activity: Not on file   Other Topics Concern    Not on file   Social History Narrative    Not on file           ROS:  [x] All negative/unchanged except if checked.  Explain positive(checked items) below:  [] Constitutional  [] Eyes  [] Ear/Nose/Mouth/Throat  [] Respiratory  [] CV  [] GI  []   [] Musculoskeletal  [] Skin/Breast  [] Neurological  [] Endocrine  [] Heme/Lymph  [] Allergic/Immunologic    Explanation:     MEDICATIONS:    Current Facility-Administered Medications:     zolpidem (AMBIEN) tablet 5 mg, 5 mg, Oral, Nightly, Flynn Delaney MD    paliperidone (INVEGA) extended release tablet 6 mg, 6 mg, Oral, Daily, Flynn Delaney MD, 6 mg at 20 0831    carboxymethylcellulose PF (REFRESH PLUS) 0.5 % ophthalmic solution 1 drop, 1 drop, Both Eyes, 4x Daily PRN, TARIK Rojas, 1 drop at 20 0555    Akwa Tears (LACRILUBE) 2-15-83 % opthalmic ointment, , Both Eyes, PRN, TARIK Leblanc    ibuprofen (ADVIL;MOTRIN) tablet 600 mg, 600 mg, Oral, Q6H PRN, TARIK Rojas, 600 mg at 20 2134    aspirin EC tablet 81 mg, 81 mg, Oral, BID, Wille Apgar, MD, 81 mg at 20 0832    fenofibrate (TRIGLIDE) tablet 160 mg, 160 mg, Oral, Daily, Wille Apgar, MD, 160 mg at 20 0831    furosemide (LASIX) tablet 40 mg, 40 mg, Oral, Daily, Wille Apgar, MD, 40 mg at 20 0831    hydroCHLOROthiazide (HYDRODIURIL) tablet 25 mg, 25 mg, Oral, Daily, Wille Apgar, MD, 25 mg at 20 0831    lisinopril (PRINIVIL;ZESTRIL) tablet 40 mg, 40 mg, Oral, Daily, Wille Apgar, MD, 40 mg at 20 0831    melatonin tablet 10 mg, 10 mg, Oral, Nightly PRN, Wille Apgar, MD, 10 mg at 03/15/20 2253    meloxicam (MOBIC) tablet 15 mg, 15 mg, Oral, Daily, Wille Apgar, MD, 15 mg at 20 3716    celecoxib (CELEBREX) capsule 200 mg, 200 mg, Oral, Daily, Wille Apgar, MD, 200 mg at 20 0831    potassium chloride (KLOR-CON) extended release tablet 10 mEq, 10 mEq, Oral, Daily, Wille Apgar, MD, 10 mEq at 20 0831    atorvastatin (LIPITOR) tablet 40 mg, 40 mg, Oral, Daily, Wille Apgar, MD, 40 mg at 20 0609    hydrOXYzine (VISTARIL) capsule 50 mg, 50 mg, Oral, Q6H PRN, 50 mg at 03/15/20 0406 **OR** hydrOXYzine (VISTARIL) injection 50 mg, 50 mg, Intramuscular, Q6H PRN, Wille Apgar, MD    haloperidol lactate (HALDOL) injection 5 mg, 5 mg, Intramuscular, Q6H PRN **OR** haloperidol (HALDOL) tablet 5 mg, 5 mg, Oral, Q6H PRN, Wille Apgar, MD    acetaminophen (TYLENOL) tablet 650 mg, 650 mg, Oral, Q4H PRN, Wille Apgar, MD, 650 mg at 20 0558    polyethylene glycol (GLYCOLAX) packet 17 g, 17 g, Oral, Daily PRN, Wille Apgar, MD      Examination:  BP (!) 146/84   Pulse 81   Temp 97 °F (36.1 °C) (Oral)   Resp 18   Ht 5' 5\" (1.651 m)   Wt 218 lb (98.9 kg)   SpO2 96%   BMI 36.28 kg/m²   Gait - steady  Medication side effects(SE): no    Mental Status Examination:    Level of consciousness:  within normal limits   Appearance:  fair grooming and fair hygiene  Behavior/Motor:  responding to internal stimuli  Attitude toward examiner:  cooperative  Speech:  slow   Mood: dysthymic  Affect:  anxious  Thought processes:  slow   Thought content:  Suicidal Ideation:  denies suicidal ideation  Delusions:  paranoid  Perceptual Disturbance:  denies any perceptual disturbance  Cognition:  oriented to person, place, and time   Concentration poor  Insight poor   Judgement poor     ASSESSMENT:   Patient symptoms are:  [] Well controlled  [] Improving  [] Worsening  [] No change      Diagnosis:   [facility] Problems:    [facility]  Resolved Problems:    * No resolved hospital problems. *      LABS:    No results for input(s): WBC, HGB, PLT in the last 72 hours. Recent Labs     03/14/20  1401      K 4.6   CL 98   CO2 26   BUN 12   CREATININE 0.77   GLUCOSE 104*     No results for input(s): BILITOT, ALKPHOS, AST, ALT in the last 72 hours. Lab Results   Component Value Date    LABAMPH Neg 03/13/2020    BARBSCNU Neg 03/13/2020    LABBENZ Neg 03/13/2020    LABMETH Neg 03/13/2020    OPIATESCREENURINE Neg 03/13/2020    PHENCYCLIDINESCREENURINE Neg 03/13/2020    ETOH <10 03/13/2020     Lab Results   Component Value Date    TSH 1.150 03/13/2020     No results found for: LITHIUM  No results found for: VALPROATE, CBMZ      Treatment Plan:  Reviewed current Medications with the patient. Increase Invega to 6 mg  D/C trazodone  Ambien for sleep started    Risks, benefits, side effects, drug-to-drug interactions and alternatives to treatment were discussed.   Collateral information: pending  CD evaluation  Encourage patient to attend group and other

## 2020-03-17 NOTE — CARE COORDINATION
Patient did not attend group despite staff encouragement.   Electronically signed by Luz Marina Mccray on 3/17/2020 at 12:06 PM

## 2020-03-17 NOTE — PROGRESS NOTES
Patient did not attend wrap up/relaxation group despite staff encouragement.  Electronically signed by [employer] on 3/16/2020 at 9:30 PM

## 2020-03-17 NOTE — PROGRESS NOTES
Pt bright on approach, cooperative with assessment. Denies SI/HI/AVH at this time. Expresses he has been sleeping poorly due to pain, but is ok otherwise. Denies depression and anxiety.

## 2020-03-18 LAB
ANION GAP SERPL CALCULATED.3IONS-SCNC: 13 MEQ/L (ref 9–15)
BILIRUBIN URINE: NEGATIVE
BLOOD, URINE: NEGATIVE
BUN BLDV-MCNC: 15 MG/DL (ref 8–23)
CALCIUM SERPL-MCNC: 9.5 MG/DL (ref 8.5–9.9)
CHLORIDE BLD-SCNC: 89 MEQ/L (ref 95–107)
CLARITY: CLEAR
CO2: 24 MEQ/L (ref 20–31)
COLOR: YELLOW
CORTISOL - AM: 4.9 UG/DL (ref 6.2–19.4)
CREAT SERPL-MCNC: 0.72 MG/DL (ref 0.7–1.2)
CREATININE URINE: 33.3 MG/DL
GFR AFRICAN AMERICAN: >60
GFR NON-AFRICAN AMERICAN: >60
GLUCOSE BLD-MCNC: 100 MG/DL (ref 70–99)
GLUCOSE URINE: NEGATIVE MG/DL
KETONES, URINE: NEGATIVE MG/DL
LEUKOCYTE ESTERASE, URINE: NEGATIVE
NITRITE, URINE: NEGATIVE
OSMOLALITY URINE: 205 MOSM/KG (ref 300–900)
OSMOLALITY: 269 MOSM/KG (ref 280–303)
PH UA: 7 (ref 5–9)
POTASSIUM SERPL-SCNC: 4.8 MEQ/L (ref 3.4–4.9)
PROTEIN UA: NEGATIVE MG/DL
SODIUM BLD-SCNC: 126 MEQ/L (ref 135–144)
SODIUM URINE: 26 MEQ/L
SPECIFIC GRAVITY UA: 1.01 (ref 1–1.03)
UROBILINOGEN, URINE: 0.2 E.U./DL

## 2020-03-18 PROCEDURE — 81003 URINALYSIS AUTO W/O SCOPE: CPT

## 2020-03-18 PROCEDURE — 82570 ASSAY OF URINE CREATININE: CPT

## 2020-03-18 PROCEDURE — 6370000000 HC RX 637 (ALT 250 FOR IP): Performed by: PSYCHIATRY & NEUROLOGY

## 2020-03-18 PROCEDURE — 6370000000 HC RX 637 (ALT 250 FOR IP): Performed by: PHYSICIAN ASSISTANT

## 2020-03-18 PROCEDURE — 83930 ASSAY OF BLOOD OSMOLALITY: CPT

## 2020-03-18 PROCEDURE — 1240000000 HC EMOTIONAL WELLNESS R&B

## 2020-03-18 PROCEDURE — 99232 SBSQ HOSP IP/OBS MODERATE 35: CPT | Performed by: PSYCHIATRY & NEUROLOGY

## 2020-03-18 PROCEDURE — 84300 ASSAY OF URINE SODIUM: CPT

## 2020-03-18 PROCEDURE — 36415 COLL VENOUS BLD VENIPUNCTURE: CPT

## 2020-03-18 PROCEDURE — 6370000000 HC RX 637 (ALT 250 FOR IP): Performed by: INTERNAL MEDICINE

## 2020-03-18 PROCEDURE — 82533 TOTAL CORTISOL: CPT

## 2020-03-18 PROCEDURE — 83935 ASSAY OF URINE OSMOLALITY: CPT

## 2020-03-18 PROCEDURE — 80048 BASIC METABOLIC PNL TOTAL CA: CPT

## 2020-03-18 RX ORDER — TOLVAPTAN 15 MG/1
15 TABLET ORAL ONCE
Status: COMPLETED | OUTPATIENT
Start: 2020-03-18 | End: 2020-03-18

## 2020-03-18 RX ORDER — PALIPERIDONE 6 MG/1
6 TABLET, EXTENDED RELEASE ORAL DAILY
Qty: 30 TABLET | Refills: 1 | Status: ON HOLD | OUTPATIENT
Start: 2020-03-19 | End: 2022-07-22

## 2020-03-18 RX ORDER — SODIUM CHLORIDE 9 MG/ML
INJECTION, SOLUTION INTRAVENOUS CONTINUOUS
Status: DISCONTINUED | OUTPATIENT
Start: 2020-03-18 | End: 2020-03-18

## 2020-03-18 RX ORDER — ZOLPIDEM TARTRATE 5 MG/1
5 TABLET ORAL NIGHTLY
Qty: 14 TABLET | Refills: 0 | Status: SHIPPED | OUTPATIENT
Start: 2020-03-18 | End: 2020-04-01

## 2020-03-18 RX ADMIN — POTASSIUM CHLORIDE 10 MEQ: 750 TABLET, FILM COATED, EXTENDED RELEASE ORAL at 08:39

## 2020-03-18 RX ADMIN — CARBOXYMETHYLCELLULOSE SODIUM 1 DROP: 0.5 SOLUTION/ DROPS OPHTHALMIC at 07:06

## 2020-03-18 RX ADMIN — ASPIRIN 81 MG: 81 TABLET, COATED ORAL at 08:39

## 2020-03-18 RX ADMIN — IBUPROFEN 600 MG: 600 TABLET, FILM COATED ORAL at 21:03

## 2020-03-18 RX ADMIN — ASPIRIN 81 MG: 81 TABLET, COATED ORAL at 21:04

## 2020-03-18 RX ADMIN — ATORVASTATIN CALCIUM 40 MG: 40 TABLET, FILM COATED ORAL at 08:39

## 2020-03-18 RX ADMIN — LISINOPRIL 40 MG: 20 TABLET ORAL at 08:38

## 2020-03-18 RX ADMIN — FUROSEMIDE 40 MG: 20 TABLET ORAL at 08:39

## 2020-03-18 RX ADMIN — TOLVAPTAN 15 MG: 15 TABLET ORAL at 14:41

## 2020-03-18 RX ADMIN — CELECOXIB 200 MG: 200 CAPSULE ORAL at 08:39

## 2020-03-18 RX ADMIN — HYDROCHLOROTHIAZIDE 25 MG: 25 TABLET ORAL at 08:38

## 2020-03-18 RX ADMIN — FENOFIBRATE 160 MG: 160 TABLET ORAL at 08:38

## 2020-03-18 RX ADMIN — MELOXICAM 15 MG: 7.5 TABLET ORAL at 08:38

## 2020-03-18 RX ADMIN — ZOLPIDEM TARTRATE 5 MG: 5 TABLET ORAL at 21:04

## 2020-03-18 RX ADMIN — PALIPERIDONE 6 MG: 6 TABLET, FILM COATED, EXTENDED RELEASE ORAL at 08:38

## 2020-03-18 ASSESSMENT — ENCOUNTER SYMPTOMS
ABDOMINAL DISTENTION: 0
SHORTNESS OF BREATH: 0
EYE DISCHARGE: 0

## 2020-03-18 ASSESSMENT — PAIN SCALES - GENERAL
PAINLEVEL_OUTOF10: 9
PAINLEVEL_OUTOF10: 6
PAINLEVEL_OUTOF10: 2

## 2020-03-18 NOTE — PROGRESS NOTES
Denies [address] Pt demanding this morning stating, \"nurse, you work for me, get me a phone. \" Pt was supposed to be discharged today however sodium was low. Dr. Diana Loya notified and orders placed. Taty Bassett also here to see pt and new orders placed/previous orders reviewed. Awaiting pt to urinate to send to lab. No concerns at this time. Will continue to monitor.

## 2020-03-18 NOTE — DISCHARGE SUMMARY
Supplies SANDRA, Full face CPAP mask and supplies  CPAP Machine MISC, by Does not apply route New CPAP with 6 cm  albuterol (PROVENTIL) (2.5 MG/3ML) 0.083% nebulizer solution, Take 3 mLs by nebulization every 6 hours as needed for Wheezing  STIOLTO RESPIMAT 2.5-2.5 MCG/ACT AERS, Inhale 2 puff in AM     Compliance:no     Psychiatric Review of Systems       Depression: no     Annalisa or Hypomania:  no     Panic Attacks:  no     Phobias:  no     Obsessions and Compulsions:  no     PTSD : no     Hallucinations:  yes     Delusions:  yes      Substance Abuse History:  ETOH: no   Marijuana: no  Opiates: no  Other Drugs: no        Past Psychiatric History:  Prior Diagnosis:  SAD  Psychiatrist: calin  Therapist:calin  Hospitalization: yes  Hx of Suicidal Attempts: no  Hx of violence:  no  ECT: no      PAST MEDICAL/PSYCHIATRIC HISTORY:   Past Medical History:   Diagnosis Date    Abdominal pain     Asthma     Bloating symptom     COPD [facility] obstructive pulmonary disease) [facility] [address]     Depression     Depression     Diarrhea     Drug-seeking behavior 02/03/2020    History of rectal bleeding     Hypercholesteremia     past trx > 5 yrs    Hypercholesteremia     Hypercholesteremia     Hypertension     meds > 3 yrs    Lung disease     RANDI on CPAP     patient relates he doesn't use cpap at this time    Osteoarthritis     left hip    Osteoarthritis     Osteoarthritis of left hip     Pituitary macroadenoma (HCC)        FAMILY/SOCIAL HISTORY:  Family History   Problem Relation Age of Onset    Colon Cancer Mother     High Blood Pressure Father     Diabetes Sister     No Known Problems Brother     No Known Problems Brother     No Known Problems Brother     Other Brother         Sepsis    Other Brother         Killed    No Known Problems Sister     No Known Problems Sister     No Known Problems Son      Social History     Socioeconomic History    Marital status:      Spouse name: Not on file    Number of children: Not on file    Years of education: Not on file    Highest education level: Not on file   Occupational History    Not on file   Social Needs    Financial resource strain: Not on file    Food insecurity     Worry: Not on file     Inability: Not on file    Transportation needs     Medical: Not on file     Non-medical: Not on file   Tobacco Use    Smoking status: Former Smoker     Packs/day: 1.50     Years: 46.00     Pack years: 69.00     Types: Cigarettes     Last attempt to quit: 3/20/2019     Years since quittin.0    Smokeless tobacco: Never Used   Substance and Sexual Activity    Alcohol use: Yes    Drug use: No    Sexual activity: Not on file   Lifestyle    Physical activity     Days per week: Not on file     Minutes per session: Not on file    Stress: Not on file   Relationships    Social connections     Talks on phone: Not on file     Gets together: Not on file     Attends Restorationism service: Not on file     Active member of club or organization: Not on file     Attends meetings of clubs or organizations: Not on file     Relationship status: Not on file    Intimate partner violence     Fear of current or ex partner: Not on file     Emotionally abused: Not on file     Physically abused: Not on file     Forced sexual activity: Not on file   Other Topics Concern    Not on file   Social History Narrative    Not on file       MEDICATIONS:  No current facility-administered medications for this encounter. Current Outpatient Medications:     paliperidone (INVEGA) 6 MG extended release tablet, Take 1 tablet by mouth daily, Disp: 30 tablet, Rfl: 1    zolpidem (AMBIEN) 5 MG tablet, Take 1 tablet by mouth nightly for 14 days. , Disp: 14 tablet, Rfl: 0    furosemide (LASIX) 40 MG tablet, Take 40 mg by mouth daily, Disp: , Rfl:     meloxicam (MOBIC) 15 MG tablet, Take 15 mg by mouth daily, Disp: , Rfl:     potassium chloride (MICRO-K) 10 MEQ extended release capsule, Take 10 mEq by mouth daily, Disp: , Rfl:     simvastatin (ZOCOR) 40 MG tablet, Take 40 mg by mouth nightly, Disp: , Rfl:     fenofibrate 160 MG tablet, Take 150 mg by mouth daily, Disp: , Rfl:     lisinopril (PRINIVIL;ZESTRIL) 20 MG tablet, Take 2 tablets by mouth daily, Disp: 30 tablet, Rfl: 3    cabergoline (DOSTINEX) 0.5 MG tablet, Take 1 tablet by mouth Twice a Week ON Monday and Thursday, Disp: 8 tablet, Rfl: 3    aspirin 81 MG EC tablet, Take 1 tablet by mouth 2 times daily, Disp: 60 tablet, Rfl: 0    Respiratory Therapy Supplies SANDRA, Full face CPAP mask and supplies, Disp: 1 Device, Rfl: 0    CPAP Machine MISC, by Does not apply route New CPAP with 6 cm, Disp: 1 each, Rfl: 0    albuterol (PROVENTIL) (2.5 MG/3ML) 0.083% nebulizer solution, Take 3 mLs by nebulization every 6 hours as needed for Wheezing, Disp: 120 each, Rfl: 5    STIOLTO RESPIMAT 2.5-2.5 MCG/ACT AERS, Inhale 2 puff in AM, Disp: 1 Inhaler, Rfl: 5    Examination:  /77   Pulse 94   Temp 98 °F (36.7 °C) (Oral)   Resp 20   Ht 5' 5\" (1.651 m)   Wt 218 lb (98.9 kg)   SpO2 97%   BMI 36.28 kg/m²   Gait - steady    HOSPITAL COURSE[de-identified]  Following admission to the hospital, patient had a complete physical exam and blood work up  Patient was monitored closely with suicide precaution  Patient was started on invega and Shanae Puffer  Was encouraged to participate in group and other milieu activity  Patient started to feel better with this combination of treatment. Significant progress in the symptoms since admission.     Mood better, with the score of 2/10 - bad  Baseline AH and paranoid thoughts  No Hopeless or worthless feeling  No active SI/HI  Appetite:  [x] Normal  [] Increased  [] Decreased    Sleep:       [x] Normal  [] Fair       [] Poor            Energy:    [x] Normal  [] Increased  [] Decreased     SI [] Present  [x] Absent  HI  []Present  [x] Absent   Aggression:  [] yes  [] no  Patient is [x] able  [] unable to CONTRACT FOR SAFETY   Medication side effects(SE):  [x] None(Psych. Meds.) [] Other      Mental Status Examination on discharge:    Level of consciousness:  within normal limits   Appearance:  well-appearing  Behavior/Motor:  no abnormalities noted  Attitude toward examiner:  attentive and good eye contact  Speech:  spontaneous, normal rate and normal volume   Mood: anxious  Affect:  mood congruent  Thought processes:  goal directed   Thought content:  Suicidal Ideation:  denies suicidal ideation  Delusions:  no evidence of delusions  Perceptual Disturbance:  denies any perceptual disturbance  Cognition:  oriented to person, place, and time   Concentration intact  Memory intact  Insight good   Judgement fair   Fund of Knowledge adequate      ASSESSMENT:  Patient symptoms are:  [] Well controlled  [x] Improving  [] Worsening  [] No change      Diagnosis:  Active Problems:    Schizophrenia ([facility] [address]  Resolved Problems:    * No resolved hospital problems. *      LABS:    No results for input(s): WBC, HGB, PLT in the last 72 hours. Recent Labs     03/18/20  0615 03/19/20  0632   * 131*   K 4.8 4.6   CL 89* 94*   CO2 24 26   BUN 15 16   CREATININE 0.72 0.84   GLUCOSE 100* 102*     No results for input(s): BILITOT, ALKPHOS, AST, ALT in the last 72 hours. Lab Results   Component Value Date    LABAMPH Neg 03/13/2020    BARBSCNU Neg 03/13/2020    LABBENZ Neg 03/13/2020    LABMETH Neg 03/13/2020    OPIATESCREENURINE Neg 03/13/2020    PHENCYCLIDINESCREENURINE Neg 03/13/2020    ETOH <10 03/13/2020     Lab Results   Component Value Date    TSH 1.150 03/13/2020     No results found for: LITHIUM  No results found for: VALPROATE, CBMZ    RISK ASSESSMENT AT DISCHARGE: Low risk for suicide and homicide. Treatment Plan:  Reviewed current Medications with the patient. Education provided on the complaince with treatment. Risks, benefits, side effects, drug-to-drug interactions and alternatives to treatment were discussed.     Encourage patient to attend outpatient

## 2020-03-18 NOTE — GROUP NOTE
Group Therapy Note    Date: 3/18/2020    Group Start Time: 1000  Group End Time: 1100  Group Topic: Psychoeducation    [facility]    Luke Velázquez        Group Therapy Note    Attendees: 8         Patient's Goal:  \"To go to the [facility] after [employer]"    Notes:  Patient was talkative but calmer and focus better on his task in group.     Status After Intervention:  Improved    Participation Level: Adequate    Participation Quality: Appropriate      Speech:  talkative      Thought Process/Content: Linear      Affective Functioning: Congruent      Mood: calmer      Level of consciousness:  Alert      Response to Learning: Progressing to goal      Endings: None Reported    Modes of Intervention: Education, Socialization and Activity      Discipline Responsible: Psychoeducational Specialist      Signature:  Luke Velázquez

## 2020-03-18 NOTE — PROGRESS NOTES
Patient did not attend 1600 group despite staff encouragement.  Electronically signed by Jamison Luz on 3/18/2020 at 4:42 PM

## 2020-03-18 NOTE — CONSULTS
[facility]  [facility]ult [facility]           Reason for Consult:  Hyponatremia, htn  Requesting Physician:  Dr. Valentin [facility]     Chief Complaint:  Schizoaffective disorder  History Obtained From:  patient, electronic medical record    History of Present Ilness:    60y.o. year old male admitted with paranoid thoughts. 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 Denies drinking a lot of fluids this time and staff hasn't noticed he has been drinking that much. Na was 133 to 137 and now 126. Has been on hctz and lasix. No cp. No sob. Anxious to go home. D/c was supposed to be today    Past Medical History:        Diagnosis Date    Abdominal pain     Asthma     Bloating symptom     COPD (chronic obstructive pulmonary disease) (HCC)     Depression     Depression     Diarrhea     Drug-seeking behavior 02/03/2020    History of rectal bleeding     Hypercholesteremia     past trx > 5 yrs    Hypercholesteremia     Hypercholesteremia     Hypertension     meds > 3 yrs    Lung disease     RANDI on CPAP     patient relates he doesn't use cpap at this time    Osteoarthritis     left hip    Osteoarthritis     Osteoarthritis of left hip     Pituitary macroadenoma [facility])        Past Surgical History:        Procedure Laterality Date    COLONOSCOPY  5/2/14    JARMOSLETY    COLONOSCOPY N/A 12/12/2019    COLONOSCOPY DIAGNOSTIC performed by Adán Donald MD at [address], COLON, DIAGNOSTIC      EYE SURGERY Bilateral     cataracts   [facility] Right 1999 ?     index finger laceration repair    HERNIA REPAIR  2004    repair [facility]     JOINT REPLACEMENT Left     left hip    [place] COLON CA SCRN NOT HI RSK IND N/A 5/17/2017    COLONOSCOPY performed by Maykel Thomas MD at [address] [place] [address] Left 4/22/2019    LEFT HIP TOTAL HIP ARTHROPLASTY, LATERAL DECUB, GRACIE performed by Dread Juarez MD at [place] [address] Medical: Not on file     Non-medical: Not on file   Tobacco Use    Smoking status: Former Smoker     Packs/day: 1.50     Years: 46.00     Pack years: 69.00     Types: Cigarettes     Last attempt to quit: 3/20/2019     Years since quittin.9    Smokeless tobacco: Never Used   Substance and Sexual Activity    Alcohol use: Yes    Drug use: No    Sexual activity: Not on file   Lifestyle    Physical activity     Days per week: Not on file     Minutes per session: Not on file    Stress: Not on file   Relationships    Social connections     Talks on phone: Not on file     Gets together: Not on file     Attends Restoration service: Not on file     Active member of club or organization: Not on file     Attends meetings of clubs or organizations: Not on file     Relationship status: Not on file    Intimate partner violence     Fear of current or ex partner: Not on file     Emotionally abused: Not on file     Physically abused: Not on file     Forced sexual activity: Not on file   Other Topics Concern    Not on file   Social History Narrative    Not on file       Family History:   Family History   Problem Relation Age of Onset    Colon Cancer Mother     High Blood Pressure Father     Diabetes Sister     No Known Problems Brother     No Known Problems Brother     No Known Problems Brother     Other Brother         Sepsis    Other Brother         Killed    No Known Problems Sister     No Known Problems Sister     No Known Problems Son        Review of Systems:   Review of Systems   Constitutional: Negative for activity change. HENT: Negative for congestion. Eyes: Negative for discharge. Respiratory: Negative for shortness of breath. Cardiovascular: Negative for chest pain. Gastrointestinal: Negative for abdominal distention. Endocrine: Negative for cold intolerance. Genitourinary: Negative for difficulty urinating. Musculoskeletal: Negative for arthralgias.    Allergic/Immunologic: Negative for environmental allergies. Neurological: Negative for dizziness. Hematological: Negative for adenopathy. Psychiatric/Behavioral: Positive for dysphoric mood. Physical exam:   Constitutional:  VITALS:  BP (!) 136/91 Comment: RN notified  Pulse 81   Temp 98 °F (36.7 °C) (Oral)   Resp 20   Ht 5' 5\" (1.651 m)   Wt 218 lb (98.9 kg)   SpO2 97%   BMI 36.28 kg/m²   Gen: alert, awake, nad  Skin: no rash, turgor wnl  Heent:  eomi, mmm  Neck: no bruits or jvd noted, thyroid normal  Lungs:  Normal expansion. Clear to auscultation. No rales, rhonchi, or wheezing. Heart:  Heart sounds are normal.  Regular rate and rhythm without murmur, gallop or rub. Abdomen:  +bs, soft, nt, nd, no hepatosplenomegaly   Extremities: Extremities warm to touch, pink, with no edema. Psychiatric: mood and affect appropriate; judgement and insight intact  Musculoskeletal:  Rom, muscular strength intact; digits, nails normal    Data/  CBC:   Lab Results   Component Value Date    WBC 7.9 03/13/2020    RBC 4.24 03/13/2020    HGB 13.6 03/13/2020    HCT 41.1 03/13/2020    MCV 96.8 03/13/2020    MCH 32.2 03/13/2020    MCHC 33.2 03/13/2020    RDW 15.0 03/13/2020     03/13/2020    MPV 8.7 04/17/2014     BMP:    Lab Results   Component Value Date     03/18/2020    K 4.8 03/18/2020    K 3.9 02/20/2020    CL 89 03/18/2020    CO2 24 03/18/2020    BUN 15 03/18/2020    LABALBU 4.6 03/13/2020    CREATININE 0.72 03/18/2020    CALCIUM 9.5 03/18/2020    GFRAA >60.0 03/18/2020    LABGLOM >60.0 03/18/2020    GLUCOSE 100 03/18/2020     Ct Head Without Contrast    Result Date: 2/18/2020  CT HEAD WO CONTRAST CLINICAL HISTORY:  hallucinations, schizophrenia off meds but need of medical clearance Comparison: November 10, 2019. Clinical note: Please see MRI report from [facility] for additional details.  TECHNIQUE: Multiple unenhanced serial axial images of the brain from the vertex of the skull to the base of (celebrex and mobic)    Plan/  1- no need for IVF (cannot get on psych floor)  2- will give 1 dose of samsca instead  3- hold lasix and hctz  4. If na increasing by tomorrow (even a little so moving in right direction), could be dc'd home. If worse, would consider admit to medicine  5. Check urine studies  6. If bp goes up, add norvasc instead of hctz. Should stay off hctz on d/c due to recurrent hyponatremia  7. D/c one of NSAIDs - will get rid of celebrex and cont mobic    Thank you for the consultation. Please do not hesitate to call with questions.     Panfilo Patel

## 2020-03-19 VITALS
RESPIRATION RATE: 20 BRPM | DIASTOLIC BLOOD PRESSURE: 77 MMHG | HEIGHT: 65 IN | WEIGHT: 218 LBS | TEMPERATURE: 98 F | HEART RATE: 94 BPM | SYSTOLIC BLOOD PRESSURE: 126 MMHG | OXYGEN SATURATION: 97 % | BODY MASS INDEX: 36.32 KG/M2

## 2020-03-19 LAB
ANION GAP SERPL CALCULATED.3IONS-SCNC: 11 MEQ/L (ref 9–15)
BUN BLDV-MCNC: 16 MG/DL (ref 8–23)
CALCIUM SERPL-MCNC: 10 MG/DL (ref 8.5–9.9)
CHLORIDE BLD-SCNC: 94 MEQ/L (ref 95–107)
CO2: 26 MEQ/L (ref 20–31)
CREAT SERPL-MCNC: 0.84 MG/DL (ref 0.7–1.2)
GFR AFRICAN AMERICAN: >60
GFR NON-AFRICAN AMERICAN: >60
GLUCOSE BLD-MCNC: 102 MG/DL (ref 70–99)
POTASSIUM SERPL-SCNC: 4.6 MEQ/L (ref 3.4–4.9)
SODIUM BLD-SCNC: 131 MEQ/L (ref 135–144)

## 2020-03-19 PROCEDURE — 6370000000 HC RX 637 (ALT 250 FOR IP): Performed by: PSYCHIATRY & NEUROLOGY

## 2020-03-19 PROCEDURE — 36415 COLL VENOUS BLD VENIPUNCTURE: CPT

## 2020-03-19 PROCEDURE — 99239 HOSP IP/OBS DSCHRG MGMT >30: CPT | Performed by: PSYCHIATRY & NEUROLOGY

## 2020-03-19 PROCEDURE — 80048 BASIC METABOLIC PNL TOTAL CA: CPT

## 2020-03-19 PROCEDURE — 6370000000 HC RX 637 (ALT 250 FOR IP): Performed by: PHYSICIAN ASSISTANT

## 2020-03-19 RX ADMIN — CARBOXYMETHYLCELLULOSE SODIUM 1 DROP: 0.5 SOLUTION/ DROPS OPHTHALMIC at 14:48

## 2020-03-19 RX ADMIN — ASPIRIN 81 MG: 81 TABLET, COATED ORAL at 08:45

## 2020-03-19 RX ADMIN — MELOXICAM 15 MG: 7.5 TABLET ORAL at 08:44

## 2020-03-19 RX ADMIN — PALIPERIDONE 6 MG: 6 TABLET, FILM COATED, EXTENDED RELEASE ORAL at 08:45

## 2020-03-19 ASSESSMENT — PAIN SCALES - GENERAL: PAINLEVEL_OUTOF10: 10

## 2020-03-19 NOTE — PROGRESS NOTES
Nephrology Progress Note    Assessment/  61 yo man with sig hyponatremia. Has schizoaffective disorder. On lasix and hctz. Planning on d/c from psych standpoint but na went from 133 to 137 to 126 now. Likely a combination of lasix, hctz and fluid drinking. bp borderline on zestril 40, hctz 25, lasix. Also on 2 NSAIDs (nabumetone and mobic).  Cortisol 4.9     Plan/  - as Na improving ok for discharge from renal standpoint  - stopping HCTZ and nabumetone on discharge  - can continue lasix and mobic  - should follow up with PCP re: low cortisol level       Patient Active Problem List:     Osteoarthritis     HTN (hypertension)     Hypercholesteremia     Depression     Asthma     Pulmonary emphysema (HCC)     Tobacco use     Influenza     RANDI on CPAP     Unilateral primary osteoarthritis, left hip     Pituitary macroadenoma (HCC)     Gastritis without bleeding     Other specified soft tissue disorders     Adenomatous polyp of colon     Rectal bleeding     Grade I hemorrhoids     Aftercare following joint replacement surgery     Blurry vision     Class 2 obesity in adult     Delirium     Other acute postprocedural pain     Pain in left lower leg     Paranoid schizophrenia ([address]     Presence of left artificial hip joint     Sprain of ribs, initial encounter     Suprasellar mass     Unilateral primary osteoarthritis, left knee     Hyponatremia     Schizoaffective disorder ([address]     Hyperprolactinemia (HCC)     Psychogenic polydipsia     Schizophrenia ([address]      Subjective:  Admit Date: 3/13/2020    Interval History: Na up to 131 w/ tolvaptan given yesterday, UOsm 200, SOsm 269, SG 1.006 all c/w improving Na levels, cortisol low 4.9    Medications:  Scheduled Meds:   zolpidem  5 mg Oral Nightly    paliperidone  6 mg Oral Daily    aspirin  81 mg Oral BID    fenofibrate  160 mg Oral Daily    lisinopril  40 mg Oral Daily    meloxicam  15 mg Oral Daily    [Held by provider] potassium chloride  10 mEq Oral Daily   

## 2020-03-19 NOTE — PROGRESS NOTES
All discharge information reviewed with this pt, who states understanding of the same. No questions expressed at this time. Mood and affect appear stable, pt denies SI/HI/AVH. Pt belongs and medications from pharmacy were returned. Escorted to main entrance for transport by [facility].  Electronically signed by Raj Mcduffie RN on 3/19/2020 at 4:03 PM

## 2020-03-19 NOTE — PROGRESS NOTES
Pt. refused to attend the 1000 skills group, despite staff encouragement.  Electronically signed by Nini Sun on 3/19/2020 at 12:19 PM

## 2020-03-19 NOTE — PROGRESS NOTES
Pt. attended the 0900 community meeting. Electronically signed by Tomy Damon on 3/19/2020 at 9:26 AM

## 2020-03-19 NOTE — PROGRESS NOTES
BEHAVIORAL HEALTH FOLLOW-UP NOTE          Patient was seen and examined in person, Chart reviewed   Patient's case discussed with staff/team    Chief Complaint: Psychosis    Interim History:     Poor sleep last night  Trazodone dose increase did not work  Pt still is paranoid  Self care and hygiene better  Pt seen interacting with other peers  Pt thoughts are still disorganized  Appetite:   [] Normal/Unchanged  [] Increased  [x] Decreased      Sleep:       [] Normal/Unchanged  [] Fair       [x] Poor              Energy:    [] Normal/Unchanged  [x] Increased  [] Decreased        SI [] Present  [] Absent    HI  []Present  [] Absent     Aggression:  [] yes  [] no    Patient is [] able  [x] unable to CONTRACT FOR SAFETY     PAST MEDICAL/PSYCHIATRIC HISTORY:   Past Medical History:   Diagnosis Date    Abdominal pain     Asthma     Bloating symptom     [facility] [address]     Depression     Depression     Diarrhea     Drug-seeking behavior 02/03/2020    History of rectal bleeding     Hypercholesteremia     past trx > 5 yrs    Hypercholesteremia     Hypercholesteremia     Hypertension     meds > 3 yrs    Lung disease     RANDI on CPAP     patient relates he doesn't use cpap at this time    Osteoarthritis     left hip    Osteoarthritis     Osteoarthritis of left hip     Pituitary macroadenoma (HCC)        FAMILY/SOCIAL HISTORY:  Family History   Problem Relation Age of Onset    Colon Cancer Mother     High Blood Pressure Father     Diabetes Sister     No Known Problems Brother     No Known Problems Brother     No Known Problems Brother     Other Brother         Sepsis    Other Brother         Killed    No Known Problems Sister     No Known Problems Sister     No Known Problems Son      Social History     Socioeconomic History    Marital status:      Spouse name: Not on file    Number of children: Not on file    Years of education: Not on file   [facility] ophthalmic solution 1 drop, 1 drop, Both Eyes, 4x Daily PRN, TARIK Rios, 1 drop at 03/18/20 9093    Akwa Tears (LACRILUBE) 2-15-83 % opthalmic ointment, , Both Eyes, PRN, TARIK Sargent    ibuprofen (ADVIL;MOTRIN) tablet 600 mg, 600 mg, Oral, Q6H PRN, TARIK Rios, 600 mg at 03/18/20 2103    aspirin EC tablet 81 mg, 81 mg, Oral, BID, Sienna Valerio MD, 81 mg at 03/19/20 0845    fenofibrate (TRIGLIDE) tablet 160 mg, 160 mg, Oral, Daily, Sienna Valerio MD, 160 mg at 03/18/20 0838    lisinopril (PRINIVIL;ZESTRIL) tablet 40 mg, 40 mg, Oral, Daily, Sienna Valerio MD, 40 mg at 03/18/20 0838    melatonin tablet 10 mg, 10 mg, Oral, Nightly PRN, Sienna Valerio MD, 10 mg at 03/15/20 2253    meloxicam (MOBIC) tablet 15 mg, 15 mg, Oral, Daily, Sienna Valerio MD, 15 mg at 03/19/20 0844    [Held by provider] potassium chloride (KLOR-CON) extended release tablet 10 mEq, 10 mEq, Oral, Daily, Sienna Valerio MD, 10 mEq at 03/18/20 0839    atorvastatin (LIPITOR) tablet 40 mg, 40 mg, Oral, Daily, Sienna Valerio MD, 40 mg at 03/18/20 0839    hydrOXYzine (VISTARIL) capsule 50 mg, 50 mg, Oral, Q6H PRN, 50 mg at 03/15/20 0406 **OR** hydrOXYzine (VISTARIL) injection 50 mg, 50 mg, Intramuscular, Q6H PRN, Sienna Valerio MD    haloperidol lactate (HALDOL) injection 5 mg, 5 mg, Intramuscular, Q6H PRN **OR** haloperidol (HALDOL) tablet 5 mg, 5 mg, Oral, Q6H PRN, Sienna Valerio MD    acetaminophen (TYLENOL) tablet 650 mg, 650 mg, Oral, Q4H PRN, Sienna Poet, MD, 650 mg at 03/17/20 0558    polyethylene glycol (GLYCOLAX) packet 17 g, 17 g, Oral, Daily PRN, Sienna Valerio MD      Examination:  /77   Pulse 94   Temp 98 °F (36.7 °C) (Oral)   Resp 20   Ht 5' 5\" (1.651 m)   Wt 218 lb (98.9 kg)   SpO2 97%   BMI 36.28 kg/m²   Gait - steady  Medication side effects(SE): no    Mental Status Examination:    Level of consciousness:  within normal limits treatment furnished directly by or requiring the supervision of inpatient psychiatric personnel      Anticipated Length of stay:            Electronically signed by Kathleen López MD on 3/19/2020 at 10:53 AM

## 2020-03-19 NOTE — CARE COORDINATION
Patient did not attend group despite staff encouragement.   Electronically signed by Jaimee Saldaña on 3/19/2020 at 11:26 AM

## 2020-07-13 ENCOUNTER — HOSPITAL ENCOUNTER (OUTPATIENT)
Dept: MRI IMAGING | Age: 65
Discharge: HOME | End: 2020-07-15
Payer: COMMERCIAL

## 2020-07-13 PROCEDURE — 72148 MRI LUMBAR SPINE W/O DYE: CPT

## 2021-08-06 ENCOUNTER — OFFICE VISIT (OUTPATIENT)
Dept: ENT CLINIC | Age: 66
End: 2021-08-06
Payer: COMMERCIAL

## 2021-08-06 VITALS
BODY MASS INDEX: 38.99 KG/M2 | DIASTOLIC BLOOD PRESSURE: 78 MMHG | HEART RATE: 75 BPM | SYSTOLIC BLOOD PRESSURE: 132 MMHG | WEIGHT: 234 LBS | HEIGHT: 65 IN | OXYGEN SATURATION: 96 % | TEMPERATURE: 96.4 F

## 2021-08-06 DIAGNOSIS — J30.9 ALLERGIC RHINITIS, UNSPECIFIED SEASONALITY, UNSPECIFIED TRIGGER: ICD-10-CM

## 2021-08-06 DIAGNOSIS — J34.2 ACQUIRED DEVIATED NASAL SEPTUM: ICD-10-CM

## 2021-08-06 DIAGNOSIS — R04.0 EPISTAXIS: Primary | ICD-10-CM

## 2021-08-06 PROCEDURE — 99203 OFFICE O/P NEW LOW 30 MIN: CPT | Performed by: OTOLARYNGOLOGY

## 2021-08-06 ASSESSMENT — ENCOUNTER SYMPTOMS
VOICE CHANGE: 0
SINUS PRESSURE: 0
FACIAL SWELLING: 0
SINUS PAIN: 1
SORE THROAT: 0
RHINORRHEA: 1
TROUBLE SWALLOWING: 0

## 2021-08-06 NOTE — PROGRESS NOTES
Subjective:      Patient ID: Yamile Guy is a 61 y.o. male who presents today for:  Chief Complaint   Patient presents with    Ear Problem       HPI: History of  intermittent epistaxis the left nasal cavity with one was few days ago bleeding stopped spontaneously for evaluation    Past Medical History:   Diagnosis Date    Abdominal pain     Asthma     Bloating symptom     COPD (chronic obstructive pulmonary disease) ([address]     COPD (chronic obstructive pulmonary disease) ([address]     Depression     Depression     Diarrhea     Drug-seeking behavior 02/03/2020    Emphysema/COPD ([address]     History of rectal bleeding     Hypercholesteremia     past trx > 5 yrs    Hypercholesteremia     Hypercholesteremia     Hypertension     meds > 3 yrs    Lung disease     RANDI on CPAP     patient relates he doesn't use cpap at this time    Osteoarthritis     left hip    Osteoarthritis     Osteoarthritis of left hip     Pit[facility] macroadenoma [facility]     Sleep apnea      Past Surgical History:   Procedure Laterality Date    COLONOSCOPY  5/2/14    JENNIFER    COLONOSCOPY N/A 12/12/2019    COLONOSCOPY DIAGNOSTIC performed by Sheila Bell MD at [address], COLON, DIAGNOSTIC      EYE SURGERY Bilateral     cataracts   [facility] Right 1999 ?     index finger laceration repair    HERNIA REPAIR  2004    repair [facility]     JOINT REPLACEMENT Left     left hip    MS COLON CA SCRN NOT HI RSK IND N/A 5/17/2017    COLONOSCOPY performed by Gabbie Olivas MD at [address] [facility] Left 4/22/2019    LEFT HIP TOTAL HIP ARTHROPLASTY, LATERAL DECUB, GRACIE performed by Miranda Cowart MD at [address]  12/12/2019    EGD DIAGNOSTIC ONLY [facility] [address] Marital status:      Spouse name: Not on file    Number of children: Not on file    Years of education: Not on file    Highest education level: Not on file   Occupational History    Not on file   Tobacco Use    Smoking status: Former Smoker     Packs/day: 1.50     Years: 46.00     Pack years: 69.00     Types: Cigarettes     Quit date: 3/20/2019     Years since quittin.3    Smokeless tobacco: Never Used   Vaping Use    Vaping Use: Unknown   Substance and Sexual Activity    Alcohol use: Yes    Drug use: No    Sexual activity: Not on file   Other Topics Concern    Not on file   Social History Narrative    Not on file     Social Determinants of Health     Financial Resource Strain:     Difficulty of Paying Living Expenses:    Food Insecurity:     Worried About Running Out of Food in the Last Year:     [address] in the Last Year:    Transportation Needs:     Lack of Transportation (Medical):      Lack of Transportation (Non-Medical):    Physical Activity:     Days of Exercise per Week:     Minutes of Exercise per Session:    Stress:     Feeling of Stress :    Social Connections:     Frequency of Communication with Friends and Family:     Frequency of Social Gatherings with Friends and Family:     Attends Buddhist Services:     Active Member of Clubs or Organizations:     Attends Club or Organization Meetings:     Marital Status:    Intimate Partner Violence:     Fear of Current or Ex-Partner:     Emotionally Abused:     Physically Abused:     Sexually Abused:      Family History   Problem Relation Age of Onset    Colon Cancer Mother     Cancer Mother     Arthritis Mother     High Blood Pressure Father     Heart Disease Father     Diabetes Sister     No Known Problems Brother     No Known Problems Brother     No Known Problems Brother     Other Brother         Sepsis    Other Brother         Killed    No Known Problems Sister     No Known Problems Sister     No Known Problems Son      No Known Allergies      Review of Systems   HENT: Positive for postnasal drip, rhinorrhea and sinus pain. Negative for congestion, dental problem, drooling, ear discharge, ear pain, facial swelling, hearing loss, mouth sores, nosebleeds, sinus pressure, sneezing, sore throat, tinnitus, trouble swallowing and voice change. All other systems reviewed and are negative. Objective:   /78 (Site: Left Upper Arm, Position: Sitting, Cuff Size: Large Adult)   Pulse 75   Temp 96.4 °F (35.8 °C) (Infrared)   Ht 5' 5\" (1.651 m)   Wt 234 lb (106.1 kg)   SpO2 96%   BMI 38.94 kg/m²     Physical Exam     Head and neck: Normocephalic no nuchal rigidity no palpable mass no venous engorgement no lymphadenopathy    Ears: No intraaural mass. Tympanic membrane are intact no perforation no venous engorgement    Nose: Minimal deviated nasal septum to the left nasal congestion turbinates congested no intranasal mass    Mouth and throat: No extrinsic lesion noted    Assessment:      Mild deviated nasal septum  Recurrent epistaxis   Plan:      Cauterization of the nasal septum bilaterally with silver nitrate stick.   Controlled  Turn to the office when necessary  Advice given    Yobani Kenny MD

## 2021-10-06 ENCOUNTER — HOSPITAL ENCOUNTER (OUTPATIENT)
Dept: MRI IMAGING | Age: 66
Discharge: HOME | End: 2021-10-08
Payer: COMMERCIAL

## 2021-10-06 ENCOUNTER — HOSPITAL ENCOUNTER (OUTPATIENT)
Dept: NEUROLOGY | Age: 66
Discharge: HOME | End: 2021-10-06
Payer: COMMERCIAL

## 2021-10-06 DIAGNOSIS — G44.211 INTRACTABLE EPISODIC TENSION-TYPE HEADACHE: ICD-10-CM

## 2021-10-06 DIAGNOSIS — R25.8 OTHER ABNORMAL INVOLUNTARY MOVEMENTS: ICD-10-CM

## 2021-10-06 DIAGNOSIS — F95.8 OTHER TIC DISORDERS: ICD-10-CM

## 2021-10-06 DIAGNOSIS — E23.7 DISEASE OF PITUITARY GLAND (HCC): ICD-10-CM

## 2021-10-06 PROCEDURE — 95816 EEG AWAKE AND DROWSY: CPT

## 2021-10-06 PROCEDURE — 6360000004 HC RX CONTRAST MEDICATION: Performed by: SPECIALIST

## 2021-10-06 PROCEDURE — A9579 GAD-BASE MR CONTRAST NOS,1ML: HCPCS | Performed by: SPECIALIST

## 2021-10-06 PROCEDURE — 70553 MRI BRAIN STEM W/O & W/DYE: CPT

## 2021-10-06 RX ORDER — SODIUM CHLORIDE 9 MG/ML
10 INJECTION, SOLUTION INTRAVENOUS ONCE
Status: DISCONTINUED | OUTPATIENT
Start: 2021-10-06 | End: 2021-10-09 | Stop reason: HOSPADM

## 2021-10-06 RX ADMIN — GADOTERIDOL 20 ML: 279.3 INJECTION, SOLUTION INTRAVENOUS at 09:08

## 2021-10-07 NOTE — PROCEDURES
[address]                      [address], [address] [facility]                          ELECTROENCEPHALOGRAM REPORT    PATIENT NAME: Tatum Austin                      :        1955  MED REC NO:   [id number]                            ROOM:  ACCOUNT NO:   [de-identified]                           ADMIT DATE: 10/06/2021  PROVIDER:     Davin Contreras MD    DATE OF EEG:  10/06/2021    REFERRING PROVIDER:  Davin Contreras MD    REASON FOR STUDY:  The patient had behavior disorder with a suggestion of  encephalopathy with depression. EEG FINDINGS:  The patient had a 20-channel EEG. The patient has a  background of 9 to 10 Hz of alpha activity, which is moderately  well-attenuated and moderately well organized. Artifacts appear due to  the EKG, muscle activity and movements. Cardiac monitors showed a heart  rate of 76 beats per minute and is regular. On photic stimulation, fair  photic drive is seen. No epileptic discharges were seen during or after  the photic stimulation. The patient could not cooperate for the  hyperventilation. During sleep, slowing of the background activity  appears. IMPRESSION:  This is an essentially normal, awake, drowsy, and sleep  EEG. No epileptic discharges were seen. If a convulsive disorder is  strongly suspected, we may repeat the study with sleep deprivation. Clinical correlation shall be made.         Madison Jimenez MD    D: 10/06/2021 16:29:32       T: 10/06/2021 20:41:32     [email or web address]  Job#: [id number]     Doc#: [id number]    CC:

## 2022-01-17 LAB
AMMONIA: 54 UMOL/L
FOLATE: 10 NG/ML
VITAMIN B-12: 701 PG/ML (ref 211–911)

## 2022-01-20 LAB — VITAMIN D 1,25-DIHYDROXY: 7.7 PG/ML (ref 19.9–79.3)

## 2022-01-31 LAB — AMMONIA: 57 UMOL/L

## 2022-02-13 ENCOUNTER — APPOINTMENT (OUTPATIENT)
Dept: GENERAL RADIOLOGY | Age: 67
End: 2022-02-13
Payer: COMMERCIAL

## 2022-02-13 ENCOUNTER — HOSPITAL ENCOUNTER (EMERGENCY)
Age: 67
Discharge: NURSING, REHAB OR LONG-TERM CARE | End: 2022-02-13
Payer: COMMERCIAL

## 2022-02-13 ENCOUNTER — APPOINTMENT (OUTPATIENT)
Dept: CT IMAGING | Age: 67
End: 2022-02-13
Payer: COMMERCIAL

## 2022-02-13 VITALS
WEIGHT: 180 LBS | BODY MASS INDEX: 29.99 KG/M2 | HEIGHT: 65 IN | HEART RATE: 92 BPM | OXYGEN SATURATION: 98 % | RESPIRATION RATE: 32 BRPM | DIASTOLIC BLOOD PRESSURE: 80 MMHG | SYSTOLIC BLOOD PRESSURE: 125 MMHG | TEMPERATURE: 100.8 F

## 2022-02-13 DIAGNOSIS — R06.82 TACHYPNEA: Primary | ICD-10-CM

## 2022-02-13 DIAGNOSIS — J39.8 INCREASED TRACHEAL SECRETIONS: ICD-10-CM

## 2022-02-13 LAB
ALBUMIN SERPL-MCNC: 3.3 G/DL (ref 3.5–4.6)
ALP BLD-CCNC: 125 U/L (ref 35–104)
ALT SERPL-CCNC: 109 U/L (ref 0–41)
ANION GAP SERPL CALCULATED.3IONS-SCNC: 12 MEQ/L (ref 9–15)
AST SERPL-CCNC: 121 U/L (ref 0–40)
BACTERIA: ABNORMAL /HPF
BASE EXCESS ARTERIAL: 4 (ref -3–3)
BASOPHILS ABSOLUTE: 0.1 K/UL (ref 0–0.2)
BASOPHILS RELATIVE PERCENT: 0.6 %
BILIRUB SERPL-MCNC: <0.2 MG/DL (ref 0.2–0.7)
BILIRUBIN URINE: NEGATIVE
BLOOD, URINE: NEGATIVE
BUN BLDV-MCNC: 32 MG/DL (ref 8–23)
CALCIUM IONIZED: 1.19 MMOL/L (ref 1.12–1.32)
CALCIUM SERPL-MCNC: 9 MG/DL (ref 8.5–9.9)
CHLORIDE BLD-SCNC: 115 MEQ/L (ref 95–107)
CLARITY: CLEAR
CO2: 26 MEQ/L (ref 20–31)
COLOR: YELLOW
CREAT SERPL-MCNC: 0.76 MG/DL (ref 0.7–1.2)
EOSINOPHILS ABSOLUTE: 0.3 K/UL (ref 0–0.7)
EOSINOPHILS RELATIVE PERCENT: 2.2 %
GFR AFRICAN AMERICAN: >60
GFR AFRICAN AMERICAN: >60
GFR NON-AFRICAN AMERICAN: >60
GFR NON-AFRICAN AMERICAN: >60
GLOBULIN: 3.6 G/DL (ref 2.3–3.5)
GLUCOSE BLD-MCNC: 111 MG/DL (ref 70–99)
GLUCOSE BLD-MCNC: 112 MG/DL (ref 70–99)
GLUCOSE URINE: NEGATIVE MG/DL
HCO3 ARTERIAL: 27.4 MMOL/L (ref 21–29)
HCT VFR BLD CALC: 32.5 % (ref 42–52)
HEMOGLOBIN: 10.3 G/DL (ref 14–18)
HEMOGLOBIN: 10.8 GM/DL (ref 13.5–17.5)
KETONES, URINE: ABNORMAL MG/DL
LACTATE: 0.83 MMOL/L (ref 0.4–2)
LACTIC ACID: 1.6 MMOL/L (ref 0.5–2.2)
LEUKOCYTE ESTERASE, URINE: NEGATIVE
LYMPHOCYTES ABSOLUTE: 2.2 K/UL (ref 1–4.8)
LYMPHOCYTES RELATIVE PERCENT: 15.7 %
MAGNESIUM: 2.9 MG/DL (ref 1.7–2.4)
MCH RBC QN AUTO: 28.7 PG (ref 27–31.3)
MCHC RBC AUTO-ENTMCNC: 31.6 % (ref 33–37)
MCV RBC AUTO: 91.1 FL (ref 80–100)
MONOCYTES ABSOLUTE: 0.8 K/UL (ref 0.2–0.8)
MONOCYTES RELATIVE PERCENT: 5.6 %
NEUTROPHILS ABSOLUTE: 10.5 K/UL (ref 1.4–6.5)
NEUTROPHILS RELATIVE PERCENT: 75.9 %
NITRITE, URINE: NEGATIVE
O2 SAT, ARTERIAL: 98 % (ref 93–100)
PCO2 ARTERIAL: 38 MM HG (ref 35–45)
PDW BLD-RTO: 18 % (ref 11.5–14.5)
PERFORMED ON: ABNORMAL
PH ARTERIAL: 7.46 (ref 7.35–7.45)
PH UA: 5.5 (ref 5–9)
PLATELET # BLD: 312 K/UL (ref 130–400)
PO2 ARTERIAL: 93 MM HG (ref 75–108)
POC CHLORIDE: 117 MEQ/L (ref 99–110)
POC CREATININE: 0.8 MG/DL (ref 0.8–1.3)
POC FIO2: 35
POC HEMATOCRIT: 32 % (ref 41–53)
POC POTASSIUM: 3.9 MEQ/L (ref 3.5–5.1)
POC SAMPLE TYPE: ABNORMAL
POC SODIUM: 155 MEQ/L (ref 136–145)
POTASSIUM SERPL-SCNC: 4 MEQ/L (ref 3.4–4.9)
PRO-BNP: 407 PG/ML
PROCALCITONIN: 0.13 NG/ML (ref 0–0.15)
PROTEIN UA: 30 MG/DL
RBC # BLD: 3.57 M/UL (ref 4.7–6.1)
RBC UA: ABNORMAL /HPF (ref 0–2)
SARS-COV-2, NAAT: NOT DETECTED
SODIUM BLD-SCNC: 153 MEQ/L (ref 135–144)
SPECIFIC GRAVITY UA: 1.02 (ref 1–1.03)
TCO2 ARTERIAL: 29 MMOL/L (ref 21–32)
TOTAL PROTEIN: 6.9 G/DL (ref 6.3–8)
TROPONIN: 0.06 NG/ML (ref 0–0.01)
URINE REFLEX TO CULTURE: ABNORMAL
UROBILINOGEN, URINE: 1 E.U./DL
WBC # BLD: 13.8 K/UL (ref 4.8–10.8)
WBC UA: ABNORMAL /HPF (ref 0–5)

## 2022-02-13 PROCEDURE — 87040 BLOOD CULTURE FOR BACTERIA: CPT

## 2022-02-13 PROCEDURE — 71275 CT ANGIOGRAPHY CHEST: CPT

## 2022-02-13 PROCEDURE — 84132 ASSAY OF SERUM POTASSIUM: CPT

## 2022-02-13 PROCEDURE — 84295 ASSAY OF SERUM SODIUM: CPT

## 2022-02-13 PROCEDURE — 36600 WITHDRAWAL OF ARTERIAL BLOOD: CPT

## 2022-02-13 PROCEDURE — 82803 BLOOD GASES ANY COMBINATION: CPT

## 2022-02-13 PROCEDURE — 93005 ELECTROCARDIOGRAM TRACING: CPT

## 2022-02-13 PROCEDURE — 89220 SPUTUM SPECIMEN COLLECTION: CPT

## 2022-02-13 PROCEDURE — 83880 ASSAY OF NATRIURETIC PEPTIDE: CPT

## 2022-02-13 PROCEDURE — 81001 URINALYSIS AUTO W/SCOPE: CPT

## 2022-02-13 PROCEDURE — 80053 COMPREHEN METABOLIC PANEL: CPT

## 2022-02-13 PROCEDURE — 87635 SARS-COV-2 COVID-19 AMP PRB: CPT

## 2022-02-13 PROCEDURE — 85014 HEMATOCRIT: CPT

## 2022-02-13 PROCEDURE — 84145 PROCALCITONIN (PCT): CPT

## 2022-02-13 PROCEDURE — 6360000004 HC RX CONTRAST MEDICATION

## 2022-02-13 PROCEDURE — 83605 ASSAY OF LACTIC ACID: CPT

## 2022-02-13 PROCEDURE — 82435 ASSAY OF BLOOD CHLORIDE: CPT

## 2022-02-13 PROCEDURE — 99283 EMERGENCY DEPT VISIT LOW MDM: CPT

## 2022-02-13 PROCEDURE — 36415 COLL VENOUS BLD VENIPUNCTURE: CPT

## 2022-02-13 PROCEDURE — 85025 COMPLETE CBC W/AUTO DIFF WBC: CPT

## 2022-02-13 PROCEDURE — 94761 N-INVAS EAR/PLS OXIMETRY MLT: CPT

## 2022-02-13 PROCEDURE — 83735 ASSAY OF MAGNESIUM: CPT

## 2022-02-13 PROCEDURE — 96361 HYDRATE IV INFUSION ADD-ON: CPT

## 2022-02-13 PROCEDURE — 96360 HYDRATION IV INFUSION INIT: CPT

## 2022-02-13 PROCEDURE — 94760 N-INVAS EAR/PLS OXIMETRY 1: CPT

## 2022-02-13 PROCEDURE — 71045 X-RAY EXAM CHEST 1 VIEW: CPT

## 2022-02-13 PROCEDURE — 84484 ASSAY OF TROPONIN QUANT: CPT

## 2022-02-13 PROCEDURE — 82565 ASSAY OF CREATININE: CPT

## 2022-02-13 PROCEDURE — 2700000000 HC OXYGEN THERAPY PER DAY

## 2022-02-13 PROCEDURE — 82330 ASSAY OF CALCIUM: CPT

## 2022-02-13 PROCEDURE — 2580000003 HC RX 258

## 2022-02-13 RX ORDER — 0.9 % SODIUM CHLORIDE 0.9 %
1000 INTRAVENOUS SOLUTION INTRAVENOUS ONCE
Status: COMPLETED | OUTPATIENT
Start: 2022-02-13 | End: 2022-02-13

## 2022-02-13 RX ADMIN — SODIUM CHLORIDE 1000 ML: 9 INJECTION, SOLUTION INTRAVENOUS at 04:03

## 2022-02-13 RX ADMIN — IOPAMIDOL 100 ML: 612 INJECTION, SOLUTION INTRAVENOUS at 04:25

## 2022-02-13 NOTE — ED NOTES
trach suctioned, pt anahy well.  Pt is calm, awaiting transport back to [facility] BRIA, RN  02/13/22 2885

## 2022-02-13 NOTE — ED NOTES
Report to Edwin Holbrook at [facility] about pt return to [facility].  [facility] ETA 8am     Ezekiel Chowdhury RN  02/13/22 9325

## 2022-02-13 NOTE — ED NOTES
Suctioned pt trach. Clear mucus extracted. Spoke to respiratory about other interventions for clearing mucus from tracheal tube.       Shellie Gitelman, RN  02/13/22 8090

## 2022-02-13 NOTE — ED TRIAGE NOTES
Attempt to give report X1. Attempted X2. Pt comes to the ED from [address] [facility] called and stated that during rounds they noticed that pt was working harder to breath then normal  Pt current sat is at 95% on 4L   Pt was diagnosis with hydrocephalus and acute respiratory failure  Pt is usually non verbal per NH   Pt will response to pain   Respiratory at bedside

## 2022-02-13 NOTE — ED PROVIDER NOTES
[address] [facility] ED  eMERGENCY dEPARTMENT eNCOUnter      Pt Name: Marce Winters  MRN: [id number]  [place] 1955  Date of evaluation: 2/13/2022  Provider: TARIK Sewell        HISTORY OF PRESENT ILLNESS    Marce Winters is a 61 y.o. male per chart review has ah/o tobacco use, RANDI, hypertension, hyperlipidemia, depression, asthma, emphysema, schizophrenia, suprasellar mass. Patient presents from nursing facility with report of observed increased work of breathing as well as increased yellow-green secretions. Patient has tracheostomy and is on 35% at baseline, was 89% on this at nursing facility, suctioned and improved to 92%. At baseline patient is also nonverbal.    Per NURSE patient presented to [facility] following discharge from hospital for Pseudomonas pneumonia on 2/4. Reports completed with antibiotics at this time. Nurses reported baseline patient is diaphoretic with frequent sweating to the forehead. Reported to have low-grade fever at nursing facility. REVIEW OF SYSTEMS       Review of Systems   Unable to perform ROS: Patient nonverbal       Except as noted above the remainder of the review of systems was reviewed and negative.        PAST MEDICAL HISTORY     Past Medical History:   Diagnosis Date    Abdominal pain     Asthma     Bloating symptom     COPD (chronic obstructive pulmonary disease) (HCC)     COPD (chronic obstructive pulmonary disease) (HCC)     Depression     Depression     Diarrhea     Drug-seeking behavior 02/03/2020    Emphysema/COPD ([address]     History of rectal bleeding     Hypercholesteremia     past trx > 5 yrs    Hypercholesteremia     Hypercholesteremia     Hypertension     meds > 3 yrs    Lung disease     RANDI on CPAP     patient relates he doesn't use cpap at this time    Osteoarthritis     left hip    Osteoarthritis     Osteoarthritis of left hip     Pituitary macroadenoma ([address]     Sleep apnea          SURGICAL HISTORY       Past Surgical History:   Procedure Laterality Date    COLONOSCOPY  5/2/14    Harris Patino    COLONOSCOPY N/A 12/12/2019    COLONOSCOPY DIAGNOSTIC performed by Gege Tavares MD at [address], COLON, DIAGNOSTIC      EYE SURGERY Bilateral     cataracts   [address] ?     index finger laceration repair    HERNIA REPAIR  2004    repair [facility]     JOINT REPLACEMENT Left     left hip    HI COLON CA SCRN NOT HI RSK IND N/A 5/17/2017    COLONOSCOPY performed by Johann Patel MD at [address] [facility] [address] Left 4/22/2019    LEFT HIP TOTAL HIP ARTHROPLASTY, LATERAL DECUB, GRACIE performed by Fox Gallego MD at [facility]  12/12/2019    EGD DIAGNOSTIC ONLY performed by Gege Tavares MD at [address]        Previous Medications    ALBUTEROL (PROVENTIL) (2.5 MG/3ML) 0.083% NEBULIZER SOLUTION    Take 3 mLs by nebulization every 6 hours as needed for Wheezing    ASPIRIN 81 MG EC TABLET    Take 1 tablet by mouth 2 times daily    CABERGOLINE (DOSTINEX) 0.5 MG TABLET    Take 1 tablet by mouth Twice a Week ON Monday and Thursday    CPAP MACHINE MISC    by Does not apply route New CPAP with 6 cm    FENOFIBRATE 160 MG TABLET    Take 150 mg by mouth daily    FUROSEMIDE (LASIX) 40 MG TABLET    Take 40 mg by mouth daily    LIDOCAINE (LMX) 4 % CREAM    Apply a half dollar sized amount to intact skin topically up to twice daily as needed for pain    LISINOPRIL (PRINIVIL;ZESTRIL) 20 MG TABLET    Take 2 tablets by mouth daily    MELOXICAM (MOBIC) 15 MG TABLET    Take 15 mg by mouth daily    PALIPERIDONE (INVEGA) 6 MG EXTENDED RELEASE TABLET    Take 1 tablet by mouth daily    POTASSIUM CHLORIDE (MICRO-K) 10 MEQ EXTENDED RELEASE CAPSULE    Take 10 mEq by mouth daily    RESPIRATORY THERAPY SUPPLIES SANDRA    Full face CPAP mask and supplies    SIMVASTATIN (ZOCOR) 40 MG TABLET    Take 40 mg by mouth nightly    STIOLTO RESPIMAT 2.5-2.5 MCG/ACT AERS    Inhale 2 puff in AM       ALLERGIES     Patient has no known allergies. FAMILY HISTORY       Family History   Problem Relation Age of Onset    Colon Cancer Mother     Cancer Mother     Arthritis Mother     High Blood Pressure Father     Heart Disease Father     Diabetes Sister     No Known Problems Brother     No Known Problems Brother     No Known Problems Brother     Other Brother         Sepsis    Other Brother         Killed    No Known Problems Sister     No Known Problems Sister     No Known Problems Son           SOCIAL HISTORY       Social History     Socioeconomic History    Marital status:      Spouse name: None    Number of children: None    Years of education: None    Highest education level: None   Occupational History    None   Tobacco Use    Smoking status: Former Smoker     Packs/day: 1.50     Years: 46.00     Pack years: 69.00     Types: Cigarettes     Quit date: 3/20/2019     Years since quittin.9    Smokeless tobacco: Never Used   Vaping Use    Vaping Use: Unknown   Substance and Sexual Activity    Alcohol use: Yes    Drug use: No    Sexual activity: None   Other Topics Concern    None   Social History Narrative    None     Social Determinants of Health     Financial Resource Strain:     Difficulty of Paying Living Expenses: Not on file   Food Insecurity:     Worried About Running Out of Food in the Last Year: Not on file    [employer] in the Last Year: Not on file   Transportation Needs:     Lack of Transportation (Medical): Not on file    Lack of Transportation (Non-Medical):  Not on file   Physical Activity:     Days of Exercise per Week: Not on file    Minutes of Exercise per Session: Not on file   Stress:     Feeling of Stress : Not on file   Social Connections:     Frequency of Communication with Friends and Family: Not on file    Frequency of Social Gatherings with Friends and Family: Not on file   Colby Segura Attends Quaker Services: Not on file    Active Member of Clubs or Organizations: Not on file    Attends Club or Organization Meetings: Not on file    Marital Status: Not on file   Intimate Partner Violence:     Fear of Current or Ex-Partner: Not on file    Emotionally Abused: Not on file    Physically Abused: Not on file    Sexually Abused: Not on file   Housing Stability:     Unable to Pay for Housing in the Last Year: Not on file    Number of [place] in the Last Year: Not on file    Unstable Housing in the Last Year: Not on file         PHYSICAL EXAM        ED Triage Vitals   BP Temp Temp Source Pulse Resp SpO2 Height Weight   02/13/22 0032 02/13/22 0031 02/13/22 0031 02/13/22 0031 02/13/22 0031 02/13/22 0032 02/13/22 0031 02/13/22 0031   124/75 100.8 °F (38.2 °C) Oral 99 26 95 % 5' 5\" (1.651 m) 180 lb (81.6 kg)       Physical Exam  Constitutional:       General: He is not in acute distress. Appearance: He is ill-appearing, toxic-appearing and diaphoretic. Comments: Responsive to painful stimuli as his baseline. HENT:      Head: Normocephalic and atraumatic. Right Ear: External ear normal.      Left Ear: External ear normal.      Nose: Nose normal. No congestion or rhinorrhea. Mouth/Throat:      Mouth: Mucous membranes are dry. Pharynx: Oropharynx is clear. Eyes:      Extraocular Movements: Extraocular movements intact. Conjunctiva/sclera: Conjunctivae normal.   Cardiovascular:      Rate and Rhythm: Normal rate and regular rhythm. Pulmonary:      Effort: Pulmonary effort is normal. No respiratory distress. Breath sounds: Rhonchi present. Abdominal:      General: Bowel sounds are normal. There is no distension. Palpations: Abdomen is soft. There is no mass. Tenderness: There is no guarding or rebound. Musculoskeletal:         General: No swelling. Normal range of motion. Cervical back: Normal range of motion.       Right lower leg: No edema. Left lower leg: No edema. Comments: Contracted upper and lower extremities. Skin:     General: Skin is warm. Findings: No bruising, lesion (No evidence of open wound to the skin.) or rash. Neurological:      Mental Status: Mental status is at baseline.            LABS:  Labs Reviewed   COMPREHENSIVE METABOLIC PANEL - Abnormal; Notable for the following components:       Result Value    Sodium 153 (*)     Chloride 115 (*)     Glucose 111 (*)     BUN 32 (*)     Albumin 3.3 (*)     Alkaline Phosphatase 125 (*)      (*)      (*)     Globulin 3.6 (*)     All other components within normal limits    Narrative:     CALL  Nieto  [facility] tel. [phone],  TROP results called to and read back by [facility], 02/13/2022  02:00, by [facility]   CBC WITH AUTO DIFFERENTIAL - Abnormal; Notable for the following components:    WBC 13.8 (*)     RBC 3.57 (*)     Hemoglobin 10.3 (*)     Hematocrit 32.5 (*)     MCHC 31.6 (*)     RDW 18.0 (*)     Neutrophils Absolute 10.5 (*)     All other components within normal limits   MAGNESIUM - Abnormal; Notable for the following components:    Magnesium 2.9 (*)     All other components within normal limits    Narrative:     CALL  Nieto  [facility] tel. [phone],  TROP results called to and read back by [facility], 02/13/2022  02:00, by [facility]   TROPONIN - Abnormal; Notable for the following components:    Troponin 0.061 (*)     All other components within normal limits    Narrative:     Janette Monson tel. [phone],  TROP results called to and read back by [facility], 02/13/2022  02:00, by [facility]   URINE RT REFLEX TO CULTURE - Abnormal; Notable for the following components:    Ketones, Urine TRACE (*)     Protein, UA 30 (*)     All other components within normal limits   MICROSCOPIC URINALYSIS - Abnormal; Notable for the following components:    Bacteria, UA RARE (*)     All other components within normal limits   POCT ARTERIAL - Abnormal; Notable for the following components:    POC Sodium 155 (*)     POC Chloride 117 (*)     POC Glucose 112 (*)     pH, Arterial 7.463 (*)     pO2, Arterial 93 (*)     Base Excess, Arterial 4 (*)     O2 Sat, Arterial 98 (*)     POC Hematocrit 32 (*)     Hemoglobin 10.8 (*)     All other components within normal limits   COVID-19, RAPID   CULTURE, BLOOD 1   CULTURE, BLOOD 2   LACTIC ACID, PLASMA   PROCALCITONIN    Narrative:     CALL  [facility]  [facility] tel. [phone],  TROP results called to and read back by [facility], 02/13/2022  02:00, by Teja Shah    Narrative:     Richardson Vasquez tel. [phone],  TROP results called to and read back by [facility], 02/13/2022  02:00, by TL         MDM:   Vitals:    Vitals:    02/13/22 0415 02/13/22 0430 02/13/22 0500 02/13/22 0600   BP: 132/72 113/62 115/67 127/70   Pulse: 99 92 93 93   Resp: (!) 38 (!) 31 29 29   Temp:       TempSrc:       SpO2: 97% 97% 97% 99%   Weight:       Height:             EKG NSR HR 99, PVCs, no acute ST elevation, incomplete RBBB    82-year-old male patient presents to the ED with concern for tachypnea as well as increased secretions at the nursing home. Patient is in a nursing home since 2/4, following a hospital stay for pneumonia per nursing report. He has completed all his antibiotics and is no longer on antibiotics at this time. Patient has a tracheostomy, he is at his baseline with this. He presents with a mild fever to 100.8. Mild leukocytosis at 13.8. Urinalysis is noninfectious. Chest x-ray is negative. CT is negative for acute findings including pulmonary embolus. Procalcitonin is negative. Patient is noted to have secretions, however with secretion removal his tachypnea improves. Saturations at nursing facility reported to improve with secretion removal as well. Troponin is elevated 2.06, no baseline for comparison, no specific ST changes on EKG.   Discussion with ED attending Dr. Conrad Lemon with consideration for admission, discussed with hospitalist Dr. Jennifer Jiang with likely recommendation for discharge discussed tachypnea likely related to need for secretion removal.  Patient is discharged in stable condition and at his baseline. CRITICAL CARE TIME   Total CriticalCare time was 0 minutes, excluding separately reportable procedures. There was a high probability of clinically significant/life threatening deterioration in the patient's condition which required my urgent intervention. PROCEDURES:  Unlessotherwise noted below, none      Procedures      FINAL IMPRESSION      1. Tachypnea    2.  Increased tracheal secretions          DISPOSITION/PLAN   DISPOSITION Decision To Discharge 02/13/2022 05:45:37 AM          TARIK Diallo (electronically signed)  Attending Emergency Physician          Mao Diallo  02/13/22 0208

## 2022-02-14 LAB
EKG ATRIAL RATE: 99 BPM
EKG P AXIS: 44 DEGREES
EKG P-R INTERVAL: 150 MS
EKG Q-T INTERVAL: 358 MS
EKG QRS DURATION: 100 MS
EKG QTC CALCULATION (BAZETT): 459 MS
EKG R AXIS: -25 DEGREES
EKG T AXIS: 14 DEGREES
EKG VENTRICULAR RATE: 99 BPM

## 2022-02-16 ENCOUNTER — OFFICE VISIT (OUTPATIENT)
Dept: WOUND CARE | Age: 67
End: 2022-02-16
Payer: COMMERCIAL

## 2022-02-16 DIAGNOSIS — L89.150 PRESSURE INJURY OF COCCYGEAL REGION, UNSTAGEABLE (HCC): Primary | ICD-10-CM

## 2022-02-16 DIAGNOSIS — L89.226 PRESSURE INJURY OF DEEP TISSUE OF LEFT HIP: ICD-10-CM

## 2022-02-16 DIAGNOSIS — L89.610 PRESSURE INJURY OF RIGHT HEEL, UNSTAGEABLE (HCC): ICD-10-CM

## 2022-02-16 PROCEDURE — 97597 DBRDMT OPN WND 1ST 20 CM/<: CPT | Performed by: NURSE PRACTITIONER

## 2022-02-16 PROCEDURE — 97598 DBRDMT OPN WND ADDL 20CM/<: CPT | Performed by: NURSE PRACTITIONER

## 2022-02-16 PROCEDURE — 99308 SBSQ NF CARE LOW MDM 20: CPT | Performed by: NURSE PRACTITIONER

## 2022-02-16 NOTE — PROGRESS NOTES
[facility]                                                   Progress Note and Procedure Note      Mary Jo Ann  MEDICAL RECORD NUMBER:  [id number]  AGE: 61 y.o. GENDER: male  : 1955  EPISODE DATE:  2022    Subjective:     No chief complaint on file. HISTORY of PRESENT ILLNESS HPI     Mary Jo Ann is a 61 y.o. male who presents today for wound/ulcer evaluation. History of Wound Context: Patient received from local [facility] - s/p CVA, with subarachnoid hemorrhage and respiratory failure (has tracheostomy), COPD per history. Patient has DMII, with gastrostomy tube present for nutrition. On arrival patient is non-verbal. Wife present with sister in law toward end of exam/wound care, reports [place] is [place] Speaking (pre-CVA) but understands some English. Throughout care today-no verbalization, no eye contact, no grimacing noted. Wound to right heel, with deep tissue pressure injury to sacrum and left hip was noted as present on arrival to this facility. Per sister in law, they have been there for some time now. Wound/Ulcer Pain Timing/Severity: unable to accurately evaluate due to non-verbal status   Quality of pain: none noted per faces scale.    Severity: 0 / 10   Modifying Factors: None  Associated Signs/Symptoms: none    Ulcer Identification:  Ulcer Type: pressure  Contributing Factors: diabetes, chronic pressure, decreased mobility, shear force, decreased tissue oxygenation, incontinence of stool and incontinence of urine    Wound: N/A        PAST MEDICAL HISTORY        Diagnosis Date    Abdominal pain     Asthma     Bloating symptom     COPD (chronic obstructive pulmonary disease) (HCC)     [facility]     Depression     Depression     Diarrhea     Drug-seeking behavior 2020    [facility]D [facility] [address]     History of rectal bleeding     Hypercholesteremia     past trx > 5 yrs    Hypercholesteremia     Hypercholesteremia     Hypertension     meds > 3 yrs    Lung disease     RANDI on CPAP     patient relates he doesn't use cpap at this time    Osteoarthritis     left hip    Osteoarthritis     Osteoarthritis of left hip     Pituitary macroadenoma ([address]     Sleep apnea        PAST SURGICAL HISTORY    Past Surgical History:   Procedure Laterality Date    COLONOSCOPY  5/2/14    [facility]    COLONOSCOPY N/A 12/12/2019    COLONOSCOPY DIAGNOSTIC performed by Dion Ly MD at [address], COLON, DIAGNOSTIC      EYE SURGERY Bilateral     cataracts   [address] ?     index finger laceration repair    HERNIA REPAIR  2004    repair [facility]     JOINT REPLACEMENT Left     left hip    NC COLON CA SCRN NOT HI RSK IND N/A 5/17/2017    COLONOSCOPY performed by Jose Hernández MD at [address] [place] [address] 4/22/2019    LEFT HIP TOTAL HIP ARTHROPLASTY, LATERAL DECUB, GRACIE performed by Kirstin Schmidt MD at [place] [address]  12/12/2019    EGD DIAGNOSTIC ONLY performed by Dion Ly MD at [facility]       Problem List:    Patient Active Problem List   Diagnosis    Osteoarthritis    HTN (hypertension)    Hypercholesteremia    Depression    Asthma    Pulmonary emphysema ([address]    Tobacco use    Influenza    RANDI on CPAP    Unilateral primary osteoarthritis, left hip    Pituitary macroadenoma ([address]    Gastritis without bleeding    Other specified soft tissue disorders    Adenomatous polyp of colon    Rectal bleeding    Grade I hemorrhoids    Aftercare following joint replacement surgery    Blurry vision    Class 2 obesity in adult    Delirium    Other acute postprocedural pain    Pain in left lower leg    Paranoid schizophrenia ([address]    Presence of left artificial hip joint    Sprain of ribs, initial encounter    Suprasellar mass    Unilateral primary osteoarthritis, left knee    Hyponatremia    Schizoaffective disorder (HCC)    Hyperprolactinemia (HCC)    Psychogenic polydipsia    Schizophrenia (HCC)        FAMILY HISTORY    Family History   Problem Relation Age of Onset    Colon Cancer Mother     Cancer Mother     Arthritis Mother     High Blood Pressure Father     Heart Disease Father     Diabetes Sister     No Known Problems Brother     No Known Problems Brother     No Known Problems Brother     Other Brother         Sepsis    Other Brother         Killed    No Known Problems Sister     No Known Problems Sister     No Known Problems Son        SOCIAL HISTORY    Social History     Tobacco Use    Smoking status: Former Smoker     Packs/day: 1.50     Years: 46.00     Pack years: 69.00     Types: Cigarettes     Quit date: 3/20/2019     Years since quittin.9    Smokeless tobacco: Never Used   Vaping Use    Vaping Use: Unknown   Substance Use Topics    Alcohol use:  Yes    Drug use: No       ALLERGIES    No Known Allergies    MEDICATIONS    Current Outpatient Medications on File Prior to Visit   Medication Sig Dispense Refill    lidocaine (LMX) 4 % cream Apply a half dollar sized amount to intact skin topically up to twice daily as needed for pain 1 Tube 1    paliperidone (INVEGA) 6 MG extended release tablet Take 1 tablet by mouth daily 30 tablet 1    cabergoline (DOSTINEX) 0.5 MG tablet Take 1 tablet by mouth Twice a Week ON Monday and Thursday 8 tablet 3    furosemide (LASIX) 40 MG tablet Take 40 mg by mouth daily      meloxicam (MOBIC) 15 MG tablet Take 15 mg by mouth daily      potassium chloride (MICRO-K) 10 MEQ extended release capsule Take 10 mEq by mouth daily      simvastatin (ZOCOR) 40 MG tablet Take 40 mg by mouth nightly      fenofibrate 160 MG tablet Take 150 mg by mouth daily      aspirin 81 MG EC tablet Take 1 tablet by mouth 2 times daily 60 tablet 0    lisinopril (PRINIVIL;ZESTRIL) 20 MG tablet Take 2 tablets by mouth daily 30 tablet 3    Respiratory Therapy Supplies SANDRA Full face CPAP mask and supplies 1 Device 0    CPAP Machine MISC by Does not apply route New CPAP with 6 cm 1 each 0    albuterol (PROVENTIL) (2.5 MG/3ML) 0.083% nebulizer solution Take 3 mLs by nebulization every 6 hours as needed for Wheezing 120 each 5    STIOLTO RESPIMAT 2.5-2.5 MCG/ACT AERS Inhale 2 puff in AM 1 Inhaler 5     No current facility-administered medications on file prior to visit. REVIEW OF SYSTEMS    Pertinent items are noted in HPI. Objective: There were no vitals taken for this visit. Wt Readings from Last 3 Encounters:   02/13/22 180 lb (81.6 kg)   08/06/21 234 lb (106.1 kg)   06/15/20 234 lb (106.1 kg)       PHYSICAL EXAM    Constitutional:  Receiving nourishment via gastrostomy tube. Patient is in no apparent distress. Non-verbal and does not make eye contact throughout care/exam. No pain noted per faces scale. Respiratory:  Respiratory effort is easy and symmetric bilaterally. Rate is normal-per tracheostomy with O2 on per trach collar. Vascular:  Pedal Pulses obtained per doppler. Extremities positive for pitting edema, normal coloration for ethnicity and warm to touch. Neurological:   Unable to accurately evaluate d/t non verbal status. Dermatological:  Wound description noted in wound assessment. The wound to the right heel-is partially hypergranular/red, with the remainder covered with some hard black eschar and some areas of soft brown unstable eschar. The anitha wound is hyperkeratotic with some hard callused areas noted as being non-adherent with some underlying black eschar/and some tan purulence noted draining from beneath the non-adherent area of callus/eschar. Will debride some of the non-adherent area of callus with purulence beneath, to culture and allow contact with betadine/dressing.  The left hip has deep tissue pressure injury with some areas blanching/the remainder purple/maroon and non-blanching-no open areas at this time, will monitor for evolution of the wound. The left sacrum also has an area of deep tissue pressure injury with dark maroon/purple tissue present-non blanching, but also no open area at this time. Will continue to monitor, pad and protect. Psychiatric: Unable to accurately evaluate due to non-verbal status. Assessment:      Problems treated this admission:      1. Right heel wound:   Keep wound dry, do not get wet other than to clean with sterile saline. Daily apply betadine moistened (not overly saturated) gauze to cover entire wound bed, then apply 4 x 4 gauze, and secure with kerlix. Keep both heels elevated off of the bed. 2. Deep tissue pressure injury to coccyx and left hip  Limit time positioned to either area-totally avoiding positioning on either if possible. Use low air loss mattress if available. Daily apply liquid skin prep to both areas and then pad/protect with mepilex border. Still monitor both areas twice daily to determine whether wound has evolved to full thickness-and if noted, contact wound provider for appropriate orders. 3. Purulent drainage noted from area of right heel-monitor for systemic sx of infectious such as temp elevation, decreased bp, tachycardia, increase in drainage, or increased erythema to surrounding area. If noted please contact PCP or wound provider. Notify me of culture results from wound when back. 4. Generalized xerosis:   After washing lower extremities (intact areas only) with mild soap and water, apply urea 20 cream to dry areas. Perform every other day. 5. History of DM2, on tube feedings:   Insufficient nutrition for healing-monitor for additional support needs and notify dietician if noted.  Monitor blood sugars with goal being less then 150, to ensure ability for wounds to heal.        Procedure Note  Indications:  Based on my examination of this patient's wound(s)/ulcer(s) today, debridement is required to promote healing and evaluate the wound base. Performed by: MARICRUZ Martínez NP    Consent obtained:  Yes    Time out taken:  Yes    Pain Control:   none-noted       Right heel 2/16/22   Wound Care Documentation    Wound Properties Right heel/unstabeable pressure injury    Wound Right heel    Offloading for Diabetic Foot Ulcers Non-ambulatory    Dressing Status Changed 2/16/22    Dressing Changed Daily    Dressing/treatment Cleanse with saline, dry, then apply betadine moistened 2 x 2 gauze (not saturated) covering entire wound area-then dry 4 x 4's and secure with kerlix. Wound Cleansed Saline    Dressing Change Due Thursday    Wound Length (cm)  6.4   Wound Width (cm) 7.0   Wound Depth (cm) Unstageable    Post-Procedure Length (cm) 6.4   Post-Procedure Width (cm) 7.0   Post-Procedure Depth (cm) Unstageable    Distance Tunneling (cm) None noted    Drainage Amount Small to moderate    Drainage Description Serosanguinous from wound bed- small amount purulent tan/sanguinous drainage from beneath the non-adherent area of anitha wound callus at 7:00    Odor None    Margins Poorly defined   Exposed Structure None    Anitha-wound Assessment Macerated, hyperkeratotis (non-adherent), eschar    [place]% Wound Bed    Red% Wound Bed 33.    Yellow% Wound Bed    Black% Wound Bed 33   Purple% Wound Bed    Other% Wound Bed With remainder pale pink/white/macerated    Culture Taken Yes          Left hip deep tissue pressure injury 2/16/22   Wound Care Documentation    Wound Properties Left hip deep tissue pressure injury and stage 1 pressure injury (area intact at this time) no actual open wound   Wound Left hip    Dressing Status Daily apply liquid skin prep to area and keep padded with mepilex border -monitor each 12 hours for evolution of wound    Dressing Changed Daily    Dressing/treatment Liquid skin prep    Wound Cleansed Saline    Dressing Change Due Thursday    Wound Length (cm)  13.5    Wound Width (cm) 6.4   Wound Depth (cm) Deep tissue pressure injury - not yet open   Drainage Amount None    Drainage Description n/a   Margins Well defined    Exposed Structure None    Anitha-wound Assessment Intact    Purple% Wound Bed 20% non blanching purple/maroon   Other% Wound Bed 80% blanching light red    Culture Taken none             2/16/22 sacrococcygeal area of deep tissue pressure injury     Wound Care Documentation    Wound Properties Deep tissue pressure injury    Wound Not yet open-will monitor    Dressing Status Daily mepilex to protect after applying liquid skin prep to area    Dressing Changed Daily    Dressing/treatment Liquid skin prep with mepilex over   Wound Cleansed Saline    Dressing Change Due Thursday    Wound Length (cm)  3.4   Wound Width (cm) 2.5   Wound Depth (cm) Immeasurable not yet open    Wound Assessment DTPI   Drainage Amount None    Margins defined   Exposed Structure None    Anitha-wound Assessment Non erythematous    Pink% Wound Bed    Red% Wound Bed    Yellow% Wound Bed    Black% Wound Bed    Purple% Wound Bed Purple/maroon 100%    Culture Taken None      Debridement:Excisional Debridement-selective     Using scissors and forceps the wound(s)/ulcer(s) of the right heel was debrided to allow removal of anitha wound callus formation and some overlying soft eschar, epidermis/dermis        Devitalized Tissue Debrided:  fibrin, biofilm, necrotic/eschar, exudate and callus    Pre Debridement Measurements:  Are located in the Wound/Ulcer Documentation Flow Sheet    Wound/Ulcer #: 1 (right heel)    Post Debridement Measurements:  Wound/Ulcer Descriptions are Pre Debridement except measurements:       Incision 04/23/19 Hip Anterior;Left;Proximal (Active)   Number of days: 1029         Percent of Wound/Ulcer Debrided: 20%    Total Surface Area Debrided:  20.96 sq cm     Diabetic/Pressure/Non Pressure Ulcers:  Ulcer: Pressure ulcer, unstageable      Bleeding:  Minimal    Hemostasis Achieved:  not needed    Procedural Pain:  0  / 10     Post Procedural Pain:  0 / 10     Response to treatment:  Well tolerated by patient. Plan:     1. Right heel wound:   Keep wound dry, do not get wet other than to clean with sterile saline. Daily apply betadine moistened (not overly saturated) gauze to cover entire wound bed, then apply 4 x 4 gauze, and secure with kerlix. Keep both heels elevated off of the bed. 2. Deep tissue pressure injury to coccyx and left hip  Limit time positioned to either area-totally avoiding positioning on either if possible. Use low air loss mattress if available. Daily apply liquid skin prep to both areas and then pad/protect with mepilex border. Still monitor both areas twice daily to determine whether wound has evolved to full thickness-and if noted, contact wound provider for appropriate orders. 3. Purulent drainage noted from area of right heel-monitor for systemic sx of infectious such as temp elevation, decreased bp, tachycardia, increase in drainage, or increased erythema to surrounding area. If noted please contact PCP or wound provider. Notify me of culture results from wound when back.      Electronically signed by MARICRUZ Martinez NP on 2/16/2022 at 2:32 PM

## 2022-02-18 LAB
BLOOD CULTURE, ROUTINE: NORMAL
CULTURE, BLOOD 2: NORMAL

## 2022-02-20 LAB
GRAM STAIN RESULT: ABNORMAL
ORGANISM: ABNORMAL
ORGANISM: ABNORMAL
WOUND/ABSCESS: ABNORMAL
WOUND/ABSCESS: ABNORMAL

## 2022-02-23 ENCOUNTER — OFFICE VISIT (OUTPATIENT)
Dept: WOUND CARE | Age: 67
End: 2022-02-23
Payer: COMMERCIAL

## 2022-02-23 DIAGNOSIS — L89.226 PRESSURE INJURY OF DEEP TISSUE OF LEFT HIP: ICD-10-CM

## 2022-02-23 DIAGNOSIS — L89.610 PRESSURE INJURY OF RIGHT HEEL, UNSTAGEABLE (HCC): ICD-10-CM

## 2022-02-23 DIAGNOSIS — L89.150 PRESSURE INJURY OF COCCYGEAL REGION, UNSTAGEABLE (HCC): Primary | ICD-10-CM

## 2022-02-23 PROCEDURE — 99308 SBSQ NF CARE LOW MDM 20: CPT | Performed by: NURSE PRACTITIONER

## 2022-02-23 NOTE — PROGRESS NOTES
[facility]                                                   Progress Note and Procedure Note      Kae Padgett  MEDICAL RECORD NUMBER:  [id number]  AGE: 61 y.o. GENDER: male  : 1955  EPISODE DATE:  2022    Subjective:     Chief Complaint   Patient presents with    Wound Check         HISTORY of PRESENT ILLNESS HPI     Kae Padgett is a 61 y.o. male who presents today for wound/ulcer evaluation. History of Wound Context: Patient received from local [facility] - s/p CVA, with subarachnoid hemorrhage and respiratory failure (has tracheostomy), COPD per history. Patient has DMII, with gastrostomy tube present for nutrition. Throughout care today-no verbalization, no eye contact, no grimacing noted. Wound to right heel, with deep tissue pressure injury to sacrum and left hip- (now continuing to evolve) was noted as present on arrival to this facility. Per sister in law, they have been there for some time now. Today, the right heel was found with silvercell firmly adhered to the granular part of the heel-wound (dressing ordered was for betadine with protective dsd over). The left hip and sacral wound continue to evolve. The sacral wound is now covered with dense tan/slough. Wound/Ulcer Pain Timing/Severity: unable to accurately evaluate due to non-verbal status   Quality of pain: none noted per faces scale.    Severity: 0 / 10   Modifying Factors: None  Associated Signs/Symptoms: none    Ulcer Identification:  Ulcer Type: pressure  Contributing Factors: diabetes, chronic pressure, decreased mobility, shear force, decreased tissue oxygenation, incontinence of stool and incontinence of urine    Wound: N/A        PAST MEDICAL HISTORY        Diagnosis Date    Abdominal pain     Asthma     Bloating symptom     COPD (chronic obstructive pulmonary disease) (HCC)     COPD (chronic obstructive pulmonary disease) (HCC)     Depression     Depression     Diarrhea     Drug-seeking behavior 02/03/2020    Emphysema/COPD ([address]     History of rectal bleeding     Hypercholesteremia     past trx > 5 yrs    Hypercholesteremia     Hypercholesteremia     Hypertension     meds > 3 yrs    Lung disease     RANDI on CPAP     patient relates he doesn't use cpap at this time    Osteoarthritis     left hip    Osteoarthritis     Osteoarthritis of left hip     Pituitary macroadenoma ([address]     Sleep apnea        PAST SURGICAL HISTORY    Past Surgical History:   Procedure Laterality Date    COLONOSCOPY  5/2/14    [facility]    COLONOSCOPY N/A 12/12/2019    COLONOSCOPY DIAGNOSTIC performed by Levi Downs MD at [address], COLON, DIAGNOSTIC      EYE SURGERY Bilateral     cataracts   [address] ?     index finger laceration repair    HERNIA REPAIR  2004    repair [facility]     JOINT REPLACEMENT Left     left hip    AK COLON CA SCRN NOT HI RSK IND N/A 5/17/2017    COLONOSCOPY performed by Luis Zhong MD at [facility] Left 4/22/2019    LEFT HIP TOTAL HIP ARTHROPLASTY, LATERAL DECUB, GRACIE performed by Nubia Pereira MD at [facility]  12/12/2019    EGD DIAGNOSTIC ONLY performed by Levi Downs MD at [facility]       Problem List:    Patient Active Problem List   Diagnosis    Osteoarthritis    HTN (hypertension)    Hypercholesteremia    Depression    Asthma    Pulmonary emphysema ([address]    Tobacco use    Influenza    RANDI on CPAP    Unilateral primary osteoarthritis, left hip    Pituitary macroadenoma ([address]    Gastritis without bleeding    Other specified soft tissue disorders    Adenomatous polyp of colon    Rectal bleeding    Grade I hemorrhoids    Aftercare following joint replacement surgery    Blurry vision    Class 2 obesity in adult    Delirium    Other acute postprocedural pain    Pain in left lower leg    Paranoid schizophrenia ([address]    Presence of left artificial hip joint    Sprain of ribs, initial encounter    Suprasellar mass    Unilateral primary osteoarthritis, left knee    Hyponatremia    Schizoaffective disorder (HCC)    Hyperprolactinemia (HCC)    Psychogenic polydipsia    Schizophrenia (HCC)        FAMILY HISTORY    Family History   Problem Relation Age of Onset    Colon Cancer Mother     Cancer Mother     Arthritis Mother     High Blood Pressure Father     Heart Disease Father     Diabetes Sister     No Known Problems Brother     No Known Problems Brother     No Known Problems Brother     Other Brother         Sepsis    Other Brother         Killed    No Known Problems Sister     No Known Problems Sister     No Known Problems Son        SOCIAL HISTORY    Social History     Tobacco Use    Smoking status: Former Smoker     Packs/day: 1.50     Years: 46.00     Pack years: 69.00     Types: Cigarettes     Quit date: 3/20/2019     Years since quittin.9    Smokeless tobacco: Never Used   Vaping Use    Vaping Use: Unknown   Substance Use Topics    Alcohol use:  Yes    Drug use: No       ALLERGIES    No Known Allergies    MEDICATIONS    Current Outpatient Medications on File Prior to Visit   Medication Sig Dispense Refill    lidocaine (LMX) 4 % cream Apply a half dollar sized amount to intact skin topically up to twice daily as needed for pain 1 Tube 1    paliperidone (INVEGA) 6 MG extended release tablet Take 1 tablet by mouth daily 30 tablet 1    cabergoline (DOSTINEX) 0.5 MG tablet Take 1 tablet by mouth Twice a Week ON Monday and Thursday 8 tablet 3    furosemide (LASIX) 40 MG tablet Take 40 mg by mouth daily      meloxicam (MOBIC) 15 MG tablet Take 15 mg by mouth daily      potassium chloride (MICRO-K) 10 MEQ extended release capsule Take 10 mEq by mouth daily      simvastatin (ZOCOR) 40 MG tablet Take 40 mg by mouth nightly      fenofibrate 160 MG tablet Take 150 mg by mouth daily      aspirin 81 MG EC tablet Take 1 tablet by mouth 2 times daily 60 tablet 0    lisinopril (PRINIVIL;ZESTRIL) 20 MG tablet Take 2 tablets by mouth daily 30 tablet 3    Respiratory Therapy Supplies SANDRA Full face CPAP mask and supplies 1 Device 0    CPAP Machine MISC by Does not apply route New CPAP with 6 cm 1 each 0    albuterol (PROVENTIL) (2.5 MG/3ML) 0.083% nebulizer solution Take 3 mLs by nebulization every 6 hours as needed for Wheezing 120 each 5    STIOLTO RESPIMAT 2.5-2.5 MCG/ACT AERS Inhale 2 puff in AM 1 Inhaler 5     No current facility-administered medications on file prior to visit. REVIEW OF SYSTEMS    Pertinent items are noted in HPI. Objective: There were no vitals taken for this visit. Wt Readings from Last 3 Encounters:   02/13/22 180 lb (81.6 kg)   08/06/21 234 lb (106.1 kg)   06/15/20 234 lb (106.1 kg)       PHYSICAL EXAM    Constitutional:  Receiving nourishment via gastrostomy tube. Patient is in no apparent distress. Non-verbal and does not make eye contact throughout care/exam. No pain noted per faces scale. Respiratory:  Respiratory effort is easy and symmetric bilaterally. Rate is normal-per tracheostomy with O2 on per trach collar. Vascular:  Pedal Pulses obtained per doppler. Extremities positive for pitting edema, normal coloration for ethnicity and warm to touch. Neurological:   Unable to accurately evaluate d/t non verbal status. Dermatological:  Wound description noted in wound assessment. The wound to the right heel-is partially hypergranular/red, with the remainder covered with some hard black eschar and some areas of soft brown unstable eschar. Today found with slivercell dressing adhered to the wound bed- more granulation tissue this week. The anitha wound is hyperkeratotic with some hard callused areas noted as being non-adherent with some underlying black eschar-no purulence noted this week.  Heel C & S done last week shows pseudomonas and enterococcus faecalis, however, no purulence and no surrounding erythema noted this week. Will change topical ointment to gentamicin (both are sensitive to), and re-evaluate for further progression/involvement next week since only localized sx. The left hip DTPI is continuing to evolve, with fragile non-adherent skin overlying a superficial open wound- will monitor. The left sacrum also has an area of deep tissue pressure injury with dark maroon/purple tissue present-non blanching, this week has area of soft tan eschar/slough. Will continue to monitor, pad and protect. Psychiatric: Unable to accurately evaluate due to non-verbal status. Assessment:      Problems treated this admission:      1. Right heel wound:   Keep wound dry, do not get wet other than to clean with sterile saline, then gentamicin ointment then cover with hydrofera blue, then apply 4 x 4 gauze, and secure with kerlix. Keep both heels elevated off of the bed. DO NOT USE SILVERCELL ON THE HEEL. 2. Deep tissue pressure injury to coccyx and left hip  Limit time positioned to either area-totally avoiding positioning on either if possible. Use low air loss mattress if available. Daily apply liquid skin prep to both areas and then pad/protect with mepilex border. To left hip, cover with xeroform gauze, and change M-W-F then pad with mepilex border. TO sacrum, apply Dakins moistened gauze to wound area, protect anitha wound with liquid skin prep then 4 x 4s over, and secure with mepilex. May apply zinc ointment to the three superficial buttock/anitha wound open areas. 3. Last week there was purulent drainage noted from area of right heel-monitor for systemic sx of infectious such as temp elevation, decreased bp, tachycardia, increase in drainage, or increased erythema to surrounding area. Will hold off on oral antibiotics until seeing whether topical gentamicin is helping since symptoms are localized. Notify me prior to next visit if wound deteriorates.   4. Generalized xerosis: ck.  After washing lower extremities (intact areas only) with mild soap and water, apply urea 20 cream to dry areas. Perform every other day. 5. History of DM2, on tube feedings:   Insufficient nutrition for healing-monitor for additional support needs and notify dietician if noted. Monitor blood sugars with goal being less then 150, to ensure ability for wounds to heal.       Right heel 2/16/22     Wound Care Documentation    Wound Properties Right heel/unstabeable pressure injury    Wound Right heel    Offloading for Diabetic Foot Ulcers Non-ambulatory    Dressing Status Changed 2/23/22    Dressing Changed Daily    Dressing/treatment Cleanse with saline, dry, then apply thin layer of gentamicin ointment, then cover with hydrofera blue, then dry 4 x 4's and secure with kerlix. Wound Cleansed Saline    Dressing Change Due Thursday    Wound Length (cm)  3.5   Wound Width (cm) 3.2   Wound Depth (cm) Unstageable    Post-Procedure Length (cm) 3.5   Post-Procedure Width (cm) 3.2   Post-Procedure Depth (cm) Unstageable    Distance Tunneling (cm) None noted    Drainage Amount Small to moderate    Drainage Description Serosanguinous from wound bed-   Odor None    Margins Poorly defined   Exposed Structure None    Wendy-wound Assessment Macerated, hyperkeratotis (non-adherent), eschar    [place]% Wound Bed    Red% Wound Bed 90. Yellow% Wound Bed    Black% Wound Bed 10   Purple% Wound Bed    Other% Wound Bed With remainder pale pink/white/macerated    Culture Taken Yes              Left hip deep tissue pressure injury 2/16/22   Wound Care Documentation    Wound Properties Left hip deep tissue pressure injury - now open with full thickness wound   Wound Left hip    Dressing Status Xeroform gauze to area after gently cleansing with saline only, change M-W-F and cover with mepilex.     Dressing Changed Daily    Dressing/treatment Liquid skin prep    Wound Cleansed Saline    Dressing Change Due Thursday    Wound Length (cm) 5. 3    Wound Width (cm) 4.5   Wound Depth (cm) Deep tissue pressure injury - non-adherent epidermis with red bleeding wound bed   Drainage Amount None    Drainage Description n/a   Margins Well defined    Exposed Structure None    Anitha-wound Assessment Intact    Purple% Wound Bed 20% non blanching purple/maroon   Other% Wound Bed 80% blanching light red    Culture Taken none                 2/16/22 sacrococcygeal area of deep tissue pressure injury     Wound Care Documentation    Wound Properties Deep tissue pressure injury - unstageable    Wound Not yet open-will monitor    Dressing Status Daily cleanse with saline and apply Dakins moistened gauze, protect anitha wound with liquid skin prep, when dry  4 x 4s over then ABD'. s   Dressing Changed Daily    Dressing/treatment Cleanse with saline, then dakins 1/4 strength moist 2 x 2s over, cover with 4 x 4s, then Liquid skin prep to anitha wound with mepilex over   Wound Cleansed Saline    Dressing Change Due Thursday    Wound Length (cm)  4.6   Wound Width (cm) 3.2   Wound Depth (cm) Immeasurable slough covered     Wound Assessment DTPI   Drainage Amount None    Margins defined   Exposed Structure None    Anitha-wound Assessment Non erythematous    Pink% Wound Bed    Red% Wound Bed 10   Yellow% Wound Bed Slough/eschar coverd 90%   Black% Wound Bed    Purple% Wound Bed    Culture Taken None           Incision 04/23/19 Hip Anterior;Left;Proximal (Active)   Number of days: 1036         Plan:     1. Right heel wound:   Keep wound dry, do not get wet other than to clean with sterile saline. Daily cleanse with saline, then apply gentamicin ointment, then hydrofera blue, 4 x 4s,  and secure with kerlix. Keep both heels elevated off of the bed. DO NOT APPLY SILVERCELL TO THE HEEL. 2. Deep tissue pressure injury to coccyx and left hip  Limit time positioned to either area-totally avoiding positioning on either if possible. Use low air loss mattress if available.  To coccyx: Cleanse daily with saline, then apply Dakins moist 2 x 2s, then 4 x 4s and secure with mepilex border. To left hip, apply xeroform gauze, secure with mepilex border, and keep positioned off of the hip. 3. Purulent drainage noted from area of right heel-monitor for systemic sx of infectious such as temp elevation, decreased bp, tachycardia, increase in drainage, or increased erythema to surrounding area. If noted please contact PCP or wound provider. Will hold off on starting oral antibiotics, instead started gentamicin topical to wound beds since symptoms at this time remains localized only.     Electronically signed by MARICRUZ Alex NP on 2/23/2022 at 1:30 PM

## 2022-03-09 ENCOUNTER — OFFICE VISIT (OUTPATIENT)
Dept: WOUND CARE | Age: 67
End: 2022-03-09
Payer: COMMERCIAL

## 2022-03-09 DIAGNOSIS — L89.610 PRESSURE INJURY OF RIGHT HEEL, UNSTAGEABLE (HCC): ICD-10-CM

## 2022-03-09 DIAGNOSIS — L89.226 PRESSURE INJURY OF DEEP TISSUE OF LEFT HIP: ICD-10-CM

## 2022-03-09 DIAGNOSIS — L89.150 PRESSURE INJURY OF COCCYGEAL REGION, UNSTAGEABLE (HCC): Primary | ICD-10-CM

## 2022-03-09 DIAGNOSIS — Z74.09: ICD-10-CM

## 2022-03-09 PROCEDURE — 11055 PARING/CUTG B9 HYPRKER LES 1: CPT | Performed by: NURSE PRACTITIONER

## 2022-03-09 PROCEDURE — 11045 DBRDMT SUBQ TISS EACH ADDL: CPT | Performed by: NURSE PRACTITIONER

## 2022-03-09 PROCEDURE — 99308 SBSQ NF CARE LOW MDM 20: CPT | Performed by: NURSE PRACTITIONER

## 2022-03-09 PROCEDURE — 11042 DBRDMT SUBQ TIS 1ST 20SQCM/<: CPT | Performed by: NURSE PRACTITIONER

## 2022-03-09 NOTE — PROGRESS NOTES
[facility]                                                   Progress Note and Procedure Note      Calderon Owusu  MEDICAL RECORD NUMBER:  [id number]  AGE: 61 y.o. GENDER: male  : 1955  EPISODE DATE:  3/9/2022    Subjective:     Chief Complaint   Patient presents with    Wound Check         HISTORY of PRESENT ILLNESS HPI     Calderon Owusu is a 61 y.o. male who presents today for wound/ulcer evaluation. History of Wound Context: Patient received from local [facility] - s/p CVA, with subarachnoid hemorrhage and respiratory failure (has tracheostomy), COPD per history. Patient has DMII, with gastrostomy tube present for nutrition. Throughout care today-no verbalization, no eye contact, no grimacing noted. Wound to right heel, with deep tissue pressure injury to sacrum and left hip- (now continuing to evolve) was noted as present on arrival to this facility. Per sister in law, they have been there for some time now. Today, the right heel was found with silvercell firmly adhered to the granular part of the heel-wound (dressing ordered was for betadine with protective dsd over). The left hip and sacral wound continue to evolve. The sacral wound is now covered with dense tan/slough. Wound/Ulcer Pain Timing/Severity: unable to accurately evaluate due to non-verbal status   Quality of pain: none noted per faces scale.    Severity: 0 / 10   Modifying Factors: None  Associated Signs/Symptoms: none    Ulcer Identification:  Ulcer Type: pressure  Contributing Factors: diabetes, chronic pressure, decreased mobility, shear force, decreased tissue oxygenation, incontinence of stool and incontinence of urine    Wound: N/A        PAST MEDICAL HISTORY        Diagnosis Date    Abdominal pain     Asthma     Bloating symptom     COPD (chronic obstructive pulmonary disease) (HCC)     COPD (chronic obstructive pulmonary disease) (formerly [facility])     Depression     Depression     Diarrhea     Drug-seeking behavior 02/03/2020    Emphysema/COPD ([address]     History of rectal bleeding     Hypercholesteremia     past trx > 5 yrs    Hypercholesteremia     Hypercholesteremia     Hypertension     meds > 3 yrs    Lung disease     RANDI on CPAP     patient relates he doesn't use cpap at this time    Osteoarthritis     left hip    Osteoarthritis     Osteoarthritis of left hip     Physiological consequence of immobility 3/9/2022    Pituitary macroadenoma ([address]     Sleep apnea        PAST SURGICAL HISTORY    Past Surgical History:   Procedure Laterality Date    COLONOSCOPY  5/2/14    JARMOSMARLOK    COLONOSCOPY N/A 12/12/2019    COLONOSCOPY DIAGNOSTIC performed by Hilary Altamirano MD at [address], COLON, DIAGNOSTIC      EYE SURGERY Bilateral     cataracts   [address] ?     index finger laceration repair    HERNIA REPAIR  2004    repair [facility]     JOINT REPLACEMENT Left     left hip    [place] COLON CA SCRN NOT HI RSK IND N/A 5/17/2017    COLONOSCOPY performed by Jered Stanton MD at [address] [facility]. Left 4/22/2019    LEFT HIP TOTAL HIP ARTHROPLASTY, LATERAL DECUB, GRACIE performed by Vandana George MD at [facility]  12/12/2019    EGD DIAGNOSTIC ONLY performed by Hilary Altamirano MD at [facility]       Problem List:    Patient Active Problem List   Diagnosis    Osteoarthritis    HTN (hypertension)    Hypercholesteremia    Depression    Asthma    Pulmonary emphysema ([address]    Tobacco use    Influenza    RANDI on CPAP    Unilateral primary osteoarthritis, left hip    Pituitary macroadenoma ([address]    Gastritis without bleeding    Other specified soft tissue disorders    Adenomatous polyp of colon    Rectal bleeding    Grade I hemorrhoids    Aftercare following joint replacement surgery    Blurry vision    Class 2 obesity in adult    Delirium    Other acute postprocedural pain    Pain in left lower leg    [facility]anoid schizophrenia [facility]    Presence of left artificial hip joint    Sprain of ribs, initial encounter    Suprasellar mass    Unilateral primary osteoarthritis, left knee    Hyponatremia    Schizoaffective disorder (HCC)    Hyperprolactinemia (HCC)    Psychogenic polydipsia    Schizophrenia (HCC)    Physiological consequence of immobility        FAMILY HISTORY    Family History   Problem Relation Age of Onset    Colon Cancer Mother     Cancer Mother     Arthritis Mother     High Blood Pressure Father     Heart Disease Father     Diabetes Sister     No Known Problems Brother     No Known Problems Brother     No Known Problems Brother     Other Brother         Sepsis    Other Brother         Killed    No Known Problems Sister     No Known Problems Sister     No Known Problems Son        SOCIAL HISTORY    Social History     Tobacco Use    Smoking status: Former Smoker     Packs/day: 1.50     Years: 46.00     Pack years: 69.00     Types: Cigarettes     Quit date: 3/20/2019     Years since quittin.9    Smokeless tobacco: Never Used   Vaping Use    Vaping Use: Unknown   Substance Use Topics    Alcohol use:  Yes    Drug use: No       ALLERGIES    No Known Allergies    MEDICATIONS    Current Outpatient Medications on File Prior to Visit   Medication Sig Dispense Refill    lidocaine (LMX) 4 % cream Apply a half dollar sized amount to intact skin topically up to twice daily as needed for pain 1 Tube 1    paliperidone (INVEGA) 6 MG extended release tablet Take 1 tablet by mouth daily 30 tablet 1    cabergoline (DOSTINEX) 0.5 MG tablet Take 1 tablet by mouth Twice a Week ON Monday and Thursday 8 tablet 3    furosemide (LASIX) 40 MG tablet Take 40 mg by mouth daily      meloxicam (MOBIC) 15 MG tablet Take 15 mg by mouth daily      potassium chloride (MICRO-K) 10 MEQ extended release capsule Take 10 mEq by mouth daily      simvastatin (ZOCOR) 40 MG tablet Take 40 mg by mouth nightly  fenofibrate 160 MG tablet Take 150 mg by mouth daily      aspirin 81 MG EC tablet Take 1 tablet by mouth 2 times daily 60 tablet 0    lisinopril (PRINIVIL;ZESTRIL) 20 MG tablet Take 2 tablets by mouth daily 30 tablet 3    Respiratory Therapy Supplies SANDRA Full face CPAP mask and supplies 1 Device 0    CPAP Machine MISC by Does not apply route New CPAP with 6 cm 1 each 0    albuterol (PROVENTIL) (2.5 MG/3ML) 0.083% nebulizer solution Take 3 mLs by nebulization every 6 hours as needed for Wheezing 120 each 5    STIOLTO RESPIMAT 2.5-2.5 MCG/ACT AERS Inhale 2 puff in AM 1 Inhaler 5     No current facility-administered medications on file prior to visit. REVIEW OF SYSTEMS    Pertinent items are noted in HPI. Objective: There were no vitals taken for this visit. Wt Readings from Last 3 Encounters:   02/13/22 180 lb (81.6 kg)   08/06/21 234 lb (106.1 kg)   06/15/20 234 lb (106.1 kg)       PHYSICAL EXAM    Constitutional:  Receiving nourishment via gastrostomy tube. Patient is in no apparent distress. Non-verbal and does not make eye contact throughout care/exam. No pain noted per faces scale. Respiratory:  Respiratory effort is easy and symmetric bilaterally. Rate is normal-per tracheostomy with O2 on per trach collar. Vascular:  Pedal Pulses obtained per doppler. Extremities positive for pitting edema, normal coloration for ethnicity and warm to touch. Neurological:   Unable to accurately evaluate d/t non verbal status. Dermatological:  Wound description noted in wound assessment. The wound to the right heel-is partially hypergranular/red, with the remainder covered with some hard black eschar and some areas of soft brown unstable eschar. Today found with slivercell dressing adhered to the wound bed- more granulation tissue this week.  The anitha wound is hyperkeratotic with some hard callused areas noted as being non-adherent with some underlying black eschar-no purulence noted this week- will debride. Heel C & S done last week shows pseudomonas and enterococcus faecalis, however, no purulence and no surrounding erythema noted this week. Will change topical ointment to gentamicin (both are sensitive to), and re-evaluate for further progression/involvement next week since only localized sx. The left hip DTPI is continuing to evolve, with fragile non-adherent skin overlying a superficial open wound- will monitor. The left sacrum also has an area of deep tissue pressure injury with dark maroon/purple tissue present-non blanching, this week has area of soft tan eschar/slough. Will continue to monitor, pad and protect. Psychiatric: Unable to accurately evaluate due to non-verbal status. Assessment:      Problems treated this admission:      1. Right heel wound:   Keep wound dry, do not get wet other than to clean with sterile saline, then gentamicin ointment then cover with hydrofera blue, then apply 4 x 4 gauze, and secure with kerlix. Keep both heels elevated off of the bed. DO NOT USE SILVERCELL ON THE HEEL. 2. Deep tissue pressure injury to coccyx and left hip  Limit time positioned to either area-totally avoiding positioning on either if possible. Use low air loss mattress if available. Daily apply liquid skin prep to both areas and then pad/protect with mepilex border. To left hip, cover with xeroform gauze, and change M-W-F then pad with mepilex border. TO sacrum, apply Dakins moistened gauze to wound area, protect anitha wound with liquid skin prep then 4 x 4s over, and secure with mepilex. May apply zinc ointment to the three superficial buttock/anitha wound open areas. 3. Last week there was purulent drainage noted from area of right heel-monitor for systemic sx of infectious such as temp elevation, decreased bp, tachycardia, increase in drainage, or increased erythema to surrounding area.  Will hold off on oral antibiotics until seeing whether topical gentamicin is helping since symptoms are localized. Notify me prior to next visit if wound deteriorates. 4. Generalized xerosis:   ck. After washing lower extremities (intact areas only) with mild soap and water, apply urea 20 cream to dry areas. Perform every other day. 5. History of DM2, on tube feedings:   Insufficient nutrition for healing-monitor for additional support needs and notify dietician if noted. Monitor blood sugars with goal being less then 150, to ensure ability for wounds to heal.            Wound Care Documentation    Right heel   Picture did not save to Epic     Wound Properties Right heel pressure injury    Offloading for Diabetic Foot Ulcers    Dressing Status Daily change with saline cleanse, gentamicin with hydrofera over    Dressing Changed Daily    Dressing/treatment See above    Wound Cleansed Saline    Dressing Change Due Thursday    Wound Length (cm)  3.5   Wound Width (cm) 6.5   Wound Depth (cm) 0.2   Post-Procedure Length (cm) 3.3   Post-Procedure Width (cm) 6.5   Post-Procedure Depth (cm) 0.2   Distance Tunneling (cm) None    Tunneling Position _____O'Clock None    Undermining Starts _____O'Clock None    Undermining Ends _____ O'Clock None    Undermining Maxium Distance (cm) None    Wound Assessment Hypergranulation with large amount surrounding thick non-adherent callus formation    Drainage Amount Moderate    Drainage Description Sanguinous (on removal of the hydrofera)    Odor none    Margins Poorly defined, open    Exposed Structure None    Wendy-wound Assessment Pink newly healed epithelial tissue    Non-staged Wound Description Pressure injury    Pink% Wound Bed    Red% Wound Bed 100%   Yellow% Wound Bed    Black% Wound Bed                    Wound Properties Left hip deep tissue pressure injury - now open with full thickness wound   Wound Left hip    Dressing Status Xeroform gauze to area after gently cleansing with saline only, change M-W-F and cover with mepilex.     Dressing Changed Daily Dressing/treatment Liquid skin prep    Wound Cleansed Saline    Dressing Change Due Thursday    Wound Length (cm)  5.3    Wound Width (cm) 4.5   Wound Depth (cm) Deep tissue pressure injury - non-adherent epidermis with red bleeding wound bed   Drainage Amount None    Drainage Description n/a   Margins Well defined    Exposed Structure None    Wendy-wound Assessment Intact    Purple% Wound Bed 20% non blanching purple/maroon   Other% Wound Bed 80% blanching light red    Culture Taken none         3/9/22  Left hip area   Wound Care Documentation    Wound Properties  left hip wound -deep tissue pressure injury-unstageable   Wound Pressure injury- dtpi   Dressing Status Daily cleanse with saline and apply silver hydrogel to wound with dsd over    Dressing Changed Wednesday 3/9/22   Dressing/treatment See above    Wound Cleansed Saline    Dressing Change Due Thursday    Wound Length (cm)  2.0   Wound Width (cm) 3.0   Wound Depth (cm) Unstageable    2.0Post-Procedure Length (cm) 2.0   Post-Procedure Width (cm) 3.0   Post-Procedure Depth (cm) Unstageable    Distance Tunneling (cm) None    Undermining Starts _____O'Clock None    Drainage Amount Scant    Drainage Description Yellow/hussein   Odor None    Margins Poorly defined, intact-    Exposed Structure None    Wendy-wound Assessment Non erythematous    Pink% Wound Bed    Red% Wound Bed 80%   Yellow% Wound Bed 20%   Purple% Wound Bed                  3/9/22 sacrococcygeal area of deep tissue pressure injury       PAST MEDICAL HISTORY        Diagnosis Date    Abdominal pain     Asthma     Bloating symptom     COPD (chronic obstructive pulmonary disease) (HCC)     COPD (chronic obstructive pulmonary disease) (HCC)     Depression     Depression     Diarrhea     Drug-seeking behavior 02/03/2020    [facility]D [facility]     History of rectal bleeding     Hypercholesteremia     past trx > 5 yrs    Hypercholesteremia     Hypercholesteremia     Hypertension     meds > 3 yrs    Lung disease     RANDI on CPAP     patient relates he doesn't use cpap at this time    Osteoarthritis     left hip    Osteoarthritis     Osteoarthritis of left hip     Physiological consequence of immobility 3/9/2022    Pituitary macroadenoma ([address]     Sleep apnea        PAST SURGICAL HISTORY    Past Surgical History:   Procedure Laterality Date    COLONOSCOPY  14    JARMOSMARLOK    COLONOSCOPY N/A 2019    COLONOSCOPY DIAGNOSTIC performed by Archie Salgado MD at [address], COLON, DIAGNOSTIC      EYE SURGERY Bilateral     cataracts   [address] ? index finger laceration repair    HERNIA REPAIR  [facility]    [facility]     JOINT REPLACEMENT Left     left hip    [place] COLON CA SCRN NOT HI RSK IND N/A 2017    COLONOSCOPY performed by Paula Velasco MD at [address] [place] [address] Left 2019    LEFT HIP TOTAL HIP ARTHROPLASTY, LATERAL DECUB, GRACIE performed by Romeo Juarez MD at [address]  2019    EGD DIAGNOSTIC ONLY performed by Archie Salgado MD at [place] [address]    Family History   Problem Relation Age of Onset    Colon Cancer Mother     Cancer Mother     Arthritis Mother     High Blood Pressure Father     Heart Disease Father     Diabetes Sister     No Known Problems Brother     No Known Problems Brother     No Known Problems Brother     Other Brother         Sepsis    Other Brother         Killed    No Known Problems Sister     No Known Problems Sister     No Known Problems Son        SOCIAL HISTORY    Social History     Tobacco Use    Smoking status: Former Smoker     Packs/day: 1.50     Years: 46.00     Pack years: 69.00     Types: Cigarettes     Quit date: 3/20/2019     Years since quittin.9    Smokeless tobacco: Never Used   Vaping Use    Vaping Use: Unknown   Substance Use Topics    Alcohol use: Yes    Drug use:  No ALLERGIES    No Known Allergies    MEDICATIONS    Current Outpatient Medications on File Prior to Visit   Medication Sig Dispense Refill    lidocaine (LMX) 4 % cream Apply a half dollar sized amount to intact skin topically up to twice daily as needed for pain 1 Tube 1    paliperidone (INVEGA) 6 MG extended release tablet Take 1 tablet by mouth daily 30 tablet 1    cabergoline (DOSTINEX) 0.5 MG tablet Take 1 tablet by mouth Twice a Week ON Monday and Thursday 8 tablet 3    furosemide (LASIX) 40 MG tablet Take 40 mg by mouth daily      meloxicam (MOBIC) 15 MG tablet Take 15 mg by mouth daily      potassium chloride (MICRO-K) 10 MEQ extended release capsule Take 10 mEq by mouth daily      simvastatin (ZOCOR) 40 MG tablet Take 40 mg by mouth nightly      fenofibrate 160 MG tablet Take 150 mg by mouth daily      aspirin 81 MG EC tablet Take 1 tablet by mouth 2 times daily 60 tablet 0    lisinopril (PRINIVIL;ZESTRIL) 20 MG tablet Take 2 tablets by mouth daily 30 tablet 3    Respiratory Therapy Supplies SANDRA Full face CPAP mask and supplies 1 Device 0    CPAP Machine MISC by Does not apply route New CPAP with 6 cm 1 each 0    albuterol (PROVENTIL) (2.5 MG/3ML) 0.083% nebulizer solution Take 3 mLs by nebulization every 6 hours as needed for Wheezing 120 each 5    STIOLTO RESPIMAT 2.5-2.5 MCG/ACT AERS Inhale 2 puff in AM 1 Inhaler 5     No current facility-administered medications on file prior to visit. REVIEW OF SYSTEMS    Pertinent items are noted in HPI. Objective: There were no vitals taken for this visit. Wt Readings from Last 3 Encounters:   02/13/22 180 lb (81.6 kg)   08/06/21 234 lb (106.1 kg)   06/15/20 234 lb (106.1 kg)       PHYSICAL EXAM    Constitutional:   Well nourished and well developed. Appears neat and clean. Patient is non-verbal, no response during care. Appears at normal baseline. Respiratory:  Respiratory effort is easy and symmetric bilaterally.   Rate is normal at rest on O2. Vascular:  Pedal Pulses is palpable and audible with doppler. Capillary refill is <3 sec to digits bilateral.  Extremities negative for edema. Neurological: Patient is non-responsive during care, unable to accurately assess for. Dermatological:  Wound description noted in wound assessment. The right heel has some hypergranulation with large area of dried, non-adherent callus surrounding the remaining wound area, will debride. The left hip wound is covered with yellow slough with some granulation tissue noted through the slough, will debride. The sacral wound- some red granular tissue noted, remainder of wound bed is covered with tan/gray slough and non-viable tissue-will debride. Psychiatric:  Unable to evaluate- patient is non-responsive during care. Assessment:      There are no active hospital problems to display for this patient. Procedure Note  Indications:  Based on my examination of this patient's wound(s)/ulcer(s) today, debridement is required to promote healing and evaluate the wound base. Performed by: MARICRUZ Rojo NP    Consent obtained:  Yes    Time out taken:  Yes    Pain Control:Other none required       Debridement:Excisional Debridement    Using curette, scissors, forceps and tissue nippers the wound(s)/ulcer(s) was/were sharply debrided down through and including the removal of epidermis, dermis and subcutaneous tissue and callus.          Devitalized Tissue Debrided:  fibrin, biofilm, slough, necrotic/eschar and callus    Pre Debridement Measurements:  Are located in the Wound/Ulcer Documentation Flow Sheet    Wound/Ulcer #: right heel- debrided of surrounding callus formation, left hip debrided of slough, and sacrum of slough, and necrotic non-viable tissue     Post Debridement Measurements:  Wound/Ulcer Descriptions are Pre Debridement except measurements:       Incision 04/23/19 Hip Anterior;Left;Proximal (Active)   Number of days: 1055         Percent of Wound/Ulcer Debrided: right heel- total sq cm 11.375 (50% of wound)   Left hip total sq cm is 23.85 (100% of wound) , and sacrum 6.56 sq cm (50% of wound)    Total Surface Area Debrided:  41.785 sq cm     Diabetic/Pressure/Non Pressure Ulcers:  Ulcer: Pressure ulcer, unstageable      Bleeding:  Minimal    Hemostasis Achieved:  by silver nitrate stick    Procedural Pain:  0  / 10     Post Procedural Pain:  0 / 10     Response to treatment:  Well tolerated by patient. Wound Care Documentation    Wound Properties Deep tissue pressure injury sacrum - still unstageable    Wound Not yet open-will monitor    Dressing Status Daily cleanse with saline and apply Dakins moistened gauze, protect anitha wound with liquid skin prep, when dry  4 x 4s over then ABD'. s   Dressing Changed Daily    Dressing/treatment Cleanse with saline, then dakins 1/4 strength moist 2 x 2s over, cover with 4 x 4s, then Liquid skin prep to anitha wound with mepilex over   Wound Cleansed Saline    Dressing Change Due Thursday    Wound Length (cm)  4.1   Wound Width (cm) 3.2   Wound Depth (cm) 0.7   Wound length (cm)  4.1   Wound width (cm)  3.2   Wound Depth (cm)  0.7    Wound Assessment DTPI still unstageable    Drainage Amount Small    Margins Defined and open   Exposed Structure None    Anitha-wound Assessment Non erythematous    Pink% Wound Bed    Red% Wound Bed 50   Yellow% Wound Bed Slough/eschar coverd 50%   Black% Wound Bed    Purple% Wound Bed    Culture Taken None           Incision 04/23/19 Hip Anterior;Left;Proximal (Active)   Number of days: 1050         Plan:     1. Right heel wound:   Keep wound dry, do not get wet other than to clean with sterile saline. Daily cleanse with saline, then apply gentamicin ointment, then hydrofera blue, 4 x 4s,  and secure with kerlix. Keep both heels elevated off of the bed. DO NOT APPLY SILVERCELL TO THE HEEL.     2. Deep tissue pressure injury to coccyx and left hip  Limit time positioned to either area-totally avoiding positioning on either if possible. Use low air loss mattress. To coccyx: Cleanse daily with saline, then apply Dakins moist 2 x 2s, then 4 x 4s and secure with mepilex border. To left hip, apply silver hydrogel to wound, cover with 4 x 4 gauze, secure with mepilex border, and keep positioned off of the hip. 3. Continue use of low air loss mattress and turn frequently - also keep heels elevated off the bed at all times, use boots.     Electronically signed by MARICRUZ Lott NP on 3/9/2022 at 12:32 PM

## 2022-03-16 ENCOUNTER — OFFICE VISIT (OUTPATIENT)
Dept: WOUND CARE | Age: 67
End: 2022-03-16
Payer: COMMERCIAL

## 2022-03-16 DIAGNOSIS — L89.226 PRESSURE INJURY OF DEEP TISSUE OF LEFT HIP: ICD-10-CM

## 2022-03-16 DIAGNOSIS — Z74.09: ICD-10-CM

## 2022-03-16 DIAGNOSIS — L89.610 PRESSURE INJURY OF RIGHT HEEL, UNSTAGEABLE (HCC): ICD-10-CM

## 2022-03-16 DIAGNOSIS — L89.150 PRESSURE INJURY OF COCCYGEAL REGION, UNSTAGEABLE (HCC): Primary | ICD-10-CM

## 2022-03-16 PROCEDURE — 11042 DBRDMT SUBQ TIS 1ST 20SQCM/<: CPT | Performed by: NURSE PRACTITIONER

## 2022-03-16 PROCEDURE — 99308 SBSQ NF CARE LOW MDM 20: CPT | Performed by: NURSE PRACTITIONER

## 2022-03-16 NOTE — PROGRESS NOTES
[facility] [place]                                                                                                Debridement Note       Gerardo Allen  MEDICAL RECORD NUMBER:  [id number]  AGE: 61 y.o. GENDER: male  : 1955  EPISODE DATE:  3/16/2022    Subjective:     Chief Complaint   Patient presents with    Wound Check         HISTORY of PRESENT ILLNESS HPI  Gerardo Allen is a 61 y.o. male who presents today for wound/ulcer evaluation. History of Wound Context: Patient received from local [facility] - s/p CVA, with subarachnoid hemorrhage and respiratory failure (has tracheostomy), COPD per history. Patient has DMII, with gastrostomy tube present for nutrition. Throughout care today-no verbalization, no eye contact, no grimacing noted. Wound to right heel, with deep tissue pressure injury to sacrum and left hip- (now continuing to evolve) was noted as present on arrival to this facility. Per sister in law, the wounds have been there for some time now. Today, the right heel is improved with only small area of hypergranulation with small rim of necrotic tissue and surrounding thick callus of anitha wound. The left hip and sacral wound continue to evolve. The sacral wound is now granular with some remaining non-viable tissue. Wound/Ulcer Pain Timing/Severity: unable to accurately evaluate due to non-verbal status   Quality of pain: none noted per faces scale.    Severity: 0 / 10   Modifying Factors: None  Associated Signs/Symptoms: none     Ulcer Identification:  Ulcer Type: pressure  Contributing Factors: diabetes, chronic pressure, decreased mobility, shear force, decreased tissue oxygenation, incontinence of stool and incontinence of urine     Wound: N/A        PAST MEDICAL HISTORY        Diagnosis Date    Abdominal pain     Asthma     Bloating symptom     COPD (chronic obstructive pulmonary disease) (HCC)     [facility] [address]     Depression     Depression     Diarrhea     Drug-seeking behavior 02/03/2020    Emphysema/COPD ([facility] [address]     History of rectal bleeding     Hypercholesteremia     past trx > 5 yrs    Hypercholesteremia     Hypercholesteremia     Hypertension     meds > 3 yrs    Lung disease     RANDI on CPAP     patient relates he doesn't use cpap at this time    Osteoarthritis     left hip    Osteoarthritis     Osteoarthritis of left hip     Physiological consequence of immobility 3/9/2022    Pituitary macroadenoma ([facility] [address]     Sleep apnea        PAST SURGICAL HISTORY    Past Surgical History:   Procedure Laterality Date    COLONOSCOPY  5/2/14    JARMOSMARLOK    COLONOSCOPY N/A 12/12/2019    COLONOSCOPY DIAGNOSTIC performed by Chrissy Gutierrez MD at [address], COLON, DIAGNOSTIC      EYE SURGERY Bilateral     cataracts   [address] ?     index finger laceration repair    HERNIA REPAIR  2004    repair [facility]     JOINT REPLACEMENT Left     left hip    [place] COLON CA SCRN NOT HI RSK IND N/A 5/17/2017    COLONOSCOPY performed by Raghav Boston MD at [address] [place] [address] Left 4/22/2019    LEFT HIP TOTAL HIP ARTHROPLASTY, LATERAL DECUB, GRACIE performed by Giovany Gomes MD at [address]  12/12/2019    EGD DIAGNOSTIC ONLY performed by Chrissy Gutierrez MD at [place] [address]    Family History   Problem Relation Age of Onset    Colon Cancer Mother     Cancer Mother     Arthritis Mother     High Blood Pressure Father     Heart Disease Father     Diabetes Sister     No Known Problems Brother     No Known Problems Brother     No Known Problems Brother     Other Brother         Sepsis    Other Brother         Killed    No Known Problems Sister     No Known Problems Sister     No Known Problems Son        SOCIAL HISTORY    Social History     Tobacco Use    Smoking status: Former Smoker     Packs/day: 1.50     Years: 46.00     Pack years: 69.00     Types: Cigarettes     Quit date: 3/20/2019     Years since quittin.9    Smokeless tobacco: Never Used   Vaping Use    Vaping Use: Unknown   Substance Use Topics    Alcohol use: Yes    Drug use: No       ALLERGIES    No Known Allergies    MEDICATIONS    Current Outpatient Medications on File Prior to Visit   Medication Sig Dispense Refill    lidocaine (LMX) 4 % cream Apply a half dollar sized amount to intact skin topically up to twice daily as needed for pain 1 Tube 1    paliperidone (INVEGA) 6 MG extended release tablet Take 1 tablet by mouth daily 30 tablet 1    cabergoline (DOSTINEX) 0.5 MG tablet Take 1 tablet by mouth Twice a Week ON Monday and Thursday 8 tablet 3    furosemide (LASIX) 40 MG tablet Take 40 mg by mouth daily      meloxicam (MOBIC) 15 MG tablet Take 15 mg by mouth daily      potassium chloride (MICRO-K) 10 MEQ extended release capsule Take 10 mEq by mouth daily      simvastatin (ZOCOR) 40 MG tablet Take 40 mg by mouth nightly      fenofibrate 160 MG tablet Take 150 mg by mouth daily      aspirin 81 MG EC tablet Take 1 tablet by mouth 2 times daily 60 tablet 0    lisinopril (PRINIVIL;ZESTRIL) 20 MG tablet Take 2 tablets by mouth daily 30 tablet 3    Respiratory Therapy Supplies SANDRA Full face CPAP mask and supplies 1 Device 0    CPAP Machine MISC by Does not apply route New CPAP with 6 cm 1 each 0    albuterol (PROVENTIL) (2.5 MG/3ML) 0.083% nebulizer solution Take 3 mLs by nebulization every 6 hours as needed for Wheezing 120 each 5    STIOLTO RESPIMAT 2.5-2.5 MCG/ACT AERS Inhale 2 puff in AM 1 Inhaler 5     No current facility-administered medications on file prior to visit. REVIEW OF SYSTEMS    Pertinent items are noted in HPI. Objective: There were no vitals taken for this visit.     Wt Readings from Last 3 Encounters:   22 180 lb (81.6 kg)   21 234 lb (106.1 kg)   06/15/20 234 lb (106.1 kg)       PHYSICAL EXAM    Constitutional:   Well nourished and well developed. Appears neat and clean. Patient is not alert,  in no apparent distress. Moves arms occasionally, but does not make eye contact. Respiratory:  Respiratory effort is easy and symmetric bilaterally. Rate is normal at rest with O2 on per trach collar. Vascular:  Pedal Pulses ispalpable and audible with doppler. Capillary refill is <3 sec to digits bilateral.  Extremities negative for pitting edema. Neurological:   Unable to determine presence of sensation to lower extremities due to non-verbal status. .    Dermatological:  Wound description noted in wound assessment. The wound to the right heel is heeling well, much less necrotic tissue, granulation tissue present, with much surrounding hard callus - will debride. The left hip wound is covered with tan/gray fibrous slough- will debride. The sacrum has small remaining area of stringy necrotic tissue, will debride. Psychiatric:  Unable to evaluate due to non-verbal status. Assessment:      Patient seen this visit for: evaluation of need for debridement of right heel, left hip or sacrum and need for change to wound regimen. Problems treated this admission:   1. Right heel wound:   Keep wound dry, do not get wet other than to clean with sterile saline, then gentamicin ointment then cover with hydrofera blue, then apply 4 x 4 gauze, and secure with kerlix. Keep both heels elevated off of the bed. DO NOT USE SILVERCELL ON THE HEEL. 2. Deep tissue pressure injury to coccyx and left hip  Limit time positioned to either area-totally avoiding positioning on either if possible. Use low air loss mattress if available. Daily apply liquid skin prep to both areas and then pad/protect with mepilex border. To left hip, cover with silver hydrogel ointment, then dry dressing followed with mepilex.  TO sacrum, apply Dakins moistened gauze to wound area, protect anitha wound with liquid skin prep then 4 x 4s over, and secure with mepilex. May apply zinc ointment to any superficial buttock/anitha wound open areas. 3. Generalized xerosis:   ck. After washing lower extremities (intact areas only) with mild soap and water, apply urea 20 cream to dry areas. Perform every other day. 4. History of DM2, on tube feedings:   Insufficient nutrition for healing-monitor for additional support needs and notify dietician if noted. Monitor blood sugars with goal being less then 150, to ensure ability for wounds to heal.        Wound Care Documentation        3//16/22 right heel wound/and callus     Wound Properties  right heel wound- full thickness- unstageable    Wound pressure    Dressing Status Daily    Dressing Changed Wednsday    Dressing/treatment Daily    Wound Cleansed Saline    Dressing Change Due Thursday    Wound Length (cm)  4.0   Wound Width (cm) 3.2   Wound Depth (cm) Immeas.     Post-Procedure Length (cm) 4.0   Post-Procedure Width (cm) 3.2   Post-Procedure Depth (cm) Immeasurable    Drainage Amount Moderate    Drainage Description Serosanguinous    Odor None    Margins Open wound edges     Exposed Structure None    Anitha-wound Assessment Macerated/hyperkeratotic    Pink% Wound Bed    Red% Wound Bed 50%   Yellow% Wound Bed    Black% Wound Bed 30   Purple% Wound Bed    Other% Wound Bed White hyperkeratotic tissue 20%   Culture Taken None              3/16/22  Sacrum        Wound Properties   Pressure  Wound   Daily  Dressing Status   Wednesday  Dressing Changed   Dakins moistened gauze  Dressing/treatment   Dakins  Wound Cleansed   Thursday  Dressing Change Due   3.8 Wound Length (cm)    2.4 Wound Width (cm)   1.4 Wound Depth (cm)   3.8 Post-Procedure Length (cm)   2.4 Post-Procedure Width (cm)   1.4 Post-Procedure Depth (cm)   Small  Drainage Amount   Yellow/tan  Drainage Description   None  Odor   Open  Margins   None  Exposed Structure   Intact/ non erythematous  Anitha-wound Assessment    Pink% Wound Bed   100% Red% Wound Bed    Yellow% Wound Bed   No  Culture Taken       3/16/22           Wound Properties Left hip    Wound Pressure injury- DTPI unstageable    Dressing Status Daily    Dressing Changed Wednesday    Dressing/treatment Cleanse with saline, then apply silver hydrogel to wound, with dsd over. Liquid skin prep anitha wound before dressing. Wound Cleansed Saline    Dressing Change Due Thursday    Wound Length (cm)  2.2    Wound Width (cm) 3.4   Wound Depth (cm) Immeasurable    Post-Procedure Length (cm) 2.2   Post-Procedure Width (cm) 3.4   Post-Procedure Depth (cm) Immeasurable    Wound Assessment Deep tissue pressure injury   Drainage Amount None- wound is dry    Drainage Description None    Odor None    Margins Poorly defined- erythematous    Exposed Structure None    Anitha-wound Assessment Fragile- pink    Red% Wound Bed    Yellow% Wound Bed 100 % dense slough/eschar    Other% Wound Bed    Culture Taken None      Procedure Note  Indications:  Based on my examination of this patient's wound(s)/ulcer(s) today, debridement of the sacrum, left hip and right heel callus is required to promote healing and evaluate the wound base. Performed by: MARICRUZ Flores NP    Consent obtained:  Yes    Time out taken:  Yes    Pain Control:   none       Debridement:Excisional Debridement    Using curette and tissue nippers the wound(s)/ulcer(s) was/were sharply debrided down through and including the removal of epidermis, dermis and subcutaneous tissue.         Devitalized Tissue Debrided:  fibrin, biofilm, slough, necrotic/eschar and callus    Pre Debridement Measurements:  Are located in the Wound/Ulcer Documentation Flow Sheet    Wound/Ulcer #: left hip, sacrum and right heel     Post Debridement Measurements:  Wound/Ulcer Descriptions are Pre Debridement except measurements:       Incision 04/23/19 Hip Anterior;Left;Proximal (Active)   Number of days: 1057     Percent of Wound/Ulcer Debrided: sacrum- 10% =0.912 sq cm                                                                 Right heel- 20% = 0.552 sq cm. Right hip - 100% =7.48 sq cm     Total Surface Area Debrided:  8.944 sq cm     Diabetic/Pressure/Non Pressure Ulcers:  Ulcer: N/A      Bleeding: sacral wound- small amount bleeding approx 0.5 cc. Hemostasis Achieved:  by silver nitrate stick    Procedural Pain:  0  / 10     Post Procedural Pain:  0 / 10     Response to treatment:  Well tolerated by patient. Plan:     Continue silver hydrogel (not silvercell) to left hip wound. Change daily   Continue gentamicin ointment with hydrofera over it to the right heel wound- keep elevated off of the bed. Sacrum- Dakins moistened packing to wound bed- change daily with liquid skin prep to anitha wound and dsd over.              Electronically signed by MARICRUZ Best NP on 3/16/2022 at 1:51 PM

## 2022-03-23 ENCOUNTER — OFFICE VISIT (OUTPATIENT)
Dept: WOUND CARE | Age: 67
End: 2022-03-23
Payer: COMMERCIAL

## 2022-03-23 DIAGNOSIS — Z74.09: ICD-10-CM

## 2022-03-23 DIAGNOSIS — L89.150 PRESSURE INJURY OF COCCYGEAL REGION, UNSTAGEABLE (HCC): Primary | ICD-10-CM

## 2022-03-23 DIAGNOSIS — L89.610 PRESSURE INJURY OF RIGHT HEEL, UNSTAGEABLE (HCC): ICD-10-CM

## 2022-03-23 DIAGNOSIS — L89.226 PRESSURE INJURY OF DEEP TISSUE OF LEFT HIP: ICD-10-CM

## 2022-03-23 PROCEDURE — 99308 SBSQ NF CARE LOW MDM 20: CPT | Performed by: NURSE PRACTITIONER

## 2022-03-23 PROCEDURE — 11042 DBRDMT SUBQ TIS 1ST 20SQCM/<: CPT | Performed by: NURSE PRACTITIONER

## 2022-03-23 NOTE — PROGRESS NOTES
[facility] [place]                                                       Debridement Note      Justice Benitez  MEDICAL RECORD NUMBER:  [id number]  AGE: 61 y.o. GENDER: male  : 1955  EPISODE DATE:  3/23/2022    Subjective:     No chief complaint on file. HISTORY of PRESENT ILLNESS DELVIN Samuels Done a 61 y. o. male who presents today for wound/ulcer evaluation. History of Wound Context: Patient received from local [facility] - s/p CVA, with subarachnoid hemorrhage and respiratory failure (has tracheostomy), COPD per history.  Patient has DMII, with gastrostomy tube for nutrition.  Makes no verbalization, no eye contact, no grimacing noted. Wound to right heel much improved in depth and overall dimension, with deep tissue pressure injury to sacrum with small amount non-viable tissue remaining in wound bed, and left hip- now continuing to evolve. Family reported the wounds have been there for some time. Today, the right heel is improved with only small area of hypergranulation with small rim of necrotic tissue and surrounding thick callus beginning to resolve with serial debridements. The left hip and sacral wound continue to evolve. Wound/Ulcer Pain Timing/Severity: unable to accurately evaluate due to non-verbal status   Quality of pain: none noted per faces scale.    Severity: 0 / 10   Modifying Factors: None  Associated Signs/Symptoms: none     Ulcer Identification:  Ulcer Type: pressure  Contributing Factors: diabetes, chronic pressure, decreased mobility, shear force, decreased tissue oxygenation, incontinence of stool and incontinence of urine     Wound: N/A    PAST MEDICAL HISTORY        Diagnosis Date    Abdominal pain     Asthma     Bloating symptom     COPD (chronic obstructive pulmonary disease) (HCC)     [facility]     Depression     Depression     Diarrhea     Drug-seeking behavior 2020    Emphyse[facility]COPD ([facility] [address]     History of rectal bleeding     Hypercholesteremia     past trx > 5 yrs    Hypercholesteremia     Hypercholesteremia     Hypertension     meds > 3 yrs    Lung disease     RANDI on CPAP     patient relates he doesn't use cpap at this time    Osteoarthritis     left hip    Osteoarthritis     Osteoarthritis of left hip     Physiological consequence of immobility 3/9/2022    Pituitary macroadenoma ([address]     Sleep apnea        PAST SURGICAL HISTORY    Past Surgical History:   Procedure Laterality Date    COLONOSCOPY  5/2/14    [facility]    COLONOSCOPY N/A 12/12/2019    COLONOSCOPY DIAGNOSTIC performed by Jonny Gibbons MD at [address], COLON, DIAGNOSTIC      EYE SURGERY Bilateral     cataracts   [address] ?     index finger laceration repair    HERNIA REPAIR  2004    repair [facility]     JOINT REPLACEMENT Left     left hip    MO COLON CA SCRN NOT HI RSK IND N/A 5/17/2017    COLONOSCOPY performed by Johanna Mack MD at [facility] Left 4/22/2019    LEFT HIP TOTAL HIP ARTHROPLASTY, LATERAL DECUB, GRACIE performed by Hien Rodriguez MD at [facility]  12/12/2019    EGD DIAGNOSTIC ONLY performed by Jonny Gibbons MD at [facility]    Family History   Problem Relation Age of Onset    Colon Cancer Mother     Cancer Mother     Arthritis Mother     High Blood Pressure Father     Heart Disease Father     Diabetes Sister     No Known Problems Brother     No Known Problems Brother     No Known Problems Brother     Other Brother         Sepsis    Other Brother         Killed    No Known Problems Sister     No Known Problems Sister     No Known Problems Son        SOCIAL HISTORY    Social History     Tobacco Use    Smoking status: Former Smoker     Packs/day: 1.50     Years: 46.00     Pack years: 69.00     Types: Cigarettes     Quit date: 3/20/2019     Years since quitting: 3.0  Smokeless tobacco: Never Used   Vaping Use    Vaping Use: Unknown   Substance Use Topics    Alcohol use: Yes    Drug use: No       ALLERGIES    No Known Allergies    MEDICATIONS    Current Outpatient Medications on File Prior to Visit   Medication Sig Dispense Refill    lidocaine (LMX) 4 % cream Apply a half dollar sized amount to intact skin topically up to twice daily as needed for pain 1 Tube 1    paliperidone (INVEGA) 6 MG extended release tablet Take 1 tablet by mouth daily 30 tablet 1    cabergoline (DOSTINEX) 0.5 MG tablet Take 1 tablet by mouth Twice a Week ON Monday and Thursday 8 tablet 3    furosemide (LASIX) 40 MG tablet Take 40 mg by mouth daily      meloxicam (MOBIC) 15 MG tablet Take 15 mg by mouth daily      potassium chloride (MICRO-K) 10 MEQ extended release capsule Take 10 mEq by mouth daily      simvastatin (ZOCOR) 40 MG tablet Take 40 mg by mouth nightly      fenofibrate 160 MG tablet Take 150 mg by mouth daily      aspirin 81 MG EC tablet Take 1 tablet by mouth 2 times daily 60 tablet 0    lisinopril (PRINIVIL;ZESTRIL) 20 MG tablet Take 2 tablets by mouth daily 30 tablet 3    Respiratory Therapy Supplies SANDRA Full face CPAP mask and supplies 1 Device 0    CPAP Machine MISC by Does not apply route New CPAP with 6 cm 1 each 0    albuterol (PROVENTIL) (2.5 MG/3ML) 0.083% nebulizer solution Take 3 mLs by nebulization every 6 hours as needed for Wheezing 120 each 5    STIOLTO RESPIMAT 2.5-2.5 MCG/ACT AERS Inhale 2 puff in AM 1 Inhaler 5     No current facility-administered medications on file prior to visit. REVIEW OF SYSTEMS    Pertinent items are noted in HPI. Objective: There were no vitals taken for this visit. Wt Readings from Last 3 Encounters:   02/13/22 180 lb (81.6 kg)   08/06/21 234 lb (106.1 kg)   06/15/20 234 lb (106.1 kg)       PHYSICAL EXAM    Constitutional:   Well nourished and well developed. Appears neat and clean.   Patient is alert, in no apparent distress- no verbalization. Occasionally reaches for linens with right hand/arm. Respiratory:  Respiratory effort is easy and symmetric bilaterally. Rate is normal at rest and on O2 per trach cuff. Vascular:  Pedal Pulses palpable and audible with doppler. Capillary refill is <3 sec to digits bilateral.  Extremities negative for pitting edema. Neurological:  Unable to determine d/t non-verbal status, and decreased level or cognition. Dermatological:  Wound description noted in wound assessment. The wound to the right heel is much improved in depth and overall measurement, healing well. The sacral wound-is very friable, small amount of non-viable gray tissue in wound bed, however, very friable, bleed on dressing removal despite saturating with saline first. Silver nitrate to bleeding areas, will re-evaluate for debridement next week. Left hip remains covered with tan/brown eschar with wound perimeter of yellow slough- will debride/score as able today for increased penetration of the silver hydrogel. Psychiatric: Unable to accurately determine due to lack of verbalization. Assessment:      Patient seen this visit for: evaluation of need for debridement of the sacral, left hip, and right heel wounds. Problems treated this admission:   1. Right heel wound:   Keep wound dry, do not get wet other than to clean with sterile saline, then gentamicin ointment then cover with hydrofera blue, then apply 4 x 4 gauze, and secure with kerlix. Keep both heels elevated off of the bed. DO NOT USE SILVERCELL ON THE HEEL. 2. Deep tissue pressure injury to coccyx and left hip  Limit time positioned to either area-totally avoiding positioning on either if possible. Use low air loss mattress if available. Daily apply liquid skin prep to both areas and then pad/protect with mepilex border. 3. To left hip, cover with silver hydrogel ointment, then saline moistened dressing followed with mepilex. To sacrum, silver hydrogel, then cover with saline moistened 4 x 4 gauze, protect anitha wound with liquid skin prep then 4 x 4s over, and secure with mepilex. May apply zinc ointment to any superficial buttock/anitha wound open areas. 4. Generalized xerosis:   ck.  After washing lower extremities (intact areas only) with mild soap and water, apply urea 20 cream to dry areas. Perform every other day. 5. History of DM2, on tube feedings:   Insufficient nutrition for healing-monitor for additional support needs and notify dietician if noted. Monitor blood sugars with goal being less then 150, to ensure ability for wounds to heal.      Procedure Note  Indications:  Based on my examination of this patient's wound(s)/ulcer(s) today, debridement is required to promote healing and evaluate the wound base. Performed by: MARICRUZ Alvarez - ELLA    Consent obtained:  Yes    Time out taken:  Yes    Pain Control: none required. No discomfort observed during debridement using faces scale. Debridement:Excisional Debridement    Using curette, scissors and forceps the wound(s)/ulcer(s) was/were sharply debrided down through and including the removal of epidermis, dermis and subcutaneous tissue. Devitalized Tissue Debrided:  fibrin, biofilm, slough and necrotic/eschar    Pre Debridement Measurements:  Are located in the [place]  Documentation Flow Sheet    Wound/Ulcer #: right heel and left hip     Post Debridement Measurements:  Wound/Ulcer Descriptions are Pre Debridement except measurements:       Incision 04/23/19 Hip Anterior;Left;Proximal (Active)   Number of days: 1064         right heel 3/23/22       Wound Properties  right heel wound- full thickness- unstageable    Wound pressure    Dressing Status Daily    Dressing Changed Wednsday    Dressing/treatment Daily    Wound Cleansed Saline    Dressing Change Due Thursday    Wound Length (cm)  3.5   Wound Width (cm) 2.0   Wound Depth (cm) Immeas. Post-Procedure Length (cm) 3.5   Post-Procedure Width (cm) 2.0   Post-Procedure Depth (cm) Immeasurable    Drainage Amount Moderate    Drainage Description Serosanguinous    Odor None    Margins Open wound edges     Exposed Structure None    Anitha-wound Assessment Maceration slight at 1:00 /hyperkeratotic areas-resolved   Pink% Wound Bed     Red% Wound Bed 70%   Yellow% Wound Bed     Black% Wound Bed 25   Purple% Wound Bed     Other% Wound Bed White hyperkeratotic tissue 5%   Culture Taken None       Left hip 3/23/22           Wound Properties Left hip    Wound Pressure injury- DTPI unstageable    Dressing Status Daily    Dressing Changed Wednesday    Dressing/treatment Cleanse with saline, then apply silver hydrogel to wound, with saline moistened gauze over, then dsd overall and secure with mepilex border. Liquid skin prep anitha wound before dressing.     Wound Cleansed Saline    Dressing Change Due Thursday    Wound Length (cm)  2.0    Wound Width (cm) 3.0   Wound Depth (cm) Immeasurable    Post-Procedure Length (cm) 2.0   Post-Procedure Width (cm) 3.0   Post-Procedure Depth (cm) Immeasurable    Wound Assessment Deep tissue pressure injury   Drainage Amount None- wound is dry    Drainage Description None    Odor None    Margins Poorly defined- erythematous    Exposed Structure None    Anitha-wound Assessment Fragile- pink    Red% Wound Bed     Yellow% Wound Bed 100 % dense slough/eschar    Other% Wound Bed     Culture Taken None         3/23/22  Sacrum - picture did not save in epic          Wound Properties   Pressure  Wound   Daily  Dressing Status   Wednesday  Dressing Changed   Cleanse with saline, silver pluragel to wound bed, with saline moist dressing over, liquid skin prep anitha wound, then mepilex to secure  Dressing/treatment   Saline   Wound Cleansed   Thursday  Dressing Change Due   3.5 Wound Length (cm)    2.0 Wound Width (cm)   1.0 Wound Depth (cm)   3.5 Post-Procedure Length (cm)   2.0 Post-Procedure Width (cm)   1.0 Post-Procedure Depth (cm)   Small  Drainage Amount   Yellow/tan  Drainage Description   None  Odor   Open  Margins   None  Exposed Structure   Intact/ non erythematous  Anitha-wound Assessment     Pink% Wound Bed   10% Tan slough non viable tissue    90% Red% Wound Bed     Yellow% Wound Bed   No  Culture Taken          Percent of Wound/Ulcer Debrided: left hip-100%  Or 6.0 sq. Cm. Right heel- 10% or  0.32 sq cm     Total Surface Area Debrided:  6.32sq cm     Diabetic/Pressure/Non Pressure Ulcers:  Ulcer: unstageable pressure injury to left hip and right heel, to sacrum stage 3 pressure injury               Bleeding:  Minimum     Hemostasis Achieved:  by silver nitrate stick - to sacral wound (not debrided- tissue friable and bleeding on removal of dressing)     Procedural Pain:  0  / 10     Post Procedural Pain:  0/ 10     Response to treatment:  Well tolerated by patient. Plan:     1. Right heel wound:   Keep wound dry, do not get wet other than to clean with sterile saline, then gentamicin ointment then cover with hydrofera blue, then apply 4 x 4 gauze, and secure with kerlix. Keep both heels elevated off of the bed. DO NOT USE SILVERCELL ON THE HEEL. 2. Deep tissue pressure injury to coccyx and left hip  Limit time positioned to either area-totally avoiding positioning on either if possible. Use low air loss mattress if available. Daily apply liquid skin prep to both areas and then pad/protect with mepilex border. 3. To left hip, cover with silver hydrogel ointment, then saline moistened dressing followed with mepilex. To sacrum, silver hydrogel, then cover with saline moistened 4 x 4 gauze, protect anitha wound with liquid skin prep then 4 x 4s over, and secure with mepilex. May apply zinc ointment to any superficial buttock/anitha wound open areas.    4. Generalized xerosis:   ck.  After washing lower extremities (intact areas only) with mild soap and water, apply urea 20 cream to dry areas. Perform every other day.         Electronically signed by MARICRUZ Luke NP on 3/23/2022 at 12:32 PM

## 2022-03-30 ENCOUNTER — OFFICE VISIT (OUTPATIENT)
Dept: WOUND CARE | Age: 67
End: 2022-03-30
Payer: COMMERCIAL

## 2022-03-30 DIAGNOSIS — L89.610 PRESSURE INJURY OF RIGHT HEEL, UNSTAGEABLE (HCC): ICD-10-CM

## 2022-03-30 DIAGNOSIS — L89.226 PRESSURE INJURY OF DEEP TISSUE OF LEFT HIP: ICD-10-CM

## 2022-03-30 DIAGNOSIS — Z74.09: ICD-10-CM

## 2022-03-30 DIAGNOSIS — L89.150 PRESSURE INJURY OF COCCYGEAL REGION, UNSTAGEABLE (HCC): Primary | ICD-10-CM

## 2022-03-30 PROCEDURE — 99308 SBSQ NF CARE LOW MDM 20: CPT | Performed by: NURSE PRACTITIONER

## 2022-03-30 PROCEDURE — 11042 DBRDMT SUBQ TIS 1ST 20SQCM/<: CPT | Performed by: NURSE PRACTITIONER

## 2022-03-30 NOTE — PROGRESS NOTES
[facility] [place]                                                       Debridement Note      Aleksandr Montgomery  MEDICAL RECORD NUMBER:  [id number]  AGE: 61 y.o. GENDER: male  : 1955  EPISODE DATE:  3/30/2022    Subjective:     Chief Complaint   Patient presents with    Wound Check         HISTORY of PRESENT ILLNESS HPI  Lissa Hou a 61 y. o. male who presents today for wound/ulcer evaluation. History of Wound Context: Patient received from local [facility] - s/p CVA, with subarachnoid hemorrhage and respiratory failure (has tracheostomy), COPD per history.  Patient has DMII, with gastrostomy tube for nutrition.  No verbalization, however, does appear more alert this week, making eye contact with caregivers occasionally. Wound to right heel much improved in depth and overall dimension, with deep tissue pressure injury to sacrum with some non-viable  tissue remaining in wound bed, and left hip area of deep tissue pressure injury (DTPI) - now continuing to evolve. Today, the right heel is improved with only small area of open wound remaining-red/granular. .   Wound/Ulcer Pain Timing/Severity: unable to accurately evaluate due to non-verbal status   Quality of pain: none noted per faces scale.    Severity: 0 / 10   Modifying Factors: None  Associated Signs/Symptoms: none     Ulcer Identification:  Ulcer Type: pressure  Contributing Factors: diabetes, chronic pressure, decreased mobility, shear force, decreased tissue oxygenation, incontinence of stool and incontinence of urine     Wound: N/A    PAST MEDICAL HISTORY        Diagnosis Date    Abdominal pain     Asthma     Bloating symptom     COPD (chronic obstructive pulmonary disease) (HCC)     COPD (chronic obstructive pulmonary disease) (HCC)     Depression     Depression     Diarrhea     Drug-seeking behavior 2020    Em[facility]COPD ([facility] [address]     History of rectal bleeding     Hypercholesteremia     past trx > 5 yrs    Hypercholesteremia  Hypercholesteremia     Hypertension     meds > 3 yrs    Lung disease     RANDI on CPAP     patient relates he doesn't use cpap at this time    Osteoarthritis     left hip    Osteoarthritis     Osteoarthritis of left hip     Physiological consequence of immobility 3/9/2022    Pituitary macroadenoma ([address]     Sleep apnea        PAST SURGICAL HISTORY    Past Surgical History:   Procedure Laterality Date    COLONOSCOPY  5/2/14    JENNIFER    COLONOSCOPY N/A 12/12/2019    COLONOSCOPY DIAGNOSTIC performed by Yeny Ramos MD at [address], COLON, DIAGNOSTIC      EYE SURGERY Bilateral     cataracts   [address] ?     index finger laceration repair    HERNIA REPAIR  2004    repair [facility]     JOINT REPLACEMENT Left     left hip    CO COLON CA SCRN NOT HI RSK IND N/A 5/17/2017    COLONOSCOPY performed by Yvette Shea MD at [facility] Left 4/22/2019    LEFT HIP TOTAL HIP ARTHROPLASTY, LATERAL DECUB, GRACIE performed by Radha Torres MD at [address]  12/12/2019    EGD DIAGNOSTIC ONLY performed by [facility]y Ramos [facility] at [facility]    Family History   Problem Relation Age of Onset    Colon Cancer Mother     Cancer Mother     Arthritis Mother     High Blood Pressure Father     Heart Disease Father     Diabetes Sister     No Known Problems Brother     No Known Problems Brother     No Known Problems Brother     Other Brother         Sepsis    Other Brother         Killed    No Known Problems Sister     No Known Problems Sister     No Known Problems Son        SOCIAL HISTORY    Social History     Tobacco Use    Smoking status: Former Smoker     Packs/day: 1.50     Years: 46.00     Pack years: 69.00     Types: Cigarettes     Quit date: 3/20/2019     Years since quitting: 3.0    Smokeless tobacco: Never Used   Vaping Use    Vaping Use: Unknown   Substance Use Topics    Alcohol use: Yes    Drug use: No       ALLERGIES    No Known Allergies    MEDICATIONS    Current Outpatient Medications on File Prior to Visit   Medication Sig Dispense Refill    lidocaine (LMX) 4 % cream Apply a half dollar sized amount to intact skin topically up to twice daily as needed for pain 1 Tube 1    paliperidone (INVEGA) 6 MG extended release tablet Take 1 tablet by mouth daily 30 tablet 1    cabergoline (DOSTINEX) 0.5 MG tablet Take 1 tablet by mouth Twice a Week ON Monday and Thursday 8 tablet 3    furosemide (LASIX) 40 MG tablet Take 40 mg by mouth daily      meloxicam (MOBIC) 15 MG tablet Take 15 mg by mouth daily      potassium chloride (MICRO-K) 10 MEQ extended release capsule Take 10 mEq by mouth daily      simvastatin (ZOCOR) 40 MG tablet Take 40 mg by mouth nightly      fenofibrate 160 MG tablet Take 150 mg by mouth daily      aspirin 81 MG EC tablet Take 1 tablet by mouth 2 times daily 60 tablet 0    lisinopril (PRINIVIL;ZESTRIL) 20 MG tablet Take 2 tablets by mouth daily 30 tablet 3    Respiratory Therapy Supplies SANDRA Full face CPAP mask and supplies 1 Device 0    CPAP Machine MISC by Does not apply route New CPAP with 6 cm 1 each 0    albuterol (PROVENTIL) (2.5 MG/3ML) 0.083% nebulizer solution Take 3 mLs by nebulization every 6 hours as needed for Wheezing 120 each 5    STIOLTO RESPIMAT 2.5-2.5 MCG/ACT AERS Inhale 2 puff in AM 1 Inhaler 5     No current facility-administered medications on file prior to visit. REVIEW OF SYSTEMS    Pertinent items are noted in HPI. Objective: There were no vitals taken for this visit. Wt Readings from Last 3 Encounters:   02/13/22 180 lb (81.6 kg)   08/06/21 234 lb (106.1 kg)   06/15/20 234 lb (106.1 kg)       PHYSICAL EXAM    Constitutional:   Well nourished and well developed. Appears neat and clean. Patient is somewhat more alert this week,  in no apparent distress- no verbalization.  Occasionally reaches for linens with right hand/arm. Respiratory:  Respiratory effort is easy and symmetric bilaterally. Rate is normal at rest and on O2 per trach cuff. Vascular:  Pedal Pulses palpable and audible with doppler. Capillary refill is <3 sec to digits bilateral.  Extremities negative for pitting edema. Neurological:  Unable to determine d/t non-verbal status, and decreased level or cognition-but does appear to be making an effort to communicate with caregivers this week-reaches for areas following exam, and occasional eye contact with staff. Dermatological:  Wound description noted in wound assessment. The wound to the right heel is much improved in depth and overall measurement, healing well. The sacral wound-is very friable, small amount of non-viable gray tissue in wound bed, however, sanguinous drainage on exam-still covered with slough. Left hip remains covered with tan/brown eschar with wound perimeter of yellow slough- will debride/score as able today for increased penetration of the silver hydrogel. Psychiatric: Unable to accurately determine due to lack of verbalization. Assessment:      Patient seen this visit for: evaluation of need for debridement of the sacral, left hip, and right heel wounds. Problems treated this admission:   1. Right heel wound:   Keep wound dry, do not get wet other than to clean with sterile saline, then gentamicin ointment then cover with hydrofera blue, then apply 4 x 4 gauze, and secure with kerlix. Keep both heels elevated off of the bed. DO NOT USE SILVERCELL ON THE HEEL. 2. Deep tissue pressure injury to coccyx and left hip  Limit time positioned to either area-totally avoiding positioning on either if possible. Use low air loss mattress if available. Daily apply liquid skin prep to both areas and then pad/protect with mepilex border. 3. To left hip, cover with silver hydrogel ointment, then saline moistened dressing followed with mepilex.  To sacrum, silver hydrogel, then cover with saline moistened 4 x 4 gauze, protect anitha wound with liquid skin prep then 4 x 4s over, and secure with mepilex. May apply zinc ointment to any superficial buttock/anitha wound open areas. 4. Generalized xerosis:   ck.  After washing lower extremities (intact areas only) with mild soap and water, apply urea 20 cream to dry areas. Perform every other day. 5. History of DM2, on tube feedings:   Insufficient nutrition for healing-monitor for additional support needs and notify dietician if noted. Monitor blood sugars with goal being less then 150, to ensure ability for wounds to heal.      Procedure Note  Indications:  Based on my examination of this patient's wound(s)/ulcer(s) today, debridement is required to promote healing and evaluate the wound base. Performed by: MARICRUZ Teran - ELLA    Consent obtained:  Yes    Time out taken:  Yes    Pain Control: none required. No discomfort observed during debridement using faces scale. Debridement:Excisional Debridement    Using a curette the wound(s) to the left hip and sacrum were sharply debrided down through and including the removal of epidermis, dermis and subcutaneous tissue.         Devitalized Tissue Debrided:  fibrin, biofilm, slough and necrotic/eschar    Pre Debridement Measurements:  Are located in the [place]  Documentation Flow Sheet    Wound/Ulcer #: right heel and left hip     Post Debridement Measurements:  Wound/Ulcer Descriptions are Pre Debridement except measurements:       Incision 04/23/19 Hip Anterior;Left;Proximal (Active)   Number of days: 1071         right heel 3/30/22       Wound Properties  right heel wound- full thickness-  Both unstageable    rt medial wound                         Right lateral wound   Wound pressure                                     Pressure    Dressing Status Daily                                            daily   Dressing Changed Wednesday Wednesday    Dressing/treatment Daily                                           Daily    Wound Cleansed Saline                                         saline   Dressing Change Due Thursday Thursday   Wound Length (cm)  2.7                                               1.5   Wound Width (cm) 2.2                                               2.0   Wound Depth (cm) Immeas. immeasurable   Drainage Amount Moderate                                     moderate   Drainage Description Serosanguinous                         serosanguinous   Odor None                                           None    Margins Open wound edges                    Open wound edge    Exposed Structure None    Anitha-wound Assessment No maceration                             No maceration    Pink% Wound Bed     Red% Wound Bed 70%                                     Yellow% Wound Bed     Black% Wound Bed 30                                                  100%   Purple% Wound Bed     Other% Wound Bed    Culture Taken None                                              None         Left hip 3/30/22    Wound Properties Left hip    Wound Pressure injury- DTPI unstageable    Dressing Status Daily    Dressing Changed Wednesday    Dressing/treatment Cleanse with saline, then apply silver hydrogel to wound, with saline moistened gauze over, then dsd overall and secure with mepilex border. Liquid skin prep anitha wound before dressing.     Wound Cleansed Saline    Dressing Change Due Thursday    Wound Length (cm)  2.5   Wound Width (cm) 3.7   Wound Depth (cm) Immeasurable    Post-Procedure Length (cm) 2.5   Post-Procedure Width (cm) 3.7   Post-Procedure Depth (cm) Immeasurable    Wound Assessment Deep tissue pressure injury   Drainage Amount None- wound is dry    Drainage Description None    Odor None    Margins Poorly defined- erythematous    Exposed Structure None    Anitha-wound Assessment Fragile- pink    Red% Wound Bed     Yellow% Wound Bed 100 % dense slough/eschar    Other% Wound Bed     Culture Taken None         3/30/22   Sacral wound           sacral wound  Wound Properties   Pressure stage 3 Wound   Daily  Dressing Status   Wednesday  Dressing Changed   Cleanse with saline, silver pluragel to wound bed, with saline moist dressing over, liquid skin prep anitha wound, then mepilex to secure  Dressing/treatment   Saline   Wound Cleansed   Thursday  Dressing Change Due   3.1 Wound Length (cm)    1.8 Wound Width (cm)   1.5 Wound Depth (cm)   3.1 Post-Procedure Length (cm)   1.8 Post-Procedure Width (cm)   1.5 Post-Procedure Depth (cm)   Small  Drainage Amount   Yellow/tan  Drainage Description   None  Odor   Open  Margins   None  Exposed Structure   Intact/ non erythematous  Anitha-wound Assessment     Pink% Wound Bed   80% Tan/yellow slough non viable tissue    20% Red% Wound Bed     Yellow% Wound Bed   No  Culture Taken          Percent of Wound/Ulcer Debrided: left hip-100%  Or 9.25 sq. Cm. Sacrum 80%- 10% or 4.464 sq cm     Total Surface Area Debrided:  13.714 sq cm     Diabetic/Pressure/Non Pressure Ulcers:  Ulcer: unstageable pressure injury to left hip and to sacrum stage 3 pressure injury               Bleeding:  Minimum     Hemostasis Achieved:  by silver nitrate stick - to sacral wound (not debrided- tissue friable and bleeding on removal of dressing)     Procedural Pain:  0  / 10     Post Procedural Pain:  0/ 10     Response to treatment:  Well tolerated by patient. Plan:     1. Right heel wound:   Keep wound dry, do not get wet other than to clean with sterile saline, then gentamicin ointment then cover with hydrofera blue, then apply 4 x 4 gauze, and secure with kerlix. Keep both heels elevated off of the bed. DO NOT USE SILVERCELL ON THE HEEL.    2. Deep tissue pressure injury to coccyx and left hip  Limit time positioned to either area-totally avoiding positioning on either if possible. Use low air loss mattress if available. Daily apply liquid skin prep to both areas and then pad/protect with mepilex border. 3. To left hip, cover with silver hydrogel ointment, then saline moistened dressing followed with mepilex. To sacrum, silver hydrogel, then cover with saline moistened 4 x 4 gauze, protect anitha wound with liquid skin prep then 4 x 4s over, and secure with mepilex. May apply zinc ointment to any superficial buttock/anitha wound open areas. 4. Generalized xerosis:   ck.  After washing lower extremities (intact areas only) with mild soap and water, apply urea 20 cream to dry areas. Perform every other day.         Electronically signed by MARICRUZ Lima NP on 3/30/2022 at 2:56 PM

## 2022-04-06 ENCOUNTER — OFFICE VISIT (OUTPATIENT)
Dept: WOUND CARE | Age: 67
End: 2022-04-06
Payer: COMMERCIAL

## 2022-04-06 DIAGNOSIS — L89.226 PRESSURE INJURY OF DEEP TISSUE OF LEFT HIP: Primary | ICD-10-CM

## 2022-04-06 DIAGNOSIS — L89.150 PRESSURE INJURY OF COCCYGEAL REGION, UNSTAGEABLE (HCC): ICD-10-CM

## 2022-04-06 DIAGNOSIS — Z74.09: ICD-10-CM

## 2022-04-06 DIAGNOSIS — L89.610 PRESSURE INJURY OF RIGHT HEEL, UNSTAGEABLE (HCC): ICD-10-CM

## 2022-04-06 PROCEDURE — 11042 DBRDMT SUBQ TIS 1ST 20SQCM/<: CPT | Performed by: NURSE PRACTITIONER

## 2022-04-06 PROCEDURE — 99308 SBSQ NF CARE LOW MDM 20: CPT | Performed by: NURSE PRACTITIONER

## 2022-04-06 NOTE — PROGRESS NOTES
[facility] [place]                                                       Debridement Note      Simeon Alaniz  MEDICAL RECORD NUMBER:  [id number]  AGE: 61 y.o. GENDER: male  : 1955  EPISODE DATE:  2022    Subjective:     Chief Complaint   Patient presents with    Wound Check         HISTORY of PRESENT ILLNESS HPI  Yudi tapia 61 y. o. male who presents today for wound/ulcer evaluation. History of Wound Context: Patient received from local [facility] - s/p CVA, with subarachnoid hemorrhage and respiratory failure (has tracheostomy), COPD per history.  Patient has DMII, with gastrostomy tube for nutrition.  No verbalization, however, does appear more alert this week,occassionally reaches for linens during care. Wound to right heel much improved in depth and overall dimension, with deep tissue pressure injury to sacrum also improved, though remains somewhat friable, otherwise red/granular. Left hip area of deep tissue pressure injury (DTPI) - now continuing to evolve-will debride. Today, the right heel is improved with only small area of open wound remaining-red/granular. Wound/Ulcer Pain Timing/Severity: unable to accurately evaluate due to non-verbal status   Quality of pain: none noted per faces scale.    Severity: 0 / 10   Modifying Factors: None  Associated Signs/Symptoms: none     Ulcer Identification:  Ulcer Type: pressure  Contributing Factors: diabetes, chronic pressure, decreased mobility, shear force, decreased tissue oxygenation, incontinence of stool and incontinence of urine     Wound: N/A    PAST MEDICAL HISTORY        Diagnosis Date    Abdominal pain     Asthma     Bloating symptom     COPD (chronic obstructive pulmonary disease) (HCC)     COPD (chronic obstructive pulmonary disease) (HCC)     Depression     Depression     Diarrhea     Drug-seeking behavior 2020    Emphyse[facility]COPD ([facility] [address]     History of rectal bleeding     Hypercholesteremia     past trx > 5 yrs    Hypercholesteremia     Hypercholesteremia     Hypertension     meds > 3 yrs    Lung disease     RANDI on CPAP     patient relates he doesn't use cpap at this time    Osteoarthritis     left hip    Osteoarthritis     Osteoarthritis of left hip     Physiological consequence of immobility 3/9/2022    Pituitary macroadenoma ([address]     Sleep apnea        PAST SURGICAL HISTORY    Past Surgical History:   Procedure Laterality Date    COLONOSCOPY  5/2/14    PRATEEKK    COLONOSCOPY N/A 12/12/2019    COLONOSCOPY DIAGNOSTIC performed by Chang Thornton MD at [address], COLON, DIAGNOSTIC      EYE SURGERY Bilateral     cataracts   [address] ?     index finger laceration repair    HERNIA REPAIR  2004    repair [facility]     JOINT REPLACEMENT Left     left hip    AR COLON CA SCRN NOT HI RSK IND N/A 5/17/2017    COLONOSCOPY performed by Lita Edward MD at [address] [place] [address] Left 4/22/2019    LEFT HIP TOTAL HIP ARTHROPLASTY, LATERAL DECUB, GRACIE performed by Maritza Og MD at [address]  12/12/2019    EGD DIAGNOSTIC ONLY performed by Chang Thornton MD at [facility]    Family History   Problem Relation Age of Onset    Colon Cancer Mother     Cancer Mother     Arthritis Mother     High Blood Pressure Father     Heart Disease Father     Diabetes Sister     No Known Problems Brother     No Known Problems Brother     No Known Problems Brother     Other Brother         Sepsis    Other Brother         Killed    No Known Problems Sister     No Known Problems Sister     No Known Problems Son        SOCIAL HISTORY    Social History     Tobacco Use    Smoking status: Former Smoker     Packs/day: 1.50     Years: 46.00     Pack years: 69.00     Types: Cigarettes     Quit date: 3/20/2019     Years since quitting: 3.0    Smokeless tobacco: Never Used   Vaping Use    Vaping Use: Unknown   Substance Use Topics    Alcohol use: Yes    Drug use: No       ALLERGIES    No Known Allergies    MEDICATIONS    Current Outpatient Medications on File Prior to Visit   Medication Sig Dispense Refill    lidocaine (LMX) 4 % cream Apply a half dollar sized amount to intact skin topically up to twice daily as needed for pain 1 Tube 1    paliperidone (INVEGA) 6 MG extended release tablet Take 1 tablet by mouth daily 30 tablet 1    cabergoline (DOSTINEX) 0.5 MG tablet Take 1 tablet by mouth Twice a Week ON Monday and Thursday 8 tablet 3    furosemide (LASIX) 40 MG tablet Take 40 mg by mouth daily      meloxicam (MOBIC) 15 MG tablet Take 15 mg by mouth daily      potassium chloride (MICRO-K) 10 MEQ extended release capsule Take 10 mEq by mouth daily      simvastatin (ZOCOR) 40 MG tablet Take 40 mg by mouth nightly      fenofibrate 160 MG tablet Take 150 mg by mouth daily      aspirin 81 MG EC tablet Take 1 tablet by mouth 2 times daily 60 tablet 0    lisinopril (PRINIVIL;ZESTRIL) 20 MG tablet Take 2 tablets by mouth daily 30 tablet 3    Respiratory Therapy Supplies SANDRA Full face CPAP mask and supplies 1 Device 0    CPAP Machine MISC by Does not apply route New CPAP with 6 cm 1 each 0    albuterol (PROVENTIL) (2.5 MG/3ML) 0.083% nebulizer solution Take 3 mLs by nebulization every 6 hours as needed for Wheezing 120 each 5    STIOLTO RESPIMAT 2.5-2.5 MCG/ACT AERS Inhale 2 puff in AM 1 Inhaler 5     No current facility-administered medications on file prior to visit. REVIEW OF SYSTEMS    Pertinent items are noted in HPI. Objective: There were no vitals taken for this visit. Wt Readings from Last 3 Encounters:   02/13/22 180 lb (81.6 kg)   08/06/21 234 lb (106.1 kg)   06/15/20 234 lb (106.1 kg)       PHYSICAL EXAM    Constitutional:   Well nourished and well developed. Appears neat and clean. Patient is somewhat more alert this week,  in no apparent distress- no verbalization. Occasionally reaches for linens with right hand/arm. Respiratory:  Respiratory effort is easy and symmetric bilaterally. Rate is normal at rest and on O2 per trach cuff. Harsh audibly moist respirations. Vascular:  Pedal Pulses palpable and audible with doppler. Capillary refill is <3 sec to digits bilateral.  Extremities negative for pitting edema. Neurological:  Unable to determine d/t non-verbal status, and decreased level or cognition-but does appear to be making an effort to communicate with caregivers this week-reaches for areas following exam, and occasional eye contact with staff. Dermatological:  Wound description noted in wound assessment. The wound to the right heel is much improved in depth and overall measurement, healing well with only small area remaining open. The sacral wound-is very friable, small amount of non-viable gray tissue in wound bed, however, sanguinous drainage on exam-still some slough. Left hip remains covered with tan/brown eschar with wound perimeter of yellow slough- will debride/score as able today for increased penetration of the silver hydrogel. Psychiatric: Unable to accurately determine due to lack of verbalization. Assessment:      Patient seen this visit for: evaluation of need for debridement of the sacral, left hip, and right heel wounds. Problems treated this admission:   1. Right heel wound:   Keep wound dry, do not get wet other than to clean with sterile saline, then gentamicin ointment then cover with hydrofera blue, then apply 4 x 4 gauze, and secure with kerlix. Keep both heels elevated off of the bed. DO NOT USE SILVERCELL ON THE HEEL. 2. Stage 3 pressure injury to coccyx, with left hip deep tissue pressure injury  Limit time positioned to either area-totally avoiding positioning on either if possible. Use low air loss mattress if available.    3. To left hip, cover with silver hydrogel ointment, then saline moistened dressing followed with mepilex. To sacrum, silver hydrogel, then cover with saline moistened 4 x 4 gauze, protect anitha wound with liquid skin prep then 4 x 4s over, and secure with mepilex. May apply zinc ointment to any superficial buttock/anitha wound open areas. 4. Generalized xerosis:   ck.  After washing lower extremities (intact areas only) with mild soap and water, apply urea 20 cream to dry areas. Perform every other day. 5. History of DM2, on tube feedings:   Insufficient nutrition for healing-monitor for additional support needs and notify dietician if noted. Monitor blood sugars with goal being less then 150, to ensure ability for wounds to heal.      Procedure Note  Indications:  Based on my examination of this patient's wound(s)/ulcer(s) today, debridement is required to promote healing and evaluate the wound base. Performed by: MARICRUZ Willingham NP    Consent obtained:  Yes    Time out taken:  Yes    Pain Control: none required. No discomfort observed during debridement using faces scale. Debridement:Excisional Debridement    Using a curette the wound(s) to the left hip and sacrum were sharply debrided down through and including the removal of epidermis, dermis and subcutaneous tissue.         Devitalized Tissue Debrided:  fibrin, biofilm, slough and necrotic/eschar    Pre Debridement Measurements:  Are located in the [facility]  Documentation Flow Sheet    Wound/Ulcer #: right heel and left hip     Post Debridement Measurements:  Wound/Ulcer Descriptions are Pre Debridement except measurements:       Incision 04/23/19 Hip Anterior;Left;Proximal (Active)   Number of days: 1078         right heel 4/6/22             Wound Properties  right heel wound- full thickness-  Both unstageable    rt medial wound                         Right lateral wound   Wound pressure                                     Pressure    Dressing Status Daily                                            daily   Dressing Changed Wednesday Wednesday    Dressing/treatment Daily                                           Daily    Wound Cleansed Saline                                         saline   Dressing Change Due Thursday Thursday   Wound Length (cm) pre-debridement 2.9                                               0.5   Wound Width (cm) pre-debridement 2.8                                               0.5   Wound depth (cm) pre debridement immeas. immeas. Wound length (cm) post debridement 2.2                                                0.3   Wound width (cm) post debridement 2.2                                               0.3   Wound Depth (cm) post -debridement 0.15                                              immeasurable   Drainage Amount small                                           None    Drainage Description serosangunous    Odor None                                           None    Margins Open wound edges                    Open wound edge    Exposed Structure None    Anitha-wound Assessment No maceration                             No maceration    Pink% Wound Bed     Red% Wound Bed 30%                                     Yellow% Wound Bed     Black% Wound Bed  70%                                                  100%   Purple% Wound Bed     Other% Wound Bed    Culture Taken None                                              None         Left hip 3/30/22          Wound Properties Left hip    Wound Pressure injury- DTPI unstageable    Dressing Status Daily    Dressing Changed Wednesday    Dressing/treatment Cleanse with saline, then apply silver hydrogel to wound, with saline moistened gauze over, then dsd overall and secure with mepilex border. Liquid skin prep anitha wound before dressing.     Wound Cleansed Saline    Dressing Change Due Thursday    Wound Length (cm)  3.2       Wound Width (cm) 3.2   Wound Depth (cm) Immeasurable    Post-Procedure Length (cm) 3.2   Post-Procedure Width (cm) 3.2   Post-Procedure Depth (cm) Immeasurable    Wound Assessment Deep tissue pressure injury   Drainage Amount Moderately large   Drainage Description Tan/gray    Odor Yes    Margins Poorly defined- erythematous    Exposed Structure None    Anitha-wound Assessment Fragile- pink    Red% Wound Bed     Yellow% Wound Bed 100 % dense slough/eschar    Other% Wound Bed     Culture Taken None         3/30/22   Sacral wound               sacral wound  Wound Properties   Pressure stage 3 Wound   Daily  Dressing Status   Wednesday  Dressing Changed   Cleanse with saline, silver pluragel to wound bed, with saline moist dressing over, liquid skin prep anitha wound, then mepilex to secure  Dressing/treatment   Saline   Wound Cleansed   Thursday  Dressing Change Due   3.0 Wound Length (cm)    2.0 Wound Width (cm)   1.5 Wound Depth (cm)   3.0 Post-Procedure Length (cm)   2.0 Post-Procedure Width (cm)   1.5 Post-Procedure Depth (cm)   Small  Drainage Amount   Yellow/tan  Drainage Description   None  Odor   Open  Margins   None  Exposed Structure   Intact/ non erythematous  Anitha-wound Assessment     Pink% Wound Bed   20% Tan/yellow slough non viable tissue    80% Red% Wound Bed     Yellow% Wound Bed   No  Culture Taken          Percent of Wound/Ulcer Debrided: left hip-100%  Or 10.24 sq. Cm.                                                                  Sacrum not debrided                                                         Medial Left heel- 2.9 x 2.8 debrided 50%=4.06                                                        Lateral left heel-0.5 x 0.5 debrided 100%=0.25          Total Surface Area Debrided:  14.55 sq cm     Diabetic/Pressure/Non Pressure Ulcers:  Ulcer: unstageable pressure injury to left hip and to sacrum stage 3 pressure injury               Bleeding:  Minimum     Hemostasis Achieved:  by silver nitrate stick - to left hip wound    Procedural Pain:  0  / 10     Post Procedural Pain:  0/ 10     Response to treatment:  Well tolerated by patient. Plan:     1. Right heel wound:   Keep wound dry, do not get wet other than to clean with sterile saline, then gentamicin ointment then cover with hydrofera blue, then apply 4 x 4 gauze, and secure with kerlix. Keep both heels elevated off of the bed. DO NOT USE SILVERCELL ON THE HEEL. 2. Stage 3 pressure injury to coccyx and deep tissue pressure injury to left hip  Limit time positioned to either area-totally avoiding positioning on either if possible. Use low air loss mattress if available. Daily cleanse with saline, then silver hydrogel to both wound areas, cover with saline moistened gauze, followed by dry dressing and then pad/protect with mepilex border. 3.  May apply zinc ointment to any superficial buttock/anitha wound open areas. 4. Generalized xerosis:   ck.  After washing lower extremities (intact areas only) with mild soap and water, apply urea 20 cream to dry areas. Perform every other day.         Electronically signed by MARICRUZ Almaguer NP on 4/6/2022 at 3:05 PM

## 2022-04-13 ENCOUNTER — OFFICE VISIT (OUTPATIENT)
Dept: WOUND CARE | Age: 67
End: 2022-04-13
Payer: COMMERCIAL

## 2022-04-13 DIAGNOSIS — L89.150 PRESSURE INJURY OF COCCYGEAL REGION, UNSTAGEABLE (HCC): Primary | ICD-10-CM

## 2022-04-13 DIAGNOSIS — L89.610 PRESSURE INJURY OF RIGHT HEEL, UNSTAGEABLE (HCC): ICD-10-CM

## 2022-04-13 DIAGNOSIS — L89.226 PRESSURE INJURY OF DEEP TISSUE OF LEFT HIP: ICD-10-CM

## 2022-04-13 DIAGNOSIS — Z74.09: ICD-10-CM

## 2022-04-13 PROCEDURE — 11042 DBRDMT SUBQ TIS 1ST 20SQCM/<: CPT | Performed by: NURSE PRACTITIONER

## 2022-04-13 PROCEDURE — 99308 SBSQ NF CARE LOW MDM 20: CPT | Performed by: NURSE PRACTITIONER

## 2022-04-13 NOTE — PROGRESS NOTES
[facility] [place]                                                       Debridement Note      Dolly Saxena  MEDICAL RECORD NUMBER:  [id number]  AGE: 61 y.o. GENDER: male  : 1955  EPISODE DATE:  2022    Subjective:     Chief Complaint   Patient presents with    Wound Check         HISTORY of PRESENT ILLNESS HPI  Elisa Wharton a 61 y. o. male who presents today for wound/ulcer evaluation. History of Wound Context: Patient received from local [facility] - s/p CVA, with subarachnoid hemorrhage and respiratory failure (has tracheostomy), COPD per history.  Patient has DMII, with gastrostomy tube for nutrition.  No verbalization, however, does appear more alert this week,occassionally reaches for linens during care, and occasionally makes eye contact. Wound to right heel much improved in depth and overall dimension, with deep tissue pressure injury to sacrum also improved, red/granular, less friable then previously. Left hip area of deep tissue pressure injury (DTPI) - now continuing to evolve, but remains unstageable. will debride. Today, the right heel is improved with only small area of open wound remaining-red/granular, with some black stable eschar near open wound. Wound/Ulcer Pain Timing/Severity: unable to accurately evaluate due to non-verbal status   Quality of pain: none noted per faces scale.    Severity: 0 / 10   Modifying Factors: None  Associated Signs/Symptoms: none     Ulcer Identification:  Ulcer Type: pressure  Contributing Factors: diabetes, chronic pressure, decreased mobility, shear force, decreased tissue oxygenation, incontinence of stool and incontinence of urine     Wound: N/A    PAST MEDICAL HISTORY        Diagnosis Date    Abdominal pain     Asthma     Bloating symptom     COPD (chronic obstructive pulmonary disease) (HCC)     [facility]     Depression     Depression     Diarrhea     Drug-seeking behavior 2020    Emphysema/COPD ([address]     History of rectal bleeding     Hypercholesteremia     past trx > 5 yrs    Hypercholesteremia     Hypercholesteremia     Hypertension     meds > 3 yrs    Lung disease     RANDI on CPAP     patient relates he doesn't use cpap at this time    Osteoarthritis     left hip    Osteoarthritis     Osteoarthritis of left hip     Physiological consequence of immobility 3/9/2022    Pituitary macroadenoma ([address]     Sleep apnea        PAST SURGICAL HISTORY    Past Surgical History:   Procedure Laterality Date    COLONOSCOPY  5/2/14    JENNIFER    COLONOSCOPY N/A 12/12/2019    COLONOSCOPY DIAGNOSTIC performed by Sheila Bell MD at [address], COLON, DIAGNOSTIC      EYE SURGERY Bilateral     cataracts   [address] ?     index finger laceration repair    HERNIA REPAIR  2004    repair [facility]     JOINT REPLACEMENT Left     left hip    [place] COLON CA SCRN NOT HI [facility] IND N/A 5/17/2017    COLONOSCOPY performed by Gabbie Olivas MD at [facility] Left 4/22/2019    LEFT HIP TOTAL HIP ARTHROPLASTY, LATERAL DECUB, GRACIE performed by Miranda Cowart MD at [address] [facility]  12/12/2019    EGD DIAGNOSTIC ONLY performed by Sheila Bell MD at [facility]    Family History   Problem Relation Age of Onset    Colon Cancer Mother     Cancer Mother     Arthritis Mother     High Blood Pressure Father     Heart Disease Father     Diabetes Sister     No Known Problems Brother     No Known Problems Brother     No Known Problems Brother     Other Brother         Sepsis    Other Brother         Killed    No Known Problems Sister     No Known Problems Sister     No Known Problems Son        SOCIAL HISTORY    Social History     Tobacco Use    Smoking status: Former Smoker     Packs/day: 1.50     Years: 46.00     Pack years: 69.00     Types: Cigarettes     Quit date: 3/20/2019     Years since quitting: 3.0    Smokeless tobacco: Never Used   Vaping Use    Vaping Use: Unknown   Substance Use Topics    Alcohol use: Yes    Drug use: No       ALLERGIES    No Known Allergies    MEDICATIONS    Current Outpatient Medications on File Prior to Visit   Medication Sig Dispense Refill    lidocaine (LMX) 4 % cream Apply a half dollar sized amount to intact skin topically up to twice daily as needed for pain 1 Tube 1    paliperidone (INVEGA) 6 MG extended release tablet Take 1 tablet by mouth daily 30 tablet 1    cabergoline (DOSTINEX) 0.5 MG tablet Take 1 tablet by mouth Twice a Week ON Monday and Thursday 8 tablet 3    furosemide (LASIX) 40 MG tablet Take 40 mg by mouth daily      meloxicam (MOBIC) 15 MG tablet Take 15 mg by mouth daily      potassium chloride (MICRO-K) 10 MEQ extended release capsule Take 10 mEq by mouth daily      simvastatin (ZOCOR) 40 MG tablet Take 40 mg by mouth nightly      fenofibrate 160 MG tablet Take 150 mg by mouth daily      aspirin 81 MG EC tablet Take 1 tablet by mouth 2 times daily 60 tablet 0    lisinopril (PRINIVIL;ZESTRIL) 20 MG tablet Take 2 tablets by mouth daily 30 tablet 3    Respiratory Therapy Supplies SANDRA Full face CPAP mask and supplies 1 Device 0    CPAP Machine MISC by Does not apply route New CPAP with 6 cm 1 each 0    albuterol (PROVENTIL) (2.5 MG/3ML) 0.083% nebulizer solution Take 3 mLs by nebulization every 6 hours as needed for Wheezing 120 each 5    STIOLTO RESPIMAT 2.5-2.5 MCG/ACT AERS Inhale 2 puff in AM 1 Inhaler 5     No current facility-administered medications on file prior to visit. REVIEW OF SYSTEMS    Pertinent items are noted in HPI. Objective: There were no vitals taken for this visit. Wt Readings from Last 3 Encounters:   02/13/22 180 lb (81.6 kg)   08/06/21 234 lb (106.1 kg)   06/15/20 234 lb (106.1 kg)       PHYSICAL EXAM    Constitutional:   Well nourished and well developed.   Appears neat and clean. Patient is somewhat more alert this week,  in no apparent distress- no verbalization. Occasionally reaches for linens with right hand/arm. Respiratory:  Respiratory effort is easy and symmetric bilaterally. Rate is normal at rest and on O2 per trach cuff. Harsh audibly moist respirations. Vascular:  Pedal Pulses palpable and audible with doppler. Capillary refill is <3 sec to digits bilateral.  Extremities negative for pitting edema. Neurological:  Unable to determine d/t non-verbal status, and decreased level or cognition-but does appear to be making an effort to communicate with caregivers this week-reaches for areas following exam, and occasional eye contact with staff. Dermatological:  Wound description noted in wound assessment. The wound to the right heel is much improved in depth and overall measurement, healing well with only small area remaining open. The sacral wound-is very friable, small amount of non-viable gray tissue in wound bed, however, sanguinous drainage on exam-still some slough. Left hip remains covered with tan/brown eschar with wound perimeter of yellow slough- will debride/score as able today for increased penetration of the silver hydrogel. Psychiatric: Unable to accurately determine due to lack of verbalization. Assessment:      Patient seen this visit for: evaluation of need for debridement of the sacral, left hip, and right heel wounds. Problems treated this admission:   1. Right heel wound:   Keep wound dry, do not get wet other than to clean with sterile saline, then gentamicin ointment then cover with hydrofera blue, then apply 4 x 4 gauze, and secure with kerlix. Keep both heels elevated off of the bed. DO NOT USE SILVERCELL ON THE HEEL. 2. Stage 3 pressure injury to coccyx. Limit time positioned to area-totally avoiding positioning on either sacrum or hip if possible. Use low air loss mattress if available.    3. Left hip deep tissue pressure injury- To left hip, cover with santyl ointment, then saline moistened dressing followed with mepilex. To sacrum, santyl ointment in nickel thickness, then cover with saline moistened 4 x 4 gauze, protect anitha wound with liquid skin prep then 4 x 4s over, and secure with mepilex. May apply zinc ointment to any superficial buttock/anitha wound open areas. 4. Generalized xerosis:   ck.  After washing lower extremities (intact areas only) with mild soap and water, apply urea 20 cream to dry areas. Perform every other day. 5. History of DM2, on tube feedings:   Insufficient nutrition for healing-monitor for additional support needs and notify dietician if noted. Monitor blood sugars with goal being less then 150, to ensure ability for wounds to heal.      Procedure Note  Indications:  Based on my examination of this patient's wound(s)/ulcer(s) today, debridement is required to promote healing and evaluate the wound base. Performed by: MARICRUZ Mcdermott - NP    Consent obtained:  Yes    Time out taken:  Yes    Pain Control: none required. No discomfort observed during debridement using faces scale. Debridement:Excisional Debridement    Using a curette, scissors and forceps the wound(s) to the left hip was sharply debrided down through and including the removal of epidermis, dermis and subcutaneous tissue.         Devitalized Tissue Debrided:  fibrin, biofilm, slough and necrotic/eschar    Pre Debridement Measurements:  Are located in the [place]  Documentation Flow Sheet    Wound/Ulcer #: left hip     Post Debridement Measurements:  Wound/Ulcer Descriptions are Pre Debridement except measurements:       Incision 04/23/19 Hip Anterior;Left;Proximal (Active)   Number of days: 1085         right heel 4/13/22                 Wound Properties  right heel wound- full thickness-      rt medial wound                            Wound pressure                                      Dressing Status Daily Dressing Changed Wednesday                                    Dressing/treatment Daily  cleanse with saline, then betadine to wound area with DSD over, secure with kerlix, keep heels elevated off the bed, preferably using heel protectors                                        Wound Cleansed Saline                                            Dressing Change Due Thursday                                      Wound Length (cm) pre-debridement 1.1                                                  Wound Width (cm) pre-debridement 1  .8                                                Wound depth (cm) pre debridement immeas. Wound length (cm) post debridement N/a                                                  Wound width (cm) post     debridement N/a                                                 Wound Depth (cm) post -debridement N/a                                                  Drainage Amount small                                                Drainage Description serosangunous    Odor None                                               Margins Open wound edges                         Exposed Structure None    Anitha-wound Assessment No maceration                               Pink% Wound Bed     Red% Wound Bed 30%                                     Yellow% Wound Bed     Black% Wound Bed  70%                                                   Purple% Wound Bed     Other% Wound Bed    Culture Taken None                                                       Left hip 4/13/22 4/13/2022    Wound Properties Left hip    Wound Pressure injury- DTPI unstageable    Dressing Status Daily    Dressing Changed Wednesday    Dressing/treatment Cleanse with saline, then apply santyl to wound, with saline moistened gauze over, then dsd overall and secure with mepilex border. Liquid skin prep anitha wound before dressing.     Wound Cleansed Saline Dressing Change Due Thursday    Wound Length (cm)  3.5       Wound Width (cm) 3.5   Wound Depth (cm) Immeasurable    Post-Procedure Length (cm) 3.5   Post-Procedure Width (cm) 3.5   Post-Procedure Depth (cm) Immeasurable    Wound Assessment Deep tissue pressure injury   Drainage Amount Moderately large   Drainage Description Tan/gray    Odor Yes    Margins Poorly defined- erythematous    Exposed Structure None    Anitha-wound Assessment Fragile- pink    Red% Wound Bed     Yellow% Wound Bed 100 % dense slough/eschar    Other% Wound Bed     Culture Taken None         4/13/22   Sacral wound               sacral wound  Wound Properties   Pressure stage 3 Wound   Daily  Dressing Status   Wednesday  Dressing Changed   Cleanse with saline, santyl to wound bed, with saline moist dressing over, liquid skin prep anitha wound, then mepilex to secure  Dressing/treatment   Saline   Wound Cleansed   Thursday  Dressing Change Due   3.0 Wound Length (cm)    1.5 Wound Width (cm)   Immeasurable  Wound Depth (cm)   3.0 Post-Procedure Length (cm)   1.5 Post-Procedure Width (cm)   immeasurable  Post-Procedure Depth (cm)   Small  Drainage Amount   Yellow/tan  Drainage Description   None  Odor   Open  Margins   None  Exposed Structure   Intact/ non erythematous  Anitha-wound Assessment     Pink% Wound Bed   10% Tan/yellow slough non viable tissue    90% Red% Wound Bed     Yellow% Wound Bed   No  Culture Taken          Percent of Wound/Ulcer Debrided: left hip-100%  Or 12.25 sq. Cm. Total Surface Area Debrided:  12.25 sq cm     Diabetic/Pressure/Non Pressure Ulcers:  Ulcer: unstageable pressure injury to left hip               Bleeding: none     Hemostasis Achieved:  to left hip wound    Procedural Pain:  0  / 10     Post Procedural Pain:  0/ 10     Response to treatment:  Well tolerated by patient. Plan:     1.  Right heel wound:   Keep wound dry, do not get wet other than to clean with sterile saline, then gentamicin ointment then cover with hydrofera blue, then apply 4 x 4 gauze, and secure with kerlix. Keep both heels elevated off of the bed. DO NOT USE SILVERCELL ON THE HEEL. 2. Stage 3 pressure injury to coccyx and deep tissue pressure injury to left hip  Limit time positioned to either area-totally avoiding positioning on either if possible. Use low air loss mattress if available. Daily cleanse with saline, then santyl to both wound areas, cover with saline moistened gauze, followed by dry dressing and then pad/protect with mepilex border. 3.  May apply zinc ointment to any superficial buttock/anitha wound open areas. 4. Generalized xerosis:   ck.  After washing lower extremities (intact areas only) with mild soap and water, apply urea 20 cream to dry areas. Perform every other day.         Electronically signed by MARICRUZ Wright NP on 4/13/2022 at 1:01 PM

## 2022-04-27 ENCOUNTER — OFFICE VISIT (OUTPATIENT)
Dept: WOUND CARE | Age: 67
End: 2022-04-27
Payer: COMMERCIAL

## 2022-04-27 DIAGNOSIS — L89.150 PRESSURE INJURY OF COCCYGEAL REGION, UNSTAGEABLE (HCC): Primary | ICD-10-CM

## 2022-04-27 DIAGNOSIS — Z74.09: ICD-10-CM

## 2022-04-27 DIAGNOSIS — L89.226 PRESSURE INJURY OF DEEP TISSUE OF LEFT HIP: ICD-10-CM

## 2022-04-27 DIAGNOSIS — L89.610 PRESSURE INJURY OF RIGHT HEEL, UNSTAGEABLE (HCC): ICD-10-CM

## 2022-04-27 PROCEDURE — 99308 SBSQ NF CARE LOW MDM 20: CPT | Performed by: NURSE PRACTITIONER

## 2022-04-27 NOTE — PROGRESS NOTES
[facility] [place]                                                       Debridement Note      Lex Hernandez  MEDICAL RECORD NUMBER:  [id number]  AGE: 61 y.o. GENDER: male  : 1955  EPISODE DATE:  2022    Subjective:     Chief Complaint   Patient presents with    Wound Check         HISTORY of PRESENT ILLNESS HPI  Jem Lozano a 61 y. o. male who presents today for wound/ulcer evaluation. History of Wound Context: Patient received from local [facility] - s/p CVA, with subarachnoid hemorrhage and respiratory failure (has tracheostomy), COPD per history.  Patient has DMII, with gastrostomy tube for nutrition.  No verbalization, however, does appear more alert this week,occassionally reaches for linens during care. . Wound to right heel much improved in depth and overall dimension, with deep tissue pressure injury to sacrum also improved, red/granular, less friable then previously. Left hip area of deep tissue pressure injury (DTPI) - now continuing to evolve, but remains unstageable- will debride. Today, the right heel is improved with only small area of open wound remaining-red/granular, with some black stable eschar near open wound. Wound/Ulcer Pain Timing/Severity: unable to accurately evaluate due to non-verbal status   Quality of pain: none noted per faces scale.    Severity: 0 / 10   Modifying Factors: None  Associated Signs/Symptoms: none     Ulcer Identification:  Ulcer Type: pressure  Contributing Factors: diabetes, chronic pressure, decreased mobility, shear force, decreased tissue oxygenation, incontinence of stool and incontinence of urine     Wound: N/A    PAST MEDICAL HISTORY        Diagnosis Date    Abdominal pain     Asthma     Bloating symptom     COPD (chronic obstructive pulmonary disease) (HCC)     [facility]     Depression     Depression     Diarrhea     Drug-seeking behavior 2020    [facility]D [facility] [address]     History of rectal bleeding     Hypercholesteremia     past trx > 5 yrs    Hypercholesteremia     Hypercholesteremia     Hypertension     meds > 3 yrs    Lung disease     RANDI on CPAP     patient relates he doesn't use cpap at this time    Osteoarthritis     left hip    Osteoarthritis     Osteoarthritis of left hip     Physiological consequence of immobility 3/9/2022    Pituitary macroadenoma ([address]     Sleep apnea        PAST SURGICAL HISTORY    Past Surgical History:   Procedure Laterality Date    COLONOSCOPY  5/2/14    JARMOSMARLOK    COLONOSCOPY N/A 12/12/2019    COLONOSCOPY DIAGNOSTIC performed by Theresa Chapman MD at [address], COLON, DIAGNOSTIC      EYE SURGERY Bilateral     cataracts   [address] ?     index finger laceration repair    HERNIA REPAIR  2004    repair [facility]     JOINT REPLACEMENT Left     left hip    MD COLON CA SCRN NOT HI RSK IND N/A 5/17/2017    COLONOSCOPY performed by Jarek Sexton MD at [address] [facility] Left 4/22/2019    LEFT HIP TOTAL HIP ARTHROPLASTY, LATERAL DECUB, GRACIE performed by Jean Carlos Calix MD at [address] [place] [address]  12/12/2019    EGD DIAGNOSTIC ONLY performed by Theresa Chapman MD at [facility] [address]    Family History   Problem Relation Age of Onset    Colon Cancer Mother     Cancer Mother     Arthritis Mother     High Blood Pressure Father     Heart Disease Father     Diabetes Sister     No Known Problems Brother     No Known Problems Brother     No Known Problems Brother     Other Brother         Sepsis    Other Brother         Killed    No Known Problems Sister     No Known Problems Sister     No Known Problems Son        SOCIAL HISTORY    Social History     Tobacco Use    Smoking status: Former Smoker     Packs/day: 1.50     Years: 46.00     Pack years: 69.00     Types: Cigarettes     Quit date: 3/20/2019     Years since quitting: 3.1  Smokeless tobacco: Never Used   Vaping Use    Vaping Use: Unknown   Substance Use Topics    Alcohol use: Yes    Drug use: No       ALLERGIES    No Known Allergies    MEDICATIONS    Current Outpatient Medications on File Prior to Visit   Medication Sig Dispense Refill    lidocaine (LMX) 4 % cream Apply a half dollar sized amount to intact skin topically up to twice daily as needed for pain 1 Tube 1    paliperidone (INVEGA) 6 MG extended release tablet Take 1 tablet by mouth daily 30 tablet 1    cabergoline (DOSTINEX) 0.5 MG tablet Take 1 tablet by mouth Twice a Week ON Monday and Thursday 8 tablet 3    furosemide (LASIX) 40 MG tablet Take 40 mg by mouth daily      meloxicam (MOBIC) 15 MG tablet Take 15 mg by mouth daily      potassium chloride (MICRO-K) 10 MEQ extended release capsule Take 10 mEq by mouth daily      simvastatin (ZOCOR) 40 MG tablet Take 40 mg by mouth nightly      fenofibrate 160 MG tablet Take 150 mg by mouth daily      aspirin 81 MG EC tablet Take 1 tablet by mouth 2 times daily 60 tablet 0    lisinopril (PRINIVIL;ZESTRIL) 20 MG tablet Take 2 tablets by mouth daily 30 tablet 3    Respiratory Therapy Supplies SANDRA Full face CPAP mask and supplies 1 Device 0    CPAP Machine MISC by Does not apply route New CPAP with 6 cm 1 each 0    albuterol (PROVENTIL) (2.5 MG/3ML) 0.083% nebulizer solution Take 3 mLs by nebulization every 6 hours as needed for Wheezing 120 each 5    STIOLTO RESPIMAT 2.5-2.5 MCG/ACT AERS Inhale 2 puff in AM 1 Inhaler 5     No current facility-administered medications on file prior to visit. REVIEW OF SYSTEMS    Pertinent items are noted in HPI. Objective: There were no vitals taken for this visit. Wt Readings from Last 3 Encounters:   02/13/22 180 lb (81.6 kg)   08/06/21 234 lb (106.1 kg)   06/15/20 234 lb (106.1 kg)       PHYSICAL EXAM    Constitutional:   Well nourished and well developed. Appears neat and clean.   Patient is somewhat more alert this week,  in no apparent distress- no verbalization. Occasionally reaches for linens with right hand/arm. Respiratory:  Respiratory effort is easy and symmetric bilaterally. Rate is normal at rest and on O2 per trach cuff. Vascular:  Pedal Pulses palpable and audible with doppler. Capillary refill is <3 sec to digits bilateral.  Extremities negative for pitting edema. Neurological:  Unable to determine d/t non-verbal status, and decreased level or cognition-but does appear to be making an effort to communicate with caregivers this week-reaches for areas following exam, and occasional eye contact with staff. Dermatological:  Wound description noted in wound assessment. The wound to the right heel is much improved in depth and overall measurement, healing well with only small area remaining open. The sacral wound-is clean, red, granular with less depth. No anitha wound maceration, less friable this week. Left hip remains covered with malodorous yellow slough- will debride. Psychiatric: Unable to accurately determine due to lack of verbalization. Assessment:      Patient seen this visit for: evaluation of need for debridement of the sacral, left hip, and right heel wounds. Problems treated this admission:   1. Right heel wound:   Keep wound dry, apply betadine to heel daily,  then apply 4 x 4 gauze, and secure with kerlix to pad and protect. Keep both heels elevated off of the bed. DO NOT USE SILVERCELL ON THE HEEL. 2. Stage 3 pressure injury to coccyx. Limit time positioned to area-totally avoiding positioning on either sacrum or hip if possible. Use low air loss mattress if available. 3. Left hip deep tissue pressure injury- To left hip, cover with nickel thickness of santyl ointment, then saline moistened dressing followed with mepilex. Liquid skin prep to anitha wound before securing dressing.    To sacrum- cleanse wound with saline, then apply santyl ointment in nickel thickness, then cover with saline moistened 4 x 4 gauze, protect anitha wound with liquid skin prep then 4 x 4s over, and secure with mepilex. IF UNABLE TO OBTAIN SANTYL OINTMENT: use 1/4 strength Dakins for sacrum and left hip. Daily, moisten 2 x 2 with dakins, cover with dry 2 x 2, and secure with mepilex border dressings. Protect both anitha wound areas with liquid skin prep and allow to dry before securing dressings. 4. Generalized xerosis:   ck.  After washing lower extremities (intact areas only) with mild soap and water, apply urea 20 cream to dry areas. Perform every other day. 5. History of DM2, on tube feedings:   Insufficient nutrition for healing-monitor for additional support needs and notify dietician if noted. Monitor blood sugars with goal being less then 150, to ensure ability for wounds to heal.      Procedure Note  Indications:  Based on my examination of this patient's wound(s)/ulcer(s) today, debridement is required to promote healing and evaluate the wound base. Performed by: MARICRUZ Everett NP    Consent obtained:  Yes    Time out taken:  Yes    Pain Control: none required. No discomfort observed during debridement using faces scale. Debridement:Excisional Debridement    Using a curette, scissors and forceps the wound(s) to the left hip was sharply debrided down through and including the removal of epidermis, dermis and subcutaneous tissue.         Devitalized Tissue Debrided:  fibrin, biofilm, slough and necrotic/eschar    Pre Debridement Measurements:  Are located in the [facility]  Documentation Flow Sheet    Wound/Ulcer #: left hip     Post Debridement Measurements:  Wound/Ulcer Descriptions are Pre Debridement except measurements:       Incision 04/23/19 Hip Anterior;Left;Proximal (Active)   Number of days: 1099         right heel 4/27/22                     Wound Properties  right heel wound- full thickness-      rt medial wound                            Wound pressure Liquid skin prep anitha wound before dressing. If unable to obtain santyl, do daily dakins dressing changes 1/4 strength. Wound Cleansed Saline    Dressing Change Due Thursday    Wound Length (cm)  3.0       Wound Width (cm) 4.0   Wound Depth (cm) Immeasurable    Post-Procedure Length (cm) 3.0   Post-Procedure Width (cm) 4.0   Post-Procedure Depth (cm) Immeasurable    Wound Assessment Deep tissue pressure injury   Drainage Amount Moderate   Drainage Description Tan/gray    Odor Yes    Margins Poorly defined- erythematous    Exposed Structure None    Anitha-wound Assessment Fragile- pink    Red% Wound Bed     Yellow% Wound Bed 100 % dense slough/eschar    Other% Wound Bed     Culture Taken None         4/13/22   Sacral wound         4/27/22  Sacral wound          sacral wound  Wound Properties   Pressure stage 3 Wound   Daily  Dressing Status   Wednesday  Dressing Changed   Cleanse with saline, santyl to wound bed, with saline moist dressing over, liquid skin prep anitha wound, then mepilex to secure  Dressing/treatment   Saline   Wound Cleansed   Thursday  Dressing Change Due   2.5 Wound Length (cm)    1.5 Wound Width (cm)   2.0 Wound Depth (cm)   2.5 Post-Procedure Length (cm)   1.5 Post-Procedure Width (cm)   2.0 Post-Procedure Depth (cm)   Small  Drainage Amount   Serosanguinous  Drainage Description   None  Odor   Open  Margins   None  Exposed Structure   Intact/ non erythematous  Anitha-wound Assessment     Pink% Wound Bed   5% Tan/yellow slough non viable tissue    95% Red% Wound Bed     Yellow% Wound Bed   No  Culture Taken          Percent of Wound/Ulcer Debrided: left hip-100%  Or 12.0 sq. Cm.                                                                  Total Surface Area Debrided:  12.0  sq cm     Diabetic/Pressure/Non Pressure Ulcers:  Ulcer: unstageable pressure injury to left hip               Bleeding: none     Hemostasis Achieved:  to left hip wound    Procedural Pain:  0  / 10     Post Procedural Pain: 0/ 10     Response to treatment:  Well tolerated by patient. Plan:     1. Right heel wound:   Keep wound dry, apply betadine to heel daily,  then apply 4 x 4 gauze, and secure with kerlix to pad and protect. Keep both heels elevated off of the bed. DO NOT USE SILVERCELL ON THE HEEL. 2. Stage 3 pressure injury to coccyx. Limit time positioned to area-totally avoiding positioning on either sacrum or hip if possible. Use low air loss mattress if available. 3. Left hip deep tissue pressure injury- To left hip, cover with nickel thickness of santyl ointment, then saline moistened dressing followed with mepilex. Liquid skin prep to anitha wound before securing dressing. To sacrum- cleanse wound with saline, then apply santyl ointment in nickel thickness, then cover with saline moistened 4 x 4 gauze, protect anitha wound with liquid skin prep then 4 x 4s over, and secure with mepilex. IF UNABLE TO OBTAIN SANTYL OINTMENT: use 1/4 strength Dakins for sacrum and left hip. Daily, moisten 2 x 2 with dakins. To sacrum insert into wound area, cover with dry 2 x 2, and secure with mepilex border dressings. To left hip, cleanse with saline, then pack lightly with dakins moistened 2 x 2s and secure with mepilex. Protect both anitha wound areas with liquid skin prep and allow to dry before securing dressings. 4. Generalized xerosis:    After washing lower extremities (intact areas only) with mild soap and water, apply urea 20 cream to dry areas. Perform every other day.      Electronically signed by MARICRUZ Tracy NP on 4/27/2022 at 2:21 PM

## 2022-05-04 ENCOUNTER — OFFICE VISIT (OUTPATIENT)
Dept: WOUND CARE | Age: 67
End: 2022-05-04
Payer: COMMERCIAL

## 2022-05-04 DIAGNOSIS — L89.150 PRESSURE INJURY OF COCCYGEAL REGION, UNSTAGEABLE (HCC): ICD-10-CM

## 2022-05-04 DIAGNOSIS — L89.226 PRESSURE INJURY OF DEEP TISSUE OF LEFT HIP: Primary | ICD-10-CM

## 2022-05-04 DIAGNOSIS — Z74.09: ICD-10-CM

## 2022-05-04 DIAGNOSIS — L89.610 PRESSURE INJURY OF RIGHT HEEL, UNSTAGEABLE (HCC): ICD-10-CM

## 2022-05-04 PROCEDURE — 99308 SBSQ NF CARE LOW MDM 20: CPT | Performed by: NURSE PRACTITIONER

## 2022-05-04 PROCEDURE — 11042 DBRDMT SUBQ TIS 1ST 20SQCM/<: CPT | Performed by: NURSE PRACTITIONER

## 2022-05-04 NOTE — PROGRESS NOTES
[facility]                                                       Debridement Note      [facility]  MEDICAL RECORD NUMBER:  [id number]  AGE: 61 y.o. GENDER: male  : 1955  EPISODE DATE:  2022    Subjective:     Chief Complaint   Patient presents with    Wound Check         HISTORY of PRESENT ILLNESS HPI  Skinny Galo a 61 y. o. male who presents today for wound/ulcer evaluation. History of Wound Context: Patient received from local [facility] - s/p CVA, with subarachnoid hemorrhage and respiratory failure (has tracheostomy), COPD per history.  Patient has DMII, with gastrostomy tube for nutrition.  No verbalization, however, does appear more alert this week,occassionally reaches for linens during care. Wound to right heel is improved in depth and overall dimension however has margin of slough around the necrotic/eschar area/softening, will debride some of the surrounding non adherent eschar. Deep tissue pressure injury to sacrum also improved, red/granular, less friable then previously. Left hip area of deep tissue pressure injury (DTPI) - now continuing to evolve, but remains unstageable- will debride. Today, the right heel is improved with only small area of open wound remaining-red/granular, with some black stable eschar near open wound. Wound/Ulcer Pain Timing/Severity: unable to accurately evaluate due to non-verbal status   Quality of pain: none noted per faces scale.    Severity: 0 / 10   Modifying Factors: None  Associated Signs/Symptoms: none     Ulcer Identification:  Ulcer Type: pressure  Contributing Factors: diabetes, chronic pressure, decreased mobility, shear force, decreased tissue oxygenation, incontinence of stool and incontinence of urine     Wound: N/A    PAST MEDICAL HISTORY        Diagnosis Date    Abdominal pain     Asthma     Bloating symptom     COPD (chronic obstructive pulmonary disease) (HCC)     [facility] disease) ([facility])     Depression     Depression     Diarrhea     Drug-seeking behavior 02/03/2020    Emphysema/COPD ([address]     History of rectal bleeding     Hypercholesteremia     past trx > 5 yrs    Hypercholesteremia     Hypercholesteremia     Hypertension     meds > 3 yrs    Lung disease     RANDI on CPAP     patient relates he doesn't use cpap at this time    Osteoarthritis     left hip    Osteoarthritis     Osteoarthritis of left hip     Physiological consequence of immobility 3/9/2022    Pituitary macroadenoma ([address]     Sleep apnea        PAST SURGICAL HISTORY    Past Surgical History:   Procedure Laterality Date    COLONOSCOPY  5/2/14    PRATEEKK    COLONOSCOPY N/A 12/12/2019    COLONOSCOPY DIAGNOSTIC performed by Coy Duran MD at [address], COLON, DIAGNOSTIC      EYE SURGERY Bilateral     cataracts   [address] ?     index finger laceration repair    HERNIA REPAIR  2004    repair [facility]     JOINT REPLACEMENT Left     left hip    NC COLON CA SCRN NOT HI RSK IND N/A 5/17/2017    COLONOSCOPY performed by Anali Barr MD at [address] [place] [address] Left 4/22/2019    LEFT HIP TOTAL HIP ARTHROPLASTY, LATERAL DECUB, GRACIE performed by Paul Mascorro MD at [address]  12/12/2019    EGD DIAGNOSTIC ONLY performed by Coy Duran MD at [facility]    Family History   Problem Relation Age of Onset    Colon Cancer Mother     Cancer Mother     Arthritis Mother     High Blood Pressure Father     Heart Disease Father     Diabetes Sister     No Known Problems Brother     No Known Problems Brother     No Known Problems Brother     Other Brother         Sepsis    Other Brother         Killed    No Known Problems Sister     No Known Problems Sister     No Known Problems Son        SOCIAL HISTORY    Social History     Tobacco Use    Smoking status: Former Smoker     Packs/day: 1.50     Years: 46.00     Pack years: 69.00     Types: Cigarettes     Quit date: 3/20/2019     Years since quitting: 3.1    Smokeless tobacco: Never Used   Vaping Use    Vaping Use: Unknown   Substance Use Topics    Alcohol use: Yes    Drug use: No       ALLERGIES    No Known Allergies    MEDICATIONS    Current Outpatient Medications on File Prior to Visit   Medication Sig Dispense Refill    lidocaine (LMX) 4 % cream Apply a half dollar sized amount to intact skin topically up to twice daily as needed for pain 1 Tube 1    paliperidone (INVEGA) 6 MG extended release tablet Take 1 tablet by mouth daily 30 tablet 1    cabergoline (DOSTINEX) 0.5 MG tablet Take 1 tablet by mouth Twice a Week ON Monday and Thursday 8 tablet 3    furosemide (LASIX) 40 MG tablet Take 40 mg by mouth daily      meloxicam (MOBIC) 15 MG tablet Take 15 mg by mouth daily      potassium chloride (MICRO-K) 10 MEQ extended release capsule Take 10 mEq by mouth daily      simvastatin (ZOCOR) 40 MG tablet Take 40 mg by mouth nightly      fenofibrate 160 MG tablet Take 150 mg by mouth daily      aspirin 81 MG EC tablet Take 1 tablet by mouth 2 times daily 60 tablet 0    lisinopril (PRINIVIL;ZESTRIL) 20 MG tablet Take 2 tablets by mouth daily 30 tablet 3    Respiratory Therapy Supplies SANDRA Full face CPAP mask and supplies 1 Device 0    CPAP Machine MISC by Does not apply route New CPAP with 6 cm 1 each 0    albuterol (PROVENTIL) (2.5 MG/3ML) 0.083% nebulizer solution Take 3 mLs by nebulization every 6 hours as needed for Wheezing 120 each 5    STIOLTO RESPIMAT 2.5-2.5 MCG/ACT AERS Inhale 2 puff in AM 1 Inhaler 5     No current facility-administered medications on file prior to visit. REVIEW OF SYSTEMS    Pertinent items are noted in HPI. Objective: There were no vitals taken for this visit.     Wt Readings from Last 3 Encounters:   02/13/22 180 lb (81.6 kg)   08/06/21 234 lb (106.1 kg)   06/15/20 234 lb (106.1 kg) PHYSICAL EXAM    Constitutional:   Well nourished and well developed. Appears neat and clean. Patient is somewhat more alert this week,  in no apparent distress- no verbalization. Occasionally reaches for linens with right hand/arm. Respiratory:  Respiratory effort is easy and symmetric bilaterally. Rate is normal at rest and on O2 per trach cuff. Vascular:  Pedal Pulses palpable and audible with doppler. Capillary refill is <3 sec to digits bilateral.  Extremities negative for pitting edema. Neurological:  Unable to determine d/t non-verbal status, and decreased level or cognition-but does appear to be making an effort to communicate with caregivers this week-reaches for areas following exam, and occasional eye contact with staff. Dermatological:  Wound description noted in wound assessment. The wound to the right heel is much improved in depth and overall measurement, healing well but necrotic area has small margin of slough. The sacral wound-is clean, red, granular with less depth. No anitha wound maceration, still slightly friable this week. Left hip remains covered with malodorous yellow slough- will debride. Psychiatric: Unable to accurately determine due to lack of verbalization. Assessment:      Patient seen this visit for: evaluation of need for debridement of the sacral, left hip, and right heel wounds. Problems treated this admission:   1. Right heel wound:   Keep wound dry, apply betadine to heel daily,  then apply 4 x 4 gauze, and secure with kerlix to pad and protect. Keep both heels elevated off of the bed. DO NOT USE SILVERCELL ON THE HEEL. 2. Stage 3 pressure injury to coccyx. Limit time positioned to area-totally avoiding positioning on either sacrum or hip if possible. Use low air loss mattress if available.    3. Left hip deep tissue pressure injury- To left hip, cover with nickel thickness of santyl ointment, then saline moistened dressing followed with mepilex. Liquid skin prep to anitha wound before securing dressing. To sacrum- cleanse wound with saline, then apply santyl ointment in nickel thickness, then cover with saline moistened 4 x 4 gauze, protect anitha wound with liquid skin prep then 4 x 4s over, and secure with mepilex. IF UNABLE TO OBTAIN SANTYL OINTMENT: use 1/4 strength Dakins for sacrum and left hip. Daily, moisten 2 x 2 with dakins, cover with dry 2 x 2, and secure with mepilex border dressings. Protect both anitha wound areas with liquid skin prep and allow to dry before securing dressings. 4. Generalized xerosis:   ck.  After washing lower extremities (intact areas only) with mild soap and water, apply urea 20 cream to dry areas. Perform every other day. 5. History of DM2, on tube feedings:   Insufficient nutrition for healing-monitor for additional support needs and notify dietician if noted. Monitor blood sugars with goal being less then 150, to ensure ability for wounds to heal.      Procedure Note  Indications:  Based on my examination of this patient's wound(s)/ulcer(s) today, debridement is required to promote healing and evaluate the wound base. Performed by: MARICRUZ Mcdermott NP    Consent obtained:  Yes    Time out taken:  Yes    Pain Control: none required. No discomfort observed during debridement using faces scale. Sacral wound 5/4/22    Debridement:Excisional Debridement    Using a curette, scissors and forceps the wound(s) to the left hip and right heel was sharply debrided down through and including the removal of epidermis, dermis and subcutaneous tissue. Devitalized Tissue Debrided:  fibrin, biofilm, slough and necrotic/eschar    Pre Debridement Measurements:  Are located in the Wound/Ulcer Documentation Flow Sheet    Wound/Ulcer #: left hip debrided 100% for 11.1 sq cm   Right heel debrided of 50% for 2.73 sq cm. Total area debrided=13.83 sq cm.      Post Debridement Measurements:  Wound/Ulcer Descriptions are Pre Debridement except measurements:    Right heel  5/4/22            Wound Properties  right heel wound- full thickness-      rt medial wound                            Wound pressure                                      Dressing Status Daily                                               Dressing Changed Wednesday                                    Dressing/treatment Daily  cleanse with saline, then santyl to wound area with saline moistened 2 x 2 over, then DSD, secure with kerlix, keep heels elevated off the bed, preferably using heel protectors. When no more santyl- change to dakins dressing changes daily                                      Wound Cleansed Saline                                            Dressing Change Due Thursday                                      Wound Length (cm) pre-debridement 2.6                                                Wound Width (cm) pre-debridement 2.1                                               Wound depth (cm) pre debridement immeas.                                        Wound length (cm) post debridement 2.6                                                 Wound width (cm) post     debridement 2.1                                                Wound Depth (cm) post -debridement Remains immeasurable                                               Drainage Amount small                                                Drainage Description serosangunous    Odor None                                               Margins Open wound edges                         Exposed Structure None    Wendy-wound Assessment No maceration                               Pink% Wound Bed     Red% Wound Bed 20%                                     Yellow% Wound Bed     Black% Wound Bed  80%                                                   Purple% Wound Bed     Other% Wound Bed    Culture Taken None                                                       Left hip  5/4/22          Wound Properties Left hip    Wound Pressure injury- DTPI unstageable    Dressing Status Daily    Dressing Changed Wednesday    Dressing/treatment Cleanse with saline, then apply santyl to wound, with saline moistened gauze over, then dsd overall and secure with mepilex border. Liquid skin prep anitha wound before dressing. If unable to obtain santyl, do daily dakins dressing changes 1/4 strength. Wound Cleansed Saline    Dressing Change Due Thursday    Wound Length (cm)  3.0       Wound Width (cm) 3.7   Wound Depth (cm) Immeasurable    Post-Procedure Length (cm) 3.0   Post-Procedure Width (cm) 3.7   Post-Procedure Depth (cm) Immeasurable    Wound Assessment Deep tissue pressure injury   Drainage Amount Moderate   Drainage Description Tan/gray    Odor Yes    Margins Poorly defined- erythematous    Exposed Structure None    Anitha-wound Assessment Fragile- pink    Red% Wound Bed     Yellow% Wound Bed 100 % dense slough/eschar    Other% Wound Bed     Culture Taken None         Sacral wound 5/4/22       sacral wound  Wound Properties   Pressure stage 3 - healing Wound   Daily  Dressing Status   Wednesday  Dressing Changed   Cleanse with saline, santyl to wound bed, with saline moist dressing over, liquid skin prep anitha wound, then mepilex to secure  Dressing/treatment   Saline   Wound Cleansed   Thursday  Dressing Change Due   2.2 Wound Length (cm)    1.0 Wound Width (cm)   0.4 Wound Depth (cm)   2.2 Post-Procedure Length (cm)   1.0 Post-Procedure Width (cm)   0.4 Post-Procedure Depth (cm)   Small  Drainage Amount   Serosanguinous  Drainage Description   None  Odor   Open  Margins   None  Exposed Structure   Intact/ non erythematous  Anitha-wound Assessment     Pink% Wound Bed    Tan/yellow slough non viable tissue    100 Red% Wound Bed     Yellow% Wound Bed   No  Culture Taken        Percent of Wound/Ulcer Debrided: left hip-100%  Or 11.1 sq. Cm. Right heel- 50% of wound debrided for total of 2.73 sq cm. Total Surface Area Debrided:  13.83  sq cm     Diabetic/Pressure/Non Pressure Ulcers:  Ulcer: unstageable pressure injury to left hip   Unstageable pressure injury to right heel               Bleeding: none     Hemostasis Achieved:  to left hip wound no bleeding   To right heel- no bleeding     Procedural Pain:  0  / 10     Post Procedural Pain:  0/ 10     Response to treatment:  Well tolerated by patient. Plan:     1. Right heel wound:  Cleanse area daily with saline, then cover necrotic/slough area with santyl in nickel thickness, then cover with saline moistened gauze,  then apply 4 x 4 gauze, and secure with kerlix to pad and protect. Keep both heels elevated off of the bed. DO NOT USE SILVERCELL ON THE HEEL. When Santyl no longer available may substitute daily Dakins dressing changes. 2. Stage 3 pressure injury to coccyx. Limit time positioned to area-totally avoiding positioning on either sacrum or hip if possible. Use low air loss mattress if available. 3. Left hip deep tissue pressure injury- To left hip, cover with nickel thickness of santyl ointment, then saline moistened dressing followed with mepilex. Liquid skin prep to anitha wound before securing dressing. When Santyl no longer available okay to change to daily Dakins dressing changes. To sacrum- cleanse wound with saline, then apply Dakins moistened 2 x 2 gauze-cut to fit over the wound bed, then cover with dry 4 x 4 gauze, protect anitha wound with liquid skin prep then 4 x 4s over, and secure with mepilex. IF UNABLE TO OBTAIN SANTYL OINTMENT: use 1/4 strength Dakins for heel necrotic/slough area and left hip. Daily, moisten 2 x 2 with dakins. To sacrum insert into wound area, cover with dry 2 x 2, and secure with mepilex border dressings. To left hip, cleanse with saline, then pack lightly with dakins moistened 2 x 2s and secure with mepilex.  Protect both anitha wound areas with liquid skin prep and allow to dry before securing dressings. 4. Generalized xerosis:    After washing lower extremities (intact areas only) with mild soap and water, apply urea 20 cream to dry areas. Perform every other day.      Electronically signed by MARICRUZ Jean-Baptiste NP on 5/4/2022 at 4:17 PM

## 2022-05-11 ENCOUNTER — OFFICE VISIT (OUTPATIENT)
Dept: WOUND CARE | Age: 67
End: 2022-05-11
Payer: COMMERCIAL

## 2022-05-11 DIAGNOSIS — L89.610 PRESSURE INJURY OF RIGHT HEEL, UNSTAGEABLE (HCC): ICD-10-CM

## 2022-05-11 DIAGNOSIS — L89.226 PRESSURE INJURY OF DEEP TISSUE OF LEFT HIP: Primary | ICD-10-CM

## 2022-05-11 DIAGNOSIS — Z74.09: ICD-10-CM

## 2022-05-11 DIAGNOSIS — L89.150 PRESSURE INJURY OF COCCYGEAL REGION, UNSTAGEABLE (HCC): ICD-10-CM

## 2022-05-11 PROCEDURE — 11042 DBRDMT SUBQ TIS 1ST 20SQCM/<: CPT | Performed by: NURSE PRACTITIONER

## 2022-05-11 PROCEDURE — 99308 SBSQ NF CARE LOW MDM 20: CPT | Performed by: NURSE PRACTITIONER

## 2022-05-11 NOTE — PROGRESS NOTES
[facility]                                                       Debridement Note      Judith Ferreira  MEDICAL RECORD NUMBER:  [id number]  AGE: 61 y.o. GENDER: male  : 1955  EPISODE DATE:  2022    Subjective:     Chief Complaint   Patient presents with    Wound Check         HISTORY of PRESENT ILLNESS HPI  Tari Hatchet a 61 y. o. male who presents today for wound/ulcer evaluation. History of Wound Context: Patient received from local [facility] - s/p CVA, with subarachnoid hemorrhage and respiratory failure (has tracheostomy), COPD per history.  Patient has DMII, with gastrostomy tube for nutrition.  No verbalization, however, does appear more alert this week,occassionally reaches for linens during care. Wound to right heel is improved in depth and overall dimension however has margin of slough around the necrotic/eschar area/softening, will debride some of the surrounding non adherent eschar. Deep tissue pressure injury to sacrum also improved, red/granular, less friable then previously. Left hip area of deep tissue pressure injury (DTPI) - now continuing to evolve, but remains unstageable- will debride. Today, the right heel is improved with only small area of open wound remaining-red/granular, with some black stable eschar near open wound. Wound/Ulcer Pain Timing/Severity: unable to accurately evaluate due to non-verbal status   Quality of pain: none noted per faces scale.    Severity: 0 / 10   Modifying Factors: None  Associated Signs/Symptoms: none     Ulcer Identification:  Ulcer Type: pressure  Contributing Factors: diabetes, chronic pressure, decreased mobility, shear force, decreased tissue oxygenation, incontinence of stool and incontinence of urine     Wound: N/A    PAST MEDICAL HISTORY        Diagnosis Date    Abdominal pain     Asthma     Bloating symptom     [facility]     COPD (chronic obstructive pulmonary disease) [facility]     Depression     Depression     Diarrhea     Drug-seeking behavior 02/03/2020    Emphysema/COPD ([address]     History of rectal bleeding     Hypercholesteremia     past trx > 5 yrs    Hypercholesteremia     Hypercholesteremia     Hypertension     meds > 3 yrs    Lung disease     RANDI on CPAP     patient relates he doesn't use cpap at this time    Osteoarthritis     left hip    Osteoarthritis     Osteoarthritis of left hip     Physiological consequence of immobility 3/9/2022    Pituitary macroadenoma ([address]     Sleep apnea        PAST SURGICAL HISTORY    Past Surgical History:   Procedure Laterality Date    COLONOSCOPY  5/2/14    [facility]    COLONOSCOPY N/A 12/12/2019    COLONOSCOPY DIAGNOSTIC performed by Marta Cardenas MD at [address], COLON, DIAGNOSTIC      EYE SURGERY Bilateral     cataracts   [address] ?     index finger laceration repair    HERNIA REPAIR  2004    repair [facility]     JOINT REPLACEMENT Left     left hip    MI COLON CA SCRN NOT HI RSK IND N/A 5/17/2017    COLONOSCOPY performed by Rowena Eastman MD at [address] [facility] 4/22/2019    LEFT HIP TOTAL HIP ARTHROPLASTY, LATERAL DECUB, GRACIE performed by Stuart Hodge MD at [address] [facility]  12/12/2019    EGD DIAGNOSTIC ONLY performed by Marta Cardenas MD at [place] [address]    Family History   Problem Relation Age of Onset    Colon Cancer Mother     Cancer Mother     Arthritis Mother     High Blood Pressure Father     Heart Disease Father     Diabetes Sister     No Known Problems Brother     No Known Problems Brother     No Known Problems Brother     Other Brother         Sepsis    Other Brother         Killed    No Known Problems Sister     No Known Problems Sister     No Known Problems Son        SOCIAL HISTORY    Social History     Tobacco Use    Smoking status: Former Smoker     Packs/day: 1.50     Years: 46.00     Pack years: 69.00     Types: Cigarettes     Quit date: 3/20/2019     Years since quitting: 3.1    Smokeless tobacco: Never Used   Vaping Use    Vaping Use: Unknown   Substance Use Topics    Alcohol use: Yes    Drug use: No       ALLERGIES    No Known Allergies    MEDICATIONS    Current Outpatient Medications on File Prior to Visit   Medication Sig Dispense Refill    lidocaine (LMX) 4 % cream Apply a half dollar sized amount to intact skin topically up to twice daily as needed for pain 1 Tube 1    paliperidone (INVEGA) 6 MG extended release tablet Take 1 tablet by mouth daily 30 tablet 1    cabergoline (DOSTINEX) 0.5 MG tablet Take 1 tablet by mouth Twice a Week ON Monday and Thursday 8 tablet 3    furosemide (LASIX) 40 MG tablet Take 40 mg by mouth daily      meloxicam (MOBIC) 15 MG tablet Take 15 mg by mouth daily      potassium chloride (MICRO-K) 10 MEQ extended release capsule Take 10 mEq by mouth daily      simvastatin (ZOCOR) 40 MG tablet Take 40 mg by mouth nightly      fenofibrate 160 MG tablet Take 150 mg by mouth daily      aspirin 81 MG EC tablet Take 1 tablet by mouth 2 times daily 60 tablet 0    lisinopril (PRINIVIL;ZESTRIL) 20 MG tablet Take 2 tablets by mouth daily 30 tablet 3    Respiratory Therapy Supplies SANDRA Full face CPAP mask and supplies 1 Device 0    CPAP Machine MISC by Does not apply route New CPAP with 6 cm 1 each 0    albuterol (PROVENTIL) (2.5 MG/3ML) 0.083% nebulizer solution Take 3 mLs by nebulization every 6 hours as needed for Wheezing 120 each 5    STIOLTO RESPIMAT 2.5-2.5 MCG/ACT AERS Inhale 2 puff in AM 1 Inhaler 5     No current facility-administered medications on file prior to visit. REVIEW OF SYSTEMS    Pertinent items are noted in HPI. Objective: There were no vitals taken for this visit.     Wt Readings from Last 3 Encounters:   02/13/22 180 lb (81.6 kg)   08/06/21 234 lb (106.1 kg)   06/15/20 234 lb (106.1 kg) PHYSICAL EXAM    Constitutional:   Well nourished and well developed. Appears neat and clean. Patient is somewhat more alert this week,  in no apparent distress- no verbalization. Frequently reaches for linens with right hand/arm. Respiratory:  Respiratory effort is easy and symmetric bilaterally. Rate is normal at rest and on O2 per trach cuff. Vascular:  Pedal Pulses palpable and audible with doppler. Capillary refill is <3 sec to digits bilateral.  Extremities negative for pitting edema. Neurological:  Unable to determine d/t non-verbal status, and decreased level or cognition-but does appear to be making an effort to communicate with caregivers this week-reaches for areas following exam.    Dermatological:  Wound description noted in wound assessment. The wound to the right heel has new area of deep tissue pressure injury (DTPI) with two new areas of blood filled hematomas, one draining small amount of sanguinous drainage. Remaining necrotic , red/granular. The sacral wound-is clean, red, granular with less depth-much improved. No anitha wound maceration, still slightly friable this week. Left hip remains covered with malodorous yellow slough. Much drier today, dressing/packing removed was dry, will review dressing change technique with nursing. Too fibrotic/dry to debride this week. Psychiatric: Unable to accurately determine due to lack of verbalization. Assessment:      Patient seen this visit for: evaluation of need for debridement of the sacral, left hip, and right heel wounds. Problems treated this admission:   1. Right heel wound:   Keep wound dry, apply betadine to heel daily,  then apply 4 x 4 gauze, and secure with kerlix to pad and protect. Keep both heels elevated off of the bed. DO NOT USE SILVERCELL ON THE HEEL. 2. Stage 3 pressure injury to coccyx. Limit time positioned to area-totally avoiding positioning on either sacrum or hip if possible. Use low air loss mattress if available. 3. Left hip deep tissue pressure injury- To left hip, cover with nickel thickness of santyl ointment, then saline moistened dressing followed with mepilex. Liquid skin prep to anitha wound before securing dressing. To sacrum- cleanse wound with saline, then apply santyl ointment in nickel thickness, then cover with saline moistened 4 x 4 gauze, protect anitha wound with liquid skin prep then 4 x 4s over, and secure with mepilex. IF UNABLE TO OBTAIN SANTYL OINTMENT: use 1/4 strength Dakins for sacrum and left hip. Daily, moisten 2 x 2 with dakins, cover with dry 2 x 2, and secure with mepilex border dressings. Protect both anitha wound areas with liquid skin prep and allow to dry before securing dressings. 4. Generalized xerosis:   ck.  After washing lower extremities (intact areas only) with mild soap and water, apply urea 20 cream to dry areas. Perform every other day. 5. History of DM2, on tube feedings:   Insufficient nutrition for healing-monitor for additional support needs and notify dietician if noted. Monitor blood sugars with goal being less then 150, to ensure ability for wounds to heal.      Procedure Note  Indications:  Based on my examination of this patient's wound(s)/ulcer(s) today, debridement is required to promote healing and evaluate the wound base. Performed by: MARICRUZ Johnson NP    Consent obtained:  Yes    Time out taken:  Yes    Pain Control: none required. No discomfort observed during debridement using faces scale. Sacral wound 5/4/22    Debridement:Excisional Debridement    Using a curette, the wound(s) to the right heel was sharply debrided down through and including the removal of epidermis, dermis and subcutaneous tissue.           Devitalized Tissue Debrided:  fibrin, biofilm, slough and necrotic/eschar    Pre Debridement Measurements:  Are located in the [place]  Documentation Flow Sheet    Wound/Ulcer #: left hip debrided 100% for 11.1 sq cm   Right heel debrided of 50% for 2.73 sq cm. Total area debrided=13.83 sq cm. Post Debridement Measurements:  Wound/Ulcer Descriptions are Pre Debridement except measurements:    Right heel  5/11/22                Wound Properties  right heel wound- full thickness-      rt medial wound                            Wound pressure                                      Dressing Status Daily                                               Dressing Changed Wednesday                                    Dressing/treatment Daily  cleanse with saline, then santyl to wound area with saline moistened 2 x 2 over, then DSD, secure with kerlix, keep heels elevated off the bed, preferably using heel protectors. When no more santyl- change to [facility] get dressing changes daily                                      Wound Cleansed Saline                                            Dressing Change Due Thursday                                      Wound Length (cm) pre-debridement 1.9                                               Wound Width (cm) pre-debridement 2.5                                               Wound depth (cm) pre debridement immeas.                                        Wound length (cm) post debridement 1.9                                                 Wound width (cm) post     debridement 2.5                                               Wound Depth (cm) post -debridement Remains immeasurable                                               Drainage Amount small                                                Drainage Description serosangunous    Odor None                                               Margins Open wound edges                         Exposed Structure None    Wendy-wound Assessment No maceration                               Pink% Wound Bed     Red% Wound Bed 20%                                     Yellow% Wound Bed     Black% Wound Bed  80% Purple% Wound Bed     Other% Wound Bed    Culture Taken None                                                           Left hip  5/11/22    Wound Properties Left hip    Wound Pressure injury- DTPI unstageable    Dressing Status Daily    Dressing Changed Wednesday    Dressing/treatment Cleanse with saline, then apply dakins moistened 2 x 2 to wound, then dsd overall and secure with mepilex border. Liquid skin prep anitha wound before dressing. Wound Cleansed Saline    Dressing Change Due Thursday    Wound Length (cm)  2.0       Wound Width (cm) 3.8   Wound Depth (cm) Immeasurable    Post-Procedure Length (cm) 2.0   Post-Procedure Width (cm) 3.8   Post-Procedure Depth (cm) Immeasurable    Wound Assessment Deep tissue pressure injury   Drainage Amount Moderate   Drainage Description Tan/gray    Odor Yes    Undermining  Circumferentially - 0.5 cm.    Margins Poorly defined- erythematous    Exposed Structure None    Anitha-wound Assessment Fragile- pink    Red% Wound Bed     Yellow% Wound Bed 100 % dense slough/eschar    Other% Wound Bed     Culture Taken None         Sacral wound 5/11/22             sacral wound  Wound Properties   Pressure stage 3 - healing Wound   Daily  Dressing Status   Wednesday  Dressing Changed   Cleanse with saline, julio c to wound bed, with saline moist dressing over, liquid skin prep anitha wound, then mepilex to secure  Dressing/treatment   Saline   Wound Cleansed   Thursday  Dressing Change Due   2.0 Wound Length (cm)    0.7 Wound Width (cm)   0.3 Wound Depth (cm)   2.0 Post-Procedure Length (cm)   0.7 Post-Procedure Width (cm)   0.3 Post-Procedure Depth (cm)   Small  Drainage Amount   Serosanguinous  Drainage Description   None  Odor   Open  Margins   None  Exposed Structure   Intact/ non erythematous  Anitha-wound Assessment     Pink% Wound Bed    Tan/yellow slough non viable tissue    100 Red% Wound Bed     Yellow% Wound Bed   No  Culture Taken        Percent of Wound/Ulcer Debrided: Right heel- 50% of wound debrided for total of 2.375 sq cm. Total Surface Area Debrided:  2.375  sq cm     Diabetic/Pressure/Non Pressure Ulcers:  Ulcer  Unstageable pressure injury to right heel               Bleeding: none     Hemostasis Achieved: To right heel- no bleeding     Procedural Pain:  0  / 10     Post Procedural Pain:  0/ 10     Response to treatment:  Well tolerated by patient. Plan:     1. Right heel wound:Cleanse area daily with saline, then cover necrotic/slough area with santyl in nickel thickness, then cover with saline moistened gauze,  then apply 4 x 4 gauze, and secure with kerlix to pad and protect. Keep both heels elevated off of the bed. DO NOT USE SILVERCELL ON THE HEEL. When no more santyl - okay to use Medihoney gel to area for daily dressing changes. 2. Stage 3 pressure injury to coccyx. Limit time positioned to area-totally avoiding positioning on either sacrum or hip if possible. Use low air loss mattress if available. Alka to wound [place]-[id number], then cover with 4 x 4 then mepilex border. Liquid skin prep to surrounding area before applying mepilex. 3. Left hip deep tissue pressure injury- To left hip, cover with Dakins 1/4 strength moistened 2 x 2, then 4 x 4 over and secure with mepilex. Liquid skin prep to anitha wound before securing dressing. 4. Generalized xerosis of bilateral lower extremities/feet:    After washing lower extremities (intact areas only) with mild soap and water, apply urea 20 cream to dry areas. Perform every other day. Please apply as close to wound on heel as able without getting into the wound. The surrounding dried skin must be washed first with mild soap and water before apply. ing the lac hydrin. Please apply every other day as ordered.     Electronically signed by MARICRUZ Wright NP on 5/11/2022 at 12:51 PM

## 2022-05-18 ENCOUNTER — OFFICE VISIT (OUTPATIENT)
Dept: WOUND CARE | Age: 67
End: 2022-05-18
Payer: COMMERCIAL

## 2022-05-18 DIAGNOSIS — L89.226 PRESSURE INJURY OF DEEP TISSUE OF LEFT HIP: Primary | ICD-10-CM

## 2022-05-18 DIAGNOSIS — Z74.09: ICD-10-CM

## 2022-05-18 DIAGNOSIS — L89.610 PRESSURE INJURY OF RIGHT HEEL, UNSTAGEABLE (HCC): ICD-10-CM

## 2022-05-18 DIAGNOSIS — L89.150 PRESSURE INJURY OF COCCYGEAL REGION, UNSTAGEABLE (HCC): ICD-10-CM

## 2022-05-18 PROCEDURE — 99308 SBSQ NF CARE LOW MDM 20: CPT | Performed by: NURSE PRACTITIONER

## 2022-05-18 PROCEDURE — 11042 DBRDMT SUBQ TIS 1ST 20SQCM/<: CPT | Performed by: NURSE PRACTITIONER

## 2022-05-18 NOTE — PROGRESS NOTES
[facility] [place]                                                       Debridement Note      Adrián Beth  MEDICAL RECORD NUMBER:  [id number]  AGE: 61 y.o. GENDER: male  : 1955  EPISODE DATE:  2022    Subjective:     Chief Complaint   Patient presents with    Wound Check         HISTORY of PRESENT ILLNESS HPI  Troy Lema a 61 y. o. male who presents today for wound/ulcer evaluation. History of Wound Context: Patient received from local [facility] - s/p CVA, with subarachnoid hemorrhage and respiratory failure (has tracheostomy), COPD per history.  Patient has DMII, with gastrostomy tube for nutrition.  No verbalization, however, does appear more alert this week,occassionally reaches for linens during care. Wound to right heel is improved in depth and overall dimension however has margin of slough around the necrotic/eschar area/softening, will debride. Deep tissue pressure injury to sacrum also improved, red/granular with less depth, less friable then previously. Left hip area of deep tissue pressure injury (DTPI) - now continuing to evolve, but remains unstageable- will debride today. Right heel is improved with only small area of open wound remaining-red/granular, with some black stable eschar near open wound. Wound/Ulcer Pain Timing/Severity: unable to accurately evaluate due to non-verbal status   Quality of pain: none noted per faces scale.    Severity: 0 / 10   Modifying Factors: None  Associated Signs/Symptoms: none     Ulcer Identification:  Ulcer Type: pressure  Contributing Factors: diabetes, chronic pressure, decreased mobility, shear force, decreased tissue oxygenation, incontinence of stool and incontinence of urine     Wound: N/A    PAST MEDICAL HISTORY        Diagnosis Date    Abdominal pain     Asthma     Bloating symptom     COPD (chronic obstructive pulmonary disease) (HCC)     COPD (chronic obstructive pulmonary disease) (HCC)     Depression     Depression     Diarrhea     Drug-seeking behavior 02/03/2020    Emphysema/COPD ([address]     History of rectal bleeding     Hypercholesteremia     past trx > 5 yrs    Hypercholesteremia     Hypercholesteremia     Hypertension     meds > 3 yrs    Lung disease     RANDI on CPAP     patient relates he doesn't use cpap at this time    Osteoarthritis     left hip    Osteoarthritis     Osteoarthritis of left hip     Physiological consequence of immobility 3/9/2022    Pituitary macroadenoma ([address]     Sleep apnea        PAST SURGICAL HISTORY    Past Surgical History:   Procedure Laterality Date    COLONOSCOPY  5/2/14    JARMOSMARLOK    COLONOSCOPY N/A 12/12/2019    COLONOSCOPY DIAGNOSTIC performed by Kate Hanna MD at [address], COLON, DIAGNOSTIC      EYE SURGERY Bilateral     cataracts   [address] ?     index finger laceration repair    HERNIA REPAIR  2004    repair [facility]     JOINT REPLACEMENT Left     left hip    [place] COLON CA SCRN NOT HI RSK IND N/A 5/17/2017    COLONOSCOPY performed by Adri Torres MD at [facility] Left 4/22/2019    LEFT HIP TOTAL HIP ARTHROPLASTY, LATERAL DECUB, GRACIE performed by Arie Dove MD at [facility]  12/12/2019    EGD DIAGNOSTIC ONLY performed by Kate Hanna MD at [place] [address]    Family History   Problem Relation Age of Onset    Colon Cancer Mother     Cancer Mother     Arthritis Mother     High Blood Pressure Father     Heart Disease Father     Diabetes Sister     No Known Problems Brother     No Known Problems Brother     No Known Problems Brother     Other Brother         Sepsis    Other Brother         Killed    No Known Problems Sister     No Known Problems Sister     No Known Problems Son        SOCIAL HISTORY    Social History     Tobacco Use    Smoking status: Former Smoker     Packs/day: 1.50     Years: 46.00     Pack years: 69.00     Types: Cigarettes     Quit date: 3/20/2019     Years since quitting: 3.1    Smokeless tobacco: Never Used   Vaping Use    Vaping Use: Unknown   Substance Use Topics    Alcohol use: Yes    Drug use: No       ALLERGIES    No Known Allergies    MEDICATIONS    Current Outpatient Medications on File Prior to Visit   Medication Sig Dispense Refill    lidocaine (LMX) 4 % cream Apply a half dollar sized amount to intact skin topically up to twice daily as needed for pain 1 Tube 1    paliperidone (INVEGA) 6 MG extended release tablet Take 1 tablet by mouth daily 30 tablet 1    cabergoline (DOSTINEX) 0.5 MG tablet Take 1 tablet by mouth Twice a Week ON Monday and Thursday 8 tablet 3    furosemide (LASIX) 40 MG tablet Take 40 mg by mouth daily      meloxicam (MOBIC) 15 MG tablet Take 15 mg by mouth daily      potassium chloride (MICRO-K) 10 MEQ extended release capsule Take 10 mEq by mouth daily      simvastatin (ZOCOR) 40 MG tablet Take 40 mg by mouth nightly      fenofibrate 160 MG tablet Take 150 mg by mouth daily      aspirin 81 MG EC tablet Take 1 tablet by mouth 2 times daily 60 tablet 0    lisinopril (PRINIVIL;ZESTRIL) 20 MG tablet Take 2 tablets by mouth daily 30 tablet 3    Respiratory Therapy Supplies SANDRA Full face CPAP mask and supplies 1 Device 0    CPAP Machine MISC by Does not apply route New CPAP with 6 cm 1 each 0    albuterol (PROVENTIL) (2.5 MG/3ML) 0.083% nebulizer solution Take 3 mLs by nebulization every 6 hours as needed for Wheezing 120 each 5    STIOLTO RESPIMAT 2.5-2.5 MCG/ACT AERS Inhale 2 puff in AM 1 Inhaler 5     No current facility-administered medications on file prior to visit. REVIEW OF SYSTEMS    Pertinent items are noted in HPI. Objective: There were no vitals taken for this visit.     Wt Readings from Last 3 Encounters:   02/13/22 180 lb (81.6 kg)   08/06/21 234 lb (106.1 kg)   06/15/20 234 lb (106.1 kg)       PHYSICAL EXAM    Constitutional: Well nourished and well developed. Appears neat and clean. Patient is somewhat more alert this week,  in no apparent distress- no verbalization. Frequently reaches for linens with right hand/arm. Respiratory:  Respiratory effort is easy and symmetric bilaterally. Rate is normal at rest and on O2 per trach cuff. Vascular:  Pedal Pulses palpable and audible with doppler. Capillary refill is <3 sec to digits bilateral.  Extremities negative for pitting edema. Neurological:  Unable to determine d/t non-verbal status, and decreased level or cognition-but does appear to be making an effort to communicate with caregivers this week-reaches for areas following exam.    Dermatological:  Wound description noted in wound assessment. The wound to the right heel is slightly improved, remaining necrotic , red/granular with central area of soft slough/eschar. The sacral wound-is clean, red, granular with less depth-much improved. No anitha wound maceration. Left hip remains covered with malodorous yellow slough. Much drier today, dressing/packing removed was dry, will review dressing change technique with nursing and change to daily anasept gel impregnated packing. Psychiatric: Unable to accurately determine due to lack of verbalization. Assessment:      Patient seen this visit for: evaluation of need for debridement of the sacral, left hip, and right heel wounds. Problems treated this admission:   1. Right heel wound:   Cleanse daily with saline, then apply anasept gel to wound with dsd over, and liquid skin prep the anitha wound. DSD over. DO NOT USE SILVERCELL ON THE HEEL. 2. Stage 3 pressure injury to coccyx. Limit time positioned to area-totally avoiding positioning on either sacrum or hip if possible. Use low air loss mattress if available.    3. Left hip deep tissue pressure injury- To left hip, gently pack with anasept gel impregnated 4 x 4 taking care to tuck under areas of undermining,  then place saline moistened dressing over the packing, then dry 4 x 4, followed with mepilex. Liquid skin prep to anitha wound before securing dressing. To sacrum- cleanse wound with saline, then apply anasept gel over the wound then cover with 4 x 4 gauze, protect anitha wound with liquid skin prep then 4 x 4s over, and secure with mepilex. 4. Generalized xerosis:   ck.  After washing lower extremities (intact areas only) with mild soap and water (or saline) , apply urea 20 cream to dry areas. Perform every other day. 5. History of DM2, on tube feedings:   Insufficient nutrition for healing-monitor for additional support needs and notify dietician if noted. Monitor blood sugars with goal being less then 150, to ensure ability for wounds to heal.      Procedure Note  Indications:  Based on my examination of this patient's wound(s)/ulcer(s) today, debridement is required to promote healing and evaluate the wound base. Performed by: MARICRUZ Wilson - ELLA    Consent obtained:  Yes    Time out taken:  Yes    Pain Control: none required. No discomfort observed during debridement using faces scale. Debridement:Excisional Debridement    Using a curette, scissors and forceps the wound(s) to the right heel was sharply debrided down through and including the removal of epidermis, dermis and subcutaneous tissue. Devitalized Tissue Debrided:  fibrin, biofilm, slough and necrotic/eschar    Pre Debridement Measurements:  Are located in the Wound/Ulcer Documentation Flow Sheet    Wound/Ulcer #: left hip debrided 100% for 7.5  sq cm   Right heel debrided of 100% for  4.75 sq cm. Total area debrided=12.25 sq cm.      Post Debridement Measurements:  Wound/Ulcer Descriptions are Pre Debridement except measurements:    Right heel  5/18/22            Wound Properties  right heel wound- full thickness-                               Wound pressure                                      Dressing Status Daily                                               Dressing Changed Wednesday                                    Dressing/treatment Daily  cleanse with saline, then Anasept to wound area with 4 x 4 over, then DSD, secure with kerlix, keep heels elevated off the bed, preferably using heel protectors. Wound Cleansed Saline                                            Dressing Change Due Thursday                                      Wound Length (cm) pre-debridement 1.9                                               Wound Width (cm) pre-debridement 2.5                                               Wound depth (cm) pre debridement immeas.                                        Wound length (cm) post debridement 1.9                                                 Wound width (cm) post     debridement 2.5                                               Wound Depth (cm) post -debridement Remains immeasurable                                               Drainage Amount small                                                Drainage Description serosangunous    Odor None                                               Margins Open wound edges                         Exposed Structure None    Wendy-wound Assessment No maceration                               Pink% Wound Bed     Red% Wound Bed 240%                                     Yellow% Wound Bed 50%   Black% Wound Bed 10%   Soft unstable eschar in center of wound bed-                                              Purple% Wound Bed     Other% Wound Bed    Culture Taken None                                                           Left hip  5/18/22  Picture did not save in Epic   Above picture from 5/11/22    Wound Properties Left hip    Wound Pressure injury- DTPI unstageable    Dressing Status Daily    Dressing Changed Wednesday    Dressing/treatment Cleanse with saline, then apply dakins moistened 2 x 2 to wound, then dsd overall and secure with mepilex border. Liquid skin prep anitha wound before dressing. Wound Cleansed Saline    Dressing Change Due Thursday    Wound Length (cm)  2.5       Wound Width (cm) 3.0   Wound Depth (cm) Immeasurable  (wound edge to base of slough pre- 1.3)   Post-Procedure Length (cm) 2.5   Post-Procedure Width (cm) 3.0   Post-Procedure Depth (cm) Immeasurable (wound edge to base of slough post 1.5)   Wound Assessment Deep tissue pressure injury   Drainage Amount Moderate   Drainage Description Tan/gray    Odor Yes    Undermining  Circumferentially - 0.5 cm. Margins Poorly defined- erythematous    Exposed Structure None    Anitha-wound Assessment Fragile- pink    Red% Wound Bed     Yellow% Wound Bed 100 % moist slough/eschar    Other% Wound Bed     Culture Taken None         Sacral wound 5/18/22  Picture did not save in Epic        sacral wound  Wound Properties   Pressure stage 3 - healing Wound   Daily  Dressing Status   Wednesday  Dressing Changed   Cleanse with saline, anasept to wound bed, with DSD over, liquid skin prep anitha wound, then mepilex to secure  Dressing/treatment   Saline   Wound Cleansed   Thursday  Dressing Change Due   1.0 Wound Length (cm)    1.0 Wound Width (cm)   0.5 Wound Depth (cm)   n/a Post-Procedure Length (cm)    Post-Procedure Width (cm)    Post-Procedure Depth (cm)   Small  Drainage Amount   Serosanguinous  Drainage Description   None  Odor   Open  Margins   None  Exposed Structure   Intact/ non erythematous  Anitha-wound Assessment     Pink% Wound Bed    Tan/yellow slough non viable tissue    100 Red% Wound Bed     Yellow% Wound Bed   No  Culture Taken        Percent of Wound/Ulcer Debrided:   Right heel- 100% of wound debrided for total of 4.75 sq cm.    Left hip- 100% of wound debrided for total of 7.5 sq cm                                                        Total Surface Area Debrided:  12.25  sq cm     Diabetic/Pressure/Non Pressure Ulcers:  Ulcer  Unstageable pressure injury to right

## 2022-05-25 ENCOUNTER — OFFICE VISIT (OUTPATIENT)
Dept: WOUND CARE | Age: 67
End: 2022-05-25
Payer: COMMERCIAL

## 2022-05-25 DIAGNOSIS — Z74.09: ICD-10-CM

## 2022-05-25 DIAGNOSIS — L89.226 PRESSURE INJURY OF DEEP TISSUE OF LEFT HIP: Primary | ICD-10-CM

## 2022-05-25 DIAGNOSIS — L89.150 PRESSURE INJURY OF COCCYGEAL REGION, UNSTAGEABLE (HCC): ICD-10-CM

## 2022-05-25 DIAGNOSIS — L89.610 PRESSURE INJURY OF RIGHT HEEL, UNSTAGEABLE (HCC): ICD-10-CM

## 2022-05-25 PROCEDURE — 11042 DBRDMT SUBQ TIS 1ST 20SQCM/<: CPT | Performed by: NURSE PRACTITIONER

## 2022-05-25 PROCEDURE — 99308 SBSQ NF CARE LOW MDM 20: CPT | Performed by: NURSE PRACTITIONER

## 2022-05-25 NOTE — PROGRESS NOTES
[facility] [place]                                                       Debridement Note      Anju Jessika  MEDICAL RECORD NUMBER:  [id number]  AGE: 61 y.o. GENDER: male  : 1955  EPISODE DATE:  2022    Subjective:     Chief Complaint   Patient presents with    Wound Check         HISTORY of PRESENT ILLNESS HPI  Debi Simms a 61 y. o. male who presents today for wound/ulcer evaluation. History of Wound Context: Patient received from local [facility] - s/p CVA, with subarachnoid hemorrhage and respiratory failure (has tracheostomy), COPD per history.  Patient has DMII, with gastrostomy tube for nutrition.  No verbalization, however, does appear more alert this week,occassionally reaches for linens during care. Wound to right heel is improved in depth and overall dimension however has margin of slough around the necrotic/eschar area/softening, will debride. Deep tissue pressure injury to sacrum also improved, red/granular with less depth, less friable then previously. Left hip area of deep tissue pressure injury (DTPI) - now continuing to evolve, but remains unstageable- will debride today. Right heel is improved with only small area of open wound remaining-red/granular, with some black stable eschar near open wound. Wound/Ulcer Pain Timing/Severity: unable to accurately evaluate due to non-verbal status   Quality of pain: none noted per faces scale.    Severity: 0 / 10   Modifying Factors: None  Associated Signs/Symptoms: none     Ulcer Identification:  Ulcer Type: pressure  Contributing Factors: diabetes, chronic pressure, decreased mobility, shear force, decreased tissue oxygenation, incontinence of stool and incontinence of urine     Wound: N/A    PAST MEDICAL HISTORY        Diagnosis Date    Abdominal pain     Asthma     Bloating symptom     COPD (chronic obstructive pulmonary disease) (HCC)     COPD (chronic obstructive pulmonary disease) (HCC)     Depression     Depression     Diarrhea     Drug-seeking behavior 02/03/2020    Emphysema/COPD ([address]     History of rectal bleeding     Hypercholesteremia     past trx > 5 yrs    Hypercholesteremia     Hypercholesteremia     Hypertension     meds > 3 yrs    Lung disease     RANDI on CPAP     patient relates he doesn't use cpap at this time    Osteoarthritis     left hip    Osteoarthritis     Osteoarthritis of left hip     Physiological consequence of immobility 3/9/2022    Pituitary macroadenoma ([address]     Sleep apnea        PAST SURGICAL HISTORY    Past Surgical History:   Procedure Laterality Date    COLONOSCOPY  5/2/14    JARMOSMARLOK    COLONOSCOPY N/A 12/12/2019    COLONOSCOPY DIAGNOSTIC performed by Nicko Shaffer MD at [address], COLON, DIAGNOSTIC      EYE SURGERY Bilateral     cataracts   [address] ?     index finger laceration repair    HERNIA REPAIR  2004    repair [facility]     JOINT REPLACEMENT Left     left hip    TX COLON CA SCRN NOT HI RSK IND N/A 5/17/2017    COLONOSCOPY performed by Maria Elena Scanlon MD at [address] [facility] Left 4/22/2019    LEFT HIP TOTAL HIP ARTHROPLASTY, LATERAL DECUB, GRACIE performed by Lonnie Parry MD at [address]  12/12/2019    EGD DIAGNOSTIC ONLY performed by Nicko Shaffer MD at [facility]    Family History   Problem Relation Age of Onset    Colon Cancer Mother     Cancer Mother     Arthritis Mother     High Blood Pressure Father     Heart Disease Father     Diabetes Sister     No Known Problems Brother     No Known Problems Brother     No Known Problems Brother     Other Brother         Sepsis    Other Brother         Killed    No Known Problems Sister     No Known Problems Sister     No Known Problems Son        SOCIAL HISTORY    Social History     Tobacco Use    Smoking status: Former Smoker     Packs/day: 1.50     Years: 46.00     Pack years: 69.00     Types: Cigarettes     Quit date: 3/20/2019     Years since quitting: 3.1    Smokeless tobacco: Never Used   Vaping Use    Vaping Use: Unknown   Substance Use Topics    Alcohol use: Yes    Drug use: No       ALLERGIES    No Known Allergies    MEDICATIONS    Current Outpatient Medications on File Prior to Visit   Medication Sig Dispense Refill    lidocaine (LMX) 4 % cream Apply a half dollar sized amount to intact skin topically up to twice daily as needed for pain 1 Tube 1    paliperidone (INVEGA) 6 MG extended release tablet Take 1 tablet by mouth daily 30 tablet 1    cabergoline (DOSTINEX) 0.5 MG tablet Take 1 tablet by mouth Twice a Week ON Monday and Thursday 8 tablet 3    furosemide (LASIX) 40 MG tablet Take 40 mg by mouth daily      meloxicam (MOBIC) 15 MG tablet Take 15 mg by mouth daily      potassium chloride (MICRO-K) 10 MEQ extended release capsule Take 10 mEq by mouth daily      simvastatin (ZOCOR) 40 MG tablet Take 40 mg by mouth nightly      fenofibrate 160 MG tablet Take 150 mg by mouth daily      aspirin 81 MG EC tablet Take 1 tablet by mouth 2 times daily 60 tablet 0    lisinopril (PRINIVIL;ZESTRIL) 20 MG tablet Take 2 tablets by mouth daily 30 tablet 3    Respiratory Therapy Supplies SANDRA Full face CPAP mask and supplies 1 Device 0    CPAP Machine MISC by Does not apply route New CPAP with 6 cm 1 each 0    albuterol (PROVENTIL) (2.5 MG/3ML) 0.083% nebulizer solution Take 3 mLs by nebulization every 6 hours as needed for Wheezing 120 each 5    STIOLTO RESPIMAT 2.5-2.5 MCG/ACT AERS Inhale 2 puff in AM 1 Inhaler 5     No current facility-administered medications on file prior to visit. REVIEW OF SYSTEMS    Pertinent items are noted in HPI. Objective: There were no vitals taken for this visit.     Wt Readings from Last 3 Encounters:   02/13/22 180 lb (81.6 kg)   08/06/21 234 lb (106.1 kg)   06/15/20 234 lb (106.1 kg)       PHYSICAL EXAM    Constitutional: Well nourished and well developed. Appears neat and clean. Patient is somewhat more alert this week,  in no apparent distress- no verbalization. Frequently reaches for linens with right hand/arm. Respiratory:  Respiratory effort is easy and symmetric bilaterally. Rate is normal at rest and on O2 per trach cuff. Vascular:  Pedal Pulses palpable and audible with doppler. Capillary refill is <3 sec to digits bilateral.  Extremities negative for pitting edema. Neurological:  Unable to determine d/t non-verbal status, and decreased level or cognition-but does appear to be making an effort to communicate with caregivers this week-reaches for areas following exam.    Dermatological:  Wound description noted in wound assessment. The wound to the right heel is slightly improved, remaining necrotic , red/granular with central area of soft slough/eschar. The sacral wound-is clean, red, granular with less depth-much improved. No anitha wound maceration. Left hip remains covered with malodorous yellow slough. Much drier today, dressing/packing removed was dry, will review dressing change technique with nursing and change to daily anasept gel impregnated packing. Psychiatric: Unable to accurately determine due to lack of verbalization. Assessment:      Patient seen this visit for: evaluation of need for debridement of the sacral, left hip, and right heel wounds. Problems treated this admission:   1. Right heel wound:   Cleanse daily with saline, then apply anasept gel to wound with dsd over, and liquid skin prep the anitha wound. DSD over. DO NOT USE SILVERCELL ON THE HEEL. 2. Stage 3 pressure injury to coccyx. Limit time positioned to area-totally avoiding positioning on either sacrum or hip if possible. Use low air loss mattress if available.    3. Left hip deep tissue pressure injury- To left hip, gently pack with anasept gel impregnated 4 x 4 taking care to tuck under areas of undermining,  then place saline moistened dressing over the packing, then dry 4 x 4, followed with mepilex. Liquid skin prep to anitha wound before securing dressing. To sacrum- cleanse wound with saline, then apply anasept gel over the wound then cover with 4 x 4 gauze, protect anitha wound with liquid skin prep then 4 x 4s over, and secure with mepilex. 4. Generalized xerosis:   ck.  After washing lower extremities (intact areas only) with mild soap and water (or saline) , apply urea 20 cream to dry areas. Perform every other day. 5. History of DM2, on tube feedings:   Insufficient nutrition for healing-monitor for additional support needs and notify dietician if noted. Monitor blood sugars with goal being less then 150, to ensure ability for wounds to heal.      Procedure Note  Indications:  Based on my examination of this patient's wound(s)/ulcer(s) today, debridement is required to promote healing and evaluate the wound base. Performed by: MARICRUZ Rojas - NP    Consent obtained:  Yes    Time out taken:  Yes    Pain Control: none required. No discomfort observed during debridement using faces scale. Debridement:Excisional Debridement    Using a curette, scissors and forceps the wound(s) to the right heel was sharply debrided down through and including the removal of epidermis, dermis and subcutaneous tissue. Devitalized Tissue Debrided:  fibrin, biofilm, slough and necrotic/eschar     The left hip was debrided using a currette, to remove fibrotic slough/soft eschar overlying the wound bed. Only able to perform selective debridement- not to viable tissue. Pre Debridement Measurements:  Are located in the [place]  Documentation Flow Sheet    Wound/Ulcer #: left hip debrided 100% for 7.5  sq cm   Right heel debrided of 100% for  4.75 sq cm. Total area debrided=12.25 sq cm.      Post Debridement Measurements:  Wound/Ulcer Descriptions are Pre Debridement except              Left hip  5/25/22  Above picture from 5/25/22    Wound Properties Left hip    Wound Pressure injury- DTPI unstageable    Dressing Status Daily    Dressing Changed Wednesday    Dressing/treatment Cleanse with saline, then apply anasept ag+ (hydrogel) directly to wound bed with hydrogel impregnated 4 x 4 coarse gauze over the gel, then cover with dsd. . Liquid skin prep anitha wound before dressing. Change daily. When able to obtain Santyl, start daily santyl dressing changes with moist gauze over. Wound Cleansed Saline    Dressing Change Due Thursday    Wound Length (cm)  3.0       Wound Width (cm) 3.0   Wound Depth (cm) Immeasurable  (wound edge to base of slough pre- 1.3)   Post-Procedure Length (cm) 3.0   Post-Procedure Width (cm) 3.0   Post-Procedure Depth (cm) Immeasurable (wound edge to base of slough post 1.5)   Wound Assessment Deep tissue pressure injury   Drainage Amount Moderate   Drainage Description Tan/gray    Odor Yes    Undermining  Circumferentially - 0.7 cm.    Margins Poorly defined- erythematous    Exposed Structure None    Anitha-wound Assessment Fragile- pink    Red% Wound Bed     Yellow% Wound Bed 100 % dried/fibrotic slough/eschar    Other% Wound Bed     Culture Taken None         Sacral wound 5/25/22        sacral wound  Wound Properties   Pressure stage 3 - healing Wound   Daily  Dressing Status   Wednesday  Dressing Changed   Cleanse with saline, anasept to wound bed, with DSD over, liquid skin prep anitha wound, then mepilex to secure  Dressing/treatment   Saline   Wound Cleansed   Thursday  Dressing Change Due   0.5 Wound Length (cm)    0.5 Wound Width (cm)   0.2 Wound Depth (cm)   n/a Post-Procedure Length (cm)    Post-Procedure Width (cm)    Post-Procedure Depth (cm)   Small  Drainage Amount   Serosanguinous  Drainage Description   None  Odor   Open  Margins   None  Exposed Structure   Intact/ non erythematous  Anitha-wound Assessment     Pink% Wound Bed    Tan/yellow slough non viable tissue    100 Red% Wound Bed     Yellow% Wound Bed   No  Culture Taken        Percent of Wound/Ulcer Debrided:   Right heel- 100% of wound debrided for total of 3.3 sq cm. Left hip- 100% of wound debrided for total of 9.0 sq cm                                                        Total Surface Area Debrided:  12.3  sq cm     Diabetic/Pressure/Non Pressure Ulcers:  Ulcer  Unstageable pressure injury to right heel   Unstageable DTPI to left hip               Bleeding: Left heel- no bleeding. Left hip- no bleeding    Hemostasis Achieved:      Procedural Pain:  0  / 10     Post Procedural Pain:  0/ 10     Response to treatment:  Well tolerated by patient. Plan:     1. Right heel wound:Cleanse area daily with saline, then cover necrotic/slough area with Anasept ag+ (hydrogel) in nickel thickness, then cover with dry gauze,,  then apply 4 x 4 gauze, and secure with kerlix to pad and protect. Keep both heels elevated off of the bed. DO NOT USE SILVERCELL ON THE HEEL. Try to obtain santyl and use on heel and left hip daily. 2. Stage 3 pressure injury to coccyx. Limit time positioned to area-totally avoiding positioning on either sacrum or hip if possible. Use low air loss mattress if available. Anasept ag+ (hydrogel) daily to wound area with 4 x 4 over, then cover with mepilex border. Liquid skin prep to surrounding area before applying mepilex. 3. Left hip deep tissue pressure injury- To left hip, cleanse daily with saline, wipe with coarse gauze to remove any loose slough possible, then insert hydrogel directly into wound bed followed by 4 x 4 hydrogel impregnated gauze (Anasept gel) loosely into wound using care to insert to all areas of undermining, then cover with slightly saline moistened gauze, with DSD over. Liquid skin prep to anitha wound before securing dressing. Okay to use mepilex. Try to obtain Santyl for daily dressing change - if obtained d/c the anasept.      4. Generalized xerosis of bilateral lower extremities/feet:    After washing lower extremities (intact areas only) with mild soap and water (or saline), apply urea 20 cream to dry areas. Perform every other day. Please apply as close to wound on heel as able without getting into the wound. The surrounding dried skin must be washed first with mild soap and water before applying the lac hydrin. Please apply every other day as ordered.     Electronically signed by MARICRUZ Montgomery NP on 5/25/2022 at 2:16 PM

## 2022-06-01 ENCOUNTER — OFFICE VISIT (OUTPATIENT)
Dept: WOUND CARE | Age: 67
End: 2022-06-01
Payer: COMMERCIAL

## 2022-06-01 DIAGNOSIS — L89.150 PRESSURE INJURY OF COCCYGEAL REGION, UNSTAGEABLE (HCC): Primary | ICD-10-CM

## 2022-06-01 DIAGNOSIS — L89.226 PRESSURE INJURY OF DEEP TISSUE OF LEFT HIP: ICD-10-CM

## 2022-06-01 DIAGNOSIS — L89.896 PRESSURE INJURY OF DEEP TISSUE OF LEFT FOOT: ICD-10-CM

## 2022-06-01 DIAGNOSIS — Z74.09: ICD-10-CM

## 2022-06-01 DIAGNOSIS — L89.610 PRESSURE INJURY OF RIGHT HEEL, UNSTAGEABLE (HCC): ICD-10-CM

## 2022-06-01 PROCEDURE — 97597 DBRDMT OPN WND 1ST 20 CM/<: CPT | Performed by: NURSE PRACTITIONER

## 2022-06-01 PROCEDURE — 1123F ACP DISCUSS/DSCN MKR DOCD: CPT | Performed by: NURSE PRACTITIONER

## 2022-06-01 PROCEDURE — 99308 SBSQ NF CARE LOW MDM 20: CPT | Performed by: NURSE PRACTITIONER

## 2022-06-01 NOTE — PROGRESS NOTES
[facility] [place]                                                       Debridement Note      Judith Ferreira  MEDICAL RECORD NUMBER:  [id number]  AGE: 74 y.o. GENDER: male  : 1955  EPISODE DATE:  2022    Subjective:     Chief Complaint   Patient presents with    Wound Check         HISTORY of PRESENT ILLNESS HPI  Tari Hatchet a 61 y. o. male who presents today for wound/ulcer evaluation. History of Wound Context: Patient received from local [facility] - s/p CVA, with subarachnoid hemorrhage and respiratory failure (has tracheostomy), COPD per history.  Patient has DMII, with gastrostomy tube for nutrition.  No verbalization, however, does appear alert this week,occassionally reaches for linens during care. Wound to right heel is significantly improved in depth and overall dimension however has margin of slough around the necrotic/eschar area/softening, will need to serially debride. Pressure injury to sacrum also improved, red/granular with less depth, less friable then previously. Left hip area of deep tissue pressure injury (DTPI) - remains unstageable, dry with significant depth will debride today. Right heel is improved with only small area of necrotic tissue remaining-surrounded by red/granular tissue. Wound/Ulcer Pain Timing/Severity: unable to accurately evaluate due to non-verbal status   Quality of pain: none noted per faces scale.    Severity: 0 / 10   Modifying Factors: None  Associated Signs/Symptoms: none     Ulcer Identification:  Ulcer Type: pressure  Contributing Factors: diabetes, chronic pressure, decreased mobility, shear force, decreased tissue oxygenation, incontinence of stool and incontinence of urine     Wound: N/A    PAST MEDICAL HISTORY        Diagnosis Date    Abdominal pain     Asthma     Bloating symptom     COPD (chronic obstructive pulmonary disease) (HCC)     COPD (chronic obstructive pulmonary disease) (HCC)     Depression     Depression     Diarrhea     Drug-seeking behavior 02/03/2020    Emphysema/COPD ([address]     History of rectal bleeding     Hypercholesteremia     past trx > 5 yrs    Hypercholesteremia     Hypercholesteremia     Hypertension     meds > 3 yrs    Lung disease     RANDI on CPAP     patient relates he doesn't use cpap at this time    Osteoarthritis     left hip    Osteoarthritis     Osteoarthritis of left hip     Physiological consequence of immobility 3/9/2022    Pituitary macroadenoma ([address]     Sleep apnea        PAST SURGICAL HISTORY    Past Surgical History:   Procedure Laterality Date    COLONOSCOPY  5/2/14    PRATEEKK    COLONOSCOPY N/A 12/12/2019    COLONOSCOPY DIAGNOSTIC performed by Alger Soulier, MD at [address], COLON, DIAGNOSTIC      EYE SURGERY Bilateral     cataracts   [address] ?     index finger laceration repair    HERNIA REPAIR  2004    repair [facility]     JOINT REPLACEMENT Left     left hip    NM COLON CA SCRN NOT HI [facility]SK IND N/A 5/17/2017    COLONOSCOPY performed by Sindy Catalan MD at [facility] Left 4/22/2019    LEFT HIP TOTAL HIP ARTHROPLASTY, LATERAL DECUB, GRACIE performed by Sandra Rooney MD at [facility]  12/12/2019    EGD DIAGNOSTIC ONLY performed by Alger Soulier, MD at [facility]    Family History   Problem Relation Age of Onset    Colon Cancer Mother     Cancer Mother     Arthritis Mother     High Blood Pressure Father     Heart Disease Father     Diabetes Sister     No Known Problems Brother     No Known Problems Brother     No Known Problems Brother     Other Brother         Sepsis    Other Brother         Killed    No Known Problems Sister     No Known Problems Sister     No Known Problems Son        SOCIAL HISTORY    Social History     Tobacco Use    Smoking status: Former Smoker     Packs/day: 1.50     Years: 46.00     Pack years: 69.00 Types: Cigarettes     Quit date: 3/20/2019     Years since quitting: 3.2    Smokeless tobacco: Never Used   Vaping Use    Vaping Use: Unknown   Substance Use Topics    Alcohol use: Yes    Drug use: No       ALLERGIES    No Known Allergies    MEDICATIONS    Current Outpatient Medications on File Prior to Visit   Medication Sig Dispense Refill    lidocaine (LMX) 4 % cream Apply a half dollar sized amount to intact skin topically up to twice daily as needed for pain 1 Tube 1    paliperidone (INVEGA) 6 MG extended release tablet Take 1 tablet by mouth daily 30 tablet 1    cabergoline (DOSTINEX) 0.5 MG tablet Take 1 tablet by mouth Twice a Week ON Monday and Thursday 8 tablet 3    furosemide (LASIX) 40 MG tablet Take 40 mg by mouth daily      meloxicam (MOBIC) 15 MG tablet Take 15 mg by mouth daily      potassium chloride (MICRO-K) 10 MEQ extended release capsule Take 10 mEq by mouth daily      simvastatin (ZOCOR) 40 MG tablet Take 40 mg by mouth nightly      fenofibrate 160 MG tablet Take 150 mg by mouth daily      aspirin 81 MG EC tablet Take 1 tablet by mouth 2 times daily 60 tablet 0    lisinopril (PRINIVIL;ZESTRIL) 20 MG tablet Take 2 tablets by mouth daily 30 tablet 3    Respiratory Therapy Supplies SANDRA Full face CPAP mask and supplies 1 Device 0    CPAP Machine MISC by Does not apply route New CPAP with 6 cm 1 each 0    albuterol (PROVENTIL) (2.5 MG/3ML) 0.083% nebulizer solution Take 3 mLs by nebulization every 6 hours as needed for Wheezing 120 each 5    STIOLTO RESPIMAT 2.5-2.5 MCG/ACT AERS Inhale 2 puff in AM 1 Inhaler 5     No current facility-administered medications on file prior to visit. REVIEW OF SYSTEMS    Pertinent items are noted in HPI. Objective: There were no vitals taken for this visit.     Wt Readings from Last 3 Encounters:   02/13/22 180 lb (81.6 kg)   08/06/21 234 lb (106.1 kg)   06/15/20 234 lb (106.1 kg)       PHYSICAL EXAM    Constitutional:   Well nourished and well developed. Appears neat and clean. Patient is somewhat more alert this week,  in no apparent distress- no verbalization. Frequently reaches for linens with right hand/arm. Respiratory:  Respiratory effort is easy and symmetric bilaterally. Rate is normal at rest and on O2 per trach cuff. Vascular:  Pedal Pulses palpable and audible with doppler. Capillary refill is <3 sec to digits bilateral.  Extremities negative for pitting edema. Neurological:  Unable to determine d/t non-verbal status, and decreased level or cognition-but does appear to be making an effort to communicate with caregivers this week-reaches for areas following exam.    Dermatological:  Wound description noted in wound assessment. The wound to the right heel is improved, remaining necrotic area smaller. The sacral wound-is clean, red, granular with less depth-much improved, no anitha wound maceration. Left hip remains deep and still unstageable, covered with malodorous yellow slough-will need debrided. Hip wound again very dry today, dressing/packing removed was dry, will continue daily anasept gel impregnated packing. Psychiatric: Unable to accurately determine due to lack of verbalization. Assessment:      Patient seen this visit for: evaluation of need for debridement of the sacral, left hip, and right heel wounds. Problems treated this admission:   1. Right heel wound:   Cleanse daily with saline, then apply medihoney to wound with dsd over, and liquid skin prep the anitha wound. DSD over. DO NOT USE SILVERCELL ON THE HEEL. 2. Stage 3 pressure injury to coccyx. Limit time positioned to area-totally avoiding positioning on either sacrum or hip if possible. Use low air loss mattress if available. To sacrum- cleanse wound with saline, then apply julio c over the wound, moisten with few drops of saline, then cover with 4 x 4 gauze, and secure with mepilex.    3. Left hip deep tissue pressure injury- To left hip, gently pack with generous amount of anasept gel impregnated 4 x 4 taking care to tuck under areas of undermining,  then place saline moistened dressing over the packing, then dry 4 x 4, followed with mepilex. Liquid skin prep to anitha wound before securing dressing. 4. Generalized xerosis:   ck.  After washing lower extremities (intact areas only) with mild soap and water (or saline) , apply urea 20 cream to dry areas. Perform every other day. 5. History of DM2, on tube feedings:   Insufficient nutrition for healing-monitor for additional support needs and notify dietician if noted. Monitor blood sugars with goal being less then 150, to ensure ability for wounds to heal.      Procedure Note  Indications:  Based on my examination of this patient's wound(s)/ulcer(s) today, debridement is required to promote healing and evaluate the wound base. Performed by: MARICRUZ Willingham NP    Consent obtained:  Yes    Time out taken:  Yes    Pain Control: none required. No discomfort observed during debridement using faces scale. Debridement:Excisional Debridement    Using a curette, scissors and forceps the wound(s) to the right heel was debrided to allow removal of overlying soft eschar and slough. The wound was debrided to a layer of additional fibrotic slough. Devitalized Tissue Debrided:  Slough and necrotic eschar. The left hip was debrided using a currette, to remove fibrotic slough/soft eschar overlying the wound bed. Only able to perform selective debridement, removing slough/non-viable tissue only- not yet able to debride to layer of subcutaneous tissue. Pre Debridement Measurements:  Are located in the Wound/Ulcer Documentation Flow Sheet    Wound/Ulcer : left hip debrided 100% for 7.84 sq cm   Right heel debrided 50% for 0.78 sq cm. Total area debrided=8.62 sq cm.      Post Debridement Measurements:  Wound/Ulcer Descriptions are Pre Debridement except measurements:      Left lateral foot DTPI:   6/1/22         Wound Care Documentation    Deep tissue pressure injury  Wound Properties   Pressure  Wound   Daily  Dressing Status   Wednesday  Dressing Changed   Betadine then pad/protect Dressing/treatment   saline Wound Cleansed   Thursday  Dressing Change Due   0.8 Wound Length (cm)    0.7 Wound Width (cm)   Immeasurable  Wound Depth (cm)   None  Drainage Amount   None  Drainage Description   None  Exposed Structure   100% Black% Wound Bed     Right heel  5/25/22 6/1/22 right heel     Wound Properties  right heel wound- full thickness-                               Wound pressure                                      Dressing Status Daily                                               Dressing Changed Wednesday                                    Dressing/treatment Daily  cleanse with saline, then Anasept ag+ to wound area with 4 x 4 over, then DSD, secure with kerlix, keep heels elevated off the bed, preferably using heel protectors. When able to obtain Santyl- stop Anasept and start daily santyl dressing change with saline moistened gauze over, then dsd. Wound Cleansed Saline                                            Dressing Change Due Thursday                                      Wound Length (cm) pre-debridement 1.3                                            Wound Width (cm) pre-debridement 0.6                                              Wound depth (cm) pre debridement immeas.                                        Wound length (cm) post debridement 1.3                                              Wound width (cm) post     debridement 0.6                                              Wound Depth (cm) post -debridement Remains immeasurable                                               Drainage Amount small                                                Drainage Description serosangunous    Odor None Margins Open wound edges                         Exposed Structure None    Anitha-wound Assessment No maceration                               Pink% Wound Bed     Red% Wound Bed 30%                                     Yellow% Wound Bed 20%   Black% Wound Bed 50%   Soft unstable eschar in center of wound bed-                                              Purple% Wound Bed     Other% Wound Bed    Culture Taken None                                                                   Left hip  6/1/22      Wound Properties Left hip    Wound Pressure injury- DTPI unstageable    Dressing Status Daily    Dressing Changed Wednesday    Dressing/treatment Cleanse with saline, then apply anasept ag+ (hydrogel) directly to wound bed with hydrogel impregnated 4 x 4 coarse gauze over the gel, then cover with dsd. . Liquid skin prep anitha wound before dressing. Change daily. Wound Cleansed Saline    Dressing Change Due Thursday    Wound Length (cm)  2.8       Wound Width (cm) 2.8   Wound Depth (cm) Immeasurable  (wound edge to base of slough pre- 1.3)   Post-Procedure Length (cm) 2.8   Post-Procedure Width (cm) 2.8   Post-Procedure Depth (cm) Remains immeasurable (wound edge to base of slough post 2.0)   Wound Assessment Deep tissue pressure injury   Drainage Amount Moderate   Drainage Description Tan/gray    Odor Yes    Undermining  Circumferentially - 0.7 cm.    Margins Poorly defined- erythematous    Exposed Structure None    Anitha-wound Assessment Fragile- pink    Red% Wound Bed     Yellow% Wound Bed 100 % dried/fibrotic slough/eschar    Other% Wound Bed     Culture Taken None         Sacral wound 6/1//22   Picture did not save in Epic      sacral wound  Wound Properties   Pressure stage 3 - healing Wound   Daily  Dressing Status   Wednesday  Dressing Changed   Cleanse with saline, anasept to wound bed, with DSD over, liquid skin prep anitha wound, then mepilex to secure  Dressing/treatment   Saline   Wound Cleansed   Thursday Dressing Change Due   1.6 Wound Length (cm)    0.6 Wound Width (cm)   0.2 Wound Depth (cm)   n/a Post-Procedure Length (cm)    Post-Procedure Width (cm)    Post-Procedure Depth (cm)   Small  Drainage Amount   Serosanguinous  Drainage Description   None  Odor   Open  Margins   None  Exposed Structure   Intact/ non erythematous  Wendy-wound Assessment     Pink% Wound Bed    Tan/yellow slough non viable tissue    100 Red% Wound Bed     Yellow% Wound Bed   No  Culture Taken        Percent of Wound/Ulcer Debrided:   Right heel- 50% of wound debrided for total of 0.78 sq cm. Left hip- 100% of wound debrided for total of 7.84 sq cm                                                        Total Surface Area Debrided: 8.62  sq cm     Diabetic/Pressure/Non Pressure Ulcers:  Ulcer  Unstageable pressure injury to right heel   Unstageable DTPI to left hip               Bleeding: Left heel- small amount bleeding - silver nitrate to manage . Left hip- no bleeding    Hemostasis Achieved:      Procedural Pain:  0  / 10     Post Procedural Pain:  0/ 10     Response to treatment:  Well tolerated by patient. Plan:     1. Right heel wound:Cleanse area daily with saline, then cover necrotic/slough area with Medihoneyin nickel thickness, then cover with dry gauze,,  then apply 4 x 4 gauze, and secure with kerlix to pad and protect. Keep both heels elevated off of the bed. DO NOT USE SILVERCELL ON THE HEEL. 2. Stage 3 pressure injury to coccyx. Limit time positioned to area-totally avoiding positioning on either sacrum or hip if possible. Use low air loss mattress if available. Cleanse wound with saline, then insert Alka into wound bed, moisten with few drops of saline, then cover with DSD.  Change each M-W-F.   3. Left hip deep tissue pressure injury- To left hip, cleanse daily with saline, wipe with coarse gauze to remove any loose slough possible, then insert hydrogel directly into wound bed followed by 4 x 4 hydrogel impregnated gauze (Anasept gel) loosely into wound using care to insert to all areas of undermining, then cover with slightly saline moistened gauze, with DSD over. Liquid skin prep to anitha wound before securing dressing. Okay to use mepilex. 4. Generalized xerosis of bilateral lower extremities/feet:    After washing lower extremities (intact areas only) with mild soap and water (or saline), apply urea 20 cream to dry areas. Perform every other day. Please apply as close to wound on heel as able without getting into the wound. The surrounding dried skin must be washed first with mild soap and water before applying the lac hydrin. Please apply every other day as ordered.     Electronically signed by MARICRUZ [place] - NP on 6/1/2022 at 3:28 PM

## 2022-06-06 ENCOUNTER — APPOINTMENT (OUTPATIENT)
Dept: GENERAL RADIOLOGY | Age: 67
End: 2022-06-06
Payer: COMMERCIAL

## 2022-06-06 ENCOUNTER — HOSPITAL ENCOUNTER (EMERGENCY)
Age: 67
Discharge: HOME | End: 2022-06-06
Payer: COMMERCIAL

## 2022-06-06 VITALS
BODY MASS INDEX: 29.12 KG/M2 | DIASTOLIC BLOOD PRESSURE: 80 MMHG | HEART RATE: 73 BPM | OXYGEN SATURATION: 100 % | RESPIRATION RATE: 16 BRPM | SYSTOLIC BLOOD PRESSURE: 105 MMHG | WEIGHT: 175 LBS | TEMPERATURE: 98.3 F

## 2022-06-06 DIAGNOSIS — Z46.59 ENCOUNTER FOR FEEDING TUBE PLACEMENT: Primary | ICD-10-CM

## 2022-06-06 PROCEDURE — 99284 EMERGENCY DEPT VISIT MOD MDM: CPT

## 2022-06-06 PROCEDURE — 74018 RADEX ABDOMEN 1 VIEW: CPT

## 2022-06-06 PROCEDURE — 43762 RPLC GTUBE NO REVJ TRC: CPT

## 2022-06-06 ASSESSMENT — ENCOUNTER SYMPTOMS
COLOR CHANGE: 0
ABDOMINAL DISTENTION: 0
SORE THROAT: 0
RHINORRHEA: 0
ABDOMINAL PAIN: 0
SHORTNESS OF BREATH: 0
CONSTIPATION: 0
EYE DISCHARGE: 0

## 2022-06-06 ASSESSMENT — PAIN - FUNCTIONAL ASSESSMENT
PAIN_FUNCTIONAL_ASSESSMENT: NONE - DENIES PAIN
PAIN_FUNCTIONAL_ASSESSMENT: NONE - DENIES PAIN

## 2022-06-06 NOTE — ED PROVIDER NOTES
[facility] ED  eMERGENCY dEPARTMENT eNCOUnter      Pt Name: Yakov Roberson  MRN: [id number]  [place] 1955  Date of evaluation: 6/6/2022  Provider: Tristan Salazar PA-C    CHIEF COMPLAINT       Chief Complaint   Patient presents with    Other     pt pulled G-tube out per squad report         HISTORY OF PRESENT ILLNESS   (Location/Symptom, Timing/Onset,Context/Setting, Quality, Duration, Modifying Factors, Severity)  Note limiting factors. Yakov Roberson is a 74 y.o. male who presents to the emergency department with complaint of accidentally pulled feeding tube. Patient is from local nursing home, staff states that patient pulled feeding tube out approximately 1 hour ago. Unable to replace the facility. Patient has no other additional complaints. Past medical history significant for asthma, osteoarthritis, depression, COPD, depression, rectal bleeding, pituitary microadenoma, sleep apnea. HPI    NursingNotes were reviewed. REVIEW OF SYSTEMS    (2-9 systems for level 4, 10 or more for level 5)     Review of Systems   Constitutional: Negative for activity change and appetite change. HENT: Negative for congestion, ear discharge, ear pain, nosebleeds, rhinorrhea and sore throat. Eyes: Negative for discharge. Respiratory: Negative for shortness of breath. Cardiovascular: Negative for chest pain, palpitations and leg swelling. Gastrointestinal: Negative for abdominal distention, abdominal pain and constipation. Feeding tube fell out   Genitourinary: Negative for difficulty urinating and dysuria. Musculoskeletal: Negative for arthralgias. Skin: Negative for color change. Neurological: Negative for dizziness, syncope, numbness and headaches. Psychiatric/Behavioral: Negative for agitation and confusion. Except as noted above the remainder of the review of systems was reviewed and negative.        PAST MEDICAL HISTORY     Past Medical History:   Diagnosis Date    Abdominal pain     Asthma     Bloating symptom     COPD (chronic obstructive pulmonary disease) (HCC)     COPD (chronic obstructive pulmonary disease) (HCC)     Depression     Depression     Diarrhea     Drug-seeking behavior 02/03/2020    Emphyse[facility]COPD ([facility] [address]     History of rectal bleeding     Hypercholesteremia     past trx > 5 yrs    Hypercholesteremia     Hypercholesteremia     Hypertension     meds > 3 yrs    Lung disease     RANDI on CPAP     patient relates he doesn't use cpap at this time    Osteoarthritis     left hip    Osteoarthritis     Osteoarthritis of left hip     Physiological consequence of immobility 3/9/2022    Pituitary macroadenoma ([facility] [address]     Sleep apnea          SURGICALHISTORY       Past Surgical History:   Procedure Laterality Date    COLONOSCOPY  5/2/14    [facility]    COLONOSCOPY N/A 12/12/2019    COLONOSCOPY DIAGNOSTIC performed by William Gilmore MD at [address], COLON, DIAGNOSTIC      EYE SURGERY Bilateral     cataracts   [facility] Right 1999 ?     index finger laceration repair    HERNIA REPAIR  2004    repair [facility]     JOINT REPLACEMENT Left     left hip    DE COLON CA SCRN NOT HI RSK IND N/A 5/17/2017    COLONOSCOPY performed by Melania Lr MD at [facility] 4/22/2019    LEFT HIP TOTAL HIP ARTHROPLASTY, LATERAL DECUB, GRACIE performed by Reggie Jenkins MD at [address] [place] [address]  12/12/2019    EGD DIAGNOSTIC ONLY performed by William Gilmore MD at [address] [facility]       Previous Medications    ALBUTEROL (PROVENTIL) (2.5 MG/3ML) 0.083% NEBULIZER SOLUTION    Take 3 mLs by nebulization every 6 hours as needed for Wheezing    ASPIRIN 81 MG EC TABLET    Take 1 tablet by mouth 2 times daily    CABERGOLINE (DOSTINEX) 0.5 MG TABLET    Take 1 tablet by mouth Twice a Week ON Monday and Thursday    CPAP MACHINE MISC    by Does not apply route New CPAP with 6 cm    FENOFIBRATE 160 MG TABLET    Take 150 mg by mouth daily    FUROSEMIDE (LASIX) 40 MG TABLET    Take 40 mg by mouth daily    LIDOCAINE (LMX) 4 % CREAM    Apply a half dollar sized amount to intact skin topically up to twice daily as needed for pain    LISINOPRIL (PRINIVIL;ZESTRIL) 20 MG TABLET    Take 2 tablets by mouth daily    MELOXICAM (MOBIC) 15 MG TABLET    Take 15 mg by mouth daily    PALIPERIDONE (INVEGA) 6 MG EXTENDED RELEASE TABLET    Take 1 tablet by mouth daily    POTASSIUM CHLORIDE (MICRO-K) 10 MEQ EXTENDED RELEASE CAPSULE    Take 10 mEq by mouth daily    RESPIRATORY THERAPY SUPPLIES SANDRA    Full face CPAP mask and supplies    SIMVASTATIN (ZOCOR) 40 MG TABLET    Take 40 mg by mouth nightly    STIOLTO RESPIMAT 2.5-2.5 MCG/ACT AERS    Inhale 2 puff in AM       ALLERGIES     Patient has no known allergies. FAMILY HISTORY       Family History   Problem Relation Age of Onset    Colon Cancer Mother     Cancer Mother     Arthritis Mother     High Blood Pressure Father     Heart Disease Father     Diabetes Sister     No Known Problems Brother     No Known Problems Brother     No Known Problems Brother     Other Brother         Sepsis    Other Brother         Killed    No Known Problems Sister     No Known Problems Sister     No Known Problems Son           SOCIAL HISTORY       Social History     Socioeconomic History    Marital status:      Spouse name: None    Number of children: None    Years of education: None    Highest education level: None   Occupational History    None   Tobacco Use    Smoking status: Former Smoker     Packs/day: 1.50     Years: 46.00     Pack years: 69.00     Types: Cigarettes     Quit date: 3/20/2019     Years since quitting: 3.2    Smokeless tobacco: Never Used   Vaping Use    Vaping Use: Unknown   Substance and Sexual Activity    Alcohol use:  Yes    Drug use: No    Sexual activity: None   Other Topics Concern    None   Social History Narrative    None     Social Determinants of Health     Financial Resource Strain:     Difficulty of Paying Living Expenses: Not on file   Food Insecurity:     Worried About Running Out of Food in the Last Year: Not on file    [employer] of Food in the Last Year: Not on file   Transportation Needs:     Lack of Transportation (Medical): Not on file    Lack of Transportation (Non-Medical): Not on file   Physical Activity:     Days of Exercise per Week: Not on file    Minutes of Exercise per Session: Not on file   Stress:     Feeling of Stress : Not on file   Social Connections:     Frequency of Communication with Friends and Family: Not on file    Frequency of Social Gatherings with Friends and Family: Not on file    Attends [employer] Services: Not on file    Active Member of [address] [place] [address] Payment plugin or Organizations: Not on file    Attends Club or Organization Meetings: Not on file    Marital Status: Not on file   Intimate Partner Violence:     Fear of Current or Ex-Partner: Not on file    Emotionally Abused: Not on file    Physically Abused: Not on file    Sexually Abused: Not on file   Housing Stability:     Unable to Pay for Housing in the Last Year: Not on file    Number of [place] in the Last Year: Not on file    Unstable Housing in the Last Year: Not on file       SCREENINGS    [place] Coma Scale  Eye Opening: (S) To speech (baseline)  Best Verbal Response: (S) Confused (basline)  Best Motor Response: (S) Localizes pain (baseline)  Hardik Coma Scale Score: 12 @FLOW(18446582)@      PHYSICAL EXAM    (up to 7 for level 4, 8 or more for level 5)     ED Triage Vitals [06/06/22 0816]   BP Temp Temp Source Heart Rate Resp SpO2 Height Weight   128/74 98.3 °F (36.8 °C) Axillary 73 19 100 % -- 175 lb (79.4 kg)       Physical Exam  Vitals and nursing note reviewed. Constitutional:       General: He is not in acute distress. Appearance: He is well-developed.  He is not ill-appearing, toxic-appearing or diaphoretic. HENT:      Head: Normocephalic. Right Ear: Tympanic membrane normal.      Left Ear: Tympanic membrane normal.      Nose: No congestion. Mouth/Throat:      Mouth: Mucous membranes are moist.      Pharynx: No oropharyngeal exudate or posterior oropharyngeal erythema. Eyes:      Extraocular Movements: Extraocular movements intact. Conjunctiva/sclera: Conjunctivae normal.      Pupils: Pupils are equal, round, and reactive to light. Neck:      Vascular: No JVD. Trachea: No tracheal deviation. Cardiovascular:      Rate and Rhythm: Normal rate. Pulses: Normal pulses. Heart sounds: Normal heart sounds. No murmur heard. No friction rub. No gallop. Pulmonary:      Effort: Pulmonary effort is normal. No tachypnea, accessory muscle usage, respiratory distress or retractions. Breath sounds: No stridor. No wheezing, rhonchi or rales. Chest:      Chest wall: No tenderness. Abdominal:      General: Abdomen is flat. Bowel sounds are normal. There is no distension or abdominal bruit. Palpations: There is no shifting dullness, fluid wave, hepatomegaly, splenomegaly, mass or pulsatile mass. Tenderness: There is no abdominal tenderness. There is no right CVA tenderness, left CVA tenderness, guarding or rebound. Negative signs include Becerra's sign, Rovsing's sign and McBurney's sign. Comments: Stoma of the abdomen, is open, there is a small amount of blood around the opening, there is no signs for infection, no drainage or discharge. Musculoskeletal:         General: No deformity. Cervical back: Normal range of motion and neck supple. No rigidity. Comments: Chronic contractions of hands and legs. Skin:     General: Skin is warm and dry. Capillary Refill: Capillary refill takes less than 2 seconds. Coloration: Skin is not jaundiced. Neurological:      General: No focal deficit present. Mental Status: He is alert.  Mental status is at baseline. Cranial Nerves: No cranial nerve deficit. Sensory: No sensory deficit. Motor: No weakness. Coordination: Coordination normal.      Comments: Patient is awake, he is nonverbal, he is in a contracted position, he has not verbalized during his visit in the ED,  this is his baseline mental status. Psychiatric:         Mood and Affect: Mood normal.         DIAGNOSTIC RESULTS     EKG: All EKG's are interpreted by the Emergency Department Physician who either signs or Co-signsthis chart in the absence of a cardiologist.        RADIOLOGY:   Lorrebeca Brownie such as CT, Ultrasound and MRI are read by the radiologist. Che Wallace radiographic images are visualized and preliminarily interpreted by the emergency physician with the below findings:    KUB of the abdomen with Gastrografin flush close feeding tube within gastrium, no extravasation of contrast outside of bowel. Interpretation per the Radiologist below, if available at the time ofthis note:    XR ABDOMEN (KUB) (SINGLE AP VIEW)    (Results Pending)         ED BEDSIDE ULTRASOUND:   Performed by ED Physician - none    LABS:  Labs Reviewed - No data to display    All other labs were within normal range or not returned as of this dictation. EMERGENCY DEPARTMENT COURSE and DIFFERENTIAL DIAGNOSIS/MDM:   Vitals:    Vitals:    06/06/22 0816 06/06/22 1000   BP: 128/74 109/69   Pulse: 73 75   Resp: 19 18   Temp: 98.3 °F (36.8 °C)    TempSrc: Axillary    SpO2: 100% 100%   Weight: 175 lb (79.4 kg)           MDM  Number of Diagnoses or Management Options  Encounter for feeding tube placement  Diagnosis management comments: Patient presented to the ED for G replacement, according to staff at nursing home patient had accidentally pulled his tube out. The stoma looks well, there is no signs of injury, there is minimal bleeding, no drainage or discharge.   A 16 Frisian feeding tube was replaced easily without any difficulty, KUB was obtained, with Gastrografin flush, which shows the feeding tube within gastrium, good contrast within the bowel is soft, there is no extravasation of contrast.  Patient will be returned back to the nursing facility and may resume the use of feeding tube. Patient is to follow-up with his primary provider within next 48 hours. CRITICAL CARE TIME   Total Critical Care time was 0 minutes, excluding separately reportableprocedures. There was a high probability of clinicallysignificant/life threatening deterioration in the patient's condition which required my urgent intervention. CONSULTS:  None    PROCEDURES:  Unless otherwise noted below, none     Feeding Tube    Date/Time: 6/6/2022 8:33 AM  Performed by: Noel Gutierres PA-C  Authorized by: Noel Gutierres PA-C     Consent:     Consent obtained:  Verbal    Consent given by:  Patient    Risks discussed:  Bleeding, infection and pain    Alternatives discussed:  No treatment  Pre-procedure details: Old tube type:  Gastrostomy    Old tube size:  16 Fr  Anesthesia (see MAR for exact dosages): Anesthesia method:  None  Procedure details:     Patient position:  Recumbent    Procedure type:  Replacement    Tube type:  Gastrostomy    Tube size:  16 Fr    Bulb inflation volume:  15    Bulb inflation fluid:  Normal saline  Post-procedure details:     Placement/position confirmation:  X-ray    Placement difficulty:  None    Bleeding:  Minimal    Patient tolerance of procedure: Tolerated well, no immediate complications        FINAL IMPRESSION      1.  Encounter for feeding tube placement          DISPOSITION/PLAN   DISPOSITION Decision To Discharge 06/06/2022 10:05:08 AM      PATIENT REFERRED TO:  Doris Jaramillo DO  [address] [place] [address]  [phone]    In 2 days        DISCHARGE MEDICATIONS:  New Prescriptions    No medications on file          (Please note that portions of this note were completed with a voice recognition program.  Efforts were made to edit the dictations but occasionally words are mis-transcribed.)    Lesvia Miner PA-C (electronically signed)  Attending Emergency Physician         Lesvia Miner PA-C  06/06/22 1019

## 2022-06-06 NOTE — ED NOTES
[facility] here. Report given to EMS. No acute distress noted with patient.       Jerrod Miguel, JANIE  06/06/22 3570

## 2022-06-06 NOTE — ED NOTES
Andrew mayers at bedside, replacing g-tube, pt anahy. Well. 0 distress.       Quin Hayward RN  06/06/22 2816

## 2022-06-06 NOTE — FLOWSHEET NOTE
RN called to xray department for administration of gastrografin. Pt on xray table. Nonverbal. Name &  verified using nameband. Pt noted to have trach. PEG tube placement verified with air administration / auscultation. Gastrografin administered, followed by 30cc water flush. No distress noted and xray test commencing.  Electronically signed by Judy Gracia RN on 2022 at 10:49 AM

## 2022-06-06 NOTE — ED NOTES
Returned. Warm blanket applied. No acute distress noted. Call light within reach. Made aware we are waiting for disposition.       Lauri Caraballo RN  06/06/22 1002

## 2022-06-06 NOTE — ED NOTES
Transport placed with [facility] for return to [employer] [facility] [place]  06/06/22 7268

## 2022-06-06 NOTE — ED NOTES
Report called to [facility] at [address] [place] RN  06/06/22 3742

## 2022-06-06 NOTE — ED TRIAGE NOTES
Per report pt pulled g-tube out today, pt from [facility], pt awake on arrival, pt non-verbal, contracted in all 4 ext. Flaco Rushimarilyn noted in place pt on 3l nc on arrival.  Per report pt is at his baseline, hx. CVA. not following commands, calm, 0 distress. skin w/d/pink.

## 2022-06-10 ENCOUNTER — OFFICE VISIT (OUTPATIENT)
Dept: WOUND CARE | Age: 67
End: 2022-06-10
Payer: COMMERCIAL

## 2022-06-10 DIAGNOSIS — Z74.09: ICD-10-CM

## 2022-06-10 DIAGNOSIS — L89.610 PRESSURE INJURY OF RIGHT HEEL, UNSTAGEABLE (HCC): ICD-10-CM

## 2022-06-10 DIAGNOSIS — L89.896 PRESSURE INJURY OF DEEP TISSUE OF LEFT FOOT: ICD-10-CM

## 2022-06-10 DIAGNOSIS — L89.150 PRESSURE INJURY OF COCCYGEAL REGION, UNSTAGEABLE (HCC): Primary | ICD-10-CM

## 2022-06-10 DIAGNOSIS — L89.226 PRESSURE INJURY OF DEEP TISSUE OF LEFT HIP: ICD-10-CM

## 2022-06-10 PROCEDURE — 11042 DBRDMT SUBQ TIS 1ST 20SQCM/<: CPT | Performed by: NURSE PRACTITIONER

## 2022-06-10 PROCEDURE — 99308 SBSQ NF CARE LOW MDM 20: CPT | Performed by: NURSE PRACTITIONER

## 2022-06-10 PROCEDURE — 1123F ACP DISCUSS/DSCN MKR DOCD: CPT | Performed by: NURSE PRACTITIONER

## 2022-06-10 NOTE — PROGRESS NOTES
[facility] [place]                                                       Debridement Note      Agustin Mcdaniel  MEDICAL RECORD NUMBER:  [id number]  AGE: 74 y.o. GENDER: male  : 1955  EPISODE DATE:  6/10/2022    Subjective:     No chief complaint on file. HISTORY of PRESENT ILLNESS HPI  Eve Fitzgerald a 61 y. o. male who presents today for wound/ulcer evaluation. History of Wound Context: Patient received from local LTACH - s/p CVA, with subarachnoid hemorrhage and respiratory failure (has tracheostomy), COPD per history.  Patient has DMII, with gastrostomy tube for nutrition.  No verbalization, however, does appear alert this week,occassionally reaches for linens during care. Wound to right heel is significantly improved in depth and overall dimension however has area of slough surrounded by  necrotic/eschar area/softening, will need to serially debride. Pressure injury to sacrum also improved, red/granular with less depth, less friable then previously- nearly healed today. Left hip area of deep tissue pressure injury (DTPI) - remains unstageable, dry with significant depth will debride today. Right heel is improved with only small area of necrotic tissue remaining-surrounded by red/granular tissue. New area of injury to left inner heel- appears to be deep tissue injury, not open, purple, dark red with overlying tissue intact. Wound/Ulcer Pain Timing/Severity: unable to accurately evaluate due to non-verbal status   Quality of pain: none noted per faces scale.    Severity: 0 / 10   Modifying Factors: None  Associated Signs/Symptoms: none     Ulcer Identification:  Ulcer Type: pressure  Contributing Factors: diabetes, chronic pressure, decreased mobility, shear force, decreased tissue oxygenation, incontinence of stool and incontinence of urine     Wound: N/A    PAST MEDICAL HISTORY        Diagnosis Date    Abdominal pain     Asthma     Bloating symptom     [facility]     COPD [facility]ronic obstructive [facility] disease) [facility] [facility]     Depression     Depression     Diarrhea     Drug-seeking behavior 02/03/2020    Emphysema/COPD ([facility] [address] [facility]     History of rectal bleeding     Hypercholesteremia     past trx > 5 yrs    Hypercholesteremia     Hypercholesteremia     Hypertension     meds > 3 yrs    Lung disease     RANDI on CPAP     patient relates he doesn't use cpap at this time    Osteoarthritis     left hip    Osteoarthritis     Osteoarthritis of left hip     Physiological consequence of immobility 3/9/2022    Pituitary macroadenoma ([facility] [address]     Sleep apnea        PAST SURGICAL HISTORY    Past Surgical History:   Procedure Laterality Date    COLONOSCOPY  5/2/14    [facility]    COLONOSCOPY N/A 12/12/2019    COLONOSCOPY DIAGNOSTIC performed by Rebeca Sylvester MD at [address], COLON, DIAGNOSTIC      EYE SURGERY Bilateral     cataracts   [address] ?     index finger laceration repair    HERNIA REPAIR  2004    repair [facility]     JOINT REPLACEMENT Left     left hip    AR COLON CA SCRN NOT HI RSK IND N/A 5/17/2017    COLONOSCOPY performed by Chhaya Tony MD at [facility] Left 4/22/2019    LEFT HIP TOTAL HIP ARTHROPLASTY, LATERAL DECUB, GRACIE performed by Angelina Ricci MD at [address] [facility]  12/12/2019    EGD DIAGNOSTIC ONLY performed by Rebeca Sylvester MD at [address] [facility]    Family History   Problem Relation Age of Onset   Pitt Colon Cancer Mother     Cancer Mother     Arthritis Mother     High Blood Pressure Father     Heart Disease Father     Diabetes Sister     No Known Problems Brother     No Known Problems Brother     No Known Problems Brother     Other Brother         Sepsis    Other Brother         Killed    No Known Problems Sister     No Known Problems Sister     No Known Problems Son        SOCIAL HISTORY    Social History     Tobacco Use    Smoking status: Former Smoker     Packs/day: 1.50     Years: 46.00     Pack years: 69.00     Types: Cigarettes     Quit date: 3/20/2019     Years since quitting: 3.2    Smokeless tobacco: Never Used   Vaping Use    Vaping Use: Unknown   Substance Use Topics    Alcohol use: Yes    Drug use: No       ALLERGIES    No Known Allergies    MEDICATIONS    Current Outpatient Medications on File Prior to Visit   Medication Sig Dispense Refill    lidocaine (LMX) 4 % cream Apply a half dollar sized amount to intact skin topically up to twice daily as needed for pain 1 Tube 1    paliperidone (INVEGA) 6 MG extended release tablet Take 1 tablet by mouth daily 30 tablet 1    cabergoline (DOSTINEX) 0.5 MG tablet Take 1 tablet by mouth Twice a Week ON Monday and Thursday 8 tablet 3    furosemide (LASIX) 40 MG tablet Take 40 mg by mouth daily      meloxicam (MOBIC) 15 MG tablet Take 15 mg by mouth daily      potassium chloride (MICRO-K) 10 MEQ extended release capsule Take 10 mEq by mouth daily      simvastatin (ZOCOR) 40 MG tablet Take 40 mg by mouth nightly      fenofibrate 160 MG tablet Take 150 mg by mouth daily      aspirin 81 MG EC tablet Take 1 tablet by mouth 2 times daily 60 tablet 0    lisinopril (PRINIVIL;ZESTRIL) 20 MG tablet Take 2 tablets by mouth daily 30 tablet 3    Respiratory Therapy Supplies SANDRA Full face CPAP mask and supplies 1 Device 0    CPAP Machine MISC by Does not apply route New CPAP with 6 cm 1 each 0    albuterol (PROVENTIL) (2.5 MG/3ML) 0.083% nebulizer solution Take 3 mLs by nebulization every 6 hours as needed for Wheezing 120 each 5    STIOLTO RESPIMAT 2.5-2.5 MCG/ACT AERS Inhale 2 puff in AM 1 Inhaler 5     No current facility-administered medications on file prior to visit. REVIEW OF SYSTEMS    Pertinent items are noted in HPI. Objective: There were no vitals taken for this visit.     Wt Readings from Last 3 Encounters: 06/06/22 175 lb (79.4 kg)   02/13/22 180 lb (81.6 kg)   08/06/21 234 lb (106.1 kg)       PHYSICAL EXAM    Constitutional:   Well nourished and well developed. Appears neat and clean. Patient is somewhat more alert this week,  in no apparent distress- no verbalization. Frequently reaches for linens with right hand/arm. Respiratory:  Respiratory effort is easy and symmetric bilaterally. Rate is normal at rest and on O2 per trach cuff. Vascular:  Pedal Pulses palpable and audible with doppler. Capillary refill is <3 sec to digits bilateral.  Extremities negative for pitting edema. Neurological:  Unable to determine d/t non-verbal status, and decreased level or cognition-but does appear to be making an effort to communicate with caregivers this week-reaches for areas following exam.    Dermatological:  Wound description noted in wound assessment. The wound to the right heel is improved, remaining necrotic area smaller. The sacral wound-is clean, red, granular with less depth-much improved, no anitha wound maceration-nearly healed. Left hip remains deep and still unstageable, covered with malodorous yellow slough-will need debrided serially. New area of deep tissue pressure injury to left heel, not open- will monitor and keep offloaded. Reported did not have heel protectors on for several days. Also has small area to left outer forefoot area of deep tissue pressure injury- maroon/black- overlying skin intact. Psychiatric: Unable to accurately determine due to lack of verbalization. Assessment:      Patient seen this visit for: evaluation of need for debridement of the sacral, left hip, and right heel wounds. Problems treated this admission:   1. Right heel wound:   Cleanse daily with saline, then apply hydrogel ag+ with silver to wound with dsd over, and liquid skin prep the anitha wound. DSD over. DO NOT USE SILVERCELL ON THE HEEL. 2. Stage 3 pressure injury to coccyx.    Limit time positioned to area-totally avoiding positioning on either sacrum or hip if possible. Use low air loss mattress if available. To sacrum- cleanse wound with saline, then apply julio c over the wound, moisten with few drops of saline, then cover with 4 x 4 gauze, and secure with mepilex. 3. Left hip deep tissue pressure injury- To left hip, gently pack with generous amount of anasept gel (hydrogel ag+) impregnated 4 x 4 taking care to tuck under areas of undermining,  then place saline moistened dressing over the packing, then dry 4 x 4, followed with mepilex. Liquid skin prep to anitha wound before securing dressing. 4. Left heel and left lateral forefoot deep tissue pressure injuries- apply liquid skin prep to area twice daily, pad and protect. Keep heel protectors on to elevate off the bed at all times and prevent feet from causing pressure to the opposite extremity. 5. Generalized xerosis:   ck.  After washing lower extremities (intact areas only) with mild soap and water (or saline) , apply urea 20 cream to dry areas. Perform every other day. 6. History of DM2, on tube feedings:   Insufficient nutrition for healing-monitor for additional support needs and notify dietician if noted. Monitor blood sugars with goal being less then 150, to ensure ability for wounds to heal.      Procedure Note  Indications:  Based on my examination of this patient's wound(s)/ulcer(s) today, debridement is required to promote healing and evaluate the wound base. Performed by: MARICRUZ Parekh NP    Consent obtained:  Yes    Time out taken:  Yes    Pain Control: none required. No discomfort observed during debridement using faces scale. Debridement:Excisional Debridement    Using a curette, scissors and forceps the wound(s) to the right heel was debrided to allow removal of overlying soft eschar and slough. The wound was debrided to a layer of additional fibrotic slough.          Devitalized Tissue Debrided:  [facility] and necrotic eschar. The left hip was debrided using a currette, to remove fibrotic slough/soft eschar overlying the wound bed. Only able to perform selective debridement, removing slough/non-viable tissue only- not yet able to debride to layer of subcutaneous tissue. Pre Debridement Measurements:  Are located in the Wound/Ulcer Documentation Flow Sheet    Wound/Ulcer : left hip debrided 100% for sq cm   Right heel debrided 100% for 0 sq cm. Total area debrided= sq cm. Post Debridement Measurements:  Wound/Ulcer Descriptions are Pre Debridement except measurements:      Left lateral foot DTPI:   6/10/22         Wound Care Documentation    Deep tissue pressure injury  Wound Properties   Pressure  Wound   Daily  Dressing Status   Wednesday  Dressing Changed   Betadine then pad/protect Dressing/treatment   saline Wound Cleansed   Thursday  Dressing Change Due   0.8 Wound Length (cm)    0.4 Wound Width (cm)   Immeasurable  Wound Depth (cm)   None  Drainage Amount   None  Drainage Description   None  Exposed Structure   100% Black% Wound Bed     Right heel  6/10/22        6/10/22 right heel     Wound Properties  right heel wound- full thickness-                               Wound pressure                                      Dressing Status Daily                                               Dressing Changed Wednesday                                    Dressing/treatment Daily  cleanse with saline, then Anasept ag+ to wound area with 4 x 4 over, then DSD, secure with kerlix, keep heels elevated off the bed, preferably using heel protectors. When able to obtain Santyl- stop Anasept and start daily santyl dressing change with saline moistened gauze over, then dsd.                                   Wound Cleansed Saline                                            Dressing Change Due Thursday                                      Wound Length (cm) pre-debridement 1.3 Wound Width (cm) pre-debridement 1.2                                              Wound depth (cm) pre debridement immeas. Wound length (cm) post debridement 1.3                                              Wound width (cm) post     debridement 1.2                                              Wound Depth (cm) post -debridement Remains immeasurable                                               Drainage Amount small                                                Drainage Description serosangunous    Odor None                                               Margins Open wound edges                         Exposed Structure None    Anitha-wound Assessment No maceration                               Pink% Wound Bed     Red% Wound Bed                                    Yellow% Wound Bed 100%   Black% Wound Bed       Purple% Wound Bed     Other% Wound Bed    Culture Taken None                                                             Left hip  6/10/22      Wound Properties Left hip    Wound Pressure injury- DTPI unstageable    Dressing Status Daily    Dressing Changed Wednesday    Dressing/treatment Cleanse with saline, then apply anasept ag+ (hydrogel) directly to wound bed with hydrogel impregnated 4 x 4 coarse gauze over the gel, then cover with dsd. . Liquid skin prep anitha wound before dressing. Change daily. Wound Cleansed Saline    Dressing Change Due Thursday    Wound Length (cm)  2.7       Wound Width (cm) 3.0   Wound Depth (cm) Immeasurable  (wound edge to base of slough pre- 2.0)   Post-Procedure Length (cm) 2.7   Post-Procedure Width (cm) 3.0   Post-Procedure Depth (cm) Remains immeasurable (wound edge to base of slough post debridement is 2.3)   Wound Assessment Deep tissue pressure injury   Drainage Amount Moderate   Drainage Description Tan/gray    Odor Yes    Undermining  Circumferentially - 0.6 cm.    Margins Poorly defined- erythematous    Exposed Structure None Anitha-wound Assessment Fragile- pink    Red% Wound Bed     Yellow% Wound Bed 100 % fibrotic slough   Other% Wound Bed     Culture Taken None         Sacral wound 6/10//22             sacral wound  Wound Properties   Pressure stage 3 - healing Wound   Daily  Dressing Status   Wednesday  Dressing Changed   Cleanse with saline, anasept to wound bed, with DSD over, liquid skin prep anitha wound, then mepilex to secure  Dressing/treatment   Saline   Wound Cleansed   Thursday  Dressing Change Due   1.0 Wound Length (cm)    0.4 Wound Width (cm)   0.2 Wound Depth (cm)   n/a Post-Procedure Length (cm)    Post-Procedure Width (cm)    Post-Procedure Depth (cm)   Small  Drainage Amount   Serosanguinous  Drainage Description   None  Odor   Open  Margins   None  Exposed Structure   Intact/ non erythematous  Anitha-wound Assessment     Pink% Wound Bed    Tan/yellow slough non viable tissue    100% Red% Wound Bed     Yellow% Wound Bed   No  Culture Taken        Percent of Wound/Ulcer Debrided:   Right heel- 100 of wound debrided for total of 1.56 sq cm. Left hip- 100% of wound debrided for total of 8.1 sq cm                                                        Total Surface Area Debrided: 9.66  sq cm     Diabetic/Pressure/Non Pressure Ulcers:  Ulcer  Unstageable pressure injury to right heel   Unstageable DTPI to left hip               Bleeding: Left heel- small amount bleeding - silver nitrate to manage . Left hip- no bleeding    Hemostasis Achieved:      Procedural Pain:  0  / 10     Post Procedural Pain:  0/ 10     Response to treatment:  Well tolerated by patient. Plan:     1. Right heel wound:Cleanse area daily with saline, then cover necrotic/slough area with Medihoneyin nickel thickness, then cover with dry gauze,,  then apply 4 x 4 gauze, and secure with kerlix to pad and protect. Keep both heels elevated off of the bed. DO NOT USE SILVERCELL ON THE HEEL. 2. Stage 3 pressure injury to coccyx. Limit time positioned to area-totally avoiding positioning on either sacrum or hip if possible. Use low air loss mattress if available. Cleanse wound with saline, then insert Alka into wound bed, moisten with few drops of saline, then cover with DSD. Change each M-W-F.   3. Left hip deep tissue pressure injury- To left hip, cleanse daily with saline, wipe with coarse gauze to remove any loose slough possible, then insert hydrogel directly into wound bed followed by 4 x 4 hydrogel impregnated gauze (Anasept gel) loosely into wound using care to insert to all areas of undermining, then cover with slightly saline moistened gauze, with DSD over. Liquid skin prep to anitha wound before securing dressing. Okay to use mepilex. 4. Generalized xerosis of bilateral lower extremities/feet:    After washing lower extremities (intact areas only) with mild soap and water (or saline), apply urea 20 cream to dry areas. Perform every other day. Please apply as close to wound on heel as able without getting into the wound. The surrounding dried skin must be washed first with mild soap and water before applying the lac hydrin. Please apply every other day as ordered. 5. Left hip anitha wound erythematous- will wound malodorous. Patient no longer a hospice patient- he is now a full code so will order doxycycline 100 mg. Per feeding tube twice daily x 10 days. Please supply in liquid form.      Electronically signed by MARICRUZ Roberts NP on 6/1/2022 at 3:28 PM

## 2022-06-29 ENCOUNTER — HOSPITAL ENCOUNTER (OUTPATIENT)
Dept: WOUND CARE | Age: 67
Discharge: HOME | End: 2022-06-29
Payer: COMMERCIAL

## 2022-06-29 PROCEDURE — 99214 OFFICE O/P EST MOD 30 MIN: CPT

## 2022-06-29 RX ORDER — DESMOPRESSIN ACETATE 0.1 MG/1
0.5 TABLET ORAL 2 TIMES DAILY
COMMUNITY

## 2022-06-29 RX ORDER — MORPHINE SULFATE 100 MG/5ML
5 SOLUTION ORAL
COMMUNITY

## 2022-06-29 RX ORDER — LORAZEPAM 0.5 MG/1
0.5 TABLET ORAL EVERY 4 HOURS PRN
Status: ON HOLD | COMMUNITY
End: 2022-07-28 | Stop reason: SDUPTHER

## 2022-06-29 RX ORDER — LEVETIRACETAM 100 MG/ML
750 SOLUTION ORAL 2 TIMES DAILY
COMMUNITY

## 2022-06-29 RX ORDER — ATORVASTATIN CALCIUM 20 MG/1
TABLET, FILM COATED ORAL
COMMUNITY
Start: 2022-04-11 | End: 2022-06-29

## 2022-06-29 RX ORDER — ESOMEPRAZOLE MAGNESIUM 40 MG/1
40 FOR SUSPENSION ORAL DAILY
COMMUNITY

## 2022-06-29 RX ORDER — LEVOTHYROXINE SODIUM 0.2 MG/1
200 TABLET ORAL DAILY
Status: ON HOLD | COMMUNITY
End: 2022-07-28 | Stop reason: HOSPADM

## 2022-06-29 NOTE — PLAN OF CARE
Problem: Cognitive:  Goal: Knowledge of wound care  Description: Knowledge of wound care  Outcome: Progressing  Goal: Understands risk factors for wounds  Description: Understands risk factors for wounds  Outcome: Progressing     Problem: Wound:  Goal: Will show signs of wound healing; wound closure and no evidence of infection  Description: Will show signs of wound healing; wound closure and no evidence of infection  Outcome: Progressing     Problem: Pressure Ulcer:  Goal: Signs of wound healing will improve  Description: Signs of wound healing will improve  Outcome: Progressing  Goal: Absence of new pressure ulcer  Description: Absence of new pressure ulcer  Outcome: Progressing  Goal: Will show no infection signs and symptoms  Description: Will show no infection signs and symptoms  Outcome: Progressing     Problem: Falls - Risk of:  Goal: Will remain free from falls  Description: Will remain free from falls  Outcome: Progressing     Problem: Blood Glucose:  Goal: Ability to maintain appropriate glucose levels will improve  Description: Ability to maintain appropriate glucose levels will improve  Outcome: Progressing

## 2022-06-29 NOTE — PROGRESS NOTES
[id number] [facility] Phys[facility] Billing Sheet. Mouna Hortonstormy  AGE: 74 y.o. GENDER: male  : 1955  TODAY'S DATE:  2022    ICD-10 CODES  There are no active hospital problems to display for this patient.       PHYSICIAN PROCEDURES    CPT CODE  99286      Electronically signed by MARICRUZ Olvera NP on 2022 at 12:21 PM

## 2022-06-29 NOTE — PROGRESS NOTES
Violeta [address]   Progress Note and Procedure Note      Edi Patino  MEDICAL RECORD NUMBER:  [id number]  AGE: 74 y.o. GENDER: male  : 1955  EPISODE DATE:  2022    Subjective:     No chief complaint on file. HISTORY of PRESENT ILLNESS HPI     Edi Patino is a 74 y.o. male who presents today for wound/ulcer evaluation. History of Wound Context:   Karin Araujo a 61 y. o. male who presents today for wound/ulcer evaluation. History of Wound Context: Patient received from local [facility] - s/p CVA, with subarachnoid hemorrhage and respiratory failure (has tracheostomy), COPD per history.  Patient has DMII, with gastrostomy tube for nutrition.  No verbalization, however, does appear alert this week,occassionally reaches for linens during care. Wound to right heel is significantly improved in depth and overall dimension however has area of slough surrounded by  necrotic/eschar area/softening, will need to serially debride. Pressure injury to sacrum also improved, red/granular with less depth, less friable then previously- nearly healed today. Left hip area of deep tissue pressure injury (DTPI) - remains unstageable, dry with significant depth will debride today. Right heel is improved with only small area of necrotic tissue remaining-surrounded by red/granular tissue. New area of injury to left inner heel- appears to be deep tissue injury, not open, purple, dark red with overlying tissue intact. Seen at [facility] by Wound NP on 6/10/22. Papers today from NH state this patient is a DNR CC. He is currently responsive to tactile. Coccyx wound has healed Right heel ulcer has improved from prior pictures. Now with mild hypergranulation.   His left ischial wound is deep with clear drainage    Wound/Ulcer Pain Timing/Severity: patient does not respond  Quality of pain: N/A  Severity:  0 / 10   Modifying Factors: None  Associated Signs/Symptoms: drainage    Ulcer Identification:  Ulcer Type: pressure  Contributing Factors: chronic pressure    Wound:         PAST MEDICAL HISTORY        Diagnosis Date    Abdominal pain     Asthma     Bloating symptom     COPD (chronic obstructive pulmonary disease) (HCC)     COPD (chronic obstructive pulmonary disease) (HCC)     Depression     Depression     Diarrhea     Drug-seeking behavior 02/03/2020    Em[facility]COPD ([facility]     History of rectal bleeding     Hypercholesteremia     past trx > 5 yrs    Hypercholesteremia     Hypercholesteremia     Hypertension     meds > 3 yrs    Lung disease     RANDI on CPAP     patient relates he doesn't use cpap at this time    Osteoarthritis     left hip    Osteoarthritis     Osteoarthritis of left hip     Physiological consequence of immobility 3/9/2022    Pituitary macroadenoma ([facility]     Sleep apnea        PAST SURGICAL HISTORY    Past Surgical History:   Procedure Laterality Date    [facility]    COLONOSCOPY N/A 12/12/2019    COLONOSCOPY DIAGNOSTIC performed by Rodrick Cordoba MD at [facility], COLON, DIAGNOSTIC      EYE SURGERY Bilateral     cataracts   [facility] Right 1999 ?     index finger laceration repair    HERNIA REPAIR  2004    repair [facility]     JOINT REPLACEMENT Left     left hip    MI COLON CA SCRN NOT HI RSK IND N/A 5/17/2017    COLONOSCOPY performed by Cherry Holloway MD at [facility] Left 4/22/2019    LEFT HIP TOTAL HIP ARTHROPLASTY, LATERAL DECUB, GRACIE performed by Saba Katz MD at [address]  12/12/2019    EGD DIAGNOSTIC ONLY performed by Rodrick Cordoba MD at [place] [address]    Family History   Problem Relation Age of Onset    Colon Cancer Mother     Cancer Mother     Arthritis Mother     High Blood Pressure Father     Heart Disease Father     Diabetes Sister     No Known Problems Brother     No Known Problems Brother     No Known Problems Brother     Other Brother         Sepsis    Other Brother         Killed    No Known Problems Sister     No Known Problems Sister     No Known Problems Son        SOCIAL HISTORY    Social History     Tobacco Use    Smoking status: Former Smoker     Packs/day: 1.50     Years: 46.00     Pack years: 69.00     Types: Cigarettes     Quit date: 3/20/2019     Years since quitting: 3.2    Smokeless tobacco: Never Used   Vaping Use    Vaping Use: Unknown   Substance Use Topics    Alcohol use: Yes    Drug use: No       ALLERGIES    No Known Allergies    MEDICATIONS    Current Outpatient Medications on File Prior to Encounter   Medication Sig Dispense Refill    desmopressin (DDAVP) 0.1 MG tablet Take 0.1 mg by mouth daily Give 0.05mg via PEG tube two times a day for clotting promoter      esomeprazole Magnesium (NEXIUM) 40 MG PACK Take 40 mg by mouth daily      levETIRAcetam (KEPPRA) 100 MG/ML solution Take 750 mg/kg by mouth 2 times daily      hyoscyamine (LEVSIN/SL) 125 MCG sublingual tablet Place 125 mcg under the tongue every 6 hours as needed for Cramping      LORazepam (ATIVAN) 0.5 MG tablet Take 0.5 mg by mouth every 4 hours as needed for Anxiety.  morphine sulfate 20 MG/ML concentrated oral solution Take 5 mg by mouth every 2 hours as needed for Pain.       levothyroxine (SYNTHROID) 200 MCG tablet Take 200 mcg by mouth Daily Give one tablet via PEG tube every Mon,Tue, Wed, Thu, Fri, Sat      vitamin D (CHOLECALCIFEROL) 125 MCG (5000 UT) CAPS capsule Take 50,000 Units by mouth daily Give 1/25mg (00284BF) one capsule via peg tube one time a day every Monday for supplement      lidocaine (LMX) 4 % cream Apply a half dollar sized amount to intact skin topically up to twice daily as needed for pain 1 Tube 1    paliperidone (INVEGA) 6 MG extended release tablet Take 1 tablet by mouth daily 30 tablet 1    cabergoline (DOSTINEX) 0.5 MG tablet Take 1 tablet by mouth Twice a Week ON Monday and Thursday 8 tablet 3    furosemide (LASIX) 40 MG tablet Take 40 mg by mouth daily      meloxicam (MOBIC) 15 MG tablet Take 15 mg by mouth daily      potassium chloride (MICRO-K) 10 MEQ extended release capsule Take 10 mEq by mouth daily      fenofibrate 160 MG tablet Take 150 mg by mouth daily      aspirin 81 MG EC tablet Take 1 tablet by mouth 2 times daily 60 tablet 0    lisinopril (PRINIVIL;ZESTRIL) 20 MG tablet Take 2 tablets by mouth daily 30 tablet 3    Respiratory Therapy Supplies SANDRA Full face CPAP mask and supplies 1 Device 0    CPAP Machine MISC by Does not apply route New CPAP with 6 cm 1 each 0    albuterol (PROVENTIL) (2.5 MG/3ML) 0.083% nebulizer solution Take 3 mLs by nebulization every 6 hours as needed for Wheezing 120 each 5    STIOLTO RESPIMAT 2.5-2.5 MCG/ACT AERS Inhale 2 puff in AM 1 Inhaler 5     No current facility-administered medications on file prior to encounter. REVIEW OF SYSTEMS    Pertinent items are noted in HPI. Objective: There were no vitals taken for this visit. Wt Readings from Last 3 Encounters:   06/06/22 175 lb (79.4 kg)   02/13/22 180 lb (81.6 kg)   08/06/21 234 lb (106.1 kg)       PHYSICAL EXAM    Constitutional:   Well nourished and well developed. Appears neat and clean. Patient is alert, oriented x3, and in no apparent distress. Respiratory:  Respiratory effort is easy and symmetric bilaterally. Rate is normal at rest and on room air. Vascular:  Pedal Pulses is palpable and audible signal noted with doppler. Capillary refill is <5 sec to digits bilateral.  Extremities negative for pitting edema. Neurological:  Gross and Light touch intact. Protective sensation unknown    Dermatological:  Wound description noted in wound assessment. Psychiatric:  Judgement and insight intact. Short and long term memory intact. No evidence of depression, anxiety, or agitation. Patient is calm, cooperative, and communicative.   Appropriate interactions and affect. Assessment:      There are no active hospital problems to display for this patient. Procedure Note  Indications:  Based on my examination of this patient's wound(s)/ulcer(s) today, debridement is not required to promote healing and evaluate the wound base. Wound 06/29/22 Heel Right #1 (Active)   Wound Image   06/29/22 1002   Wound Etiology Pressure Unstageable 06/29/22 1002   Wound Cleansed Cleansed with saline 06/29/22 1002   Dressing/Treatment Other (comment); Foam 06/29/22 1015   Offloading for Diabetic Foot Ulcers Offloading ordered; Offloading boot 06/29/22 1002   Wound Length (cm) 1.5 cm 06/29/22 1002   Wound Width (cm) 1.5 cm 06/29/22 1002   Wound Depth (cm) 0.7 cm 06/29/22 1002   Wound Surface Area (cm^2) 2.25 cm^2 06/29/22 1002   Wound Volume (cm^3) 1.575 cm^3 06/29/22 1002   Wound Assessment Dry;Hyper granulation tissue;Slough 06/29/22 1002   Drainage Amount Small 06/29/22 1002   Drainage Description Serosanguinous; Yellow 06/29/22 1002   Odor None 06/29/22 1002   Wendy-wound Assessment Dry/flaky 06/29/22 1002   Margins Defined edges 06/29/22 1002   Number of days: 0       Wound 06/29/22 Hip Left (Active)   Wound Image   06/29/22 1002   Wound Etiology Pressure Stage 3 06/29/22 1002   Wound Cleansed Cleansed with saline 06/29/22 1002   Dressing/Treatment Foam;Moist to dry 06/29/22 1015   Wound Length (cm) 2.7 cm 06/29/22 1002   Wound Width (cm) 2.9 cm 06/29/22 1002   Wound Depth (cm) 2.2 cm 06/29/22 1002   Wound Surface Area (cm^2) 7.83 cm^2 06/29/22 1002   Wound Volume (cm^3) 17.226 cm^3 06/29/22 1002   Distance Tunneling (cm) 3.2 cm 06/29/22 1002   Tunneling Position ___ O'Clock 7 06/29/22 1002   Wound Assessment Pink/red 06/29/22 1002   Drainage Amount Large 06/29/22 1002   Drainage Description Serosanguinous; Yellow 06/29/22 1002   Odor Moderate 06/29/22 1002   Wendy-wound Assessment Fragile; Maceration 06/29/22 1002   Margins Defined edges 06/29/22 1002   Number of days: 0       Wound 06/29/22 Coccyx (Active)   Wound Image   06/29/22 1002   Wound Etiology Pressure Stage 2 06/29/22 1002   Wound Cleansed Cleansed with saline 06/29/22 1002   Wound Length (cm) 0.8 cm 06/29/22 1002   Wound Width (cm) 0.5 cm 06/29/22 1002   Wound Depth (cm) 0.1 cm 06/29/22 1002   Wound Surface Area (cm^2) 0.4 cm^2 06/29/22 1002   Wound Volume (cm^3) 0.04 cm^3 06/29/22 1002   Wound Assessment Pink/red 06/29/22 1002   Drainage Amount Scant 06/29/22 1002   Drainage Description Serosanguinous 06/29/22 1002   Odor None 06/29/22 1002   Wendy-wound Assessment Fragile; Intact 06/29/22 1002   Margins Defined edges 06/29/22 1002   Number of days: 0       Incision 04/23/19 Hip Anterior;Left;Proximal (Active)   Number of days: 1162             Plan: It is not a usual thing to see DNR CC patients in wound clinic. The trip his very tom on the patient. Simple wound car as below can continue at the Nursing Home. If this patient should become febrile he should be seen in an ER, but his baseline DNR CC status would also stop this  No follow up in wound clinic        Treatment Note please see attached Discharge Instructions    Written patient dismissal instructions given to patient and signed by patient or POA. Discharge [address] and Hyper[facility] Medicine   Physician Orders and Discharge Instructions  [facility] [address] [place], [place]  Telephone: [phone]      NAME:  Yamile Guy          YOB: 1955  MEDICAL RECORD NUMBER:  [id number]    Your  is:  [address] [place] [address] Care/Facility: [facility]    Wound Location:    Dressing orders:    Compression:    Offloading Device:    Other Instructions:     Keep all dressings clean, dry and intact. Keep pressure off the wound(s) at all times. Follow up visit    Weeks    Please give 24 hour notice if unable to keep appointment. [phone]    If you experience any of the following, please call the Wound Care Service at  [phone] or go to the nearest emergency room. *Increase in pain *Temperature over 101 *Increase in drainage from your wound or a foul odor  *Uncontrolled swelling *Need for compression bandage changes due to slippage, breakthrough drainage       PLEASE NOTE: IF YOU ARE UNABLE TO OBTAIN WOUND SUPPLIES, CONTINUE TO USE THE SUPPLIES YOU HAVE AVAILABLE UNTIL YOU ARE ABLE TO REACH US.  IT IS MOST IMPORTANT TO KEEP THE WOUND COVERED AT ALL TIMES                    Electronically signed by MARICRUZ Kinsey NP on 6/29/2022 at 10:48 AM

## 2022-07-21 ENCOUNTER — APPOINTMENT (OUTPATIENT)
Dept: GENERAL RADIOLOGY | Age: 67
DRG: 720 | End: 2022-07-21
Payer: COMMERCIAL

## 2022-07-21 ENCOUNTER — HOSPITAL ENCOUNTER (INPATIENT)
Age: 67
LOS: 7 days | Discharge: NURSING, REHAB OR LONG-TERM CARE | DRG: 720 | End: 2022-07-28
Attending: INTERNAL MEDICINE | Admitting: INTERNAL MEDICINE
Payer: COMMERCIAL

## 2022-07-21 DIAGNOSIS — S91.309A OPEN WOUND OF FOOT, UNSPECIFIED LATERALITY, INITIAL ENCOUNTER: ICD-10-CM

## 2022-07-21 DIAGNOSIS — A41.9 SEPTICEMIA (HCC): ICD-10-CM

## 2022-07-21 DIAGNOSIS — R77.8 ELEVATED TROPONIN: ICD-10-CM

## 2022-07-21 DIAGNOSIS — F32.A DEPRESSION, UNSPECIFIED DEPRESSION TYPE: ICD-10-CM

## 2022-07-21 DIAGNOSIS — R79.89 DECREASED THYROID STIMULATING HORMONE (TSH) LEVEL: ICD-10-CM

## 2022-07-21 DIAGNOSIS — M86.9 OSTEOMYELITIS OF OTHER SITE, UNSPECIFIED TYPE (HCC): Primary | ICD-10-CM

## 2022-07-21 DIAGNOSIS — M86.18: ICD-10-CM

## 2022-07-21 PROBLEM — L03.116 CELLULITIS OF LEFT HIP: Status: ACTIVE | Noted: 2022-07-21

## 2022-07-21 PROBLEM — E03.9 HYPOTHYROID: Status: ACTIVE | Noted: 2022-07-21

## 2022-07-21 PROBLEM — S71.002A COMPLICATED OPEN WOUND OF LEFT HIP: Status: ACTIVE | Noted: 2022-07-21

## 2022-07-21 LAB
ALBUMIN SERPL-MCNC: 3.1 G/DL (ref 3.5–4.6)
ALP BLD-CCNC: 131 U/L (ref 35–104)
ALT SERPL-CCNC: 55 U/L (ref 0–41)
ANION GAP SERPL CALCULATED.3IONS-SCNC: 15 MEQ/L (ref 9–15)
APTT: 34.4 SEC (ref 24.4–36.8)
AST SERPL-CCNC: 48 U/L (ref 0–40)
BASOPHILS ABSOLUTE: 0.2 K/UL (ref 0–0.2)
BASOPHILS RELATIVE PERCENT: 1 %
BILIRUB SERPL-MCNC: 0.9 MG/DL (ref 0.2–0.7)
BILIRUBIN URINE: NEGATIVE
BLOOD, URINE: NEGATIVE
BUN BLDV-MCNC: 18 MG/DL (ref 8–23)
C-REACTIVE PROTEIN: 241.9 MG/L (ref 0–5)
CALCIUM SERPL-MCNC: 9.4 MG/DL (ref 8.5–9.9)
CHLORIDE BLD-SCNC: 103 MEQ/L (ref 95–107)
CLARITY: CLEAR
CO2: 22 MEQ/L (ref 20–31)
COLOR: YELLOW
CREAT SERPL-MCNC: 0.61 MG/DL (ref 0.7–1.2)
EKG ATRIAL RATE: 97 BPM
EKG P AXIS: 46 DEGREES
EKG P-R INTERVAL: 130 MS
EKG Q-T INTERVAL: 350 MS
EKG QRS DURATION: 102 MS
EKG QTC CALCULATION (BAZETT): 444 MS
EKG R AXIS: -22 DEGREES
EKG T AXIS: 17 DEGREES
EKG VENTRICULAR RATE: 97 BPM
EOSINOPHILS ABSOLUTE: 0 K/UL (ref 0–0.7)
EOSINOPHILS RELATIVE PERCENT: 0.1 %
GFR AFRICAN AMERICAN: >60
GFR NON-AFRICAN AMERICAN: >60
GLOBULIN: 4.1 G/DL (ref 2.3–3.5)
GLUCOSE BLD-MCNC: 95 MG/DL (ref 70–99)
GLUCOSE BLD-MCNC: 96 MG/DL (ref 70–99)
GLUCOSE BLD-MCNC: 98 MG/DL (ref 70–99)
GLUCOSE BLD-MCNC: 98 MG/DL (ref 70–99)
GLUCOSE URINE: NEGATIVE MG/DL
HCT VFR BLD CALC: 32.5 % (ref 42–52)
HEMOGLOBIN: 10.8 G/DL (ref 14–18)
INR BLD: 1.3
KETONES, URINE: NEGATIVE MG/DL
LACTIC ACID, SEPSIS: 1.3 MMOL/L (ref 0.5–1.9)
LEUKOCYTE ESTERASE, URINE: NEGATIVE
LIPASE: 12 U/L (ref 12–95)
LYMPHOCYTES ABSOLUTE: 2.8 K/UL (ref 1–4.8)
LYMPHOCYTES RELATIVE PERCENT: 18 %
MAGNESIUM: 2.2 MG/DL (ref 1.7–2.4)
MCH RBC QN AUTO: 29.8 PG (ref 27–31.3)
MCHC RBC AUTO-ENTMCNC: 33.3 % (ref 33–37)
MCV RBC AUTO: 89.7 FL (ref 80–100)
MONOCYTES ABSOLUTE: 0.5 K/UL (ref 0.2–0.8)
MONOCYTES RELATIVE PERCENT: 2.8 %
NEUTROPHILS ABSOLUTE: 12 K/UL (ref 1.4–6.5)
NEUTROPHILS RELATIVE PERCENT: 78 %
NITRITE, URINE: NEGATIVE
PDW BLD-RTO: 14.7 % (ref 11.5–14.5)
PERFORMED ON: NORMAL
PH UA: 7.5 (ref 5–9)
PLATELET # BLD: 340 K/UL (ref 130–400)
PLATELET SLIDE REVIEW: ADEQUATE
POTASSIUM SERPL-SCNC: 4.3 MEQ/L (ref 3.4–4.9)
PRO-BNP: 1343 PG/ML
PROCALCITONIN: 0.21 NG/ML (ref 0–0.15)
PROLACTIN: 0.8 NG/ML (ref 4–15.2)
PROTEIN UA: ABNORMAL MG/DL
PROTHROMBIN TIME: 16.1 SEC (ref 12.3–14.9)
RBC # BLD: 3.62 M/UL (ref 4.7–6.1)
RBC # BLD: NORMAL 10*6/UL
SEDIMENTATION RATE, ERYTHROCYTE: 99 MM (ref 0–20)
SODIUM BLD-SCNC: 140 MEQ/L (ref 135–144)
SPECIFIC GRAVITY UA: 1.02 (ref 1–1.03)
T4 FREE: 0.81 NG/DL (ref 0.84–1.68)
TOTAL CK: 65 U/L (ref 0–190)
TOTAL PROTEIN: 7.2 G/DL (ref 6.3–8)
TROPONIN: 0.09 NG/ML (ref 0–0.01)
TSH REFLEX: <0.01 UIU/ML (ref 0.44–3.86)
URINE REFLEX TO CULTURE: ABNORMAL
UROBILINOGEN, URINE: 2 E.U./DL
WBC # BLD: 15.4 K/UL (ref 4.8–10.8)

## 2022-07-21 PROCEDURE — 97165 OT EVAL LOW COMPLEX 30 MIN: CPT

## 2022-07-21 PROCEDURE — 85025 COMPLETE CBC W/AUTO DIFF WBC: CPT

## 2022-07-21 PROCEDURE — 99254 IP/OBS CNSLTJ NEW/EST MOD 60: CPT | Performed by: INTERNAL MEDICINE

## 2022-07-21 PROCEDURE — 85652 RBC SED RATE AUTOMATED: CPT

## 2022-07-21 PROCEDURE — 6370000000 HC RX 637 (ALT 250 FOR IP)

## 2022-07-21 PROCEDURE — 6360000002 HC RX W HCPCS

## 2022-07-21 PROCEDURE — 99285 EMERGENCY DEPT VISIT HI MDM: CPT

## 2022-07-21 PROCEDURE — 93010 ELECTROCARDIOGRAM REPORT: CPT | Performed by: INTERNAL MEDICINE

## 2022-07-21 PROCEDURE — 87186 SC STD MICRODIL/AGAR DIL: CPT

## 2022-07-21 PROCEDURE — 87040 BLOOD CULTURE FOR BACTERIA: CPT

## 2022-07-21 PROCEDURE — 93005 ELECTROCARDIOGRAM TRACING: CPT | Performed by: PERSONAL EMERGENCY RESPONSE ATTENDANT

## 2022-07-21 PROCEDURE — 73501 X-RAY EXAM HIP UNI 1 VIEW: CPT

## 2022-07-21 PROCEDURE — 84145 PROCALCITONIN (PCT): CPT

## 2022-07-21 PROCEDURE — 1210000000 HC MED SURG R&B

## 2022-07-21 PROCEDURE — 84484 ASSAY OF TROPONIN QUANT: CPT

## 2022-07-21 PROCEDURE — 02HV33Z INSERTION OF INFUSION DEVICE INTO SUPERIOR VENA CAVA, PERCUTANEOUS APPROACH: ICD-10-PCS | Performed by: RADIOLOGY

## 2022-07-21 PROCEDURE — 84146 ASSAY OF PROLACTIN: CPT

## 2022-07-21 PROCEDURE — 80053 COMPREHEN METABOLIC PANEL: CPT

## 2022-07-21 PROCEDURE — 99222 1ST HOSP IP/OBS MODERATE 55: CPT | Performed by: INTERNAL MEDICINE

## 2022-07-21 PROCEDURE — 87150 DNA/RNA AMPLIFIED PROBE: CPT

## 2022-07-21 PROCEDURE — 83880 ASSAY OF NATRIURETIC PEPTIDE: CPT

## 2022-07-21 PROCEDURE — 87070 CULTURE OTHR SPECIMN AEROBIC: CPT

## 2022-07-21 PROCEDURE — 2580000003 HC RX 258: Performed by: PERSONAL EMERGENCY RESPONSE ATTENDANT

## 2022-07-21 PROCEDURE — 83605 ASSAY OF LACTIC ACID: CPT

## 2022-07-21 PROCEDURE — 84443 ASSAY THYROID STIM HORMONE: CPT

## 2022-07-21 PROCEDURE — 96365 THER/PROPH/DIAG IV INF INIT: CPT

## 2022-07-21 PROCEDURE — 96366 THER/PROPH/DIAG IV INF ADDON: CPT

## 2022-07-21 PROCEDURE — 99213 OFFICE O/P EST LOW 20 MIN: CPT

## 2022-07-21 PROCEDURE — 6370000000 HC RX 637 (ALT 250 FOR IP): Performed by: PERSONAL EMERGENCY RESPONSE ATTENDANT

## 2022-07-21 PROCEDURE — 2580000003 HC RX 258

## 2022-07-21 PROCEDURE — 83735 ASSAY OF MAGNESIUM: CPT

## 2022-07-21 PROCEDURE — 6360000002 HC RX W HCPCS: Performed by: PERSONAL EMERGENCY RESPONSE ATTENDANT

## 2022-07-21 PROCEDURE — 85610 PROTHROMBIN TIME: CPT

## 2022-07-21 PROCEDURE — 97161 PT EVAL LOW COMPLEX 20 MIN: CPT

## 2022-07-21 PROCEDURE — 84439 ASSAY OF FREE THYROXINE: CPT

## 2022-07-21 PROCEDURE — 87077 CULTURE AEROBIC IDENTIFY: CPT

## 2022-07-21 PROCEDURE — 85730 THROMBOPLASTIN TIME PARTIAL: CPT

## 2022-07-21 PROCEDURE — 82533 TOTAL CORTISOL: CPT

## 2022-07-21 PROCEDURE — 81003 URINALYSIS AUTO W/O SCOPE: CPT

## 2022-07-21 PROCEDURE — 83690 ASSAY OF LIPASE: CPT

## 2022-07-21 PROCEDURE — 86140 C-REACTIVE PROTEIN: CPT

## 2022-07-21 PROCEDURE — 80177 DRUG SCRN QUAN LEVETIRACETAM: CPT

## 2022-07-21 PROCEDURE — 96360 HYDRATION IV INFUSION INIT: CPT

## 2022-07-21 PROCEDURE — 82550 ASSAY OF CK (CPK): CPT

## 2022-07-21 PROCEDURE — 99252 IP/OBS CONSLTJ NEW/EST SF 35: CPT | Performed by: NURSE PRACTITIONER

## 2022-07-21 PROCEDURE — 71045 X-RAY EXAM CHEST 1 VIEW: CPT

## 2022-07-21 PROCEDURE — 36415 COLL VENOUS BLD VENIPUNCTURE: CPT

## 2022-07-21 RX ORDER — SODIUM CHLORIDE 0.9 % (FLUSH) 0.9 %
5-40 SYRINGE (ML) INJECTION PRN
Status: DISCONTINUED | OUTPATIENT
Start: 2022-07-21 | End: 2022-07-26

## 2022-07-21 RX ORDER — ACETAMINOPHEN 650 MG/1
650 SUPPOSITORY RECTAL ONCE
Status: COMPLETED | OUTPATIENT
Start: 2022-07-21 | End: 2022-07-21

## 2022-07-21 RX ORDER — 0.9 % SODIUM CHLORIDE 0.9 %
2000 INTRAVENOUS SOLUTION INTRAVENOUS ONCE
Status: COMPLETED | OUTPATIENT
Start: 2022-07-21 | End: 2022-07-21

## 2022-07-21 RX ORDER — ONDANSETRON 2 MG/ML
4 INJECTION INTRAMUSCULAR; INTRAVENOUS EVERY 6 HOURS PRN
Status: DISCONTINUED | OUTPATIENT
Start: 2022-07-21 | End: 2022-07-28 | Stop reason: HOSPADM

## 2022-07-21 RX ORDER — SODIUM CHLORIDE 0.9 % (FLUSH) 0.9 %
5-40 SYRINGE (ML) INJECTION EVERY 12 HOURS SCHEDULED
Status: DISCONTINUED | OUTPATIENT
Start: 2022-07-21 | End: 2022-07-26

## 2022-07-21 RX ORDER — CHLORHEXIDINE GLUCONATE 0.12 MG/ML
15 RINSE ORAL 3 TIMES DAILY
COMMUNITY

## 2022-07-21 RX ORDER — CARBOXYMETHYLCELLULOSE SODIUM 10 MG/ML
2 SOLUTION/ DROPS OPHTHALMIC DAILY
COMMUNITY

## 2022-07-21 RX ORDER — LEVOTHYROXINE SODIUM 0.15 MG/1
150 TABLET ORAL DAILY
Status: DISCONTINUED | OUTPATIENT
Start: 2022-07-21 | End: 2022-07-28 | Stop reason: HOSPADM

## 2022-07-21 RX ORDER — ACETAMINOPHEN 325 MG/1
650 TABLET ORAL EVERY 6 HOURS PRN
Status: DISCONTINUED | OUTPATIENT
Start: 2022-07-21 | End: 2022-07-28 | Stop reason: HOSPADM

## 2022-07-21 RX ORDER — ATORVASTATIN CALCIUM 20 MG/1
20 TABLET, FILM COATED ORAL NIGHTLY
COMMUNITY

## 2022-07-21 RX ORDER — ENOXAPARIN SODIUM 100 MG/ML
40 INJECTION SUBCUTANEOUS DAILY
Status: DISCONTINUED | OUTPATIENT
Start: 2022-07-21 | End: 2022-07-28 | Stop reason: HOSPADM

## 2022-07-21 RX ORDER — SULFAMETHOXAZOLE AND TRIMETHOPRIM 800; 160 MG/1; MG/1
1 TABLET ORAL 2 TIMES DAILY
Status: ON HOLD | COMMUNITY
End: 2022-07-28 | Stop reason: HOSPADM

## 2022-07-21 RX ORDER — ONDANSETRON 4 MG/1
4 TABLET, ORALLY DISINTEGRATING ORAL EVERY 8 HOURS PRN
Status: DISCONTINUED | OUTPATIENT
Start: 2022-07-21 | End: 2022-07-28 | Stop reason: HOSPADM

## 2022-07-21 RX ORDER — SODIUM CHLORIDE 9 MG/ML
INJECTION, SOLUTION INTRAVENOUS CONTINUOUS
Status: DISCONTINUED | OUTPATIENT
Start: 2022-07-21 | End: 2022-07-22

## 2022-07-21 RX ORDER — SENNA AND DOCUSATE SODIUM 50; 8.6 MG/1; MG/1
1 TABLET, FILM COATED ORAL 2 TIMES DAILY
COMMUNITY

## 2022-07-21 RX ORDER — INSULIN LISPRO 100 [IU]/ML
0-8 INJECTION, SOLUTION INTRAVENOUS; SUBCUTANEOUS
Status: DISCONTINUED | OUTPATIENT
Start: 2022-07-21 | End: 2022-07-28 | Stop reason: HOSPADM

## 2022-07-21 RX ORDER — ACETAMINOPHEN 325 MG/1
650 TABLET ORAL EVERY 4 HOURS PRN
COMMUNITY

## 2022-07-21 RX ORDER — ATROPINE SULFATE 0.01 %
1 DROPS, EMULSION OPHTHALMIC (EYE) AS NEEDED
COMMUNITY

## 2022-07-21 RX ORDER — DEXTROSE MONOHYDRATE 100 MG/ML
INJECTION, SOLUTION INTRAVENOUS CONTINUOUS PRN
Status: DISCONTINUED | OUTPATIENT
Start: 2022-07-21 | End: 2022-07-28 | Stop reason: HOSPADM

## 2022-07-21 RX ORDER — POLYETHYLENE GLYCOL 3350 17 G/17G
17 POWDER, FOR SOLUTION ORAL 2 TIMES DAILY
COMMUNITY

## 2022-07-21 RX ORDER — POLYETHYLENE GLYCOL 3350 17 G/17G
17 POWDER, FOR SOLUTION ORAL DAILY PRN
Status: DISCONTINUED | OUTPATIENT
Start: 2022-07-21 | End: 2022-07-28 | Stop reason: HOSPADM

## 2022-07-21 RX ORDER — ACETAMINOPHEN 650 MG/1
650 SUPPOSITORY RECTAL EVERY 6 HOURS PRN
Status: DISCONTINUED | OUTPATIENT
Start: 2022-07-21 | End: 2022-07-28 | Stop reason: HOSPADM

## 2022-07-21 RX ORDER — SODIUM CHLORIDE 9 MG/ML
INJECTION, SOLUTION INTRAVENOUS PRN
Status: DISCONTINUED | OUTPATIENT
Start: 2022-07-21 | End: 2022-07-28 | Stop reason: HOSPADM

## 2022-07-21 RX ADMIN — PIPERACILLIN AND TAZOBACTAM 3375 MG: 3; .375 INJECTION, POWDER, FOR SOLUTION INTRAVENOUS at 17:00

## 2022-07-21 RX ADMIN — VANCOMYCIN HYDROCHLORIDE 1250 MG: 1.25 INJECTION, POWDER, LYOPHILIZED, FOR SOLUTION INTRAVENOUS at 15:04

## 2022-07-21 RX ADMIN — SODIUM CHLORIDE 2000 ML: 9 INJECTION, SOLUTION INTRAVENOUS at 03:30

## 2022-07-21 RX ADMIN — ENOXAPARIN SODIUM 40 MG: 100 INJECTION SUBCUTANEOUS at 11:19

## 2022-07-21 RX ADMIN — VANCOMYCIN HYDROCHLORIDE 1500 MG: 1.5 INJECTION, POWDER, LYOPHILIZED, FOR SOLUTION INTRAVENOUS at 04:04

## 2022-07-21 RX ADMIN — ACETAMINOPHEN 650 MG: 650 SUPPOSITORY RECTAL at 04:13

## 2022-07-21 RX ADMIN — SODIUM CHLORIDE, PRESERVATIVE FREE 10 ML: 5 INJECTION INTRAVENOUS at 11:16

## 2022-07-21 RX ADMIN — SODIUM CHLORIDE: 9 INJECTION, SOLUTION INTRAVENOUS at 11:11

## 2022-07-21 RX ADMIN — ACETAMINOPHEN 650 MG: 650 SUPPOSITORY RECTAL at 11:16

## 2022-07-21 RX ADMIN — SODIUM CHLORIDE, PRESERVATIVE FREE 10 ML: 5 INJECTION INTRAVENOUS at 21:13

## 2022-07-21 RX ADMIN — PIPERACILLIN AND TAZOBACTAM 3375 MG: 3; .375 INJECTION, POWDER, FOR SOLUTION INTRAVENOUS at 11:18

## 2022-07-21 RX ADMIN — SODIUM CHLORIDE: 9 INJECTION, SOLUTION INTRAVENOUS at 21:12

## 2022-07-21 ASSESSMENT — ENCOUNTER SYMPTOMS
SHORTNESS OF BREATH: 0
COUGH: 0
RHINORRHEA: 0
VOMITING: 0
BLOOD IN STOOL: 0
NAUSEA: 0
COLOR CHANGE: 0
DIARRHEA: 0
ABDOMINAL PAIN: 0
SORE THROAT: 0

## 2022-07-21 NOTE — PLAN OF CARE
Nutrition Problem #1: Inadequate oral intake  Intervention: Food and/or Nutrient Delivery: Start Tube Feeding  Nutritional    Initiation and tolerance of EN

## 2022-07-21 NOTE — CONSULTS
Palliative Care Consult Note  Patient: Hadley Martini  Gender: male  YOB: 1955  Unit/Bed: A063/P622-35  Code Status: DNR-CCA  Inpatient Treatment Team: Treatment Team: Attending Provider: Izzy Hanna MD; Registered Nurse: Lia Oconnell, RN; Registered Nurse: Anita Cardoso, RN; : Puala Echols, RN; Registered Nurse: Yari Alcantara, RN  Admit Date:  7/21/2022    Chief Complaint: Fever    History of Presenting Illness:      Hadley Martini is a 74 y.o. male on hospital day 0 with a history of  asthma, COPD, depression, right parietal CVA, schizophrenia,hypercholesterolemia, hypertension osteoarthritis, pituitary macroadenoma. Resident of [facility] brought in for fever, multiple wounds, both feet and a tunneling wound on left hip. Nonverbal which is his baseline. Workup shows sepsis and hypotension, maintain his own airway. Has a feeding tube in place. I find him sleeping in his room, he does not respond to voice or light touch. When I touch areas near  his wounds his eyes open and he moans. Resps even and non labored. No vomiting or abdominal guarding. Labs reviewed  Albumin 3.1  ALT 55  AST 48  Alk phos 131  WBC 15.4    Pall care consulted for code status and goals of care    Review of Systems:       Review of Systems   Unable to perform ROS: Patient nonverbal     Physical Examination:       BP (!) 96/53   Pulse 84   Temp (!) 100.8 °F (38.2 °C) (Oral)   Resp 26   Ht 5' 5\" (1.651 m)   Wt 201 lb 4.8 oz (91.3 kg)   SpO2 96%   BMI 33.50 kg/m²    Physical Exam  Vitals and nursing note reviewed. Constitutional:       General: He is not in acute distress. Appearance: He is ill-appearing. He is not diaphoretic. HENT:      Head: Normocephalic and atraumatic. Right Ear: External ear normal.      Left Ear: External ear normal.      Nose: Nose normal.      Mouth/Throat:      Mouth: Mucous membranes are dry. Pharynx: No oropharyngeal exudate.    Eyes: General: No scleral icterus. Right eye: No discharge. Left eye: No discharge. Conjunctiva/sclera: Conjunctivae normal.      Pupils: Pupils are equal, round, and reactive to light. Neck:      Thyroid: No thyromegaly. Vascular: No JVD. Trachea: No tracheal deviation. Cardiovascular:      Rate and Rhythm: Normal rate and regular rhythm. Heart sounds: Normal heart sounds. Pulmonary:      Effort: Pulmonary effort is normal. No respiratory distress. Breath sounds: Normal breath sounds. No stridor. No wheezing or rales. Chest:      Chest wall: No tenderness. Abdominal:      General: Bowel sounds are normal. There is no distension. Palpations: Abdomen is soft. There is no mass. Tenderness: There is no abdominal tenderness. There is no guarding or rebound. Musculoskeletal:         General: No tenderness or deformity. Cervical back: Normal range of motion and neck supple. Comments: Contractures bilateral legs   Lymphadenopathy:      Cervical: No cervical adenopathy. Skin:     General: Skin is warm and dry. Capillary Refill: Capillary refill takes less than 2 seconds. Findings: No erythema or rash. Neurological:      Mental Status: He is unresponsive. Cranial Nerves: No cranial nerve deficit. Coordination: Coordination normal.      Comments: Only responds to pain   Psychiatric:         Attention and Perception: He is inattentive. Speech: He is noncommunicative.        Allergies:      No Known Allergies    Medications:      Current Facility-Administered Medications   Medication Dose Route Frequency Provider Last Rate Last Admin    sodium chloride flush 0.9 % injection 5-40 mL  5-40 mL IntraVENous 2 times per day MARICRUZ Dozier - JAMAL        sodium chloride flush 0.9 % injection 5-40 mL  5-40 mL IntraVENous PRN MARICRUZ Mistry CNP        0.9 % sodium chloride infusion   IntraVENous PRN MARICRUZ Dozier - JAMAL enoxaparin (LOVENOX) injection 40 mg  40 mg SubCUTAneous Daily MARICRUZ Mistry CNP        ondansetron (ZOFRAN-ODT) disintegrating tablet 4 mg  4 mg Oral Q8H PRN MARICRUZ Mistry CNP        Or    ondansetron (ZOFRAN) injection 4 mg  4 mg IntraVENous Q6H PRN MARICRUZ Mistry CNP        polyethylene glycol (GLYCOLAX) packet 17 g  17 g Oral Daily PRN MARICRUZ Virgen CNP        acetaminophen (TYLENOL) tablet 650 mg  650 mg Oral Q6H PRN MARICRUZ Mistry CNP        Or    acetaminophen (TYLENOL) suppository 650 mg  650 mg Rectal Q6H PRN MARICRUZ Mistry CNP        0.9 % sodium chloride infusion   IntraVENous Continuous MARICRUZ Mistry CNP        piperacillin-tazobactam (ZOSYN) 3,375 mg in dextrose 5 % 50 mL IVPB extended infusion (mini-bag)  3,375 mg IntraVENous Q8H MARICRUZ Mistry CNP        vancomycin (VANCOCIN) intermittent dosing (placeholder)   Other RX Placeholder MARICRUZ Mistry CNP        vancomycin (VANCOCIN) 1,250 mg in dextrose 5 % 250 mL IVPB (ADDAVIAL)  1,250 mg IntraVENous Q12H MARICRUZ Mistry CNP           History:      PM Hx:  Past Medical History:   Diagnosis Date    Abdominal pain     Asthma     Bloating symptom     COPD (chronic obstructive pulmonary disease) (HCC)     COPD (chronic obstructive [facility] disease) ([facility]     Depression     Depression     Diarrhea     Drug-seeking behavior 02/03/2020    Emphysema/COPD ([facility]     History of rectal bleeding     Hypercholesteremia     past trx > 5 yrs    Hypercholesteremia     Hypercholesteremia     Hypertension     meds > 3 yrs    Hypothyroid 07/21/2022    Lung disease     Nontraumatic subarachnoid hemorrhage ([facility]     [address] PAPERWORK    RANDI on CPAP     patient relates he doesn't use cpap at this time    Osteoarthritis     left hip    Osteoarthritis     Osteoarthritis of left hip     Other hydrocephalus ([facility]     DX OBTAINED FROM NURSING HOME PAPERWORK    Physiological consequence of immobility 03/09/2022    Pituitary macroadenoma ([address]     Sleep apnea        PSHx:  Past Surgical History:   Procedure Laterality Date    COLONOSCOPY  5/2/14    PRATEEKK    COLONOSCOPY N/A 12/12/2019    COLONOSCOPY DIAGNOSTIC performed by Cnidy Graves MD at [place] [address], DIAGNOSTIC      EYE SURGERY Bilateral     cataracts    [address] ?     index finger laceration repair    HERNIA REPAIR  2004    repair [facility]     JOINT REPLACEMENT Left     left hip    MA COLON CA SCRN NOT HI RSK IND N/A 5/17/2017    COLONOSCOPY performed by Tessa Bettencourt MD at [facility] Left 4/22/2019    LEFT HIP TOTAL HIP ARTHROPLASTY, LATERAL DECUB, GRACIE performed by Gaurav Bull MD at [address]  12/12/2019    EGD DIAGNOSTIC ONLY performed by Cindy Graves MD at [address] Hx:  Social History     Socioeconomic History    Marital status: Legally      Spouse name: None    Number of children: None    Years of education: None    Highest education level: None   Tobacco Use    Smoking status: Former     Packs/day: 1.50     Years: 46.00     Pack years: 69.00     Types: Cigarettes     Quit date: 3/20/2019     Years since quitting: 3.3    Smokeless tobacco: Never   Vaping Use    Vaping Use: Unknown   Substance and Sexual Activity    Alcohol use: Yes    Drug use: No       Family Hx:  Family History   Problem Relation Age of Onset    Colon Cancer Mother     Cancer Mother     Arthritis Mother     High Blood Pressure Father     Heart Disease Father     Diabetes Sister     No Known Problems Brother     No Known Problems Brother     No Known Problems Brother     Other Brother         Sepsis    Other Brother         Killed    No Known Problems Sister     No Known Problems Sister     No Known Problems Son        LABS: Reviewed     CBC:  Lab Results   Component Value Date/Time    WBC 15.4 07/21/2022 03:15 AM    RBC 3.62 07/21/2022 03:15 AM    HGB 10.8 07/21/2022 03:15 AM    HCT 32.5 07/21/2022 03:15 AM    MCV 89.7 07/21/2022 03:15 AM    MCH 29.8 07/21/2022 03:15 AM    MCHC 33.3 07/21/2022 03:15 AM    RDW 14.7 07/21/2022 03:15 AM     07/21/2022 03:15 AM    MPV 8.7 04/17/2014 09:12 AM     CBC with Differential:   Lab Results   Component Value Date/Time    WBC 15.4 07/21/2022 03:15 AM    RBC 3.62 07/21/2022 03:15 AM    HGB 10.8 07/21/2022 03:15 AM    HCT 32.5 07/21/2022 03:15 AM     07/21/2022 03:15 AM    MCV 89.7 07/21/2022 03:15 AM    MCH 29.8 07/21/2022 03:15 AM    MCHC 33.3 07/21/2022 03:15 AM    RDW 14.7 07/21/2022 03:15 AM    LYMPHOPCT 18.0 07/21/2022 03:15 AM    MONOPCT 2.8 07/21/2022 03:15 AM    BASOPCT 1.0 07/21/2022 03:15 AM    MONOSABS 0.5 07/21/2022 03:15 AM    LYMPHSABS 2.8 07/21/2022 03:15 AM    EOSABS 0.0 07/21/2022 03:15 AM    BASOSABS 0.2 07/21/2022 03:15 AM     CMP:    Lab Results   Component Value Date/Time     07/21/2022 03:15 AM    K 4.3 07/21/2022 03:15 AM    K 3.9 02/20/2020 03:33 AM     07/21/2022 03:15 AM    CO2 22 07/21/2022 03:15 AM    BUN 18 07/21/2022 03:15 AM    CREATININE 0.61 07/21/2022 03:15 AM    GFRAA >60.0 07/21/2022 03:15 AM    LABGLOM >60.0 07/21/2022 03:15 AM    GLUCOSE 96 07/21/2022 03:15 AM    PROT 7.2 07/21/2022 03:15 AM    LABALBU 3.1 07/21/2022 03:15 AM    CALCIUM 9.4 07/21/2022 03:15 AM    BILITOT 0.9 07/21/2022 03:15 AM    ALKPHOS 131 07/21/2022 03:15 AM    AST 48 07/21/2022 03:15 AM    ALT 55 07/21/2022 03:15 AM     BMP:    Lab Results   Component Value Date/Time     07/21/2022 03:15 AM    K 4.3 07/21/2022 03:15 AM    K 3.9 02/20/2020 03:33 AM     07/21/2022 03:15 AM    CO2 22 07/21/2022 03:15 AM    BUN 18 07/21/2022 03:15 AM    LABALBU 3.1 07/21/2022 03:15 AM    CREATININE 0.61 07/21/2022 03:15 AM    CALCIUM 9.4 07/21/2022 03:15 AM    GFRAA >60.0 07/21/2022 03:15 AM    LABGLOM >60.0 07/21/2022 03:15 AM    GLUCOSE 96 07/21/2022 03:15 AM     TSH:   Lab Results   Component Value Date/Time    TSH <0.010 06/17/2022 07:15 AM     Vitamin B12 and Folate: No components found for: FOLIC,  O66  Urinalysis:   Lab Results   Component Value Date/Time    NITRU Negative 07/21/2022 03:54 AM    WBCUA 3-5 02/13/2022 12:45 AM    BACTERIA RARE 02/13/2022 12:45 AM    RBCUA None seen 02/13/2022 12:45 AM    BLOODU Negative 07/21/2022 03:54 AM    SPECGRAV 1.017 07/21/2022 03:54 AM    GLUCOSEU Negative 07/21/2022 03:54 AM           FUNCTIONAL ADL´S:     Independent: [  ] Eating, [   ] Dressing, [   ] Transferring, [   ] Sapphire Hu, [   ] Bathing, [   ] Continence  Dependent   : [  x] Eating, [  x ] Dressing, [   x] Transferring, [  x ] Toileting, [  x ] Bathing, [ x  ] Continence  W. assistant : [  ] Eating, [   ] Dressing, [   ] Transferring, [   ] Sapphire Le, [   ] Pham Plata, [   ] Continence    Radiology: Reviewed      XR CHEST PORTABLE    Result Date: 7/21/2022  EXAMINATION: Portable AP ERECT view of the chest. CLINICAL HISTORY: Fever and left hip tunneling wound. DATE: 7/21/2022 3:20 AM COMPARISONS: 2/13/2022 FINDINGS: The heart is mildly enlarged, unchanged. There are atherosclerotic calcifications in the aortic arch. Lungs are clear without consolidation. No pleural effusion or pneumothorax. There are mild degenerative changes in spine. End of endotracheal  tube lies 3 cm above the elvin. NO ACUTE CARDIOPULMONARY ABNORMALITY. NO CHANGE. Assessment and plan:      -Advance Care Planning  Attempted to discuss goals of care with his wife. I used our telephone language interpretor service. Her telephone number was wrong in our system, I left a message through interpretor with first contact [address] Remains DNR CCA  MPOA and Living will in place per our records      -Goals of Care Discussion:  Unable to discuss disease process or goals of treatment. Will continue to attempt to contact family.         In the setting of Functional status 10%, protein calorie malnutrition with a peg tube, multiple nonhealing pressure wounds, Sepsis, osteomyelitis, untreated pituitary macroadenoma, hx of CVA, multiple hospitalizations [facility] would qualify for hospice should family decide burden of treatment is greater than benefit.     While hospitalized treated for multiple medical problems including:  Sepsis  Hypotension  Hypothyroid  Schizophrenia  Wounds      Electronically signed by MARICRUZ Rogers CNP on 7/21/2022 at 8:19 AM

## 2022-07-21 NOTE — PROGRESS NOTES
[facility] OCCUPATIONAL THERAPY EVALUATION - ACUTE     NAME: Rodolfo Howard  : 1955 (74 y.o.)  MRN: [id number]  CODE STATUS: DNR-CCA  Room: M591/T049-89    Date of Service: 2022    Patient Diagnosis(es): Septicemia ([address] [A41.9]  Elevated troponin [R77.8]  Sepsis ([address] [A41.9]  Decreased thyroid stimulating hormone (TSH) level [R79.89]  Osteomyelitis of other site, unspecified type [facility]) [M86.9]   Patient Active Problem List    Diagnosis Date Noted    Sepsis ([address] 2022    Hypothyroid 2022    Osteomyelitis ([address] 2022    Physiological consequence of immobility 2022    Schizophrenia ([address] 2020    Psychogenic polydipsia 2020    Schizoaffective disorder ([address] 2020    Hyperprolactinemia ([address]     Hyponatremia 2020    Gastritis without bleeding     Adenomatous polyp of colon     Rectal bleeding     Grade I hemorrhoids     Pituitary macroadenoma ([address] 2019    HTN (hypertension) 2019    Hypercholesteremia 2019    Blurry vision 2019    Class 2 obesity in adult 2019    Paranoid schizophrenia ([address] 2019    Delirium 2019    Suprasellar mass 2019    Sprain of ribs, initial encounter 2019    Aftercare following joint replacement surgery 2019    Presence of left artificial hip joint 2019    Unilateral primary osteoarthritis, left knee 2019    Other specified soft tissue disorders 2019    Other acute postprocedural pain 2019    Pain in left lower leg 2019    Tobacco use 2019    Influenza 2019    RANDI on CPAP 2019    Unilateral primary osteoarthritis, left hip 2018    Pulmonary emphysema ([address] 2017    Osteoarthritis     Depression     Asthma         Past Medical History:   Diagnosis Date    Abdominal pain     Asthma     Bloating symptom     COPD (chronic obstructive pulmonary disease) (HCC)     [facility]D [facility]) [facility] Depression     Depression     Diarrhea     Drug-seeking behavior 02/03/2020    Emphysema/COPD ([facility]     History of rectal bleeding     Hypercholesteremia     past trx > 5 yrs    Hypercholesteremia     Hypercholesteremia     Hypertension     meds > 3 yrs    Hypothyroid 07/21/2022    Lung disease     Nontraumatic subarachnoid hemorrhage ([facility]     [address] PAPERWORK    RANDI on CPAP     patient relates he doesn't use cpap at this time    Osteoarthritis     left hip    Osteoarthritis     Osteoarthritis of left hip     Other hydrocephalus ([facility]     [address] PAPERWORK    Physiological consequence of immobility 03/09/2022    Pituitary macroadenoma ([facility]     Sleep apnea      Past Surgical History:   Procedure Laterality Date    COLONOSCOPY  5/2/14    JENNIFER    COLONOSCOPY N/A 12/12/2019    COLONOSCOPY DIAGNOSTIC performed by Yelena Hardin MD at [place] [address], DIAGNOSTIC      EYE SURGERY Bilateral     cataracts    [address] ? index finger laceration repair    HERNIA REPAIR  2004    repair [facility]     JOINT REPLACEMENT Left     left hip    DE COLON CA SCRN NOT HI RSK IND N/A 5/17/2017    COLONOSCOPY performed by Zach Fischer MD at [address] [place] [address] Left 4/22/2019    LEFT HIP TOTAL HIP ARTHROPLASTY, LATERAL DECUB, GRACIE performed by Dieudonne Ramos MD at [facility]  12/12/2019    EGD DIAGNOSTIC ONLY performed by Yelena Hardin MD at [facility]        Restrictions  Restrictions/Precautions: NPO (trach in place)        Safety Devices: Safety Devices  Type of Devices: All fall risk precautions in place; Bed alarm in place; Left in bed;Nurse notified     Patient's date of birth confirmed: Pt verbally unable, verified via wrist band    General:  Chart Reviewed: Yes  Patient assessed for rehabilitation services?: Yes    Subjective  Subjective: Pt unable to make subjective (degrees)  Right Hand General PROM: Significantly limited d/t tone  Right Hand AROM (degrees)  Right Hand General AROM: Minimal AROM of all joints    UE STRENGTH:  Strength: Grossly decreased, non-functional    UE COORDINATION:  Coordination: Grossly decreased, non-functional    UE TONE:  Tone: Abnormal (Increased in all limbs)    UE SENSATION:  Sensation: Impaired (Appears impaired)    Hand Dominance:  Hand Dominance  Hand Dominance:  (Pt unable to state. R more contracted than L)    ADL Status:  ADL  Feeding: NPO;Dependent/Total  Grooming: Dependent/Total  UE Bathing: Dependent/Total  LE Bathing: Dependent/Total  UE Dressing: Dependent/Total  LE Dressing: Dependent/Total  Toileting: Dependent/Total  Additional Comments: Simulated ADLs as above. Limited d/t tone and minimal ability to participate  Toilet Transfers  Toilet Transfer: Unable to assess  Toilet Transfers Comments: Anticipate dependent    Functional Mobility:  Patient ambulated NT due to Pt unable to safely come to EOB. Bed Mobility  Bed mobility  Rolling to Left: Dependent/Total  Rolling to Right: Dependent/Total  Bed Mobility Comments: not safe to test other functional activities    Seated and Standing Balance:  Balance  Sitting:  (Unable to assess)  Standing:  (Unable to assess)    Functional Endurance:  Activity Tolerance  Activity Tolerance: Patient limited by fatigue  Activity Tolerance Comments: Unable to remain alert    D/C Recommendations:  OT D/C RECOMMENDATIONS  REQUIRES OT FOLLOW-UP: No    Equipment Recommendations:  OT Equipment Recommendations  Other: Continue to assess    OT Education:   Patient Education  Education Provided Comments: Pt minimally responsive and unable to attend to education    OT Follow Up:   OT D/C RECOMMENDATIONS  REQUIRES OT FOLLOW-UP: No       Assessment/Discharge Disposition:  Assessment: Pt is a 74year old man from SNF who presents to [facility] with Sepsis.  Pt. demonstrates significant limtiations in all mobility and activity tasks. Pt. is baseline depenedent per chart review and appears to be at that level at this time. No acute OT indicated. Prognosis: Guarded  Discharge Recommendations: Continue to assess pending progress  Decision Making: Low Complexity  History: Pt's medical history is moderately complex  Exam: Pt. has no new performance deficits  Assistance / Modification: pt. requires total A    AMPAC (Six Click) Self care Score   How much help for putting on and taking off regular lower body clothing?: Total  How much help for Bathing?: Total  How much help for Toileting?: Total  How much help for putting on and taking off regular upper body clothing?: Total  How much help for taking care of personal grooming?: Total  How much help for eating meals?: Total  AM-PAC Inpatient Daily Activity Raw Score: 6  AM-PAC Inpatient ADL T-Scale Score : 17.07  ADL Inpatient CMS 0-100% Score: 100    Therapy key for assistance levels -   Independent/Mod I = Pt. is able to perform task with no assistance but may require a device   Stand by assistance = Pt. does not perform task at an independent level but does not need physical assistance, requires verbal cues  Minimal, Moderate, Maximal Assistance = Pt. requires physical assistance (25%, 50%, 75% assist from helper) for task but is able to actively participate in task   Dependent = Pt. requires total assistance with task and is not able to actively participate with task completion     Plan:  Plan  Times per Week: No acute OT    Goals:     Patient Goal: Patient goals : Pt unable to state a goal     Discussed and agreed upon: No Comments:       Therapy Time:   Individual   Time In 1450   Time Out 1458   Minutes 8     Eval: 8 minutes     Electronically signed by:     LLUVIA Jacome,   7/21/2022, 3:56 PM

## 2022-07-21 NOTE — H&P
ENDOSCOPY, COLON, DIAGNOSTIC      EYE SURGERY Bilateral     cataracts    FINGER SURGERY Right 1999 ?     index finger laceration repair    HERNIA REPAIR  2004    repair [facility]     JOINT REPLACEMENT Left     left hip    [place] COLON CA SCRN NOT HI RSK IND N/A 5/17/2017    COLONOSCOPY performed by Keegan Grewal MD at [address] [facility] Left 4/22/2019    LEFT HIP TOTAL HIP ARTHROPLASTY, LATERAL DECUB, GRACIE performed by Winnie Gray MD at [facility]  12/12/2019    EGD DIAGNOSTIC ONLY performed by Matilda Wills MD at [facility]:    Family History   Problem Relation Age of Onset    Colon Cancer Mother     Cancer Mother     Arthritis Mother     High Blood Pressure Father     Heart Disease Father     Diabetes Sister     No Known Problems Brother     No Known Problems Brother     No Known Problems Brother     Other Brother         Sepsis    Other Brother         Killed    No Known Problems Sister     No Known Problems Sister     No Known Problems Son      SOCIAL HISTORY:    Social History     Socioeconomic History    Marital status: Legally      Spouse name: Not on file    Number of children: Not on file    Years of education: Not on file    Highest education level: Not on file   Occupational History    Not on file   Tobacco Use    Smoking status: Former     Packs/day: 1.50     Years: 46.00     Pack years: 69.00     Types: Cigarettes     Quit date: 3/20/2019     Years since quitting: 3.3    Smokeless tobacco: Never   Vaping Use    Vaping Use: Unknown   Substance and Sexual Activity    Alcohol use: Yes    Drug use: No    Sexual activity: Not on file   Other Topics Concern    Not on file   Social History Narrative    Not on file     Social Determinants of Health     Financial Resource Strain: Not on file   Food Insecurity: Not on file   Transportation Needs: Not on file   Physical Activity: Not on file   Stress: Not on file Social Connections: Not on file   Intimate Partner Violence: Not on file   Housing Stability: Not on file     MEDICATIONS:   Prior to Admission medications    Medication Sig Start Date End Date Taking? Authorizing Provider   desmopressin (DDAVP) 0.1 MG tablet Take 0.1 mg by mouth daily Give 0.05mg via PEG tube two times a day for clotting promoter    Historical Provider, MD   esomeprazole Magnesium (NEXIUM) 40 MG PACK Take 40 mg by mouth daily    Historical Provider, MD   levETIRAcetam (KEPPRA) 100 MG/ML solution Take 750 mg/kg by mouth 2 times daily    Historical Provider, MD   hyoscyamine (LEVSIN/SL) 125 MCG sublingual tablet Place 125 mcg under the tongue every 6 hours as needed for Cramping    Historical Provider, MD   LORazepam (ATIVAN) 0.5 MG tablet Take 0.5 mg by mouth every 4 hours as needed for Anxiety. Historical Provider, MD   morphine sulfate 20 MG/ML concentrated oral solution Take 5 mg by mouth every 2 hours as needed for Pain.     Historical Provider, MD   levothyroxine (SYNTHROID) 200 MCG tablet Take 200 mcg by mouth Daily Give one tablet via PEG tube every Mon,Tue, Wed, Thu, Fri, Sat    Historical Provider, MD   vitamin D (CHOLECALCIFEROL) 125 MCG (5000 UT) CAPS capsule Take 50,000 Units by mouth daily Give 1/25mg ([id number]) one capsule via peg tube one time a day every Monday for supplement    Historical Provider, MD   lidocaine (LMX) 4 % cream Apply a half dollar sized amount to intact skin topically up to twice daily as needed for pain 6/15/20   Maria Holly MD   paliperidone (INVEGA) 6 MG extended release tablet Take 1 tablet by mouth daily 3/19/20   Keith Wright MD   cabergoline (DOSTINEX) 0.5 MG tablet Take 1 tablet by mouth Twice a Week ON Monday and Thursday 2/27/20   Nimco Barrera MD   furosemide (LASIX) 40 MG tablet Take 40 mg by mouth daily    Historical Provider, MD   meloxicam (MOBIC) 15 MG tablet Take 15 mg by mouth daily    Historical Provider, MD   potassium chloride (MICRO-K) 10 MEQ extended release capsule Take 10 mEq by mouth daily    Historical Provider, MD   fenofibrate 160 MG tablet Take 150 mg by mouth daily    Historical Provider, MD   aspirin 81 MG EC tablet Take 1 tablet by mouth 2 times daily 4/24/19 5/24/19  Claudia Crain APRN - CNP   lisinopril (PRINIVIL;ZESTRIL) 20 MG tablet Take 2 tablets by mouth daily 4/24/19   Yue Brito MD   Respiratory Therapy Supplies SANDRA Full face CPAP mask and supplies 2/28/19   Grace Georges MD   CPAP Machine MISC by Does not apply route New CPAP with 6 cm 10/10/18   Grace Georges MD   albuterol (PROVENTIL) (2.5 MG/3ML) 0.083% nebulizer solution Take 3 mLs by nebulization every 6 hours as needed for Wheezing 8/30/18   Grace Georges MD   STIOLTO RESPIMAT 2.5-2.5 MCG/ACT AERS Inhale 2 puff in AM 8/30/18   Grace Georges MD       ALLERGIES: Patient has no known allergies. REVIEW OF SYSTEM:   Review of Systems   Unable to perform ROS: Patient nonverbal       OBJECTIVE  PHYSICAL EXAM: /71   Pulse 90   Temp (!) 100.8 °F (38.2 °C) (Oral)   Resp 25   Ht 5' 5\" (1.651 m)   Wt 180 lb (81.6 kg)   SpO2 94%   BMI 29.95 kg/m²   Physical Exam  Vitals and nursing note reviewed. Constitutional:       General: He is sleeping. He is not in acute distress. Appearance: He is well-developed and normal weight. He is not ill-appearing, toxic-appearing or diaphoretic. HENT:      Head: Atraumatic. Nose: Nose normal. No congestion or rhinorrhea. Mouth/Throat:      Lips: Pink. Mouth: Mucous membranes are moist.      Tongue: Tongue does not deviate from midline. Pharynx: Oropharynx is clear. No oropharyngeal exudate or posterior oropharyngeal erythema. Eyes:      General: Lids are normal. No visual field deficit or scleral icterus. Left eye: Discharge present. Extraocular Movements: Extraocular movements intact. Right eye: No nystagmus. Left eye: No nystagmus. Conjunctiva/sclera: Conjunctivae normal.   Neck:      Thyroid: No thyromegaly. Vascular: No JVD. Trachea: Trachea and phonation normal.      Comments: trach  Cardiovascular:      Rate and Rhythm: Normal rate and regular rhythm. Pulses: Normal pulses. Radial pulses are 2+ on the right side and 2+ on the left side. Dorsalis pedis pulses are 2+ on the right side and 2+ on the left side. Heart sounds: Normal heart sounds, S1 normal and S2 normal. No murmur heard. Pulmonary:      Effort: Pulmonary effort is normal. No respiratory distress. Breath sounds: Normal air entry. No stridor or decreased air movement. Rhonchi present. No decreased breath sounds, wheezing or rales. Chest:      Chest wall: No tenderness. Abdominal:      General: Abdomen is flat. Bowel sounds are normal. There is no distension. Palpations: Abdomen is soft. Tenderness: There is no abdominal tenderness. There is no guarding. Comments: Peg tube   Musculoskeletal:         General: No swelling, tenderness, deformity or signs of injury. Normal range of motion. Cervical back: Normal range of motion and neck supple. No rigidity or tenderness. Right lower leg: No edema. Left lower leg: No edema. Feet:      Right foot:      Skin integrity: Skin integrity normal.      Left foot:      Skin integrity: Skin integrity normal.   Skin:     General: Skin is warm and dry. Capillary Refill: Capillary refill takes less than 2 seconds. Coloration: Skin is not jaundiced or pale. Findings: No bruising, erythema, lesion or rash. Nails: There is no clubbing. Neurological:      Mental Status: Mental status is at baseline. Cranial Nerves: Cranial nerves are intact. Sensory: Sensation is intact. Motor: Weakness present.    Psychiatric:         Attention and Perception: Attention and perception normal.         Mood and Affect: Affect normal.         Cognition and Phosphatase 131 (H) 35 - 104 U/L    ALT 55 (H) 0 - 41 U/L    AST 48 (H) 0 - 40 U/L    Globulin 4.1 (H) 2.3 - 3.5 g/dL   Lactate, Sepsis    Collection Time: 07/21/22  3:15 AM   Result Value Ref Range    Lactic Acid, Sepsis 1.3 0.5 - 1.9 mmol/L   Lipase    Collection Time: 07/21/22  3:15 AM   Result Value Ref Range    Lipase 12 12 - 95 U/L   Magnesium    Collection Time: 07/21/22  3:15 AM   Result Value Ref Range    Magnesium 2.2 1.7 - 2.4 mg/dL   Procalcitonin    Collection Time: 07/21/22  3:15 AM   Result Value Ref Range    Procalcitonin 0.21 (H) 0.00 - 0.15 ng/mL   Protime-INR    Collection Time: 07/21/22  3:15 AM   Result Value Ref Range    Protime 16.1 (H) 12.3 - 14.9 sec    INR 1.3    Troponin    Collection Time: 07/21/22  3:15 AM   Result Value Ref Range    Troponin 0.093 (HH) 0.000 - 0.010 ng/mL   TSH with Reflex    Collection Time: 07/21/22  3:15 AM   Result Value Ref Range    TSH <0.010 (L) 0.440 - 3.860 uIU/mL   C-Reactive Protein    Collection Time: 07/21/22  3:15 AM   Result Value Ref Range    .9 (H) 0.0 - 5.0 mg/L   T4, Free    Collection Time: 07/21/22  3:15 AM   Result Value Ref Range    T4 Free 0.81 (L) 0.84 - 1.68 ng/dL   Sedimentation Rate    Collection Time: 07/21/22  3:22 AM   Result Value Ref Range    Sed Rate 99 (H) 0 - 20 mm   Urinalysis with Reflex to Culture    Collection Time: 07/21/22  3:54 AM    Specimen: Urine, clean catch   Result Value Ref Range    Color, UA Yellow Straw/Yellow    Clarity, UA Clear Clear    Glucose, Ur Negative Negative mg/dL    Bilirubin Urine Negative Negative    Ketones, Urine Negative Negative mg/dL    Specific Gravity, UA 1.017 1.005 - 1.030    Blood, Urine Negative Negative    pH, UA 7.5 5.0 - 9.0    Protein, UA TRACE (A) Negative mg/dL    Urobilinogen, Urine 2.0 (A) <2.0 E.U./dL    Nitrite, Urine Negative Negative    Leukocyte Esterase, Urine Negative Negative    Urine Reflex to Culture Not Indicated    EKG 12 Lead    Collection Time: 07/21/22  4:09 AM Result Value Ref Range    Ventricular Rate 97 BPM    Atrial Rate 97 BPM    P-R Interval 130 ms    QRS Duration 102 ms    Q-T Interval 350 ms    QTc Calculation (Bazett) 444 ms    P Axis 46 degrees    R Axis -22 degrees    T Axis 17 degrees       IMAGING:   No results found. VTE Prophylaxis: low molecular weight heparin -  start     ASSESSMENT AND PLAN    Principal Problem:      Sepsis ([facility] - HR > 90, hypotension, rr>25, temp 100.8, source of infection - wound, lactic acid 1.3, blood cultures pending, wbc 15.4. Plan: admit, vancomycin, zosyn, IVF, wound cultures, consult infectious disease and wound care. Podiatry for heel wounds. Active Problems:      Hypothyroid - takes synthroid, last TSH <0.010, free T4 0.81. Plan: Will resume home meds, as tolerated, when home med list is completed by nursing. HTN (hypertension) - patient is hypotensive due to sepsis, takes ACE. Plan: Will resume home meds once patient's blood pressure can tolerate, when home med list is completed by nursing. Schizophrenia ([facility] - patient on invega per Dr. Balwinder Tinoco. Plan: Will resume home meds, as tolerated, when home med list is completed by nursing.         Plan of care discussed with: patient    SIGNATURE: MARICRUZ Gutiérrez - CNP  DATE: July 21, 2022  TIME: 5:09 AM     John Chandler MD - supervising physician

## 2022-07-21 NOTE — CONSULTS
Giselle Coon  1955  male  Medical Record Number: [id number]    Patient informed that I am an Infectious Disease physician and permission obtained from the patient to speak in front of any visitors prior to any discussion for HIPPA purposes. HPI:     Patient with nonverbal baseline with L hip wound that is infected with surrounding cellulitis and purulent drainage. No cultures yet obtained. Discussed with nurse at bedside to obtain culture. Apparently had fever at snf      Review of Systems: All 14 review of systems were not discussed with the patient       Past Medical History:   Diagnosis Date    Abdominal pain     Asthma     Bloating symptom     COPD (chronic obstructive pulmonary disease) (HCC)     COPD (chronic obstructive pulmonary disease) ([address]     Depression     Depression     Diarrhea     Drug-seeking behavior 02/03/2020    Emphysema/COPD ([facility]     History of rectal bleeding     Hypercholesteremia     past trx > 5 yrs    Hypercholesteremia     Hypercholesteremia     Hypertension     meds > 3 yrs    Hypothyroid 07/21/2022    Lung disease     Nontraumatic subarachnoid hemorrhage ([facility]     [facility] [address] PAPERWORK    RANDI on CPAP     patient relates he doesn't use cpap at this time    Osteoarthritis     left hip    Osteoarthritis     Osteoarthritis of left hip     Other hydrocephalus ([address]     [address] PAPERWORK    Physiological consequence of immobility 03/09/2022    Pituitary macroadenoma ([facility]     Sleep apnea        Past Surgical History:   Procedure Laterality Date    COLONOSCOPY  5/2/14    JENNIFER    COLONOSCOPY N/A 12/12/2019    COLONOSCOPY DIAGNOSTIC performed by Mansoor Louis MD at [place] [address], DIAGNOSTIC      EYE SURGERY Bilateral     cataracts    [address] ?     index finger laceration repair    HERNIA REPAIR  2004    repair [facility]     JOINT REPLACEMENT Left     left hip    AR COLON CA SCRN NOT HI RSK IND N/A Nutritional Supplements (ISOSOURCE 1.5 NUSRAT PO) 60 mLs by Percutaneous Endoscopic Gastrostomy route continuous      carboxymethylcellulose (ARTIFICIAL TEARS) 1 % ophthalmic solution Place 2 drops into both eyes daily      atorvastatin (LIPITOR) 20 MG tablet 20 mg by PEG Tube route nightly      Atropine Sulfate 0.01 % SOLN Place 1 drop under the tongue as needed (EVERY TWO HOURS FOR REDUCTION OF SECRETIONS)      sulfamethoxazole-trimethoprim (BACTRIM DS;SEPTRA DS) 800-160 MG per tablet 1 tablet by PEG Tube route in the morning and 1 tablet before bedtime. chlorhexidine (PERIDEX) 0.12 % solution Take 15 mLs by mouth in the morning, at noon, and at bedtime FOR USE WITH TOOTHETTES FOR MOUTH CARE      polyethylene glycol (MIRALAX) 17 g PACK packet 17 g by Per G Tube route in the morning and at bedtime      acetaminophen (TYLENOL) 325 MG tablet 650 mg by PEG Tube route every 4 hours as needed for Pain      sennosides-docusate sodium (SENOKOT-S) 8.6-50 MG tablet 1 tablet by PEG Tube route in the morning and at bedtime      desmopressin (DDAVP) 0.1 MG tablet 0.5 mg by PEG Tube route in the morning and at bedtime Give 0.05mg via PEG tube two times a day for clotting promoter      esomeprazole Magnesium (NEXIUM) 40 MG PACK 40 mg by Per G Tube route in the morning. levETIRAcetam (KEPPRA) 100 MG/ML solution 750 mg/kg by PEG Tube route 2 times daily      hyoscyamine (LEVSIN/SL) 125 MCG sublingual tablet Place 125 mcg under the tongue every 6 hours as needed for Cramping      LORazepam (ATIVAN) 0.5 MG tablet 0.5 mg by PEG Tube route every 4 hours as needed for Anxiety. morphine sulfate 20 MG/ML concentrated oral solution Take 5 mg by mouth every 2 hours as needed for Pain.      levothyroxine (SYNTHROID) 200 MCG tablet 200 mcg by PEG Tube route in the morning.  Give one tablet via PEG tube every Mon,Tue, Wed, Thu, Fri, Sat .      vitamin D (CHOLECALCIFEROL) 125 MCG (5000 UT) CAPS capsule Take 50,000 Units by mouth daily Give 1/25mg ([id number]) one capsule via peg tube one time a day every Monday for supplement      lidocaine (LMX) 4 % cream Apply a half dollar sized amount to intact skin topically up to twice daily as needed for pain 1 Tube 1    paliperidone (INVEGA) 6 MG extended release tablet Take 1 tablet by mouth daily 30 tablet 1    cabergoline (DOSTINEX) 0.5 MG tablet Take 1 tablet by mouth Twice a Week ON Monday and Thursday 8 tablet 3    furosemide (LASIX) 40 MG tablet Take 40 mg by mouth daily      meloxicam (MOBIC) 15 MG tablet Take 15 mg by mouth daily      potassium chloride (MICRO-K) 10 MEQ extended release capsule Take 10 mEq by mouth daily      fenofibrate 160 MG tablet Take 150 mg by mouth daily      aspirin 81 MG EC tablet Take 1 tablet by mouth 2 times daily (Patient taking differently: Take 81 mg by mouth in the morning. ADMINISTER VIA PEG TUBE.) 60 tablet 0    lisinopril (PRINIVIL;ZESTRIL) 20 MG tablet Take 2 tablets by mouth daily 30 tablet 3    Respiratory Therapy Supplies SANDRA Full face CPAP mask and supplies 1 Device 0    CPAP Machine MISC by Does not apply route New CPAP with 6 cm 1 each 0    albuterol (PROVENTIL) (2.5 MG/3ML) 0.083% nebulizer solution Take 3 mLs by nebulization every 6 hours as needed for Wheezing (Patient taking differently: Take 2.5 mg by nebulization every 6 hours as needed for Wheezing or Shortness of Breath) 120 each 5    STIOLTO RESPIMAT 2.5-2.5 MCG/ACT AERS Inhale 2 puff in AM 1 Inhaler 5       Physical Exam:  Slightly turned to the right to alleviate pressure from hip  General: Patient appears in no acute distress, opens eyes and raises eyebrows. Does not track me  Skin: no new rashes   Skin on the L hip erythematous with wound that is packed  HEENT:  MMM, Neck is supple, trach in place  Heart: S1 S2  Lungs: bilaterally clear to auscultation  Abdomen: soft, ND, NTTP, +BS  Extrem:contractures no calf pain  Neuro exam: CN II-XII intact      Labs:    I have reviewed all lab results by electronic record, including most recent CBC, metabolic panel, and pertinent abnormalities were addressed from an infectious disease perspective. WBC trends are being monitored. Lab Results   Component Value Date/Time     07/21/2022 03:15 AM    K 4.3 07/21/2022 03:15 AM    K 3.9 02/20/2020 03:33 AM     07/21/2022 03:15 AM    CO2 22 07/21/2022 03:15 AM    BUN 18 07/21/2022 03:15 AM    CREATININE 0.61 07/21/2022 03:15 AM    GLUCOSE 96 07/21/2022 03:15 AM    CALCIUM 9.4 07/21/2022 03:15 AM      Lab Results   Component Value Date    WBC 15.4 (H) 07/21/2022    HGB 10.8 (L) 07/21/2022    HCT 32.5 (L) 07/21/2022    MCV 89.7 07/21/2022     07/21/2022       Radiology:   I have reviewed imaging results per electronic record and most pertinent abnormalities are being addressed from an infectious disease standpoint. Assessment:    Sepsis due to multiple wounds - bilateral LEs and tunneling wound L hip - photos reviewed  Fever and chills  Fever, tachy, leukocytosis, tachypnea ( resolved )       Plan:  Vanco and Zosyn while wound cultures pending - direct therapy with results  Discussed with nurse and obtained  Consider CT vs MRI hip for possible OM  CRP = 241    Febrile. . trending down. If worse will give one dose of gent  Follow up all cx results.      Catarina Arroyo D.O.

## 2022-07-21 NOTE — PROGRESS NOTES
left hip    Osteoarthritis     Osteoarthritis of left hip     Other hydrocephalus ([address]     [address] PAPERWORK    Physiological consequence of immobility 03/09/2022    Pituitary macroadenoma ([facility]     Sleep apnea        PAST SURGICAL HISTORY    Past Surgical History:   Procedure Laterality Date    COLONOSCOPY  5/2/14    JENNIFER    COLONOSCOPY N/A 12/12/2019    COLONOSCOPY DIAGNOSTIC performed by Sunny Essex, MD at [place] [address], DIAGNOSTIC      EYE SURGERY Bilateral     cataracts    [address] ?     index finger laceration repair    HERNIA REPAIR  2004    repair [facility]     JOINT REPLACEMENT Left     left hip    LA COLON CA SCRN NOT HI RSK IND N/A 5/17/2017    COLONOSCOPY performed by Duane Moran MD at [address] [place] [address] 4/22/2019    LEFT HIP TOTAL HIP ARTHROPLASTY, LATERAL DECUB, GRACIE performed by Thelma Ty MD at [address] [place] [address]  12/12/2019    EGD DIAGNOSTIC ONLY performed by Sunny Essex, MD at [place] [address]    Family History   Problem Relation Age of Onset    Colon Cancer Mother     Cancer Mother     Arthritis Mother     High Blood Pressure Father     Heart Disease Father     Diabetes Sister     No Known Problems Brother     No Known Problems Brother     No Known Problems Brother     Other Brother         Sepsis    Other Brother         Killed    No Known Problems Sister     No Known Problems Sister     No Known Problems Son        SOCIAL HISTORY    Social History     Tobacco Use    Smoking status: Former     Packs/day: 1.50     Years: 46.00     Pack years: 69.00     Types: Cigarettes     Quit date: 3/20/2019     Years since quitting: 3.3    Smokeless tobacco: Never   Vaping Use    Vaping Use: Unknown   Substance Use Topics    Alcohol use: Yes    Drug use: No       ALLERGIES    No Known Allergies    MEDICATIONS    No current facility-administered medications on file prior to encounter. Current Outpatient Medications on File Prior to Encounter   Medication Sig Dispense Refill    Nutritional Supplements (ISOSOURCE 1.5 NUSRAT PO) 60 mLs by Percutaneous Endoscopic Gastrostomy route continuous      carboxymethylcellulose (ARTIFICIAL TEARS) 1 % ophthalmic solution Place 2 drops into both eyes daily      atorvastatin (LIPITOR) 20 MG tablet 20 mg by PEG Tube route nightly      Atropine Sulfate 0.01 % SOLN Place 1 drop under the tongue as needed (EVERY TWO HOURS FOR REDUCTION OF SECRETIONS)      sulfamethoxazole-trimethoprim (BACTRIM DS;SEPTRA DS) 800-160 MG per tablet 1 tablet by PEG Tube route in the morning and 1 tablet before bedtime. chlorhexidine (PERIDEX) 0.12 % solution Take 15 mLs by mouth in the morning, at noon, and at bedtime FOR USE WITH TOOTHETTES FOR MOUTH CARE      polyethylene glycol (MIRALAX) 17 g PACK packet 17 g by Per G Tube route in the morning and at bedtime      acetaminophen (TYLENOL) 325 MG tablet 650 mg by PEG Tube route every 4 hours as needed for Pain      sennosides-docusate sodium (SENOKOT-S) 8.6-50 MG tablet 1 tablet by PEG Tube route in the morning and at bedtime      desmopressin (DDAVP) 0.1 MG tablet 0.5 mg by PEG Tube route in the morning and at bedtime Give 0.05mg via PEG tube two times a day for clotting promoter      esomeprazole Magnesium (NEXIUM) 40 MG PACK 40 mg by Per G Tube route in the morning. levETIRAcetam (KEPPRA) 100 MG/ML solution 750 mg/kg by PEG Tube route 2 times daily      hyoscyamine (LEVSIN/SL) 125 MCG sublingual tablet Place 125 mcg under the tongue every 6 hours as needed for Cramping      LORazepam (ATIVAN) 0.5 MG tablet 0.5 mg by PEG Tube route every 4 hours as needed for Anxiety. morphine sulfate 20 MG/ML concentrated oral solution Take 5 mg by mouth every 2 hours as needed for Pain.      levothyroxine (SYNTHROID) 200 MCG tablet 200 mcg by PEG Tube route in the morning.  Give one tablet via PEG tube every Mon,Tue, Wed, Thu, Fri, Sat .      vitamin D (CHOLECALCIFEROL) 125 MCG (5000 UT) CAPS capsule Take 50,000 Units by mouth daily Give 1/25mg ([id number]) one capsule via peg tube one time a day every Monday for supplement      lidocaine (LMX) 4 % cream Apply a half dollar sized amount to intact skin topically up to twice daily as needed for pain 1 Tube 1    paliperidone (INVEGA) 6 MG extended release tablet Take 1 tablet by mouth daily 30 tablet 1    cabergoline (DOSTINEX) 0.5 MG tablet Take 1 tablet by mouth Twice a Week ON Monday and Thursday 8 tablet 3    furosemide (LASIX) 40 MG tablet Take 40 mg by mouth daily      meloxicam (MOBIC) 15 MG tablet Take 15 mg by mouth daily      potassium chloride (MICRO-K) 10 MEQ extended release capsule Take 10 mEq by mouth daily      fenofibrate 160 MG tablet Take 150 mg by mouth daily      aspirin 81 MG EC tablet Take 1 tablet by mouth 2 times daily (Patient taking differently: Take 81 mg by mouth in the morning.  ADMINISTER VIA PEG TUBE.) 60 tablet 0    lisinopril (PRINIVIL;ZESTRIL) 20 MG tablet Take 2 tablets by mouth daily 30 tablet 3    Respiratory Therapy Supplies SANDRA Full face CPAP mask and supplies 1 Device 0    CPAP Machine MISC by Does not apply route New CPAP with 6 cm 1 each 0    albuterol (PROVENTIL) (2.5 MG/3ML) 0.083% nebulizer solution Take 3 mLs by nebulization every 6 hours as needed for Wheezing (Patient taking differently: Take 2.5 mg by nebulization every 6 hours as needed for Wheezing or Shortness of Breath) 120 each 5    STIOLTO RESPIMAT 2.5-2.5 MCG/ACT AERS Inhale 2 puff in AM 1 Inhaler 5       Objective    BP (!) 96/53   Pulse 84   Temp (!) 100.8 °F (38.2 °C) (Oral)   Resp 26   Ht 5' 5\" (1.651 m)   Wt 201 lb 4.8 oz (91.3 kg)   SpO2 96%   BMI 33.50 kg/m²     LABS:  WBC:    Lab Results   Component Value Date/Time    WBC 15.4 07/21/2022 03:15 AM     H/H:    Lab Results   Component Value Date/Time    HGB 10.8 07/21/2022 03:15 AM    HCT 32.5 07/21/2022 03:15 AM     PTT:    Lab Results   Component Value Date/Time    APTT 34.4 07/21/2022 03:15 AM    PTT 28.1 11/13/2012 02:05 PM   [APTT}  PT/INR:    Lab Results   Component Value Date/Time    PROTIME 16.1 07/21/2022 03:15 AM    INR 1.3 07/21/2022 03:15 AM     HgBA1c:    Lab Results   Component Value Date/Time    LABA1C 5.6 02/21/2020 06:05 AM       Assessment   Michael Risk Score:      Patient Active Problem List   Diagnosis    Osteoarthritis    HTN (hypertension)    Hypercholesteremia    Depression    Asthma    Pulmonary emphysema (HCC)    Tobacco use    Influenza    RANDI on CPAP    Unilateral primary osteoarthritis, left hip    Pituitary macroadenoma (HCC)    Gastritis without bleeding    Other specified soft tissue disorders    Adenomatous polyp of colon    Rectal bleeding    Grade I hemorrhoids    Aftercare following joint replacement surgery    Blurry vision    Class 2 obesity in adult    Delirium    Other acute postprocedural pain    Pain in left lower leg    Paranoid schizophrenia ([address]    Presence of left artificial hip joint    Sprain of ribs, initial encounter    Suprasellar mass    Unilateral primary osteoarthritis, left knee    Hyponatremia    Schizoaffective disorder ([address]    Hyperprolactinemia ([address]    Psychogenic polydipsia    Schizophrenia ([address]    Physiological consequence of immobility    Sepsis ([address]    Hypothyroid       Measurements:  Wound 06/29/22 Coccyx (Active)   Wound Image   06/29/22 1002   Wound Etiology Pressure Stage 2 07/21/22 1025   Dressing Status New dressing applied 07/21/22 1025   Wound Cleansed Cleansed with saline 06/29/22 1002   Dressing/Treatment Zinc paste 07/21/22 1025   Dressing Change Due 07/22/22 07/21/22 1025   Wound Length (cm) 1 cm 07/21/22 1025   Wound Width (cm) 0.5 cm 07/21/22 1025   Wound Depth (cm) 0.1 cm 07/21/22 1025   Wound Surface Area (cm^2) 0.5 cm^2 07/21/22 1025   Change in Wound Size % (l*w) -25 07/21/22 1025   Wound Volume (cm^3) 0.05 cm^3 07/21/22 1025   Wound Healing % -25 07/21/22 1025   Wound Assessment Hettinger/red;Superficial 07/21/22 1025   Drainage Amount None 07/21/22 1025   Drainage Description Serosanguinous 06/29/22 1002   Odor None 07/21/22 1025   Wendy-wound Assessment Blanchable erythema 07/21/22 1025   Margins Defined edges 07/21/22 1025   Number of days: 22     Wound 04/23/19 Hip Anterior;Left;Proximal (Active)   Wound Image   07/21/22 1025   Dressing Status New dressing applied 07/21/22 1025   Dressing Change Due 07/22/22 07/21/22 1025   Incision Length (cm) 3 07/21/22 1025   Incision Width (cm) 3 cm 07/21/22 1025   Incision Depth (cm) 4 cm 07/21/22 1025   Tunneling 6 cm 07/21/2022   Tunneling position 7 o'clock 07/21/2022   Drainage Amount Moderate 07/21/22 1025   Drainage Description Yellow;Purulent 07/21/22 1025   Odor Malodorous/putrid 07/21/22 1025   Wendy-incision Assessment Blanchable erythema 07/21/22 1025   Number of days: 1184     Assessment: This [id number]  nurse at bedside for evaluation of wounds. Patient visibly looks to be uncomfortable, but does not communicated. Left hip stage 4 pressure injury is deep, granular with some fibrin noted - unable to evaluate the entire wound bed as the depth of the wound surpasses viability. Left hip wound with strong foul odor and moderate yellow, purulent drainage noted. Wound cleansed and lightly packed with plurogel impregnated packing - to promote autolytic debridement. Small stage 2 pressure injury to the coccyx - wound is clean, pink and superficial, protective barrier cream with zinc applied.      Plan   Plan of Care: Wound 06/29/22 Coccyx-Dressing/Treatment: Zinc paste    Specialty Bed Required : Yes   [x] Low Air Loss   [] Pressure Redistribution  [] Fluid Immersion  [] Bariatric  [] Other:     Current Diet: Diet NPO  Dietician consult:  Yes    Discharge Plan:  Placement for patient upon discharge: skilled nursing    Patient appropriate for Outpatient [address] [facility]: Yes - established with [facility] Wound center    Referrals:  []   [] [facility]  [] Supplies  [] Other    Patient/Caregiver Teaching:  Level of patient/caregiver understanding able to:   [] Indicates understanding       [] Needs reinforcement  [] Unsuccessful      [] Verbal Understanding  [] Demonstrated understanding       [] No evidence of learning  [] Refused teaching         [x] N/A       Electronically signed by ZELALEM SanchezN, RN, CWOCN on 7/21/2022 at 12:47 PM

## 2022-07-21 NOTE — PROGRESS NOTES
Comprehensive Nutrition Assessment    Type and Reason for Visit:  Initial, Consult (Michael)    Nutrition Recommendations/Plan:   Resume tube feeding when medically appropriate  Recommend peptide based TF ( Vital 1.2) @ goal rate of 60 ml/hr x 23 hrs via PEG  Hold TF 30 minutes before and after synthroid ( once resumed)  Water flushes 50 ml,every 6 hrs while on IVF, then 100 ml, every 4 hrs  Recommend daily MVI with minerals to aid in meeting micronutrient needs to support wound healing       Malnutrition Assessment:  Malnutrition Status: At risk for malnutrition (Comment) (07/21/22 1354)    Context:  Chronic Illness     Findings of the 6 clinical characteristics of malnutrition:  Energy Intake:  No significant decrease in energy intake  Weight Loss:  Mild weight loss (specify amount and time period) (14% x 1 year)     Body Fat Loss:  No significant body fat loss     Muscle Mass Loss:  Unable to assess (due due medical condition)    Fluid Accumulation:  Unable to assess     Strength:  Not Performed    Nutrition Assessment:    Pt at risk for malnutrition related to dependence on EN and with increased nutrient needs for wound healing. Tube feeding recommendations provided, when medically appropriate    Nutrition Related Findings: \"PMHx of asthma, COPD, depression, hypercholesteremia, hypertension, pituitary microadenoma, sleep apnea, hydrocephalus, hypothyroidism, DM2, seizures, respiratory failure s/p pituitary surgery with tracheostomy and PEG tube, nontraumatic subarachnoid hemorrhage presents to the emergency department with fever. Pt from [facility]. He was sent here for fever and left hip tunneling wound.  Pt nonverbal at baseline so poor historian., \" new admit, limited flowsheet documentation available, labs noted, meds reviewed ( on synthroid @ NH, not yet re-ordered), IVF= .9 NS @ 100 ml/hr, febrile upon admission Wound Type: Multiple, Pressure Injury (see wound documentation) Current Nutrition Intake & Therapies:    Average Meal Intake: NPO  Average Supplements Intake: NPO  Diet NPO    Anthropometric Measures:  Height: 5' 5\" (165.1 cm)  Ideal Body Weight (IBW): 136 lbs (62 kg)    Admission Body Weight: 180 lb (81.6 kg) (est)  Current Body Weight: 201 lb (91.2 kg),   IBW. Weight Source: Bed Scale  Current BMI (kg/m2): 33.4  Usual Body Weight: 234 lb (106.1 kg) (( 8/2021) 230# ( 2020))  % Weight Change (Calculated): -14.1                    BMI Categories: Obese Class 1 (BMI 30.0-34. 9)    Estimated Daily Nutrient Needs:  Energy Requirements Based On: Kcal/kg  Weight Used for Energy Requirements: Current  Energy (kcal/day): 7240-5818 kcals @ 15-18 kcal/kg  Weight Used for Protein Requirements: Ideal  Protein (g/day):  g protein @ 1.5-1.8 g/kg  Method Used for Fluid Requirements: 1 ml/kcal  Fluid (ml/day): ~1600    Nutrition Diagnosis:   Inadequate oral intake related to cognitive or neurological impairment as evidenced by NPO or clear liquid status due to medical condition, nutrition support - enteral nutrition  Increased nutrient needs related to increase demand for energy/nutrients as evidenced by wounds    Nutrition Interventions:   Food and/or Nutrient Delivery: Start Tube Feeding  Nutrition Education/Counseling: Education not indicated  Coordination of Nutrition Care: Continue to monitor while inpatient       Goals:     Goals: Initiate nutrition support, by next RD assessment       Nutrition Monitoring and Evaluation:   Behavioral-Environmental Outcomes: None Identified  Food/Nutrient Intake Outcomes: Food and Nutrient Intake, Enteral Nutrition Intake/Tolerance  Physical Signs/Symptoms Outcomes: Biochemical Data, Skin, Weight, GI Status    Discharge Planning:    Enteral Nutrition     VIVIENNE ROMERO, ELLA, LD

## 2022-07-21 NOTE — PROGRESS NOTES
Hospitalist Progress Note      PCP: Citlaly Barajas DO    Date of Admission: 7/21/2022    Chief Complaint:    Chief Complaint   Patient presents with    Wound Infection     Left hip wound, tunneled, fever       Subjective:  Patient is non verbal right now; unable to complete full 12 point ROS    Medications:  Reviewed    Infusion Medications    sodium chloride      sodium chloride 100 mL/hr at 07/21/22 1111    dextrose       Scheduled Medications    sodium chloride flush  5-40 mL IntraVENous 2 times per day    enoxaparin  40 mg SubCUTAneous Daily    piperacillin-tazobactam  3,375 mg IntraVENous Q8H    vancomycin (VANCOCIN) intermittent dosing (placeholder)   Other RX Placeholder    vancomycin  1,250 mg IntraVENous Q12H    insulin lispro  0-8 Units SubCUTAneous 6 times per day     PRN Meds: sodium chloride flush, sodium chloride, ondansetron **OR** ondansetron, polyethylene glycol, acetaminophen **OR** acetaminophen, glucose, dextrose bolus **OR** dextrose bolus, glucagon (rDNA), dextrose    No intake or output data in the 24 hours ending 07/21/22 1308    Exam:    BP (!) 96/53   Pulse 84   Temp (!) 100.8 °F (38.2 °C) (Oral)   Resp 26   Ht 5' 5\" (1.651 m)   Wt 201 lb 4.8 oz (91.3 kg)   SpO2 96%   BMI 33.50 kg/m²     General appearance: No apparent distress  HEENT:  Conjunctivae/corneas clear. Neck: Trachea midline. Respiratory:  Normal respiratory effort. Clear to auscultation  Cardiovascular: Regular rate and rhythm  Abdomen: PEG in place. Musculoskeletal: No clubbing, cyanosis or edema bilaterally  Neuro: Contracted.    Capillary Refill: Brisk,< 3 seconds   Peripheral Pulses: +2 palpable, equal bilaterally   Skin:  Left hip tunneling wound with surrounding cellulitis    Labs:   Recent Labs     07/21/22 0315   WBC 15.4*   HGB 10.8*   HCT 32.5*        Recent Labs     07/21/22 0315      K 4.3      CO2 22   BUN 18   CREATININE 0.61*   CALCIUM 9.4     Recent Labs     07/21/22 0315   AST 48* ALT 55*   BILITOT 0.9*   ALKPHOS 131*     Recent Labs     07/21/22  0315   INR 1.3     Recent Labs     07/21/22  0315   CKTOTAL 65   TROPONINI 0.093*       Urinalysis:      Lab Results   Component Value Date/Time    NITRU Negative 07/21/2022 03:54 AM    WBCUA 3-5 02/13/2022 12:45 AM    BACTERIA RARE 02/13/2022 12:45 AM    RBCUA None seen 02/13/2022 12:45 AM    BLOODU Negative 07/21/2022 03:54 AM    SPECGRAV 1.017 07/21/2022 03:54 AM    GLUCOSEU Negative 07/21/2022 03:54 AM       Radiology:  XR CHEST PORTABLE   Final Result   NO ACUTE CARDIOPULMONARY ABNORMALITY. NO CHANGE. XR HIP LEFT (1 VIEW)    (Results Pending)       Assessment/Plan:    #sepsis secondary to suspected OM    - Xray ordered; if negative will likely need MRI    - continue broad spec Abx    - ID consulted    #Hypothryoidism     - continue home meds; endo consult for adjustments as may need slightly lower dose    #HTN    - resume home meds tomorrow if possible    #Schizophrenia    - continue home meds once reconciled     Active Hospital Problems    Diagnosis Date Noted    Sepsis ([facility] [A41.9] 07/21/2022     Priority: Medium    Hypothyroid [E03.9] 07/21/2022     Priority: Medium    Schizophrenia ([facility] [F20.9] 03/13/2020    HTN (hypertension) [I10] 09/19/2019    Depression [F32. A]         Additional work up or/and treatment plan may be added today or then after based on clinical progression. I am managing a portion of pt care. Some medical issues are handled by other specialists. Additional work up and treatment should be done in out pt setting by pt PCP and other out pt providers. In addition to examining and evaluating pt, I spent additional time explaining care, normal and abnormal findings, and treatment plan. All of pt questions were answered. Counseling, diet and education were  provided. Case will be discussed with nursing staff when appropriate. Family will be updated if and when appropriate.       Diet: Diet NPO    Code Status: DNR-CCA    PT/OT Eval     Electronically signed by Margaret Lopez MD on 7/21/2022 at 1:08 PM

## 2022-07-21 NOTE — ED PROVIDER NOTES
[facility] ED  eMERGENCY dEPARTMENT eNCOUnter      Pt Name: Dillon Sheffield  MRN: [id number]  [place] 1955  Date of evaluation: 7/21/2022  Provider: TARIK Magdaleno      HISTORY OF PRESENT ILLNESS    Dillon Sheffield is a 74 y.o. male with PMHx of asthma, COPD, depression, hypercholesteremia, hypertension, pituitary microadenoma, sleep apnea, hydrocephalus, hypothyroidism, DM2, seizures, respiratory failure s/p pituitary surgery with tracheostomy and PEG tube, nontraumatic subarachnoid hemorrhage presents to the emergency department with fever. Pt from [facility], [facility]. He was sent here for fever and left hip tunneling wound. Pt nonverbal at baseline so poor historian. It appears per chart review he saw wound center 6/29 and had healing right heel and sacral wounds. Had left hip DTPI unstageable with debridement done. Had developing left heel DTI which was purple and dark red with overlying tissue intact. Arrives febrile. HPI    Nursing Notes were reviewed. REVIEW OF SYSTEMS       Review of Systems   Constitutional:  Positive for fever. Negative for appetite change and chills. HENT:  Negative for congestion, rhinorrhea and sore throat. Respiratory:  Negative for cough and shortness of breath. Cardiovascular:  Negative for chest pain. Gastrointestinal:  Negative for abdominal pain, blood in stool, diarrhea, nausea and vomiting. Genitourinary:  Negative for difficulty urinating. Musculoskeletal:  Negative for neck stiffness. Skin:  Positive for wound. Negative for color change and rash. Neurological:  Negative for dizziness, syncope, weakness, light-headedness, numbness and headaches. All other systems reviewed and are negative.           PAST MEDICAL HISTORY     Past Medical History:   Diagnosis Date    Abdominal pain     Asthma     Bloating symptom     COPD (chronic obstructive pulmonary disease) ([facility])     COPD (chronic obstructive pulmonary disease) ([facility] Depression     Depression     Diarrhea     Drug-seeking behavior 02/03/2020    Emphysema/COPD ([address]     History of rectal bleeding     Hypercholesteremia     past trx > 5 yrs    Hypercholesteremia     Hypercholesteremia     Hypertension     meds > 3 yrs    Lung disease     RANDI on CPAP     patient relates he doesn't use cpap at this time    Osteoarthritis     left hip    Osteoarthritis     Osteoarthritis of left hip     Physiological consequence of immobility 3/9/2022    Pituitary macroadenoma [facility])     Sleep apnea          SURGICAL HISTORY       Past Surgical History:   Procedure Laterality Date    COLONOSCOPY  5/2/14    JENNIFER    COLONOSCOPY N/A 12/12/2019    COLONOSCOPY DIAGNOSTIC performed by Angelo Veliz MD at [place] [address], DIAGNOSTIC      EYE SURGERY Bilateral     cataracts    [address] ?     index finger laceration repair    HERNIA REPAIR  2004    repair [facility]     JOINT REPLACEMENT Left     left hip    MO COLON CA SCRN NOT HI RSK IND N/A 5/17/2017    COLONOSCOPY performed by Jimmy Valentin MD at [facility] Left 4/22/2019    LEFT HIP TOTAL HIP ARTHROPLASTY, LATERAL DECUB, RGACIE performed by Rubina Craig MD at [facility]  12/12/2019    EGD DIAGNOSTIC ONLY performed by Angelo Veliz MD at [address]       Previous Medications    ALBUTEROL (PROVENTIL) (2.5 MG/3ML) 0.083% NEBULIZER SOLUTION    Take 3 mLs by nebulization every 6 hours as needed for Wheezing    ASPIRIN 81 MG EC TABLET    Take 1 tablet by mouth 2 times daily    CABERGOLINE (DOSTINEX) 0.5 MG TABLET    Take 1 tablet by mouth Twice a Week ON Monday and Thursday    CPAP MACHINE MISC    by Does not apply route New CPAP with 6 cm    DESMOPRESSIN (DDAVP) 0.1 MG TABLET    Take 0.1 mg by mouth daily Give 0.05mg via PEG tube two times a day for clotting promoter    ESOMEPRAZOLE MAGNESIUM (NEXIUM) 40 MG Problems Sister     No Known Problems Son           SOCIAL HISTORY       Social History     Socioeconomic History    Marital status: Legally      Spouse name: None    Number of children: None    Years of education: None    Highest education level: None   Tobacco Use    Smoking status: Former     Packs/day: 1.50     Years: 46.00     Pack years: 69.00     Types: Cigarettes     Quit date: 3/20/2019     Years since quitting: 3.3    Smokeless tobacco: Never   Vaping Use    Vaping Use: Unknown   Substance and Sexual Activity    Alcohol use: Yes    Drug use: No         PHYSICAL EXAM         ED Triage Vitals [07/21/22 0243]   BP Temp Temp Source Heart Rate Resp SpO2 Height Weight   104/66 (!) 100.8 °F (38.2 °C) Oral 99 (!) 36 95 % 5' 5\" (1.651 m) 180 lb (81.6 kg)       Physical Exam  Constitutional:       Appearance: He is well-developed. Comments: Eyes closed     HENT:      Head: Normocephalic and atraumatic. Eyes:      Conjunctiva/sclera: Conjunctivae normal.      Pupils: Pupils are equal, round, and reactive to light. Neck:      Trachea: No tracheal deviation. Cardiovascular:      Heart sounds: Normal heart sounds. Pulmonary:      Effort: Pulmonary effort is normal. No respiratory distress. Breath sounds: Normal breath sounds. No stridor. Abdominal:      General: Bowel sounds are normal. There is no distension. Palpations: Abdomen is soft. There is no mass. Tenderness: There is no abdominal tenderness. There is no guarding or rebound. Musculoskeletal:         General: Normal range of motion. Cervical back: Normal range of motion and neck supple. Skin:     General: Skin is warm and dry. Capillary Refill: Capillary refill takes less than 2 seconds. Findings: Lesion and rash present. Comments: 1cm erythematous and necrotic ulcer to right heel.    1cm x 2 dark purple, not open wounds to left heel  Tunneling wound to left hip with purulent, foul smelling drainage, and surrounding erythema/induration of left hip   Stage 2 sacral/coccyx ulcer    Neurological:      Mental Status: He is alert. Mental status is at baseline. Deep Tendon Reflexes: Reflexes are normal and symmetric. Comments: Arousable to painful stimuli, eyes closed    Psychiatric:         Behavior: Behavior normal.         Thought Content:  Thought content normal.         Judgment: Judgment normal.       DIAGNOSTIC RESULTS     EKG:All EKG's are interpreted by the Emergency Department Physician who either signs or Co-signs this chart in the absence of a cardiologist.    Normal sinus rhythm, rate 97, normal intervals, no ST segment changes    RADIOLOGY:   Non-plain film images such as CT, Ultrasound and MRI are read by theradiologist. Plain radiographic images are visualized and preliminarily interpreted by the emergency physician with the below findings:    Interpretation per theRadiologist below, if available at the time of this note:    XR CHEST PORTABLE    (Results Pending)           LABS:  Labs Reviewed   CBC WITH AUTO DIFFERENTIAL - Abnormal; Notable for the following components:       Result Value    WBC 15.4 (*)     RBC 3.62 (*)     Hemoglobin 10.8 (*)     Hematocrit 32.5 (*)     RDW 14.7 (*)     All other components within normal limits   COMPREHENSIVE METABOLIC PANEL - Abnormal; Notable for the following components:    Creatinine 0.61 (*)     Albumin 3.1 (*)     Total Bilirubin 0.9 (*)     Alkaline Phosphatase 131 (*)     ALT 55 (*)     AST 48 (*)     Globulin 4.1 (*)     All other components within normal limits    Narrative:     CALL  [facility] tel. [phone],  Trop results called to and read back by Isac Key, 07/21/2022 04:09, by SHUN   PROCALCITONIN - Abnormal; Notable for the following components:    Procalcitonin 0.21 (*)     All other components within normal limits    Narrative:     Chris Huston tel. [phone],  Trop results called to and read back by Isac Key, 07/21/2022 04:09, by SHUN   PROTIME-INR - Abnormal; Notable for the following components:    Protime 16.1 (*)     All other components within normal limits   TROPONIN - Abnormal; Notable for the following components:    Troponin 0.093 (*)     All other components within normal limits    Narrative:     Ritchie Zabala  ED tel. [phone],  Trop results called to and read back by Rajiv Ochoa, 07/21/2022 04:09, by SHUN   TSH WITH REFLEX - Abnormal; Notable for the following components:    TSH <0.010 (*)     All other components within normal limits    Narrative:     [facility] [phone],  Trop results called to and read back by Rajiv Ochoa, 07/21/2022 04:09, by [id number] TO CULTURE - Abnormal; Notable for the following components:    Protein, UA TRACE (*)     Urobilinogen, Urine 2.0 (*)     All other components within normal limits   SEDIMENTATION RATE - Abnormal; Notable for the following components:    Sed Rate 99 (*)     All other components within normal limits   C-REACTIVE PROTEIN - Abnormal; Notable for the following components:    .9 (*)     All other components within normal limits   T4, FREE - Abnormal; Notable for the following components:    T4 Free 0.81 (*)     All other components within normal limits    Narrative:     [facility] [phone],  Trop results called to and read back by Rajiv Ochoa, 07/21/2022 04:09, by SHUN   CULTURE, BLOOD 1   CULTURE, BLOOD 2   APTT   BRAIN NATRIURETIC PEPTIDE    Narrative:     Steve Winston tel. [phone],  Trop results called to and read back by Rajiv Ochoa, 07/21/2022 04:09, by SHUN   CK    Narrative:     Steve Winston tel. [phone],  Trop results called to and read back by Rajiv Ochoa, 07/21/2022 04:09, by SHUN   LACTATE, SEPSIS   LIPASE    Narrative:     Stevelopez Matthewser tel. [phone],  Trop results called to and read back by Rajiv Ochoa, 07/21/2022 04:09, by SHUN   MAGNESIUM    Narrative:     Steve Matthewser tel. [phone],  Trop results called to and read back by Cherry Cantu, 07/21/2022 04:09, by SHUN   LEVETIRACETAM LEVEL       All other labs were within normal range or not returned as of this dictation. EMERGENCY DEPARTMENT COURSE and DIFFERENTIAL DIAGNOSIS/MDM:   Vitals:    Vitals:    07/21/22 0243 07/21/22 0332 07/21/22 0402 07/21/22 0422   BP: 104/66 105/81 104/71    Pulse: 99 98 96 90   Resp: (!) 36 (!) 34 (!) 33 25   Temp: (!) 100.8 °F (38.2 °C)      TempSrc: Oral      SpO2: 95% 95% 95% 94%   Weight: 180 lb (81.6 kg)      Height: 5' 5\" (1.651 m)            MDM      CXR shows no acute process. Procalcitonin 0.21, .9, sed rate 99, BNP 1343, troponin 0.093, ALT 55, AST 48, bilirubin 0.9, TSH negative levels, WBC 15.4. Pt started on 2L IVF, given tylenol rectal, and started on zosyn and vanco with concerns for osteomyelitis. Patient be admitted at this time. I spoke to Wife Nicolas Storey on the phone who wants patient to have wounds debrided and surgical interventions if needed. She was made aware his code status would change from [facility] to [facility]. She agrees with this and wants him to have the surgery if needed to treat the infection. Code status changed to DNR-CCA. CRITICAL CARE TIME   Total Critical Caretime was 0 minutes, excluding separately reportable procedures. There was a high probability of clinically significant/life threatening deterioration in the patient's condition which required my urgent intervention. Procedures    FINAL IMPRESSION      1. Osteomyelitis of other site, unspecified type ([facility]    2. Elevated troponin    3. Septicemia ([facility]    4. Decreased thyroid stimulating hormone (TSH) level          DISPOSITION/PLAN   DISPOSITION Decision To Admit 07/21/2022 04:10:15 AM      PATIENT REFERRED TO:  No follow-up provider specified.     DISCHARGE MEDICATIONS:  New Prescriptions    No medications on file          (Please notethat portions of this note were completed with a voice recognition program.  Efforts were made to edit the dictations but occasionally words are mis-transcribed. )    TARIK Holliday (electronically signed)  Emergency Physician Assistant          Fabrizio Almaraz, [place]  07/21/22 [address] [place] [address]  07/21/22 6544

## 2022-07-21 NOTE — PLAN OF CARE
See OT evaluation for all goals and OT POC.  Electronically signed by JOSE ANGEL Pineda/L on 7/21/2022 at 3:58 PM

## 2022-07-21 NOTE — FLOWSHEET NOTE
Patient L hip wound is tunneling approximately 6cm about 7 o'clock area of the wound, pluragel impregnated gauze packed to the wound with strong odor. Coccyx wound is pink yet blanchable Zinc paste applied,per DEJA Hines wound nurse. Alginate to the right heel with dressing and mepilex to left heel area where a blister is present per podiatry.

## 2022-07-21 NOTE — PROGRESS NOTES
Physical Therapy Med Surg Initial Assessment  Facility/Department[facility]  [id number]       NAME: Srikanth Pena  : 1955 (74 y.o.)  MRN: [id number]  CODE STATUS: DNR-CCA    Date of Service: 2022    Patient Diagnosis(es): Septicemia ([address] [A41.9]  Elevated troponin [R77.8]  Sepsis ([address] [A41.9]  Decreased thyroid stimulating hormone (TSH) level [R79.89]  Osteomyelitis of other site, unspecified type [facility]) [M86.9]   Chief Complaint   Patient presents with    Wound Infection     Left hip wound, tunneled, fever     Patient Active Problem List    Diagnosis Date Noted    Sepsis ([address] 2022    Hypothyroid 2022    Osteomyelitis ([address] 2022    Physiological consequence of immobility 2022    Schizophrenia ([address] 2020    Psychogenic polydipsia 2020    Schizoaffective disorder ([address] 2020    Hyperprolactinemia ([address]     Hyponatremia 2020    Gastritis without bleeding     Adenomatous polyp of colon     Rectal bleeding     Grade I hemorrhoids     Pituitary macroadenoma ([address] 2019    HTN (hypertension) 2019    Hypercholesteremia 2019    Blurry vision 2019    Class 2 obesity in adult 2019    Paranoid schizophrenia ([address] 2019    Delirium 2019    Suprasellar mass 2019    Sprain of ribs, initial encounter 2019    Aftercare following joint replacement surgery 2019    Presence of left artificial hip joint 2019    Unilateral primary osteoarthritis, left knee 2019    Other specified soft tissue disorders 2019    Other acute postprocedural pain 2019    Pain in left lower leg 2019    Tobacco use 2019    Influenza 2019    RANDI on CPAP 2019    Unilateral primary osteoarthritis, left hip 2018    Pulmonary emphysema ([address] 2017    Osteoarthritis     Depression     Asthma         Past Medical History:   Diagnosis Date    Abdominal pain     Asthma Bloating symptom     COPD (chronic obstructive pulmonary disease) ([facility])     COPD (chronic obstructive pulmonary disease) ([facility])     Depression     Depression     Diarrhea     Drug-seeking behavior 02/03/2020    Emphysema/COPD ([facility]     History of rectal bleeding     Hypercholesteremia     past trx > 5 yrs    Hypercholesteremia     Hypercholesteremia     Hypertension     meds > 3 yrs    Hypothyroid 07/21/2022    Lung disease     Nontraumatic subarachnoid hemorrhage ([facility]     [address] PAPERWORK    RANDI on CPAP     patient relates he doesn't use cpap at this time    Osteoarthritis     left hip    Osteoarthritis     Osteoarthritis of left hip     Other hydrocephalus ([facility]     [address] PAPERWORK    Physiological consequence of immobility 03/09/2022    Pituitary macroadenoma ([facility]     Sleep apnea      Past Surgical History:   Procedure Laterality Date    COLONOSCOPY  5/2/14    JARMOSMARLOK    COLONOSCOPY N/A 12/12/2019    COLONOSCOPY DIAGNOSTIC performed by Yony Aguirre MD at [place] [address], DIAGNOSTIC      EYE SURGERY Bilateral     cataracts    [address] ?     index finger laceration repair    HERNIA REPAIR  2004    repair [facility]     JOINT REPLACEMENT Left     left hip    GA COLON CA SCRN NOT HI RSK IND N/A 5/17/2017    COLONOSCOPY performed by Jesse Jiménez MD at [address] [place] [address] Left 4/22/2019    LEFT HIP TOTAL HIP ARTHROPLASTY, LATERAL DECUB, GRACIE performed by Beto Kohler MD at \[facility]"" 14.  12/12/2019    EGD DIAGNOSTIC ONLY performed by Yony Aguirre MD at [facility]       Chart Reviewed: Yes  Family / Caregiver Present: No    Restrictions:  Restrictions/Precautions: NPO (trach in place)     SUBJECTIVE:        Pain   Pt demonstrated facial grimacing with LE movement tasks    Prior Level of Function:  Social/Functional History  Type of Home: Facility (long term bed hold)  Additional Comments: SW and nursing indicate pt most likely at dependent level of function prior to admission    OBJECTIVE:   Vision  Vision: Impaired (pt present with no eye contact)    Cognition:  Follows Commands: Impaired (pt is non- verbal and follows no commands)         ROM:  AROM: Grossly decreased, non-functional  PROM: Grossly decreased, non-functional    Strength:  Strength: Grossly decreased, non-functional    Neuro:  Balance  Comments: unsafe to test edge of bed due to pt with no active                      Tone: Abnormal (flexor tone throughout)  Coordination: Grossly decreased, non-functional         Bed mobility  Rolling to Left: Dependent/Total  Rolling to Right: Dependent/Total  Bed Mobility Comments: not safe to test other functional activities                             Activity Tolerance  Activity Tolerance: Patient tolerated evaluation without incident            ASSESSMENT:   Decision Making: Low Complexity  History: low  Exam: low  Clinical Presentation: low    Barriers to Learning: verbalization/understanding         DISCHARGE RECOMMENDATIONS:  No Skilled PT: No PT goals identified    Assessment: Pt demonstrates significant rigidity in trunk neck and extremities. He has significant contractures at this time. Pt is not able to actively participate in functional tasks at this time. No further PT needs identified at this time        [facility] (6 CLICK) Adarsh Wolf 28 Inpatient Mobility Raw Score : 6     Therapy Time:   Individual   Time In 1437   Time Out 1457   Minutes a [address], 07/21/22 at 3:02 PM         Definitions for assistance levels  Independent = pt does not require any physical supervision or assistance from another person for activity completion. Device may be needed.   Stand by assistance = pt requires verbal cues or instructions from another person, close to but not touching, to perform the activity  Minimal assistance= pt performs 75% or more of the activity; assistance is required to complete the activity  Moderate assistance= pt performs 50% of the activity; assistance is required to complete the activity  Maximal assistance = pt performs 25% of the activity; assistance is required to complete the activity  Dependent = pt requires total physical assistance to accomplish the task

## 2022-07-21 NOTE — FLOWSHEET NOTE
Trache supplies at bedside, size 6 shiley XLT, thick white mucous suctioned with 14 fr. Patient remains 96% on room air.

## 2022-07-21 NOTE — CONSULTS
PODIATRIC MEDICINE AND SURGERY  CONSULT HISTORY AND PHYSICAL    Consulting Service:  Medicine  Requesting Provider: Jamse Apodaca   Opinion/advice regarding: Bilateral foot wounds  Staff Doctor:  Dr. Pancho Shukla:  74 y.o. male with PMH significant for asthma, COPD, depression, hypercholesterolemia, hypertension, osteoarthritis, pituitary macroadenoma for who podiatry was consulted for bilateral foot wounds. Patient is seen in the wound care clinic the foot wounds appear stable, continue local wound care. PLAN AND RECOMMENDATIONS[de-identified]  Patient's case to be discussed with staff, Dr. Demetris Rush, who will provide final recommendations going forward  Wound care: Alginate to the right heel with dry sterile dressing daily, mepilex border to the left foot daily per nursing  Offload bilateral lower extremities with prevlon boots while resting   Elevate bilateral lower extremities at or above heart level while resting  Toe touch weight bearing to the right lower extremity in protective surgical shoe when ambulating. Antibiotic coverage per primary team   Pain management per primary team   Podiatry to follow while in house   Patient will need follow up with Dr. Demetris Rush within 7-10 days of discharge      HPI: This 74y.o. year old male was seen today for bilateral chronic foot wounds. There is a wound on the right heel and a blister on the left foot, as per chart review. Unable to obtain history from patient as he has a trach and is not able to verbalize. No recent history of nausea, vomiting, diarrhea, fevers, chills, chest pain, shortness of breath, head ache, or calf pain. No other pedal complaints.      Past Medical History:   Diagnosis Date    Abdominal pain     Asthma     Bloating symptom     COPD (chronic obstructive pulmonary disease) (HCC)     COPD (chronic obstructive pulmonary disease) ([facility])     Depression     Depression     Diarrhea     Drug-seeking behavior 02/03/2020    Emphysema/COPD ([facility] [address]     History of rectal bleeding     Hypercholesteremia     past trx > 5 yrs    Hypercholesteremia     Hypercholesteremia     Hypertension     meds > 3 yrs    Hypothyroid 07/21/2022    Lung disease     Nontraumatic subarachnoid hemorrhage ([facility] [address]     [address] PAPER[address]    RANDI on CPAP     patient relates he doesn't use cpap at this time    Osteoarthritis     left hip    Osteoarthritis     Osteoarthritis of left hip     Other hydrocephalus ([address]     [address] PAPERWORK    Physiological consequence of immobility 03/09/2022    Pituitary macroadenoma ([facility] [address]     Sleep apnea        Past Surgical History:   Procedure Laterality Date    COLONOSCOPY  5/2/14    JARMOSMARLOK    COLONOSCOPY N/A 12/12/2019    COLONOSCOPY DIAGNOSTIC performed by Asa Ormond, MD at [place] [address], DIAGNOSTIC      EYE SURGERY Bilateral     cataracts    [address] ? index finger laceration repair    HERNIA REPAIR  2004    repair [facility]     JOINT REPLACEMENT Left     left hip    MS COLON CA SCRN NOT HI RSK IND N/A 5/17/2017    COLONOSCOPY performed by Lianet Chance MD at [address] [place] [address] Left 4/22/2019    LEFT HIP TOTAL HIP ARTHROPLASTY, LATERAL DECUB, GRACIE performed by Neftali Ramirez MD at [address]  12/12/2019    EGD DIAGNOSTIC ONLY performed by Asa Ormond, MD at [facility]       No current facility-administered medications on file prior to encounter.      Current Outpatient Medications on File Prior to Encounter   Medication Sig Dispense Refill    Nutritional Supplements (ISOSOURCE 1.5 NUSRAT PO) 60 mLs by Percutaneous Endoscopic Gastrostomy route continuous      carboxymethylcellulose (ARTIFICIAL TEARS) 1 % ophthalmic solution Place 2 drops into both eyes daily      atorvastatin (LIPITOR) 20 MG tablet 20 mg by PEG Tube route nightly      Atropine Sulfate 0.01 % SOLN Place 1 drop under the tongue as needed (EVERY TWO HOURS FOR REDUCTION OF SECRETIONS)      sulfamethoxazole-trimethoprim (BACTRIM DS;SEPTRA DS) 800-160 MG per tablet 1 tablet by PEG Tube route in the morning and 1 tablet before bedtime. chlorhexidine (PERIDEX) 0.12 % solution Take 15 mLs by mouth in the morning, at noon, and at bedtime FOR USE WITH TOOTHETTES FOR MOUTH CARE      polyethylene glycol (MIRALAX) 17 g PACK packet 17 g by Per G Tube route in the morning and at bedtime      acetaminophen (TYLENOL) 325 MG tablet 650 mg by PEG Tube route every 4 hours as needed for Pain      sennosides-docusate sodium (SENOKOT-S) 8.6-50 MG tablet 1 tablet by PEG Tube route in the morning and at bedtime      desmopressin (DDAVP) 0.1 MG tablet 0.5 mg by PEG Tube route in the morning and at bedtime Give 0.05mg via PEG tube two times a day for clotting promoter      esomeprazole Magnesium (NEXIUM) 40 MG PACK 40 mg by Per G Tube route in the morning. levETIRAcetam (KEPPRA) 100 MG/ML solution 750 mg/kg by PEG Tube route 2 times daily      hyoscyamine (LEVSIN/SL) 125 MCG sublingual tablet Place 125 mcg under the tongue every 6 hours as needed for Cramping      LORazepam (ATIVAN) 0.5 MG tablet 0.5 mg by PEG Tube route every 4 hours as needed for Anxiety. morphine sulfate 20 MG/ML concentrated oral solution Take 5 mg by mouth every 2 hours as needed for Pain.      levothyroxine (SYNTHROID) 200 MCG tablet 200 mcg by PEG Tube route in the morning.  Give one tablet via PEG tube every Mon,Tue, Wed, Thu, Fri, Sat .      vitamin D (CHOLECALCIFEROL) 125 MCG (5000 UT) CAPS capsule Take 50,000 Units by mouth daily Give 1/25mg ([id number]) one capsule via peg tube one time a day every Monday for supplement      lidocaine (LMX) 4 % cream Apply a half dollar sized amount to intact skin topically up to twice daily as needed for pain 1 Tube 1    paliperidone (INVEGA) 6 MG extended release tablet Take 1 tablet by mouth daily 30 tablet 1    cabergoline (DOSTINEX) 0.5 MG tablet Take 1 tablet by mouth Twice a Week ON Monday and Thursday 8 tablet 3    furosemide (LASIX) 40 MG tablet Take 40 mg by mouth daily      meloxicam (MOBIC) 15 MG tablet Take 15 mg by mouth daily      potassium chloride (MICRO-K) 10 MEQ extended release capsule Take 10 mEq by mouth daily      fenofibrate 160 MG tablet Take 150 mg by mouth daily      aspirin 81 MG EC tablet Take 1 tablet by mouth 2 times daily (Patient taking differently: Take 81 mg by mouth in the morning.  ADMINISTER VIA PEG TUBE.) 60 tablet 0    lisinopril (PRINIVIL;ZESTRIL) 20 MG tablet Take 2 tablets by mouth daily 30 tablet 3    Respiratory Therapy Supplies SANDRA Full face CPAP mask and supplies 1 Device 0    CPAP Machine MISC by Does not apply route New CPAP with 6 cm 1 each 0    albuterol (PROVENTIL) (2.5 MG/3ML) 0.083% nebulizer solution Take 3 mLs by nebulization every 6 hours as needed for Wheezing (Patient taking differently: Take 2.5 mg by nebulization every 6 hours as needed for Wheezing or Shortness of Breath) 120 each 5    STIOLTO RESPIMAT 2.5-2.5 MCG/ACT AERS Inhale 2 puff in AM 1 Inhaler 5       No Known Allergies    Family History   Problem Relation Age of Onset    Colon Cancer Mother     Cancer Mother     Arthritis Mother     High Blood Pressure Father     Heart Disease Father     Diabetes Sister     No Known Problems Brother     No Known Problems Brother     No Known Problems Brother     Other Brother         Sepsis    Other Brother         Killed    No Known Problems Sister     No Known Problems Sister     No Known Problems Son        Social History     Socioeconomic History    Marital status: Legally      Spouse name: Not on file    Number of children: Not on file    Years of education: Not on file    Highest education level: Not on file   Occupational History    Not on file   Tobacco Use    Smoking status: Former     Packs/day: 1.50     Years: 46.00     Pack years: 69.00     Types: Cigarettes     Quit date: 3/20/2019 Years since quitting: 3.3    Smokeless tobacco: Never   Vaping Use    Vaping Use: Unknown   Substance and Sexual Activity    Alcohol use: Yes    Drug use: No    Sexual activity: Not on file   Other Topics Concern    Not on file   Social History Narrative    Not on file     Social Determinants of Health     Financial Resource Strain: Not on file   Food Insecurity: Not on file   Transportation Needs: Not on file   Physical Activity: Not on file   Stress: Not on file   Social Connections: Not on file   Intimate Partner Violence: Not on file   Housing Stability: Not on file       Review of Systems  CONSTITUTIONAL: No fevers, chills, diaphoresis  HEENT: No epistaxis, tinnitus  EYES: No diplopia, blurry vision.   CARDIOVASCULAR:  No chest pain, palpitations, lower extremity edema  PULM: No dyspnea, tachypnea, wheezing  GI: No nausea, vomiting, constipation, diarrhea  : No dysuria, gross hematuria, or pyuria  NEURO:  No new balance problems, peripheral weakness, paresthesias, numbness  MSK: Contracted lower extremities   PSY: No concerns regarding depression, anxiety  INTEGUMENTARY:  open skin lesion to right heel     OBJECTIVE:  BP (!) 96/53   Pulse 84   Temp (!) 100.8 °F (38.2 °C) (Oral)   Resp 26   Ht 5' 5\" (1.651 m)   Wt 201 lb 4.8 oz (91.3 kg)   SpO2 96%   BMI 33.50 kg/m²   Patient is alert and oriented to person, place, and time    Vascular:   Non Palpable Dorsalis Pedis and Non Palpable Posterior Tibial Pulses B/L   Capillary Fill time < 3 seconds to B/L digits  Skin temperature warm to warm tibial tuberosity to the digits B/L  Hair growth absent to digits  mild edema  No varicosities     Neurological:   Sensation to light touch intact B/L    Musculoskeletal/Orthopaedic:   Structural Deformities: contracted lower extremities  5/5 muscle strength Dorsiflexion, Plantarflexion, Inversion, Eversion B/L  mild tenderness on palpation of right heel    Dermatological:   Skin appears well hydrated and supple with good temperature, texture, turgor   hyperkeratotic lesions noted  Nails 1-5 B/L appear within normal limits  Interspaces 1-4 B/L are clear and without debris  There is a superficial darkened blister noted at the fifth metatarsal head laterally on the left foot, wound is not open. There is crepitus, induration or acute signs of infection. Ulceration #1:   Location: right heel  Measurements: approx. 3.0 cm x 3.0 cm x 1.0 cm  Base: Granular/Healthy, with probe to periosteum at the 1 o clock aspect of the wound   Borders: appear healthy, no hyperkeratotic or macerated tissue   Exudate: minimal drainage   Comments: no periwound erythema or edema  wound borders with no evidence of ascending lymphangitis. Wound undermines probes to periosteum.        LABS:   Lab Results   Component Value Date    WBC 15.4 (H) 07/21/2022    HGB 10.8 (L) 07/21/2022    HCT 32.5 (L) 07/21/2022    MCV 89.7 07/21/2022     07/21/2022     Lab Results   Component Value Date/Time     07/21/2022 03:15 AM    K 4.3 07/21/2022 03:15 AM    K 3.9 02/20/2020 03:33 AM     07/21/2022 03:15 AM    CO2 22 07/21/2022 03:15 AM    BUN 18 07/21/2022 03:15 AM    CREATININE 0.61 07/21/2022 03:15 AM    GLUCOSE 96 07/21/2022 03:15 AM    CALCIUM 9.4 07/21/2022 03:15 AM      Lab Results   Component Value Date    LABALBU 3.1 (L) 07/21/2022     Lab Results   Component Value Date    SEDRATE 99 (H) 07/21/2022     Lab Results   Component Value Date    .9 (H) 07/21/2022     Lab Results   Component Value Date    LABA1C 5.6 02/21/2020       IMAGING:   No foot films         Catrachita Wick DPM PGY-3  Podiatric Surgery Resident  Podiatry On Call Pager: [phone]    07/21/22  12:12 PM

## 2022-07-22 PROBLEM — R78.81 GRAM-POSITIVE BACTEREMIA: Status: ACTIVE | Noted: 2022-07-22

## 2022-07-22 LAB
ACINETOBACTER CALCOAC BAUMANNII COMPLEX BY PCR: NOT DETECTED
ALBUMIN SERPL-MCNC: 2.7 G/DL (ref 3.5–4.6)
ALP BLD-CCNC: 88 U/L (ref 35–104)
ALT SERPL-CCNC: 47 U/L (ref 0–41)
ANION GAP SERPL CALCULATED.3IONS-SCNC: 11 MEQ/L (ref 9–15)
AST SERPL-CCNC: 41 U/L (ref 0–40)
BACTEROIDES FRAGILIS BY PCR: NOT DETECTED
BASOPHILS ABSOLUTE: 0 K/UL (ref 0–0.2)
BASOPHILS RELATIVE PERCENT: 0.4 %
BILIRUB SERPL-MCNC: 1.1 MG/DL (ref 0.2–0.7)
BLOOD CULTURE, ROUTINE: ABNORMAL
BUN BLDV-MCNC: 12 MG/DL (ref 8–23)
CALCIUM SERPL-MCNC: 9 MG/DL (ref 8.5–9.9)
CANDIDA ALBICANS BY PCR: NOT DETECTED
CANDIDA AURIS BY PCR: NOT DETECTED
CANDIDA GLABRATA BY PCR: NOT DETECTED
CANDIDA KRUSEI BY PCR: NOT DETECTED
CANDIDA PARAPSILOSIS BY PCR: NOT DETECTED
CANDIDA TROPICALIS BY PCR: NOT DETECTED
CHLORIDE BLD-SCNC: 110 MEQ/L (ref 95–107)
CO2: 21 MEQ/L (ref 20–31)
CORTISOL COLLECTION INFO: NORMAL
CORTISOL: 12.1 UG/DL (ref 2.7–18.4)
CREAT SERPL-MCNC: 0.55 MG/DL (ref 0.7–1.2)
CRYPTOCOCCUS NEOFORMANS/GATTII BY PCR: NOT DETECTED
CULTURE, BLOOD 2: ABNORMAL
ENTEROBACTER CLOACAE COMPLEX BY PCR: NOT DETECTED
ENTEROBACTERALES BY PCR: NOT DETECTED
ENTEROCOCCUS FAECALIS BY PCR: DETECTED
ENTEROCOCCUS FAECIUM BY PCR: NOT DETECTED
EOSINOPHILS ABSOLUTE: 0.5 K/UL (ref 0–0.7)
EOSINOPHILS RELATIVE PERCENT: 3.9 %
ESCHERICHIA COLI BY PCR: NOT DETECTED
GFR AFRICAN AMERICAN: >60
GFR NON-AFRICAN AMERICAN: >60
GLOBULIN: 3.9 G/DL (ref 2.3–3.5)
GLUCOSE BLD-MCNC: 83 MG/DL (ref 70–99)
GLUCOSE BLD-MCNC: 84 MG/DL (ref 70–99)
GLUCOSE BLD-MCNC: 85 MG/DL (ref 70–99)
GLUCOSE BLD-MCNC: 86 MG/DL (ref 70–99)
GLUCOSE BLD-MCNC: 88 MG/DL (ref 70–99)
HAEMOPHILUS INFLUENZAE BY PCR: NOT DETECTED
HCT VFR BLD CALC: 30.3 % (ref 42–52)
HEMOGLOBIN: 9.7 G/DL (ref 14–18)
KEPPRA: 24 UG/ML (ref 10–40)
KLEBSIELLA AEROGENES BY PCR: NOT DETECTED
KLEBSIELLA OXYTOCA BY PCR: NOT DETECTED
KLEBSIELLA PNEUMONIAE GROUP BY PCR: NOT DETECTED
LISTERIA MONOCYTOGENES BY PCR: NOT DETECTED
LYMPHOCYTES ABSOLUTE: 1.8 K/UL (ref 1–4.8)
LYMPHOCYTES RELATIVE PERCENT: 15.6 %
MCH RBC QN AUTO: 29.6 PG (ref 27–31.3)
MCHC RBC AUTO-ENTMCNC: 32.1 % (ref 33–37)
MCV RBC AUTO: 92.3 FL (ref 80–100)
METHICILLIN RESISTANCE MECA/C  BY PCR: DETECTED
MONOCYTES ABSOLUTE: 0.9 K/UL (ref 0.2–0.8)
MONOCYTES RELATIVE PERCENT: 7.9 %
NEISSERIA MENINGITIDIS BY PCR: NOT DETECTED
NEUTROPHILS ABSOLUTE: 8.5 K/UL (ref 1.4–6.5)
NEUTROPHILS RELATIVE PERCENT: 72.2 %
PDW BLD-RTO: 14.8 % (ref 11.5–14.5)
PERFORMED ON: NORMAL
PLATELET # BLD: 282 K/UL (ref 130–400)
POTASSIUM REFLEX MAGNESIUM: 4.2 MEQ/L (ref 3.4–4.9)
PROTEUS SPECIES BY PCR: NOT DETECTED
PSEUDOMONAS AERUGINOSA BY PCR: NOT DETECTED
RBC # BLD: 3.28 M/UL (ref 4.7–6.1)
SALMONELLA SPECIES BY PCR: NOT DETECTED
SERRATIA MARCESCENS BY PCR: NOT DETECTED
SODIUM BLD-SCNC: 142 MEQ/L (ref 135–144)
STAPHYLOCOCCUS AUREUS BY PCR: NOT DETECTED
STAPHYLOCOCCUS EPIDERMIDIS BY PCR: DETECTED
STAPHYLOCOCCUS LUGDUNENSIS BY PCR: NOT DETECTED
STAPHYLOCOCCUS SPECIES BY PCR: DETECTED
STENOTROPHOMONAS MALTOPHILIA BY PCR: NOT DETECTED
STREPTOCOCCUS AGALACTIAE BY PCR: NOT DETECTED
STREPTOCOCCUS PNEUMONIAE BY PCR: NOT DETECTED
STREPTOCOCCUS PYOGENES  BY PCR: NOT DETECTED
STREPTOCOCCUS SPECIES BY PCR: NOT DETECTED
TOTAL PROTEIN: 6.6 G/DL (ref 6.3–8)
VANCOMYCIN RESISTANT BY PCR: NOT DETECTED
WBC # BLD: 11.8 K/UL (ref 4.8–10.8)

## 2022-07-22 PROCEDURE — 1210000000 HC MED SURG R&B

## 2022-07-22 PROCEDURE — 2580000003 HC RX 258

## 2022-07-22 PROCEDURE — 87186 SC STD MICRODIL/AGAR DIL: CPT

## 2022-07-22 PROCEDURE — 2580000003 HC RX 258: Performed by: INTERNAL MEDICINE

## 2022-07-22 PROCEDURE — 36415 COLL VENOUS BLD VENIPUNCTURE: CPT

## 2022-07-22 PROCEDURE — 6360000002 HC RX W HCPCS

## 2022-07-22 PROCEDURE — 99232 SBSQ HOSP IP/OBS MODERATE 35: CPT | Performed by: INTERNAL MEDICINE

## 2022-07-22 PROCEDURE — 87077 CULTURE AEROBIC IDENTIFY: CPT

## 2022-07-22 PROCEDURE — 86403 PARTICLE AGGLUT ANTBDY SCRN: CPT

## 2022-07-22 PROCEDURE — 6360000002 HC RX W HCPCS: Performed by: INTERNAL MEDICINE

## 2022-07-22 PROCEDURE — 6370000000 HC RX 637 (ALT 250 FOR IP)

## 2022-07-22 PROCEDURE — 6370000000 HC RX 637 (ALT 250 FOR IP): Performed by: INTERNAL MEDICINE

## 2022-07-22 PROCEDURE — 87040 BLOOD CULTURE FOR BACTERIA: CPT

## 2022-07-22 PROCEDURE — 99253 IP/OBS CNSLTJ NEW/EST LOW 45: CPT | Performed by: INTERNAL MEDICINE

## 2022-07-22 PROCEDURE — 94664 DEMO&/EVAL PT USE INHALER: CPT

## 2022-07-22 PROCEDURE — 85025 COMPLETE CBC W/AUTO DIFF WBC: CPT

## 2022-07-22 PROCEDURE — 80053 COMPREHEN METABOLIC PANEL: CPT

## 2022-07-22 RX ORDER — SENNA AND DOCUSATE SODIUM 50; 8.6 MG/1; MG/1
1 TABLET, FILM COATED ORAL 2 TIMES DAILY
Status: DISCONTINUED | OUTPATIENT
Start: 2022-07-22 | End: 2022-07-28 | Stop reason: HOSPADM

## 2022-07-22 RX ORDER — LANSOPRAZOLE
30 KIT DAILY
Status: DISCONTINUED | OUTPATIENT
Start: 2022-07-22 | End: 2022-07-28 | Stop reason: HOSPADM

## 2022-07-22 RX ORDER — ALBUTEROL SULFATE 2.5 MG/3ML
2.5 SOLUTION RESPIRATORY (INHALATION) EVERY 6 HOURS PRN
Status: DISCONTINUED | OUTPATIENT
Start: 2022-07-22 | End: 2022-07-28 | Stop reason: HOSPADM

## 2022-07-22 RX ORDER — ESOMEPRAZOLE MAGNESIUM 40 MG/1
40 FOR SUSPENSION ORAL DAILY
Status: DISCONTINUED | OUTPATIENT
Start: 2022-07-22 | End: 2022-07-22 | Stop reason: CLARIF

## 2022-07-22 RX ORDER — POLYETHYLENE GLYCOL 3350 17 G/17G
17 POWDER, FOR SOLUTION ORAL 2 TIMES DAILY
Status: DISCONTINUED | OUTPATIENT
Start: 2022-07-22 | End: 2022-07-28 | Stop reason: HOSPADM

## 2022-07-22 RX ORDER — ATORVASTATIN CALCIUM 20 MG/1
20 TABLET, FILM COATED ORAL NIGHTLY
Status: DISCONTINUED | OUTPATIENT
Start: 2022-07-22 | End: 2022-07-28 | Stop reason: HOSPADM

## 2022-07-22 RX ORDER — DESMOPRESSIN ACETATE 0.1 MG/1
500 TABLET ORAL 2 TIMES DAILY
Status: DISCONTINUED | OUTPATIENT
Start: 2022-07-22 | End: 2022-07-28 | Stop reason: HOSPADM

## 2022-07-22 RX ORDER — LEVETIRACETAM 100 MG/ML
750 SOLUTION ORAL 2 TIMES DAILY
Status: DISCONTINUED | OUTPATIENT
Start: 2022-07-22 | End: 2022-07-28 | Stop reason: HOSPADM

## 2022-07-22 RX ORDER — CABERGOLINE 0.5 MG/1
0.5 TABLET ORAL
Status: DISCONTINUED | OUTPATIENT
Start: 2022-07-25 | End: 2022-07-28 | Stop reason: HOSPADM

## 2022-07-22 RX ORDER — CHLORHEXIDINE GLUCONATE 0.12 MG/ML
15 RINSE ORAL 3 TIMES DAILY
Status: DISCONTINUED | OUTPATIENT
Start: 2022-07-22 | End: 2022-07-28 | Stop reason: HOSPADM

## 2022-07-22 RX ORDER — FENOFIBRATE 160 MG/1
160 TABLET ORAL DAILY
Status: DISCONTINUED | OUTPATIENT
Start: 2022-07-22 | End: 2022-07-28 | Stop reason: HOSPADM

## 2022-07-22 RX ADMIN — LEVETIRACETAM 750 MG: 100 SOLUTION ORAL at 23:04

## 2022-07-22 RX ADMIN — PIPERACILLIN AND TAZOBACTAM 3375 MG: 3; .375 INJECTION, POWDER, FOR SOLUTION INTRAVENOUS at 17:46

## 2022-07-22 RX ADMIN — SODIUM CHLORIDE, PRESERVATIVE FREE 10 ML: 5 INJECTION INTRAVENOUS at 23:08

## 2022-07-22 RX ADMIN — LEVOTHYROXINE SODIUM 150 MCG: 0.15 TABLET ORAL at 05:45

## 2022-07-22 RX ADMIN — DAPTOMYCIN 350 MG: 500 INJECTION, POWDER, LYOPHILIZED, FOR SOLUTION INTRAVENOUS at 23:01

## 2022-07-22 RX ADMIN — ACETAMINOPHEN 650 MG: 325 TABLET ORAL at 11:52

## 2022-07-22 RX ADMIN — PIPERACILLIN AND TAZOBACTAM 3375 MG: 3; .375 INJECTION, POWDER, FOR SOLUTION INTRAVENOUS at 23:43

## 2022-07-22 RX ADMIN — SODIUM CHLORIDE: 9 INJECTION, SOLUTION INTRAVENOUS at 06:42

## 2022-07-22 RX ADMIN — PIPERACILLIN AND TAZOBACTAM 3375 MG: 3; .375 INJECTION, POWDER, FOR SOLUTION INTRAVENOUS at 00:51

## 2022-07-22 RX ADMIN — PIPERACILLIN AND TAZOBACTAM 3375 MG: 3; .375 INJECTION, POWDER, FOR SOLUTION INTRAVENOUS at 09:25

## 2022-07-22 RX ADMIN — ATORVASTATIN CALCIUM 20 MG: 20 TABLET, FILM COATED ORAL at 23:08

## 2022-07-22 RX ADMIN — VANCOMYCIN HYDROCHLORIDE 1250 MG: 1.25 INJECTION, POWDER, LYOPHILIZED, FOR SOLUTION INTRAVENOUS at 17:48

## 2022-07-22 RX ADMIN — DESMOPRESSIN ACETATE 500 MCG: 0.1 TABLET ORAL at 23:04

## 2022-07-22 RX ADMIN — POLYETHYLENE GLYCOL 3350 17 G: 17 POWDER, FOR SOLUTION ORAL at 23:06

## 2022-07-22 RX ADMIN — VANCOMYCIN HYDROCHLORIDE 1250 MG: 1.25 INJECTION, POWDER, LYOPHILIZED, FOR SOLUTION INTRAVENOUS at 04:13

## 2022-07-22 RX ADMIN — SENNOSIDES AND DOCUSATE SODIUM 1 TABLET: 50; 8.6 TABLET ORAL at 23:06

## 2022-07-22 RX ADMIN — 0.12% CHLORHEXIDINE GLUCONATE 15 ML: 1.2 RINSE ORAL at 23:07

## 2022-07-22 NOTE — PROGRESS NOTES
Hospitalist Progress Note      PCP: Antolin Escudero DO    Date of Admission: 7/21/2022    Chief Complaint:    Chief Complaint   Patient presents with    Wound Infection     Left hip wound, tunneled, fever       Subjective:  Patient is non verbal right now; unable to complete full 12 point ROS    Medications:  Reviewed    Infusion Medications    sodium chloride      dextrose       Scheduled Medications    atorvastatin  20 mg PEG Tube Nightly    [START ON 7/25/2022] cabergoline  0.5 mg Oral Once per day on Mon Thu    hypromellose  2 drop Both Eyes Daily    chlorhexidine  15 mL Mouth/Throat TID    desmopressin  500 mcg PEG Tube BID    fenofibrate  160 mg Oral Daily    levETIRAcetam  750 mg PEG Tube BID    polyethylene glycol  17 g Per G Tube BID    sennosides-docusate sodium  1 tablet PEG Tube BID    tiotropium-olodaterol  2 puff Inhalation Daily    vitamin D  50,000 Units Oral Daily    lansoprazole  30 mg Per G Tube Daily    sodium chloride flush  5-40 mL IntraVENous 2 times per day    enoxaparin  40 mg SubCUTAneous Daily    piperacillin-tazobactam  3,375 mg IntraVENous Q8H    vancomycin (VANCOCIN) intermittent dosing (placeholder)   Other RX Placeholder    vancomycin  1,250 mg IntraVENous Q12H    insulin lispro  0-8 Units SubCUTAneous 6 times per day    levothyroxine  150 mcg Oral Daily     PRN Meds: albuterol, sodium chloride flush, sodium chloride, ondansetron **OR** ondansetron, polyethylene glycol, acetaminophen **OR** acetaminophen, glucose, dextrose bolus **OR** dextrose bolus, glucagon (rDNA), dextrose      Intake/Output Summary (Last 24 hours) at 7/22/2022 1300  Last data filed at 7/22/2022 6098  Gross per 24 hour   Intake 2803.31 ml   Output --   Net 2803.31 ml       Exam:    /61   Pulse 91   Temp (!) 100.6 °F (38.1 °C)   Resp 22   Ht 5' 5\" (1.651 m)   Wt 201 lb 4.8 oz (91.3 kg)   SpO2 94%   BMI 33.50 kg/m²     General appearance: No apparent distress  HEENT:  Conjunctivae/corneas clear.   Neck: Trachea midline. Respiratory:  Normal respiratory effort. Clear to auscultation  Cardiovascular: Regular rate and rhythm  Abdomen: PEG in place. Musculoskeletal: No clubbing, cyanosis or edema bilaterally  Neuro: Contracted. Capillary Refill: Brisk,< 3 seconds   Peripheral Pulses: +2 palpable, equal bilaterally   Skin:  Left hip tunneling wound with surrounding cellulitis    Labs:   Recent Labs     07/21/22 0315 07/22/22  0958   WBC 15.4* 11.8*   HGB 10.8* 9.7*   HCT 32.5* 30.3*    282       Recent Labs     07/21/22 0315 07/22/22  0958    142   K 4.3 4.2    110*   CO2 22 21   BUN 18 12   CREATININE 0.61* 0.55*   CALCIUM 9.4 9.0       Recent Labs     07/21/22 0315 07/22/22  0958   AST 48* 41*   ALT 55* 47*   BILITOT 0.9* 1.1*   ALKPHOS 131* 88       Recent Labs     07/21/22  0315   INR 1.3       Recent Labs     07/21/22 0315   CKTOTAL 65   TROPONINI 0.093*         Urinalysis:      Lab Results   Component Value Date/Time    NITRU Negative 07/21/2022 03:54 AM    WBCUA 3-5 02/13/2022 12:45 AM    BACTERIA RARE 02/13/2022 12:45 AM    RBCUA None seen 02/13/2022 12:45 AM    BLOODU Negative 07/21/2022 03:54 AM    SPECGRAV 1.017 07/21/2022 03:54 AM    GLUCOSEU Negative 07/21/2022 03:54 AM       Radiology:  XR HIP LEFT (1 VIEW)   Final Result   There are no acute osseous changes. Status post left hip arthroplasty. There is a lucency in the subcutaneous soft tissues of the left hip consistent with open wound or ulcer. If osteomyelitis is suspected may consider bone scan evaluation. XR CHEST PORTABLE   Final Result   NO ACUTE CARDIOPULMONARY ABNORMALITY. NO CHANGE.          MRI HIP LEFT WO CONTRAST    (Results Pending)       Assessment/Plan:    #sepsis secondary to suspected OM and bacteremia POA    - MRI ordered    - daily blood cultures    - continue broad spec Abx    - ID consulted for abx     #Hypothryoidism     - per endo    #HTN    - resume home meds     #Schizophrenia    -

## 2022-07-22 NOTE — PROGRESS NOTES
PODIATRIC MEDICINE AND SURGERY  CONSULT PROGRESS NOTE    Consulting Service:  Medicine  Requesting Provider: Benigno Li   Opinion/advice regarding: Bilateral foot wounds  Staff Doctor:  Dr. Lambert Albert:  74 y.o. male with PMH significant for asthma, COPD, depression, hypercholesterolemia, hypertension, osteoarthritis, pituitary macroadenoma for who podiatry was consulted for bilateral foot wounds. Patient is seen in the wound care clinic the foot wounds appear stable, continue local wound care with iodoform packing to the right heel daily. PLAN AND RECOMMENDATIONS[de-identified]  Patient's case to be discussed with staff, Dr. Lily Gomez, who will provide final recommendations going forward  Wound care: Iodoform packing into the right heel with dry sterile dressing daily, mepilex border to the left foot daily per nursing  Offload bilateral lower extremities with prevlon boots while resting   Elevate bilateral lower extremities at or above heart level while resting  Toe touch weight bearing to the right lower extremity in protective surgical shoe when ambulating. Antibiotic coverage per primary team   Pain management per primary team   Podiatry to follow while in house   Patient will need follow up with Dr. Lily Gomez within 7-10 days of discharge      Interval Events  -Febrile, positive blood cultures   -Down trending leukocytosis   -Right heel wound is stable. Not likely the source of infection. HPI: This 74y.o. year old male was seen today for bilateral chronic foot wounds. There is a wound on the right heel and a blister on the left foot, as per chart review. Unable to obtain history from patient as he has a trach and is not able to verbalize. No recent history of nausea, vomiting, diarrhea, fevers, chills, chest pain, shortness of breath, head ache, or calf pain. No other pedal complaints.      Past Medical History:   Diagnosis Date    Abdominal pain     Asthma     Bloating symptom     COPD (chronic obstructive pulmonary disease) (HCC)     COPD (chronic obstructive pulmonary disease) ([facility]     Depression     Depression     Diarrhea     Drug-seeking behavior 02/03/2020    Emphysema/COPD ([facility]     History of rectal bleeding     Hypercholesteremia     past trx > 5 yrs    Hypercholesteremia     Hypercholesteremia     Hypertension     meds > 3 yrs    Hypothyroid 07/21/2022    Lung disease     Nontraumatic subarachnoid hemorrhage ([facility]     [address] PAPER[address]    RANDI on CPAP     patient relates he doesn't use cpap at this time    Osteoarthritis     left hip    Osteoarthritis     Osteoarthritis of left hip     Other hydrocephalus ([facility]     [address] PAPERWORK    Physiological consequence of immobility 03/09/2022    Pituitary macroadenoma ([facility]     Sleep apnea        Past Surgical History:   Procedure Laterality Date    COLONOSCOPY  5/2/14    JENNIFER    COLONOSCOPY N/A 12/12/2019    COLONOSCOPY DIAGNOSTIC performed by Ofelia Pérez MD at [place] [address], [facility]      EYE SURGERY Bilateral     cataracts    [address] ? index finger laceration repair    HERNIA REPAIR  2004    repair [facility]     JOINT REPLACEMENT Left     left hip    [place] COLON CA SCRN NOT HI RSK IND N/A 5/17/2017    COLONOSCOPY performed by Azalia Halsted, MD at [address] [place] [address] Left 4/22/2019    LEFT HIP TOTAL HIP ARTHROPLASTY, LATERAL DECUB, GRACIE performed by Edenilson Wakefield MD at [address] [place] [address]  12/12/2019    EGD DIAGNOSTIC ONLY performed by Ofelia Pérez MD at [facility]       No current facility-administered medications on file prior to encounter.      Current Outpatient Medications on File Prior to Encounter   Medication Sig Dispense Refill    Nutritional Supplements (ISOSOURCE 1.5 NUSRAT PO) 60 mLs by Percutaneous Endoscopic Gastrostomy route continuous      carboxymethylcellulose (ARTIFICIAL TEARS) 1 tablet Take 1 tablet by mouth daily 30 tablet 1    cabergoline (DOSTINEX) 0.5 MG tablet Take 1 tablet by mouth Twice a Week ON Monday and Thursday 8 tablet 3    [DISCONTINUED] furosemide (LASIX) 40 MG tablet Take 40 mg by mouth daily      fenofibrate 160 MG tablet Take 150 mg by mouth daily      aspirin 81 MG EC tablet Take 1 tablet by mouth 2 times daily (Patient taking differently: Take 81 mg by mouth in the morning.  ADMINISTER VIA PEG TUBE.) 60 tablet 0    [DISCONTINUED] lisinopril (PRINIVIL;ZESTRIL) 20 MG tablet Take 2 tablets by mouth daily 30 tablet 3    Respiratory Therapy Supplies SANDRA Full face CPAP mask and supplies 1 Device 0    CPAP Machine MISC by Does not apply route New CPAP with 6 cm 1 each 0    albuterol (PROVENTIL) (2.5 MG/3ML) 0.083% nebulizer solution Take 3 mLs by nebulization every 6 hours as needed for Wheezing (Patient taking differently: Take 2.5 mg by nebulization every 6 hours as needed for Wheezing or Shortness of Breath) 120 each 5    STIOLTO RESPIMAT 2.5-2.5 MCG/ACT AERS Inhale 2 puff in AM 1 Inhaler 5       No Known Allergies    Family History   Problem Relation Age of Onset    Colon Cancer Mother     Cancer Mother     Arthritis Mother     High Blood Pressure Father     Heart Disease Father     Diabetes Sister     No Known Problems Brother     No Known Problems Brother     No Known Problems Brother     Other Brother         Sepsis    Other Brother         Killed    No Known Problems Sister     No Known Problems Sister     No Known Problems Son        Social History     Socioeconomic History    Marital status: Legally      Spouse name: Not on file    Number of children: Not on file    Years of education: Not on file    Highest education level: Not on file   Occupational History    Not on file   Tobacco Use    Smoking status: Former     Packs/day: 1.50     Years: 46.00     Pack years: 69.00     Types: Cigarettes     Quit date: 3/20/2019     Years since quitting: 3.3    Smokeless tobacco: Never   Vaping Use    Vaping Use: Unknown   Substance and Sexual Activity    Alcohol use: Yes    Drug use: No    Sexual activity: Not on file   Other Topics Concern    Not on file   Social History Narrative    Not on file     Social Determinants of Health     Financial Resource Strain: Not on file   Food Insecurity: Not on file   Transportation Needs: Not on file   Physical Activity: Not on file   Stress: Not on file   Social Connections: Not on file   Intimate Partner Violence: Not on file   Housing Stability: Not on file       Review of Systems  CONSTITUTIONAL: No fevers, chills, diaphoresis  HEENT: No epistaxis, tinnitus  EYES: No diplopia, blurry vision.   CARDIOVASCULAR:  No chest pain, palpitations, lower extremity edema  PULM: No dyspnea, tachypnea, wheezing  GI: No nausea, vomiting, constipation, diarrhea  : No dysuria, gross hematuria, or pyuria  NEURO:  No new balance problems, peripheral weakness, paresthesias, numbness  MSK: Contracted lower extremities   PSY: No concerns regarding depression, anxiety  INTEGUMENTARY:  open skin lesion to right heel     OBJECTIVE:  /61   Pulse 91   Temp (!) 100.6 °F (38.1 °C)   Resp 22   Ht 5' 5\" (1.651 m)   Wt 201 lb 4.8 oz (91.3 kg)   SpO2 94%   BMI 33.50 kg/m²   Patient is alert and oriented to person, place, and time    Vascular:   Non Palpable Dorsalis Pedis and Non Palpable Posterior Tibial Pulses B/L   Capillary Fill time < 3 seconds to B/L digits  Skin temperature warm to warm tibial tuberosity to the digits B/L  Hair growth absent to digits  mild edema  No varicosities     Neurological:   Sensation to light touch intact B/L    Musculoskeletal/Orthopaedic:   Structural Deformities: contracted lower extremities  5/5 muscle strength Dorsiflexion, Plantarflexion, Inversion, Eversion B/L  mild tenderness on palpation of right heel    Dermatological:   Skin appears well hydrated and supple with good temperature, texture, turgor   hyperkeratotic lesions noted  Nails 1-5 B/L appear within normal limits  Interspaces 1-4 B/L are clear and without debris  There is a superficial darkened blister noted at the fifth metatarsal head laterally on the left foot, wound is not open. There is crepitus, induration or acute signs of infection. Ulceration #1:   Location: right heel  Measurements: approx. 3.0 cm x 3.0 cm x 1.0 cm  Base: Granular/Healthy, with probe to periosteum at the 1 o clock aspect of the wound   Borders: appear healthy, no hyperkeratotic or macerated tissue   Exudate: minimal drainage   Comments: no periwound erythema or edema  wound borders with no evidence of ascending lymphangitis. Wound undermines probes to periosteum.        LABS:   Lab Results   Component Value Date    WBC 11.8 (H) 07/22/2022    HGB 9.7 (L) 07/22/2022    HCT 30.3 (L) 07/22/2022    MCV 92.3 07/22/2022     07/22/2022     Lab Results   Component Value Date/Time     07/22/2022 09:58 AM    K 4.2 07/22/2022 09:58 AM     07/22/2022 09:58 AM    CO2 21 07/22/2022 09:58 AM    BUN 12 07/22/2022 09:58 AM    CREATININE 0.55 07/22/2022 09:58 AM    GLUCOSE 85 07/22/2022 09:58 AM    CALCIUM 9.0 07/22/2022 09:58 AM      Lab Results   Component Value Date    LABALBU 2.7 (L) 07/22/2022     Lab Results   Component Value Date    SEDRATE 99 (H) 07/21/2022     Lab Results   Component Value Date    .9 (H) 07/21/2022     Lab Results   Component Value Date    LABA1C 5.6 02/21/2020       IMAGING:   No foot films         Leonel Bernstein DPM PGY-3  Podiatric Surgery Resident  Podiatry On Call Pager: [phone]    07/22/22  5:28 PM

## 2022-07-22 NOTE — CARE COORDINATION
CURRENT DC PLAN IS RETURN TO [facility] WHEN STABLE. MAY BENEFIT FROM PALLIATIVE CARE CONSULT TO DISCUSS GOALS OF CARE

## 2022-07-22 NOTE — PROGRESS NOTES
Assessment documented. Vital signs stable. Wound dressings are clean,dry and intact. Respirations are even and unlabored, lungs and clear/diminished. No s/s of pain noted or reported by staff.      /83   Pulse 93   Temp 99.5 °F (37.5 °C)   Resp 22   Ht 5' 5\" (1.651 m)   Wt 201 lb 4.8 oz (91.3 kg)   SpO2 98%   BMI 33.50 kg/m²

## 2022-07-22 NOTE — CONSULTS
Cardiology Consult Note  Patient: Adri Fraser  Unit/Bed: C559/R255-00  YOB: 1955  MRN: [id number]  Acct: [de-identified]   Admitting Diagnosis: Septicemia ([facility] [A41.9]  Elevated troponin [R77.8]  Sepsis ([facility] [A41.9]  Decreased thyroid stimulating hormone (TSH) level [R79.89]  Osteomyelitis of other site, unspecified type [facility]) [M86.9]  Date:  7/21/2022  Hospital Day: 1      Chief Complaint:  Fevers    Reason of this consult  Management of bradycardia      History of Present Illness:  28-year-old male without prior cardiac history but significant for COPD, hypertension, hyperlipidemia, nonverbal obesity, diabetes, who is a resident of [facility], he was brought to the hospital for fevers.   Currently undergoing management of sepsis left hip wound, and right heel infection  Cardiology consulted for management of bradycardia    On telemetry sinus rhythm with occasional rates in the 30s, no significant sinus pauses  No significant high degree blocks      No Known Allergies    Current Facility-Administered Medications   Medication Dose Route Frequency Provider Last Rate Last Admin    albuterol (PROVENTIL) nebulizer solution 2.5 mg  2.5 mg Nebulization Q6H PRN Luis Zepeda MD        atorvastatin (LIPITOR) tablet 20 mg  20 mg PEG Tube Nightly Luis Zepeda MD        [START ON 7/25/2022] cabergoline (DOSTINEX) tablet 0.5 mg  0.5 mg Oral Once per day on Mon Thu Luis Zepeda MD        hypromellose (ISOPTO TEARS) 0.5 % ophthalmic solution 2 drop  2 drop Both Eyes Daily Luis Zepeda MD        chlorhexidine (PERIDEX) 0.12 % solution 15 mL  15 mL Mouth/Throat TID Luis Zepeda MD        desmopressin (DDAVP) tablet 500 mcg  500 mcg PEG Tube BID Luis Zepeda MD        fenofibrate (TRIGLIDE) tablet 160 mg  160 mg Oral Daily Luis Zepeda MD        levETIRAcetam (KEPPRA) 100 MG/ML solution 750 mg  750 mg PEG Tube BID Luis Zepeda MD        polyethylene glycol (GLYCOLAX) packet 17 g  17 g Per G Tube BID Corona Schaffer MD        sennosides-docusate sodium (SENOKOT-S) 8.6-50 MG tablet 1 tablet  1 tablet PEG Tube BID Corona Schaffer MD        tiotropium-olodaterol (STIOLTO) 2.5-2.5 MCG/ACT inhaler 2 puff  2 puff Inhalation Daily Corona Schaffer MD        lansoprazole suspension SUSP 30 mg  30 mg Per G Tube Daily Corona Schaffer MD        sodium chloride flush 0.9 % injection 5-40 mL  5-40 mL IntraVENous 2 times per day MARICRUZ Graham CNP   10 mL at 07/21/22 2113    sodium chloride flush 0.9 % injection 5-40 mL  5-40 mL IntraVENous PRN MARICRUZ Mistry CNP        0.9 % sodium chloride infusion   IntraVENous PRN MARICRUZ Graham CNP        enoxaparin (LOVENOX) injection 40 mg  40 mg SubCUTAneous Daily MARICRUZ Mistry CNP   40 mg at 07/21/22 1119    ondansetron (ZOFRAN-ODT) disintegrating tablet 4 mg  4 mg Oral Q8H PRN MARICRUZ Graham CNP        Or    ondansetron (ZOFRAN) injection 4 mg  4 mg IntraVENous Q6H PRN MARICRUZ Mistry CNP        polyethylene glycol (GLYCOLAX) packet 17 g  17 g Oral Daily PRN MARICRUZ Graham CNP        acetaminophen (TYLENOL) tablet 650 mg  650 mg Oral Q6H PRN MARICRUZ Graham CNP   650 mg at 07/22/22 1152    Or    acetaminophen (TYLENOL) suppository 650 mg  650 mg Rectal Q6H PRN MARICRUZ Graham CNP   650 mg at 07/21/22 1116    piperacillin-tazobactam (ZOSYN) 3,375 mg in dextrose 5 % 50 mL IVPB extended infusion (mini-bag)  3,375 mg IntraVENous Q8H MARICRUZ Mistry CNP 12.5 mL/hr at 07/22/22 1746 3,375 mg at 07/22/22 1746    vancomycin (VANCOCIN) intermittent dosing (placeholder)   Other RX Placeholder MARICRUZ Mistry CNP        vancomycin (VANCOCIN) 1,250 mg in dextrose 5 % 250 mL IVPB (ADDAVIAL)  1,250 mg IntraVENous Q12H MARICRUZ Graham - .7 mL/hr at 07/22/22 1748 1,250 mg at 07/22/22 1748    glucose chewable tablet 16 g  4 tablet Oral PRN Corona Schaffer MD        dextrose bolus 10% 125 mL  125 mL IntraVENous PRN Juan F Sherlyn Collier MD        Or    dextrose bolus 10% 250 mL  250 mL IntraVENous PRN Juan Garcia MD        glucagon (rDNA) injection 1 mg  1 mg SubCUTAneous PRN Juan Garcia MD        dextrose 10 % infusion   IntraVENous Continuous PRN Juan Garcia MD        insulin lispro (HUMALOG) injection vial 0-8 Units  0-8 Units SubCUTAneous 6 times per day Juan Garcia MD        levothyroxine (SYNTHROID) tablet 150 mcg  150 mcg Oral Daily Jose Cruz MD   150 mcg at 07/22/22 0545       PMHx:  Past Medical History:   Diagnosis Date    Abdominal pain     Asthma     Bloating symptom     COPD [facility]chronic obstructive pulmonary disease) ([facility])     COPD (chronic obstructive pulmonary disease) ([facility]     Depression     Depression     Diarrhea     Drug-seeking behavior 02/03/2020    Emphysema/COPD ([facility]     History of rectal bleeding     Hypercholesteremia     past trx > 5 yrs    Hypercholesteremia     Hypercholesteremia     Hypertension     meds > 3 yrs    Hypothyroid 07/21/2022    Lung disease     Nontraumatic subarachnoid hemorrhage ([facility]     [facility] PAPERWORK    RANDI on CPAP     patient relates he doesn't use cpap at this time    Osteoarthritis     left hip    Osteoarthritis     Osteoarthritis of left hip     Other hydrocephalus ([facility]     [address] PAPERWORK    Physiological consequence of immobility 03/09/2022    Pituitary macroadenoma ([facility]     Sleep apnea        PSHx:  Past Surgical History:   Procedure Laterality Date    COLONOSCOPY  5/2/14    JENNIFER    COLONOSCOPY N/A 12/12/2019    COLONOSCOPY DIAGNOSTIC performed by Melburn Buerger, MD at [place] [address], DIAGNOSTIC      EYE SURGERY Bilateral     cataracts    [address] ?     index finger laceration repair    HERNIA REPAIR  2004    repair [facility]     JOINT REPLACEMENT Left     left hip    [place] COLON CA SCRN NOT HI RSK IND N/A 5/17/2017    COLONOSCOPY performed by Keegan Dailey MD at [facility] TOTAL HIP ARTHROPLASTY Left 4/22/2019    LEFT HIP TOTAL HIP ARTHROPLASTY, LATERAL DECUB, GRACIE performed by Paige Hawthorne MD at [address]  12/12/2019    EGD DIAGNOSTIC ONLY performed by Tino Dominguez MD at [address] Hx:  Social History     Socioeconomic History    Marital status: Legally      Spouse name: None    Number of children: None    Years of education: None    Highest education level: None   Tobacco Use    Smoking status: Former     Packs/day: 1.50     Years: 46.00     Pack years: 69.00     Types: Cigarettes     Quit date: 3/20/2019     Years since quitting: 3.3    Smokeless tobacco: Never   Vaping Use    Vaping Use: Unknown   Substance and Sexual Activity    Alcohol use: Yes    Drug use: No       Family Hx:  Family History   Problem Relation Age of Onset    Colon Cancer Mother     Cancer Mother     Arthritis Mother     High Blood Pressure Father     Heart Disease Father     Diabetes Sister     No Known Problems Brother     No Known Problems Brother     No Known Problems Brother     Other Brother         Sepsis    Other Brother         Killed    No Known Problems Sister     No Known Problems Sister     No Known Problems Son        Review of Systems:   Review of Systems   Unable to perform ROS: Mental status change       Physical Examination:    /61   Pulse 91   Temp (!) 100.6 °F (38.1 °C)   Resp 22   Ht 5' 5\" (1.651 m)   Wt 201 lb 4.8 oz (91.3 kg)   SpO2 94%   BMI 33.50 kg/m²    Physical Exam  Vitals and nursing note reviewed. Constitutional:       Appearance: He is ill-appearing. HENT:      Head: Normocephalic and atraumatic. Mouth/Throat:      Mouth: Mucous membranes are moist.      Pharynx: Oropharynx is clear. Eyes:      Extraocular Movements: Extraocular movements intact. Conjunctiva/sclera: Conjunctivae normal.      Pupils: Pupils are equal, round, and reactive to light.    Cardiovascular:      Rate and Rhythm: Normal rate and regular rhythm. Pulses: Normal pulses. Heart sounds: Normal heart sounds. Pulmonary:      Effort: Pulmonary effort is normal.      Breath sounds: Normal breath sounds. Abdominal:      General: Abdomen is flat. Bowel sounds are normal.      Palpations: Abdomen is soft. Musculoskeletal:      Cervical back: Normal range of motion and neck supple. Skin:     General: Skin is warm.        LABS:  CBC:  Lab Results   Component Value Date/Time    WBC 11.8 07/22/2022 09:58 AM    RBC 3.28 07/22/2022 09:58 AM    HGB 9.7 07/22/2022 09:58 AM    HCT 30.3 07/22/2022 09:58 AM    MCV 92.3 07/22/2022 09:58 AM    MCH 29.6 07/22/2022 09:58 AM    MCHC 32.1 07/22/2022 09:58 AM    RDW 14.8 07/22/2022 09:58 AM     07/22/2022 09:58 AM    MPV 8.7 04/17/2014 09:12 AM     CBC with Differential:   Lab Results   Component Value Date/Time    WBC 11.8 07/22/2022 09:58 AM    RBC 3.28 07/22/2022 09:58 AM    HGB 9.7 07/22/2022 09:58 AM    HCT 30.3 07/22/2022 09:58 AM     07/22/2022 09:58 AM    MCV 92.3 07/22/2022 09:58 AM    MCH 29.6 07/22/2022 09:58 AM    MCHC 32.1 07/22/2022 09:58 AM    RDW 14.8 07/22/2022 09:58 AM    LYMPHOPCT 15.6 07/22/2022 09:58 AM    MONOPCT 7.9 07/22/2022 09:58 AM    BASOPCT 0.4 07/22/2022 09:58 AM    MONOSABS 0.9 07/22/2022 09:58 AM    LYMPHSABS 1.8 07/22/2022 09:58 AM    EOSABS 0.5 07/22/2022 09:58 AM    BASOSABS 0.0 07/22/2022 09:58 AM     CMP:    Lab Results   Component Value Date/Time     07/22/2022 09:58 AM    K 4.2 07/22/2022 09:58 AM     07/22/2022 09:58 AM    CO2 21 07/22/2022 09:58 AM    BUN 12 07/22/2022 09:58 AM    CREATININE 0.55 07/22/2022 09:58 AM    GFRAA >60.0 07/22/2022 09:58 AM    LABGLOM >60.0 07/22/2022 09:58 AM    GLUCOSE 85 07/22/2022 09:58 AM    PROT 6.6 07/22/2022 09:58 AM    LABALBU 2.7 07/22/2022 09:58 AM    CALCIUM 9.0 07/22/2022 09:58 AM    BILITOT 1.1 07/22/2022 09:58 AM    ALKPHOS 88 07/22/2022 09:58 AM    AST 41 07/22/2022 09:58 AM    ALT 47 07/22/2022 09:58 AM     BMP:    Lab Results   Component Value Date/Time     07/22/2022 09:58 AM    K 4.2 07/22/2022 09:58 AM     07/22/2022 09:58 AM    CO2 21 07/22/2022 09:58 AM    BUN 12 07/22/2022 09:58 AM    LABALBU 2.7 07/22/2022 09:58 AM    CREATININE 0.55 07/22/2022 09:58 AM    CALCIUM 9.0 07/22/2022 09:58 AM    GFRAA >60.0 07/22/2022 09:58 AM    LABGLOM >60.0 07/22/2022 09:58 AM    GLUCOSE 85 07/22/2022 09:58 AM     Magnesium:    Lab Results   Component Value Date/Time    MG 2.2 07/21/2022 03:15 AM     Troponin:    Lab Results   Component Value Date/Time    TROPONINI 0.093 07/21/2022 03:15 AM          EKG: Sinus rhythm non specific ST changes      Assessment:    Active Hospital Problems    Diagnosis Date Noted    Sepsis ([facility] [A41.9] 07/21/2022     Priority: Medium    Hypothyroid [E03.9] 07/21/2022     Priority: Medium    Osteomyelitis ([facility] [M86.9] 07/21/2022     Priority: Medium    Complicated open wound of left hip [S71.002A] 07/21/2022     Priority: Medium    Cellulitis of left hip [F38.121] 07/21/2022     Priority: Medium    Schizophrenia ([facility] [F20.9] 03/13/2020     Priority: Low    HTN (hypertension) [I10] 09/19/2019     Priority: Low    Depression [F32. A]      Priority: Low     Sinus bradycardia. Likely due to acute illness. No signs of high degree AV block, no sinus pauses.   Possibly also related to hypothyroidism as T4 is low  Sepsis  Hypothyroidism  COPD  Hypertension  Hyperlipidemia  Nonverbal  Obesity  Diabetes  Resident of [address] [place] [address],[address]  Nonambulatory  Pituitary macroadenoma  Abnormal LFTs    Plan:  Continue telemetry  Currently patient does not meet criteria for pacemaker placement  Please avoid any negative chronotropic agents no beta-blockers, no calcium channel blockers  Please keep potassium between 4 and 5 magnesium above 2  Continue management of sepsis as per primary team  Recommend endocrinology consult for hypothyroidism  Follow-up echocardiogram  Recommend ACE inhibitor versus ARB once patient more hemodynamically stable  Hold statins in the setting of abnormal LFTs  Conservative management from cardiology standpoint given patient's chronic irreversible conditions  Recommend palliative care consult        This report was transcribed using voice recognition software. Every effort was made to ensure accuracy; however, inadvertent computerized transcription errors may be present.     Electronically signed by Carmelo Roe MD on 7/22/2022 at 6:38 PM

## 2022-07-22 NOTE — CONSULTS
None.    MEDICATIONS:  Here include IV Zosyn, vancomycin, Lovenox. REVIEW OF SYSTEMS:  Other than wounds unable to obtain. PHYSICAL EXAMINATION:  GENERAL:  The patient is nonverbal and lying in bed, has contractures of  extremity. VITAL SIGNS:  Blood pressure was 111/76, pulse rate was 90, respiratory  rate was 26, temperature of 99.8. HEENT:  Normocephalic. Pupils are equally to reactive to light. Oral  mucosa was dry. NECK:  Supple. Trachea in midline. CHEST:  Lungs were clear to auscultation bilaterally. CARDIOVASCULAR:  Heart sounds were normal.  No murmurs or thrills were  present. ABDOMEN:  Soft and nontender. EXTREMITIES:  Lower extremities reveal contractures. SKIN:  Skin was showing wounds. NEUROLOGIC:  She is unable to complete. PSYCHIATRIC:  Unable to complete. LABORATORY DATA:  As above. ASSESSMENT:  Hypothyroidism with suppressed TSH, could be euthyroid sick  syndrome, sepsis, free T4 on the low side, sepsis due to multiple skin  wounds. PLAN:  Lower Synthroid to 150 mcg daily and repeat thyroid function test  to be checked in few weeks and adjustment made based on thyroid function  test.  Okay to discharge the patient from Endocrine. Total time spent 50 minutes. Thank you for the consult.         Solange Mcintyre MD    D: 07/21/2022 22:41:41       T: 07/21/2022 22:44:32     [email or web address]  Job#: [id number]     Doc#: [id number]    CC:

## 2022-07-22 NOTE — PROGRESS NOTES
Tania Kraus  1955  male  Medical Record Number: [id number]    Patient informed that I am an Infectious Disease physician and permission obtained from the patient to speak in front of any visitors prior to any discussion for HIPPA purposes. HPI:     Patient with nonverbal baseline with L hip wound that is infected with surrounding cellulitis and purulent drainage. No cultures yet obtained. Discussed with nurse at bedside to obtain culture. Apparently had fever at snf    Positive BC    Review of Systems: All 14 review of systems were not discussed with the patient       Past Medical History:   Diagnosis Date    Abdominal pain     Asthma     Bloating symptom     COPD (chronic obstructive pulmonary disease) ([facility])     COPD (chronic obstructive pulmonary disease) ([facility])     Depression     Depression     Diarrhea     Drug-seeking behavior 02/03/2020    Emphysema/COPD ([address]     History of rectal bleeding     Hypercholesteremia     past trx > 5 yrs    Hypercholesteremia     Hypercholesteremia     Hypertension     meds > 3 yrs    Hypothyroid 07/21/2022    Lung disease     Nontraumatic subarachnoid hemorrhage ([address]     [facility] [address] PAPERWORK    RANDI on CPAP     patient relates he doesn't use cpap at this time    Osteoarthritis     left hip    Osteoarthritis     Osteoarthritis of left hip     Other hydrocephalus ([address]     [address] PAPERWORK    Physiological consequence of immobility 03/09/2022    Pituitary macroadenoma [facility]     Sleep apnea        Past Surgical History:   Procedure Laterality Date    COLONOSCOPY  5/2/14    JENNIFER    COLONOSCOPY N/A 12/12/2019    COLONOSCOPY DIAGNOSTIC performed by Asa Ormond, MD at [place] [address], DIAGNOSTIC      EYE SURGERY Bilateral     cataracts    [address] ?     index finger laceration repair    HERNIA REPAIR  2004    repair [facility]     JOINT REPLACEMENT Left     left hip    GA COLON CA SCRN NOT HI RSK IND N/A 5/17/2017    COLONOSCOPY performed by Milton Hightower MD at [address] Left 4/22/2019    LEFT HIP TOTAL HIP ARTHROPLASTY, LATERAL DECUB, GRACIE performed by Ron Girard MD at [facility]. [address]  12/12/2019    EGD DIAGNOSTIC ONLY performed by Chen Gomes MD at [address] [place] [address] History    Marital status: Legally      Spouse name: Not on file    Number of children: Not on file    Years of education: Not on file    Highest education level: Not on file   Occupational History    Not on file   Tobacco Use    Smoking status: Former     Packs/day: 1.50     Years: 46.00     Pack years: 69.00     Types: Cigarettes     Quit date: 3/20/2019     Years since quitting: 3.3    Smokeless tobacco: Never   Vaping Use    Vaping Use: Unknown   Substance and Sexual Activity    Alcohol use: Yes    Drug use: No    Sexual activity: Not on file   Other Topics Concern    Not on file   Social History Narrative    Not on file     Social Determinants of Health     Financial Resource Strain: Not on file   Food Insecurity: Not on file   Transportation Needs: Not on file   Physical Activity: Not on file   Stress: Not on file   Social Connections: Not on file   Intimate Partner Violence: Not on file   Housing Stability: Not on file       Family History   Problem Relation Age of Onset    Colon Cancer Mother     Cancer Mother     Arthritis Mother     High Blood Pressure Father     Heart Disease Father     Diabetes Sister     No Known Problems Brother     No Known Problems Brother     No Known Problems Brother     Other Brother         Sepsis    Other Brother         Killed    No Known Problems Sister     No Known Problems Sister     No Known Problems Son        No current facility-administered medications on file prior to encounter.      Current Outpatient Medications on File Prior to Encounter   Medication Sig Dispense Refill    Nutritional Supplements (ISOSOURCE 1.5 NUSRAT PO) 60 mLs by Percutaneous Endoscopic Gastrostomy route continuous      carboxymethylcellulose (ARTIFICIAL TEARS) 1 % ophthalmic solution Place 2 drops into both eyes daily      atorvastatin (LIPITOR) 20 MG tablet 20 mg by PEG Tube route nightly      Atropine Sulfate 0.01 % SOLN Place 1 drop under the tongue as needed (EVERY TWO HOURS FOR REDUCTION OF SECRETIONS)      sulfamethoxazole-trimethoprim (BACTRIM DS;SEPTRA DS) 800-160 MG per tablet 1 tablet by PEG Tube route in the morning and 1 tablet before bedtime. chlorhexidine (PERIDEX) 0.12 % solution Take 15 mLs by mouth in the morning, at noon, and at bedtime FOR USE WITH TOOTHETTES FOR MOUTH CARE      polyethylene glycol (MIRALAX) 17 g PACK packet 17 g by Per G Tube route in the morning and at bedtime      acetaminophen (TYLENOL) 325 MG tablet 650 mg by PEG Tube route every 4 hours as needed for Pain      sennosides-docusate sodium (SENOKOT-S) 8.6-50 MG tablet 1 tablet by PEG Tube route in the morning and at bedtime      desmopressin (DDAVP) 0.1 MG tablet 0.5 mg by PEG Tube route in the morning and at bedtime Give 0.05mg via PEG tube two times a day for clotting promoter      esomeprazole Magnesium (NEXIUM) 40 MG PACK 40 mg by Per G Tube route in the morning. levETIRAcetam (KEPPRA) 100 MG/ML solution 750 mg/kg by PEG Tube route 2 times daily      hyoscyamine (LEVSIN/SL) 125 MCG sublingual tablet Place 125 mcg under the tongue every 6 hours as needed for Cramping      LORazepam (ATIVAN) 0.5 MG tablet 0.5 mg by PEG Tube route every 4 hours as needed for Anxiety. morphine sulfate 20 MG/ML concentrated oral solution Take 5 mg by mouth every 2 hours as needed for Pain.      levothyroxine (SYNTHROID) 200 MCG tablet 200 mcg by PEG Tube route in the morning.  Give one tablet via PEG tube every Mon,Tue, Wed, Thu, Fri, Sat .      vitamin D (CHOLECALCIFEROL) 125 MCG (5000 UT) CAPS capsule Take 50,000 Units by mouth daily Give 1/25mg (19522JH) one capsule via peg tube one time a day every Monday for supplement      [DISCONTINUED] paliperidone (INVEGA) 6 MG extended release tablet Take 1 tablet by mouth daily 30 tablet 1    cabergoline (DOSTINEX) 0.5 MG tablet Take 1 tablet by mouth Twice a Week ON Monday and Thursday 8 tablet 3    [DISCONTINUED] furosemide (LASIX) 40 MG tablet Take 40 mg by mouth daily      fenofibrate 160 MG tablet Take 150 mg by mouth daily      aspirin 81 MG EC tablet Take 1 tablet by mouth 2 times daily (Patient taking differently: Take 81 mg by mouth in the morning. ADMINISTER VIA PEG TUBE.) 60 tablet 0    [DISCONTINUED] lisinopril (PRINIVIL;ZESTRIL) 20 MG tablet Take 2 tablets by mouth daily 30 tablet 3    Respiratory Therapy Supplies SANDRA Full face CPAP mask and supplies 1 Device 0    CPAP Machine MISC by Does not apply route New CPAP with 6 cm 1 each 0    albuterol (PROVENTIL) (2.5 MG/3ML) 0.083% nebulizer solution Take 3 mLs by nebulization every 6 hours as needed for Wheezing (Patient taking differently: Take 2.5 mg by nebulization every 6 hours as needed for Wheezing or Shortness of Breath) 120 each 5    STIOLTO RESPIMAT 2.5-2.5 MCG/ACT AERS Inhale 2 puff in AM 1 Inhaler 5       Physical Exam:  resting  General: Patient appears in no acute distress, opens eyes and raises eyebrows. Does not track me  Skin: no new rashes   Skin on the L hip erythematous with wound that is packed  HEENT:  MMM, Neck is supple, trach in place  Heart: S1 S2  Lungs: bilaterally clear to auscultation  Abdomen: soft, ND, NTTP, +BS  Extrem:contractures no calf pain  Neuro exam: CN II-XII intact      Labs: I have reviewed all lab results by electronic record, including most recent CBC, metabolic panel, and pertinent abnormalities were addressed from an infectious disease perspective. WBC trends are being monitored.     Lab Results   Component Value Date/Time     07/22/2022 09:58 AM    K 4.2 07/22/2022 09:58 AM  07/22/2022 09:58 AM    CO2 21 07/22/2022 09:58 AM    BUN 12 07/22/2022 09:58 AM    CREATININE 0.55 07/22/2022 09:58 AM    GLUCOSE 85 07/22/2022 09:58 AM    CALCIUM 9.0 07/22/2022 09:58 AM      Lab Results   Component Value Date    WBC 11.8 (H) 07/22/2022    HGB 9.7 (L) 07/22/2022    HCT 30.3 (L) 07/22/2022    MCV 92.3 07/22/2022     07/22/2022       Radiology:   I have reviewed imaging results per electronic record and most pertinent abnormalities are being addressed from an infectious disease standpoint. Assessment:    Sepsis due to multiple wounds - bilateral LEs and tunneling wound L hip - photos reviewed  Fever and chills  Fever, tachy, leukocytosis, tachypnea ( resolved )   Bacteremia - 2/2 GPC but still febrile despite Vanco.   Polymicrobial wound infection      Plan:  Vancomycin is not yet therapeutic. Will give 2 dose of Dapto until Vanco reaches adequate level and in case of VRE  Continue Zosyn  Direct therpy with Discussed with nurse and obtained  Consider CT vs MRI hip for possible OM  CRP = 241  Recommend Echo    Follow up all cx results.      Catarina Arroyo D.O.

## 2022-07-22 NOTE — PROGRESS NOTES
Physician Progress Note      Kari Arellano  CSN #:                  [id number]  :                       1955  ADMIT DATE:       2022 2:42 AM  [address] [place] DATE:  RESPONDING  PROVIDER #:        Familia Babb MD          QUERY TEXT:    Per wound care RN assessment on  pt noted to have stage 4 pressure injury   to the left hip and a stage 2 pressure injury to the coccyx. If possible,   please document in progress notes and discharge summary the location, present   on admission status and stage of the pressure ulcer: The medical record reflects the following:  Risk Factors: chronic pressure, decreased mobility, shear force, and   malnutrition  Clinical Indicators: WON- stage 4 pressure injury to the left hip and a   stage 2 pressure injury to the coccyx. Left hip stage 4 pressure injury is deep, granular with some fibrin noted -   unable to evaluate the entire wound bed as the depth of the wound surpasses   viability. Left hip wound with strong foul odor and moderate yellow, purulent   drainage noted. Wound cleansed and lightly packed with plurogel impregnated   packing - to promote autolytic debridement.  Small stage 2 pressure injury to   the coccyx - wound is clean, pink and superficial  Treatment: plurogel, protective barrier with zinc paste, wound care RN,   specialty bed    Stage 1:  Non-blanchable erythema of intact skin  Stage 2:  Abrasion, Blister, Partial-thickness skin loss, with exposed dermis  Stage 3:  Full-thickness skin loss with damage or necrosis of subcutaneous   tissue  Stage 4:  Full-thickness skin & soft tissue loss through to underlying muscle,   tendon or bone  Unstageable: Obscured full-thickness skin & tissue loss    Thank you, Tysno Quiroz RN BSN [facility]  [phone]  Options provided:  -- Stage 4 Pressure Ulcer of left hip and stage 2 pressure ulcer of coccyx   present on admission  -- Other - I will add my own diagnosis  -- Disagree - Not applicable / Not valid  -- Disagree - Clinically unable to determine / Unknown  -- Refer to Clinical Documentation Reviewer    PROVIDER RESPONSE TEXT:    This patient has a Stage 4 pressure ulcer of the left hip and stage 2 pressure   ulcer of the coccyx which was present on admission.     Query created by: Hero Shaw on 7/22/2022 1:01 PM      Electronically signed by:  Elsa Moses MD 7/22/2022 1:10 PM

## 2022-07-22 NOTE — PROGRESS NOTES
CARDIO CONSULT CALLED TO DR Curt Hoff VIA ANSWERING SERVICE Electronically signed by Kisha Yang on 7/22/2022 at 12:37 PM

## 2022-07-22 NOTE — PROGRESS NOTES
[facility] EMERGENCY [facility] AT [place] Respiratory Therapy Evaluation   Current Order:  albuterol PRN      Home Regimen: PRN      Ordering Physician: Yamileth  Re-evaluation Date:  eval done     Diagnosis: sepsis      Patient Status: Stable / Unstable + Physician notified    The following MDI Criteria must be met in order to convert aerosol to MDI with spacer. If unable to meet, MDI will be converted to aerosol:  []  Patient able to demonstrate the ability to use MDI effectively  []  Patient alert and cooperative  []  Patient able to take deep breath with 5-10 second hold  []  Medication(s) available in this delivery method   []  Peak flow greater than or equal to 200 ml/min            Current Order Substituted To  (same drug, same frequency)   Aerosol to MDI [] Albuterol Sulfate 0.083% unit dose by aerosol Albuterol Sulfate MDI 2 puffs by inhalation with spacer    [] Levalbuterol 1.25 mg unit dose by aerosol Levalbuterol MDI 2 puffs by inhalation with spacer    [] Levalbuterol 0.63 mg unit dose by aerosol Levalbuterol MDI 2 puffs by inhalation with spacer    [] Ipratropium Bromide 0.02% unit dose by aerosol Ipratropium Bromide MDI 2 puffs by inhalation with spacer    [] Duoneb (Ipratropium + Albuterol) unit dose by aerosol Ipratropium MDI + Albuterol MDI 2 puffs by inhalation w/spacer   MDI to Aerosol [] Albuterol Sulfate MDI Albuterol Sulfate 0.083% unit dose by aerosol    [] Levalbuterol MDI 2 puffs by inhalation Levalbuterol 1.25 mg unit dose by aerosol    [] Ipratropium Bromide MDI by inhalation Ipratropium Bromide 0.02% unit dose by aerosol    [] Combivent (Ipratropium + Albuterol) MDI by inhalation Duoneb (Ipratropium + Albuterol) unit dose by aerosol       Treatment Assessment [Frequency/Schedule]:  Change frequency to: __________no chnages per eval________________________________________per Protocol, P&T, MEC      Points 0 1 2 3 4   Pulmonary Status  Non-Smoker  []   Smoking history   < 20 pack years  []   Smoking history  ?  20 pack years  []   Pulmonary Disorder  (acute or chronic)  [x]   Severe or Chronic w/ Exacerbation  []     Surgical Status No [x]   Surgeries     General []   Surgery Lower []   Abdominal Thoracic or []   Upper Abdominal Thoracic with  PulmonaryDisorder  []     Chest X-ray Clear/Not  Ordered     [x]  Chronic Changes  Results Pending  []  Infiltrates, atelectasis, pleural effusion, or edema  []  Infiltrates in more than one lobe []  Infiltrate + Atelectasis, &/or pleural effusion  []    Respiratory Pattern Regular,  RR = 12-20 [x]  Increased,  RR = 21-25 []  GUTIERREZ, irregular,  or RR = 26-30 []  Decreased FEV1  or RR = 31-35 []  Severe SOB, use  of accessory muscles, or RR ? 35  []    Mental Status Alert, oriented,  Cooperative [x]  Confused but Follows commands []  Lethargic or unable to follow commands []  Obtunded  []  Comatose  []    Breath Sounds Clear to  auscultation  [x]  Decreased unilaterally or  in bases only []  Decreased  bilaterally  []  Crackles or intermittent wheezes []  Wheezes []    Cough Strong, Spontan., & nonproductive [x]  Strong,  spontaneous, &  productive []  Weak,  Nonproductive []  Weak, productive or  with wheezes []  No spontaneous  cough or may require suctioning []    Level of Activity Ambulatory []  Ambulatory w/ Assist  []  Non-ambulatory [x]  Paraplegic []  Quadriplegic []    Total    Score:__5_____     Triage Score:____5____      Tri       Triage:     1. (>20) Freq: Q3    2. (16-20) Freq: Q4   3. (11-15) Freq: QID & Albuterol Q2 PRN    4. (6-10) Freq: TID & Albuterol Q2 PRN    5. (0-5) Freq Q4prn

## 2022-07-22 NOTE — PROGRESS NOTES
Nutrition Note    Consult received for Tube Feed Order & Manage. Please review RD note on 7/21 for full assessment. 1) Start peptide based TF ( Vital 1.2) @ goal rate of 60 ml/hr x 23 hrs via PEG.    2) Hold TF 30 minutes before and after Synthroid administration   3) Water flushes 50 ml,every 6 hrs while on IVF, then 100 ml every 4 hrs    4) Recommend daily MVI with minerals to aid in meeting micronutrient needs to support wound healing    Current Tube Feeding (TF) Orders:   Feeding Route: PEG  Formula: Peptide Based  Schedule: Continuous  Additives/Modulars: None  Water Flushes: 50 ml q 6 hrs while on IV fluids  Current TF & Flush Orders Provides: none currently running   Goal TF & Flush Orders Provides: @60ml/hq=5183ullg, 103g pro, 1419ml free water    Electronically signed by Delma Arevalo MS, RD, LD on 7/22/22 at 11:30 AM EDT

## 2022-07-23 PROBLEM — A41.01 STAPHYLOCOCCUS AUREUS BACTEREMIA WITH SEPSIS (HCC): Status: ACTIVE | Noted: 2022-07-23

## 2022-07-23 PROBLEM — M86.18: Status: ACTIVE | Noted: 2022-07-23

## 2022-07-23 LAB
ALBUMIN SERPL-MCNC: 2.8 G/DL (ref 3.5–4.6)
ALP BLD-CCNC: 98 U/L (ref 35–104)
ALT SERPL-CCNC: 60 U/L (ref 0–41)
ANION GAP SERPL CALCULATED.3IONS-SCNC: 11 MEQ/L (ref 9–15)
AST SERPL-CCNC: 57 U/L (ref 0–40)
BASOPHILS ABSOLUTE: 0 K/UL (ref 0–0.2)
BASOPHILS RELATIVE PERCENT: 0.5 %
BILIRUB SERPL-MCNC: 0.5 MG/DL (ref 0.2–0.7)
BUN BLDV-MCNC: 14 MG/DL (ref 8–23)
CALCIUM SERPL-MCNC: 8.7 MG/DL (ref 8.5–9.9)
CHLORIDE BLD-SCNC: 105 MEQ/L (ref 95–107)
CO2: 21 MEQ/L (ref 20–31)
CREAT SERPL-MCNC: 0.44 MG/DL (ref 0.7–1.2)
EOSINOPHILS ABSOLUTE: 0.7 K/UL (ref 0–0.7)
EOSINOPHILS RELATIVE PERCENT: 6.9 %
GFR AFRICAN AMERICAN: >60
GFR NON-AFRICAN AMERICAN: >60
GLOBULIN: 3.9 G/DL (ref 2.3–3.5)
GLUCOSE BLD-MCNC: 100 MG/DL (ref 70–99)
GLUCOSE BLD-MCNC: 108 MG/DL (ref 70–99)
GLUCOSE BLD-MCNC: 86 MG/DL (ref 70–99)
GLUCOSE BLD-MCNC: 91 MG/DL (ref 70–99)
GLUCOSE BLD-MCNC: 92 MG/DL (ref 70–99)
GLUCOSE BLD-MCNC: 93 MG/DL (ref 70–99)
GLUCOSE BLD-MCNC: 98 MG/DL (ref 70–99)
HCT VFR BLD CALC: 29.8 % (ref 42–52)
HEMOGLOBIN: 9.8 G/DL (ref 14–18)
LYMPHOCYTES ABSOLUTE: 1.8 K/UL (ref 1–4.8)
LYMPHOCYTES RELATIVE PERCENT: 18 %
MCH RBC QN AUTO: 29.8 PG (ref 27–31.3)
MCHC RBC AUTO-ENTMCNC: 32.8 % (ref 33–37)
MCV RBC AUTO: 90.7 FL (ref 80–100)
MONOCYTES ABSOLUTE: 0.9 K/UL (ref 0.2–0.8)
MONOCYTES RELATIVE PERCENT: 8.9 %
NEUTROPHILS ABSOLUTE: 6.7 K/UL (ref 1.4–6.5)
NEUTROPHILS RELATIVE PERCENT: 65.7 %
PDW BLD-RTO: 14.5 % (ref 11.5–14.5)
PERFORMED ON: ABNORMAL
PERFORMED ON: ABNORMAL
PERFORMED ON: NORMAL
PLATELET # BLD: 290 K/UL (ref 130–400)
POTASSIUM REFLEX MAGNESIUM: 3.8 MEQ/L (ref 3.4–4.9)
RBC # BLD: 3.28 M/UL (ref 4.7–6.1)
SODIUM BLD-SCNC: 137 MEQ/L (ref 135–144)
TOTAL PROTEIN: 6.7 G/DL (ref 6.3–8)
VANCOMYCIN RANDOM: 24.9 UG/ML (ref 10–40)
VANCOMYCIN TROUGH: 16.2 UG/ML (ref 10–20)
WBC # BLD: 10.1 K/UL (ref 4.8–10.8)

## 2022-07-23 PROCEDURE — 87040 BLOOD CULTURE FOR BACTERIA: CPT

## 2022-07-23 PROCEDURE — 2580000003 HC RX 258

## 2022-07-23 PROCEDURE — 1210000000 HC MED SURG R&B

## 2022-07-23 PROCEDURE — 99233 SBSQ HOSP IP/OBS HIGH 50: CPT | Performed by: INTERNAL MEDICINE

## 2022-07-23 PROCEDURE — 6370000000 HC RX 637 (ALT 250 FOR IP): Performed by: INTERNAL MEDICINE

## 2022-07-23 PROCEDURE — 85025 COMPLETE CBC W/AUTO DIFF WBC: CPT

## 2022-07-23 PROCEDURE — 80202 ASSAY OF VANCOMYCIN: CPT

## 2022-07-23 PROCEDURE — 36415 COLL VENOUS BLD VENIPUNCTURE: CPT

## 2022-07-23 PROCEDURE — 2580000003 HC RX 258: Performed by: INTERNAL MEDICINE

## 2022-07-23 PROCEDURE — 80053 COMPREHEN METABOLIC PANEL: CPT

## 2022-07-23 PROCEDURE — 6360000002 HC RX W HCPCS: Performed by: INTERNAL MEDICINE

## 2022-07-23 PROCEDURE — 6360000002 HC RX W HCPCS

## 2022-07-23 PROCEDURE — 2700000000 HC OXYGEN THERAPY PER DAY

## 2022-07-23 RX ADMIN — DAPTOMYCIN 350 MG: 500 INJECTION, POWDER, LYOPHILIZED, FOR SOLUTION INTRAVENOUS at 22:09

## 2022-07-23 RX ADMIN — FENOFIBRATE 160 MG: 160 TABLET ORAL at 11:23

## 2022-07-23 RX ADMIN — DESMOPRESSIN ACETATE 500 MCG: 0.1 TABLET ORAL at 23:31

## 2022-07-23 RX ADMIN — HYPROMELLOSE 2910 2 DROP: 5 SOLUTION OPHTHALMIC at 18:30

## 2022-07-23 RX ADMIN — ENOXAPARIN SODIUM 40 MG: 100 INJECTION SUBCUTANEOUS at 11:23

## 2022-07-23 RX ADMIN — SODIUM CHLORIDE, PRESERVATIVE FREE 10 ML: 5 INJECTION INTRAVENOUS at 23:36

## 2022-07-23 RX ADMIN — POLYETHYLENE GLYCOL 3350 17 G: 17 POWDER, FOR SOLUTION ORAL at 23:30

## 2022-07-23 RX ADMIN — DESMOPRESSIN ACETATE 500 MCG: 0.1 TABLET ORAL at 11:22

## 2022-07-23 RX ADMIN — 0.12% CHLORHEXIDINE GLUCONATE 15 ML: 1.2 RINSE ORAL at 23:31

## 2022-07-23 RX ADMIN — Medication 30 MG: at 11:48

## 2022-07-23 RX ADMIN — SENNOSIDES AND DOCUSATE SODIUM 1 TABLET: 50; 8.6 TABLET ORAL at 11:23

## 2022-07-23 RX ADMIN — PIPERACILLIN AND TAZOBACTAM 3375 MG: 3; .375 INJECTION, POWDER, FOR SOLUTION INTRAVENOUS at 22:08

## 2022-07-23 RX ADMIN — SODIUM CHLORIDE, PRESERVATIVE FREE 10 ML: 5 INJECTION INTRAVENOUS at 11:47

## 2022-07-23 RX ADMIN — LEVOTHYROXINE SODIUM 150 MCG: 0.15 TABLET ORAL at 06:26

## 2022-07-23 RX ADMIN — LEVETIRACETAM 750 MG: 100 SOLUTION ORAL at 11:47

## 2022-07-23 RX ADMIN — VANCOMYCIN HYDROCHLORIDE 1250 MG: 1.25 INJECTION, POWDER, LYOPHILIZED, FOR SOLUTION INTRAVENOUS at 04:59

## 2022-07-23 RX ADMIN — LEVETIRACETAM 750 MG: 100 SOLUTION ORAL at 23:30

## 2022-07-23 RX ADMIN — VANCOMYCIN HYDROCHLORIDE 1250 MG: 1.25 INJECTION, POWDER, LYOPHILIZED, FOR SOLUTION INTRAVENOUS at 18:24

## 2022-07-23 RX ADMIN — ATORVASTATIN CALCIUM 20 MG: 20 TABLET, FILM COATED ORAL at 23:31

## 2022-07-23 RX ADMIN — SENNOSIDES AND DOCUSATE SODIUM 1 TABLET: 50; 8.6 TABLET ORAL at 23:31

## 2022-07-23 RX ADMIN — 0.12% CHLORHEXIDINE GLUCONATE 15 ML: 1.2 RINSE ORAL at 11:23

## 2022-07-23 RX ADMIN — POLYETHYLENE GLYCOL 3350 17 G: 17 POWDER, FOR SOLUTION ORAL at 11:23

## 2022-07-23 RX ADMIN — PIPERACILLIN AND TAZOBACTAM 3375 MG: 3; .375 INJECTION, POWDER, FOR SOLUTION INTRAVENOUS at 11:30

## 2022-07-23 RX ADMIN — 0.12% CHLORHEXIDINE GLUCONATE 15 ML: 1.2 RINSE ORAL at 18:24

## 2022-07-23 NOTE — PROGRESS NOTES
Assessment completed this shift. Nonverbal with eyes closed. Opens eyes at times. Turned and repositioned every two hours. Moist cough. Suctioned trach. Clear to white thick secretions noted. Flushed PEG tube and administered medications as ordered. Will continue to monitor.   Electronically signed by Ja Panda RN on 7/23/2022 at 12:36 PM

## 2022-07-23 NOTE — PLAN OF CARE
Problem: Nutrition Deficit:  Goal: Optimize nutritional status  Outcome: Progressing     Problem: Skin/Tissue Integrity  Goal: Absence of new skin breakdown  Description: 1. Monitor for areas of redness and/or skin breakdown  2. Assess vascular access sites hourly  3. Every 4-6 hours minimum:  Change oxygen saturation probe site  4. Every 4-6 hours:  If on nasal continuous positive airway pressure, respiratory therapy assess nares and determine need for appliance change or resting period.   Outcome: Progressing

## 2022-07-23 NOTE — PROGRESS NOTES
left hip    NC COLON CA SCRN NOT HI RSK IND N/A 5/17/2017    COLONOSCOPY performed by Jimmy Valentin MD at [address] Left 4/22/2019    LEFT HIP TOTAL HIP ARTHROPLASTY, LATERAL DECUB, GRACIE performed by Rubina Craig MD at [address]  12/12/2019    EGD DIAGNOSTIC ONLY performed by Angelo Veliz MD at [facility] History    Marital status: Legally      Spouse name: Not on file    Number of children: Not on file    Years of education: Not on file    Highest education level: Not on file   Occupational History    Not on file   Tobacco Use    Smoking status: Former     Packs/day: 1.50     Years: 46.00     Pack years: 69.00     Types: Cigarettes     Quit date: 3/20/2019     Years since quitting: 3.3    Smokeless tobacco: Never   Vaping Use    Vaping Use: Unknown   Substance and Sexual Activity    Alcohol use: Yes    Drug use: No    Sexual activity: Not on file   Other Topics Concern    Not on file   Social History Narrative    Not on file     Social Determinants of Health     Financial Resource Strain: Not on file   Food Insecurity: Not on file   Transportation Needs: Not on file   Physical Activity: Not on file   Stress: Not on file   Social Connections: Not on file   Intimate Partner Violence: Not on file   Housing Stability: Not on file       Family History   Problem Relation Age of Onset    Colon Cancer Mother     Cancer Mother     Arthritis Mother     High Blood Pressure Father     Heart Disease Father     Diabetes Sister     No Known Problems Brother     No Known Problems Brother     No Known Problems Brother     Other Brother         Sepsis    Other Brother         Killed    No Known Problems Sister     No Known Problems Sister     No Known Problems Son        No current facility-administered medications on file prior to encounter.      Current Outpatient Medications on File Prior to Encounter   Medication Sig Dispense Refill    Nutritional Supplements (ISOSOURCE 1.5 NUSRAT PO) 60 mLs by Percutaneous Endoscopic Gastrostomy route continuous      carboxymethylcellulose (ARTIFICIAL TEARS) 1 % ophthalmic solution Place 2 drops into both eyes daily      atorvastatin (LIPITOR) 20 MG tablet 20 mg by PEG Tube route nightly      Atropine Sulfate 0.01 % SOLN Place 1 drop under the tongue as needed (EVERY TWO HOURS FOR REDUCTION OF SECRETIONS)      sulfamethoxazole-trimethoprim (BACTRIM DS;SEPTRA DS) 800-160 MG per tablet 1 tablet by PEG Tube route in the morning and 1 tablet before bedtime. chlorhexidine (PERIDEX) 0.12 % solution Take 15 mLs by mouth in the morning, at noon, and at bedtime FOR USE WITH TOOTHETTES FOR MOUTH CARE      polyethylene glycol (MIRALAX) 17 g PACK packet 17 g by Per G Tube route in the morning and at bedtime      acetaminophen (TYLENOL) 325 MG tablet 650 mg by PEG Tube route every 4 hours as needed for Pain      sennosides-docusate sodium (SENOKOT-S) 8.6-50 MG tablet 1 tablet by PEG Tube route in the morning and at bedtime      desmopressin (DDAVP) 0.1 MG tablet 0.5 mg by PEG Tube route in the morning and at bedtime Give 0.05mg via PEG tube two times a day for clotting promoter      esomeprazole Magnesium (NEXIUM) 40 MG PACK 40 mg by Per G Tube route in the morning. levETIRAcetam (KEPPRA) 100 MG/ML solution 750 mg/kg by PEG Tube route 2 times daily      hyoscyamine (LEVSIN/SL) 125 MCG sublingual tablet Place 125 mcg under the tongue every 6 hours as needed for Cramping      LORazepam (ATIVAN) 0.5 MG tablet 0.5 mg by PEG Tube route every 4 hours as needed for Anxiety. morphine sulfate 20 MG/ML concentrated oral solution Take 5 mg by mouth every 2 hours as needed for Pain.      levothyroxine (SYNTHROID) 200 MCG tablet 200 mcg by PEG Tube route in the morning.  Give one tablet via PEG tube every Mon,Tue, Wed, Thu, Fri, Sat .      vitamin D (CHOLECALCIFEROL) 125 MCG (5000 UT) CAPS capsule Take 50,000 Units by mouth daily Give 1/25mg ([id number]) one capsule via peg tube one time a day every Monday for supplement      [DISCONTINUED] paliperidone (INVEGA) 6 MG extended release tablet Take 1 tablet by mouth daily 30 tablet 1    cabergoline (DOSTINEX) 0.5 MG tablet Take 1 tablet by mouth Twice a Week ON Monday and Thursday 8 tablet 3    [DISCONTINUED] furosemide (LASIX) 40 MG tablet Take 40 mg by mouth daily      fenofibrate 160 MG tablet Take 150 mg by mouth daily      aspirin 81 MG EC tablet Take 1 tablet by mouth 2 times daily (Patient taking differently: Take 81 mg by mouth in the morning. ADMINISTER VIA PEG TUBE.) 60 tablet 0    [DISCONTINUED] lisinopril (PRINIVIL;ZESTRIL) 20 MG tablet Take 2 tablets by mouth daily 30 tablet 3    Respiratory Therapy Supplies SANDRA Full face CPAP mask and supplies 1 Device 0    CPAP Machine MISC by Does not apply route New CPAP with 6 cm 1 each 0    albuterol (PROVENTIL) (2.5 MG/3ML) 0.083% nebulizer solution Take 3 mLs by nebulization every 6 hours as needed for Wheezing (Patient taking differently: Take 2.5 mg by nebulization every 6 hours as needed for Wheezing or Shortness of Breath) 120 each 5    STIOLTO RESPIMAT 2.5-2.5 MCG/ACT AERS Inhale 2 puff in AM 1 Inhaler 5       Physical Exam:  Vitals:    07/22/22 0634 07/22/22 0828 07/22/22 1051 07/23/22 0206   BP: (!) 144/74  114/61    Pulse: 60  91    Resp:       Temp: 100 °F (37.8 °C) (!) 100.6 °F (38.1 °C)     TempSrc:       SpO2: 97%  94% 93%   Weight:       Height:         General Appearance: Nonverbal and nonresponsive to verbal stimulation  Opens eyes spontaneously on occasion   in no acute distress  On trach collar  Skin: warm and dry, no rash. Head: normocephalic and atraumatic  Eyes: anicteric sclerae  ENT:  normal mucous membranes.    Lungs: normal respiratory effort, bilateral diffuse scattered rhonchi  Heart normal S1-S2 no murmur  Abdomen: soft, no distention or rigidity, intact PEG site  No leg edema  No erythema  Contracted bilateral lower extremities  Intact dressing on bilateral heels and left hip area  Labs: I have reviewed all lab results by electronic record, including most recent CBC, metabolic panel, and pertinent abnormalities were addressed from an infectious disease perspective. WBC trends are being monitored. Lab Results   Component Value Date/Time     07/23/2022 06:37 AM    K 3.8 07/23/2022 06:37 AM     07/23/2022 06:37 AM    CO2 21 07/23/2022 06:37 AM    BUN 14 07/23/2022 06:37 AM    CREATININE 0.44 07/23/2022 06:37 AM    GLUCOSE 98 07/23/2022 06:37 AM    CALCIUM 8.7 07/23/2022 06:37 AM      Lab Results   Component Value Date    WBC 10.1 07/23/2022    HGB 9.8 (L) 07/23/2022    HCT 29.8 (L) 07/23/2022    MCV 90.7 07/23/2022     07/23/2022       Radiology:   I have reviewed imaging results per electronic record and most pertinent abnormalities are being addressed from an infectious disease standpoint.    Culture, Blood ID Sensitivity [[id number]][id number]: 07/21/22 0322 Order Status: CompletedSpecimen: BloodUpdated: 07/22/22 1359 Culture, Blood Id Sensitivity-- Cult,Blood:  POSITIVE Blood Culture   Performed at [facility] [id number] [place] [address]   ([phone]   Narrative:  ORDER#: [id number]                          ORDERED BY: Miladys Kramer   SOURCE: Blood                              COLLECTED:  07/21/22 03:22   ANTIBIOTICS AT TOBIAS.:                      RECEIVED :  07/21/22 03:22   Gram stain aerobic bottle   Gram positive cocci in chains - resembling Strep   Gram positive cocci in clusters-resembling Staph   Gram stain aerobic bottle   Gram positive cocci in clusters-resembling Staph   2 out of 2 blood cultures     Culture, Wound [[id number]]Collected: 07/21/22 1707 Order Status: CompletedSpecimen: ButtockUpdated: 07/22/22 1501 WOUND/ABSCESS-- Direct Exam:  MODERATE NEUTROPHILS   Direct Exam:  FEW GRAM NEGATIVE RODS   Direct Exam: RARE GRAM POSITIVE COCCI IN CLUSTERS   Cult,Wound:  PENDING   Performed at [facility] [address] [facility], [address] [place] [address]   ([phone]   Narrative:        Assessment:    Staph aureus bacteremia and Sepsis   Acute osteomyelitis of left hip area   infected multiple DU - bilateral LEs and tunneling wound L hip - photos reviewed  Polymicrobial bacterial wound infection      Plan:  IV Vancomycin and Zosyn pending sensitivity  Follow-up repeat daily blood cultures to ensure clearing of bacteremia  PICC line and prolonged course of IV antibiotics on discharge  Local wound care and follow-up with surgery/podiatry  Follow up all cx results.    Follow-up CBC MIAN HUNTER MD

## 2022-07-23 NOTE — PROGRESS NOTES
Upon assessment of pt O2 saturations sustained 94% however pt was coughing with audible respirations. Attempted suctioning trach however met resistance with secretions being visibly thick. Spoke to respiratory therapy and plan for resp to come to bedside to apply humidified O2  to soften secretions. Respiratory came to bedside and place humidified O2. Patient resting in bed with fall precautions in place.  Suctioned pt X3 passes O2 stable minimal thick secretions produced

## 2022-07-23 NOTE — CARE COORDINATION
D/C plan remains to return to [facility]  Per ID, patient will need PICC line placed and extended course of IV ABX. Patient might require pre-cert if skilled for IV ABX. CM/SW to follow up.

## 2022-07-23 NOTE — PROGRESS NOTES
[address] [facility] Pharmacokinetic Monitoring Service - Vancomycin    Consulting Provider: Bob Nicole   Indication: SSTI  Target Concentration: Goal trough of 10-15 mg/L and AUC/MANDEEP <500 mg*hr/L  Day of Therapy: 3  Additional Antimicrobials: Zosyn    Pertinent Laboratory Values: Wt Readings from Last 1 Encounters:   07/21/22 201 lb 4.8 oz (91.3 kg)     Temp Readings from Last 1 Encounters:   07/22/22 (!) 100.6 °F (38.1 °C)     Estimated Creatinine Clearance: 174 mL/min (A) (based on SCr of 0.44 mg/dL (L)).   Recent Labs     07/22/22  0958 07/23/22  0637   CREATININE 0.55* 0.44*   WBC 11.8* 10.1     Procalcitonin: 0.21 7-21-22    Recent vancomycin administrations                     vancomycin (VANCOCIN) 1,250 mg in dextrose 5 % 250 mL IVPB (ADDAVIAL) (mg) 1,250 mg New Bag 07/23/22 0459     1,250 mg New Bag 07/22/22 1748     1,250 mg New Bag  0413     1,250 mg New Bag 07/21/22 1504    vancomycin (VANCOCIN) 1,500 mg in dextrose 5 % 500 mL IVPB (ADDAVIAL) (mg) 1,500 mg New Bag 07/21/22 0404                    Assessment:  Date/Time Current Dose Concentration Timing of Concentration (h) AUC   07/23/22 Vanco 1250 mg IV q 12 hours 16.2mg/L 12.5 hours post dose  537mg/L.hr   Note: Serum concentrations collected for AUC dosing may appear elevated if collected in close proximity to the dose administered, this is not necessarily an indication of toxicity    Plan:  Current dosing regimen is therapeutic  Continue current dose  Updated InsightRX, AfdasUC 537mg/L.hr and trough 16.6mg/L predicted  Renal function stable  Repeat vancomycin concentration ordered for 7-25-22 @ 0198  Pharmacy will continue to monitor patient and adjust therapy as indicated    Thank you for the consult,    Zaid Bauer, PharmD, BCPS   7/23/2022 6:17 PM

## 2022-07-23 NOTE — PROGRESS NOTES
Hospitalist Progress Note      PCP: Jennifer Corado DO    Date of Admission: 7/21/2022    Chief Complaint:    Chief Complaint   Patient presents with    Wound Infection     Left hip wound, tunneled, fever       Subjective:  Patient is non verbal right now; unable to complete full 12 point ROS    Medications:  Reviewed    Infusion Medications    sodium chloride      dextrose       Scheduled Medications    atorvastatin  20 mg PEG Tube Nightly    [START ON 7/25/2022] cabergoline  0.5 mg Oral Once per day on Mon Thu    hypromellose  2 drop Both Eyes Daily    chlorhexidine  15 mL Mouth/Throat TID    desmopressin  500 mcg PEG Tube BID    fenofibrate  160 mg Oral Daily    levETIRAcetam  750 mg PEG Tube BID    polyethylene glycol  17 g Per G Tube BID    sennosides-docusate sodium  1 tablet PEG Tube BID    tiotropium-olodaterol  2 puff Inhalation Daily    lansoprazole  30 mg Per G Tube Daily    daptomycin (CUBICIN) IVPB  6 mg/kg (Ideal) IntraVENous Q24H    sodium chloride flush  5-40 mL IntraVENous 2 times per day    enoxaparin  40 mg SubCUTAneous Daily    piperacillin-tazobactam  3,375 mg IntraVENous Q8H    vancomycin (VANCOCIN) intermittent dosing (placeholder)   Other RX Placeholder    vancomycin  1,250 mg IntraVENous Q12H    insulin lispro  0-8 Units SubCUTAneous 6 times per day    levothyroxine  150 mcg Oral Daily     PRN Meds: albuterol, sodium chloride flush, sodium chloride, ondansetron **OR** ondansetron, polyethylene glycol, acetaminophen **OR** acetaminophen, glucose, dextrose bolus **OR** dextrose bolus, glucagon (rDNA), dextrose      Intake/Output Summary (Last 24 hours) at 7/23/2022 1252  Last data filed at 7/23/2022 1130  Gross per 24 hour   Intake 100 ml   Output --   Net 100 ml         Exam:    /61   Pulse 91   Temp (!) 100.6 °F (38.1 °C)   Resp 22   Ht 5' 5\" (1.651 m)   Wt 201 lb 4.8 oz (91.3 kg)   SpO2 93%   BMI 33.50 kg/m²     General appearance: No apparent distress  HEENT: Conjunctivae/corneas clear. Neck: on trach collar. Respiratory:  Normal respiratory effort. Clear to auscultation  Cardiovascular: Regular rate and rhythm  Abdomen: PEG in place. Musculoskeletal: No clubbing, cyanosis or edema bilaterally  Neuro: lower extremities contracted. Capillary Refill: Brisk,< 3 seconds   Peripheral Pulses: +2 palpable, equal bilaterally   Skin:  Left hip tunneling wound with surrounding cellulitis     Labs:   Recent Labs     07/21/22 0315 07/22/22  0958 07/23/22  0637   WBC 15.4* 11.8* 10.1   HGB 10.8* 9.7* 9.8*   HCT 32.5* 30.3* 29.8*    282 290       Recent Labs     07/21/22 0315 07/22/22  0958 07/23/22  0637    142 137   K 4.3 4.2 3.8    110* 105   CO2 22 21 21   BUN 18 12 14   CREATININE 0.61* 0.55* 0.44*   CALCIUM 9.4 9.0 8.7       Recent Labs     07/21/22 0315 07/22/22  0958 07/23/22  0637   AST 48* 41* 57*   ALT 55* 47* 60*   BILITOT 0.9* 1.1* 0.5   ALKPHOS 131* 88 98       Recent Labs     07/21/22  0315   INR 1.3       Recent Labs     07/21/22  0315   CKTOTAL 65   TROPONINI 0.093*         Urinalysis:      Lab Results   Component Value Date/Time    NITRU Negative 07/21/2022 03:54 AM    WBCUA 3-5 02/13/2022 12:45 AM    BACTERIA RARE 02/13/2022 12:45 AM    RBCUA None seen 02/13/2022 12:45 AM    BLOODU Negative 07/21/2022 03:54 AM    SPECGRAV 1.017 07/21/2022 03:54 AM    GLUCOSEU Negative 07/21/2022 03:54 AM       Radiology:  XR HIP LEFT (1 VIEW)   Final Result   There are no acute osseous changes. Status post left hip arthroplasty. There is a lucency in the subcutaneous soft tissues of the left hip consistent with open wound or ulcer. If osteomyelitis is suspected may consider bone scan evaluation. XR CHEST PORTABLE   Final Result   NO ACUTE CARDIOPULMONARY ABNORMALITY. NO CHANGE.          MRI HIP LEFT WO CONTRAST    (Results Pending)       Assessment/Plan:    #sepsis secondary to suspected OM and bacteremia POA    - MRI ordered    - daily blood cultures    - continue broad spec Abx    - ID consulted for abx     #Hypothryoidism     - per endo    #HTN    - resume home meds     #Schizophrenia    - continue home meds     Active Hospital Problems    Diagnosis Date Noted    Staphylococcus aureus bacteremia with sepsis ([address] [A41.01] 07/23/2022     Priority: Medium    Acute osteomyelitis of hip ([address] [M86.18] 07/23/2022     Priority: Medium    Gram-positive bacteremia [R78.81] 07/22/2022     Priority: Medium    Sepsis ([address] [A41.9] 07/21/2022     Priority: Medium    Hypothyroid [E03.9] 07/21/2022     Priority: Medium    Osteomyelitis ([address] [M86.9] 07/21/2022     Priority: Medium    Complicated open wound of left hip [S71.002A] 07/21/2022     Priority: Medium    Cellulitis of left hip [U84.401] 07/21/2022     Priority: Medium    Schizophrenia ([address] [F20.9] 03/13/2020    HTN (hypertension) [I10] 09/19/2019    Depression [F32. A]         Additional work up or/and treatment plan may be added today or then after based on clinical progression. I am managing a portion of pt care. Some medical issues are handled by other specialists. Additional work up and treatment should be done in out pt setting by pt PCP and other out pt providers. In addition to examining and evaluating pt, I spent additional time explaining care, normal and abnormal findings, and treatment plan. All of pt questions were answered. Counseling, diet and education were  provided. Case will be discussed with nursing staff when appropriate. Family will be updated if and when appropriate.       Diet: Diet NPO  ADULT TUBE FEEDING; PEG; Peptide Based; Continuous; 60; No; 50; Q 6 hours    Code Status: DNR-CCA    PT/OT Eval     Electronically signed by Rosebud Schlatter, MD on 7/23/2022 at 12:52 PM

## 2022-07-23 NOTE — PROGRESS NOTES
Patient: Maximo Mcclure  Unit/Bed: O761/Q068-41  YOB: 1955  MRN: [id number]  Acct: [de-identified]   Admitting Diagnosis: Septicemia ([facility] [address] [A41.9]  Elevated troponin [R77.8]  Sepsis ([facility] [address] [A41.9]  Decreased thyroid stimulating hormone (TSH) level [R79.89]  Osteomyelitis of other site, unspecified type [facility]) [M86.9]  Date:  7/21/2022  Hospital Day: 1        Chief Complaint:  Fevers     Reason of this consult  Management of bradycardia        History of Present Illness:  72-year-old male without prior cardiac history but significant for COPD, hypertension, hyperlipidemia, nonverbal obesity, diabetes, who is a resident of [facility], he was brought to the hospital for fevers.   Currently undergoing management of sepsis left hip wound, and right heel infection  Cardiology consulted for management of bradycardia    On telemetry sinus rhythm with occasional rates in the 30s, no significant sinus pauses  No significant high degree blocks    7/23/2022: The patient is normal sinus rhythm on telemetry heart rate of 79 bpm.        No Known Allergies     Current Facility-Administered Medications             Current Facility-Administered Medications   Medication Dose Route Frequency Provider Last Rate Last Admin    albuterol (PROVENTIL) nebulizer solution 2.5 mg  2.5 mg Nebulization Q6H PRN Anton Martinez MD        atorvastatin (LIPITOR) tablet 20 mg  20 mg PEG Tube Nightly Anton Martinez MD        [START ON 7/25/2022] cabergoline (DOSTINEX) tablet 0.5 mg  0.5 mg Oral Once per day on Mon Thu Anton Martinez MD        hypromellose (ISOPTO TEARS) 0.5 % ophthalmic solution 2 drop  2 drop Both Eyes Daily Anton Martinez MD        chlorhexidine (PERIDEX) 0.12 % solution 15 mL  15 mL Mouth/Throat TID Anton Martinez MD        desmopressin (DDAVP) tablet 500 mcg  500 mcg PEG Tube BID Anton Martinez MD        fenofibrate (TRIGLIDE) tablet 160 mg  160 mg Oral Daily Anton Martinez MD        levETIRAcetam (KEPPRA) 100 MG/ML tablet 16 g  4 tablet Oral PRN Libia Greenwood MD        dextrose bolus 10% 125 mL  125 mL IntraVENous PRN Libia Greenwood MD         Or    dextrose bolus 10% 250 mL  250 mL IntraVENous PRN Libia Greenwood MD        glucagon (rDNA) injection 1 mg  1 mg SubCUTAneous PRN Libia Greenwood MD        dextrose 10 % infusion   IntraVENous Continuous PRN iLbia Greenwood MD        insulin lispro (HUMALOG) injection vial 0-8 Units  0-8 Units SubCUTAneous 6 times per day Libia Greenwood MD        levothyroxine (SYNTHROID) tablet 150 mcg  150 mcg Oral Daily Key Pisano MD   150 mcg at 07/22/22 0545            PMHx:  Past Medical History        Past Medical History:   Diagnosis Date    Abdominal pain      Asthma      Bloating symptom      COPD (chronic obstructive pulmonary disease) (HCC)      COPD (chronic obstructive pulmonary disease) ([facility] [address]      Depression      Depression      Diarrhea      Drug-seeking behavior 02/03/2020    Emphysema/COPD ([facility] [address]      History of rectal bleeding      Hypercholesteremia       past trx > 5 yrs    Hypercholesteremia      Hypercholesteremia      Hypertension       meds > 3 yrs    Hypothyroid 07/21/2022    Lung disease      Nontraumatic subarachnoid hemorrhage ([facility] [address]       OBTAINED FROM NURSING HOME PAPERWORK    RANDI on CPAP       patient relates he doesn't use cpap at this time    Osteoarthritis       left hip    Osteoarthritis      Osteoarthritis of left hip      Other hydrocephalus ([facility] [address]       [address] PAPERWORK    Physiological consequence of immobility 03/09/2022    Pituitary macroadenoma ([facility] [address]      Sleep apnea              PSHx:  Past Surgical History         Past Surgical History:   Procedure Laterality Date    COLONOSCOPY   5/2/14     Chito Mccain    COLONOSCOPY N/A 12/12/2019     COLONOSCOPY DIAGNOSTIC performed by Lindsay Burger MD at [address] [place], COLON, DIAGNOSTIC        EYE SURGERY Bilateral       cataracts    [address] ?      index finger laceration repair    HERNIA REPAIR   2004     repair [facility]    JOINT REPLACEMENT Left       left hip    [address] COLON CA SCRN NOT  [address]  IND N/A 5/17/2017     COLONOSCOPY performed by Lianet Chance MD at [address] Left 4/22/2019     LEFT HIP TOTAL HIP ARTHROPLASTY, LATERAL DECUB, GRACIE performed by Neftali Ramirez MD at [address]   12/12/2019     EGD DIAGNOSTIC ONLY performed by Asa Ormond, MD at [address] Hx:  Social History   Social History            Socioeconomic History    Marital status: Legally        Spouse name: None    Number of children: None    Years of education: None    Highest education level: None   Tobacco Use    Smoking status: Former       Packs/day: 1.50       Years: 46.00       Pack years: 69.00       Types: Cigarettes       Quit date: 3/20/2019       Years since quitting: 3.3    Smokeless tobacco: Never   Vaping Use    Vaping Use: Unknown   Substance and Sexual Activity    Alcohol use: Yes    Drug use: No            Family Hx:  Family History         Family History   Problem Relation Age of Onset    Colon Cancer Mother      Cancer Mother      Arthritis Mother      High Blood Pressure Father      Heart Disease Father      Diabetes Sister      No Known Problems Brother      No Known Problems Brother      No Known Problems Brother      Other Brother           Sepsis    Other Brother           Killed    No Known Problems Sister      No Known Problems Sister      No Known Problems Son              Review of Systems:   Review of Systems   Unable to perform ROS: Mental status change         Physical Examination:    /61   Pulse 91   Temp (!) 100.6 °F (38.1 °C)   Resp 22   Ht 5' 5\" (1.651 m)   Wt 201 lb 4.8 oz (91.3 kg)   SpO2 94%   BMI 33.50 kg/m²    Physical Exam  Vitals and nursing note reviewed. Constitutional:       Appearance: He is ill-appearing.   HENT:     Head: Normocephalic and atraumatic. Mouth/Throat:     Mouth: Mucous membranes are moist.     Pharynx: Oropharynx is clear. Eyes:     Extraocular Movements: Extraocular movements intact. Conjunctiva/sclera: Conjunctivae normal.     Pupils: Pupils are equal, round, and reactive to light. Cardiovascular:     Rate and Rhythm: Normal rate and regular rhythm. Pulses: Normal pulses. Heart sounds: Normal heart sounds. Pulmonary:     Effort: Pulmonary effort is normal.     Breath sounds: Normal breath sounds. Abdominal:     General: Abdomen is flat. Bowel sounds are normal.     Palpations: Abdomen is soft. Musculoskeletal:     Cervical back: Normal range of motion and neck supple. Skin:     General: Skin is warm.          LABS:  CBC:        Lab Results   Component Value Date/Time     WBC 11.8 07/22/2022 09:58 AM     RBC 3.28 07/22/2022 09:58 AM     HGB 9.7 07/22/2022 09:58 AM     HCT 30.3 07/22/2022 09:58 AM     MCV 92.3 07/22/2022 09:58 AM     MCH 29.6 07/22/2022 09:58 AM     MCHC 32.1 07/22/2022 09:58 AM     RDW 14.8 07/22/2022 09:58 AM      07/22/2022 09:58 AM     MPV 8.7 04/17/2014 09:12 AM      CBC with Differential:         Lab Results   Component Value Date/Time     WBC 11.8 07/22/2022 09:58 AM     RBC 3.28 07/22/2022 09:58 AM     HGB 9.7 07/22/2022 09:58 AM     HCT 30.3 07/22/2022 09:58 AM      07/22/2022 09:58 AM     MCV 92.3 07/22/2022 09:58 AM     MCH 29.6 07/22/2022 09:58 AM     MCHC 32.1 07/22/2022 09:58 AM     RDW 14.8 07/22/2022 09:58 AM     LYMPHOPCT 15.6 07/22/2022 09:58 AM     MONOPCT 7.9 07/22/2022 09:58 AM     BASOPCT 0.4 07/22/2022 09:58 AM     MONOSABS 0.9 07/22/2022 09:58 AM     LYMPHSABS 1.8 07/22/2022 09:58 AM     EOSABS 0.5 07/22/2022 09:58 AM     BASOSABS 0.0 07/22/2022 09:58 AM      CMP:          Lab Results   Component Value Date/Time      07/22/2022 09:58 AM     K 4.2 07/22/2022 09:58 AM      07/22/2022 09:58 AM     CO2 21 07/22/2022 09:58 AM     BUN 12 07/22/2022 09:58 AM     CREATININE 0.55 07/22/2022 09:58 AM     GFRAA >60.0 07/22/2022 09:58 AM     LABGLOM >60.0 07/22/2022 09:58 AM     GLUCOSE 85 07/22/2022 09:58 AM     PROT 6.6 07/22/2022 09:58 AM     LABALBU 2.7 07/22/2022 09:58 AM     CALCIUM 9.0 07/22/2022 09:58 AM     BILITOT 1.1 07/22/2022 09:58 AM     ALKPHOS 88 07/22/2022 09:58 AM     AST 41 07/22/2022 09:58 AM     ALT 47 07/22/2022 09:58 AM      BMP:          Lab Results   Component Value Date/Time      07/22/2022 09:58 AM     K 4.2 07/22/2022 09:58 AM      07/22/2022 09:58 AM     CO2 21 07/22/2022 09:58 AM     BUN 12 07/22/2022 09:58 AM     LABALBU 2.7 07/22/2022 09:58 AM     CREATININE 0.55 07/22/2022 09:58 AM     CALCIUM 9.0 07/22/2022 09:58 AM     GFRAA >60.0 07/22/2022 09:58 AM     LABGLOM >60.0 07/22/2022 09:58 AM     GLUCOSE 85 07/22/2022 09:58 AM      Magnesium:          Lab Results   Component Value Date/Time     MG 2.2 07/21/2022 03:15 AM      Troponin:          Lab Results   Component Value Date/Time     TROPONINI 0.093 07/21/2022 03:15 AM            EKG: Sinus rhythm non specific ST changes        Assessment:          Active Hospital Problems     Diagnosis Date Noted    Sepsis ([facility] [address] [A41.9] 07/21/2022       Priority: Medium    Hypothyroid [E03.9] 07/21/2022       Priority: Medium    Osteomyelitis ([facility] [address] [M86.9] 07/21/2022       Priority: Medium    Complicated open wound of left hip [S71.002A] 07/21/2022       Priority: Medium    Cellulitis of left hip [L03.116] 07/21/2022       Priority: Medium    Schizophrenia ([facility] [address] [F20.9] 03/13/2020       Priority: Low    HTN (hypertension) [I10] 09/19/2019       Priority: Low    Depression [F32. A]         Priority: Low      Sinus bradycardia. Likely due to acute illness. No signs of high degree AV block, no sinus pauses.   Possibly also related to hypothyroidism   Sepsis  Hypothyroidism  COPD  Hypertension  Hyperlipidemia  Nonverbal  Obesity  Diabetes  Resident of [address] [place] [address],[address]  Nonambulatory  Pituitary macroadenoma  Abnormal LFTs     Plan:  Continue telemetry  Currently patient does not meet criteria for pacemaker placement  Avoid negative chronotropic agents   Please keep potassium between 4 and 5 magnesium above 2  Await echocardiogram  Continue management of sepsis as per primary team  Recommend ACE inhibitor versus ARB once patient more hemodynamically stable  Hold statins in the setting of abnormal LFTs  Conservative management from cardiology standpoint given patient's chronic irreversible conditions  Recommend palliative care consult

## 2022-07-24 LAB
ALBUMIN SERPL-MCNC: 2.5 G/DL (ref 3.5–4.6)
ALP BLD-CCNC: 96 U/L (ref 35–104)
ALT SERPL-CCNC: 49 U/L (ref 0–41)
ANION GAP SERPL CALCULATED.3IONS-SCNC: 13 MEQ/L (ref 9–15)
AST SERPL-CCNC: 33 U/L (ref 0–40)
BASOPHILS ABSOLUTE: 0 K/UL (ref 0–0.2)
BASOPHILS RELATIVE PERCENT: 0.4 %
BILIRUB SERPL-MCNC: 0.4 MG/DL (ref 0.2–0.7)
BUN BLDV-MCNC: 12 MG/DL (ref 8–23)
CALCIUM SERPL-MCNC: 8.9 MG/DL (ref 8.5–9.9)
CHLORIDE BLD-SCNC: 111 MEQ/L (ref 95–107)
CO2: 20 MEQ/L (ref 20–31)
CREAT SERPL-MCNC: 0.41 MG/DL (ref 0.7–1.2)
CULTURE, BLOOD ID SENSITIVITY: ABNORMAL
EOSINOPHILS ABSOLUTE: 0.5 K/UL (ref 0–0.7)
EOSINOPHILS RELATIVE PERCENT: 5.8 %
GFR AFRICAN AMERICAN: >60
GFR NON-AFRICAN AMERICAN: >60
GLOBULIN: 4.1 G/DL (ref 2.3–3.5)
GLUCOSE BLD-MCNC: 101 MG/DL (ref 70–99)
GLUCOSE BLD-MCNC: 104 MG/DL (ref 70–99)
GLUCOSE BLD-MCNC: 109 MG/DL (ref 70–99)
GLUCOSE BLD-MCNC: 93 MG/DL (ref 70–99)
GLUCOSE BLD-MCNC: 95 MG/DL (ref 70–99)
GLUCOSE BLD-MCNC: 97 MG/DL (ref 70–99)
GLUCOSE BLD-MCNC: 98 MG/DL (ref 70–99)
HCT VFR BLD CALC: 27.9 % (ref 42–52)
HEMOGLOBIN: 9.1 G/DL (ref 14–18)
LYMPHOCYTES ABSOLUTE: 1.5 K/UL (ref 1–4.8)
LYMPHOCYTES RELATIVE PERCENT: 16 %
MCH RBC QN AUTO: 30.1 PG (ref 27–31.3)
MCHC RBC AUTO-ENTMCNC: 32.5 % (ref 33–37)
MCV RBC AUTO: 92.8 FL (ref 80–100)
MONOCYTES ABSOLUTE: 0.7 K/UL (ref 0.2–0.8)
MONOCYTES RELATIVE PERCENT: 7.1 %
NEUTROPHILS ABSOLUTE: 6.7 K/UL (ref 1.4–6.5)
NEUTROPHILS RELATIVE PERCENT: 70.7 %
ORGANISM: ABNORMAL
PDW BLD-RTO: 14.6 % (ref 11.5–14.5)
PERFORMED ON: ABNORMAL
PERFORMED ON: ABNORMAL
PERFORMED ON: NORMAL
PLATELET # BLD: 302 K/UL (ref 130–400)
POTASSIUM REFLEX MAGNESIUM: 3.9 MEQ/L (ref 3.4–4.9)
RBC # BLD: 3.01 M/UL (ref 4.7–6.1)
SODIUM BLD-SCNC: 144 MEQ/L (ref 135–144)
TOTAL PROTEIN: 6.6 G/DL (ref 6.3–8)
WBC # BLD: 9.5 K/UL (ref 4.8–10.8)

## 2022-07-24 PROCEDURE — 2580000003 HC RX 258

## 2022-07-24 PROCEDURE — 99232 SBSQ HOSP IP/OBS MODERATE 35: CPT | Performed by: INTERNAL MEDICINE

## 2022-07-24 PROCEDURE — 6370000000 HC RX 637 (ALT 250 FOR IP): Performed by: INTERNAL MEDICINE

## 2022-07-24 PROCEDURE — 87040 BLOOD CULTURE FOR BACTERIA: CPT

## 2022-07-24 PROCEDURE — 1210000000 HC MED SURG R&B

## 2022-07-24 PROCEDURE — 6360000002 HC RX W HCPCS

## 2022-07-24 PROCEDURE — 85025 COMPLETE CBC W/AUTO DIFF WBC: CPT

## 2022-07-24 PROCEDURE — 2700000000 HC OXYGEN THERAPY PER DAY

## 2022-07-24 PROCEDURE — 80053 COMPREHEN METABOLIC PANEL: CPT

## 2022-07-24 PROCEDURE — 36415 COLL VENOUS BLD VENIPUNCTURE: CPT

## 2022-07-24 RX ADMIN — PIPERACILLIN AND TAZOBACTAM 3375 MG: 3; .375 INJECTION, POWDER, FOR SOLUTION INTRAVENOUS at 05:39

## 2022-07-24 RX ADMIN — LEVOTHYROXINE SODIUM 150 MCG: 0.15 TABLET ORAL at 05:38

## 2022-07-24 RX ADMIN — SODIUM CHLORIDE, PRESERVATIVE FREE 10 ML: 5 INJECTION INTRAVENOUS at 23:23

## 2022-07-24 RX ADMIN — HYPROMELLOSE 2910 2 DROP: 5 SOLUTION OPHTHALMIC at 10:14

## 2022-07-24 RX ADMIN — 0.12% CHLORHEXIDINE GLUCONATE 15 ML: 1.2 RINSE ORAL at 10:12

## 2022-07-24 RX ADMIN — VANCOMYCIN HYDROCHLORIDE 1250 MG: 1.25 INJECTION, POWDER, LYOPHILIZED, FOR SOLUTION INTRAVENOUS at 05:40

## 2022-07-24 RX ADMIN — Medication 30 MG: at 10:18

## 2022-07-24 RX ADMIN — LEVETIRACETAM 750 MG: 100 SOLUTION ORAL at 10:12

## 2022-07-24 RX ADMIN — DESMOPRESSIN ACETATE 500 MCG: 0.1 TABLET ORAL at 10:12

## 2022-07-24 RX ADMIN — DESMOPRESSIN ACETATE 500 MCG: 0.1 TABLET ORAL at 23:21

## 2022-07-24 RX ADMIN — FENOFIBRATE 160 MG: 160 TABLET ORAL at 10:12

## 2022-07-24 RX ADMIN — SENNOSIDES AND DOCUSATE SODIUM 1 TABLET: 50; 8.6 TABLET ORAL at 10:12

## 2022-07-24 RX ADMIN — PIPERACILLIN AND TAZOBACTAM 3375 MG: 3; .375 INJECTION, POWDER, FOR SOLUTION INTRAVENOUS at 23:23

## 2022-07-24 RX ADMIN — POLYETHYLENE GLYCOL 3350 17 G: 17 POWDER, FOR SOLUTION ORAL at 10:13

## 2022-07-24 RX ADMIN — 0.12% CHLORHEXIDINE GLUCONATE 15 ML: 1.2 RINSE ORAL at 15:41

## 2022-07-24 RX ADMIN — ENOXAPARIN SODIUM 40 MG: 100 INJECTION SUBCUTANEOUS at 10:12

## 2022-07-24 RX ADMIN — ATORVASTATIN CALCIUM 20 MG: 20 TABLET, FILM COATED ORAL at 23:21

## 2022-07-24 RX ADMIN — SODIUM CHLORIDE, PRESERVATIVE FREE 10 ML: 5 INJECTION INTRAVENOUS at 10:14

## 2022-07-24 RX ADMIN — LEVETIRACETAM 750 MG: 100 SOLUTION ORAL at 23:31

## 2022-07-24 RX ADMIN — 0.12% CHLORHEXIDINE GLUCONATE 15 ML: 1.2 RINSE ORAL at 23:21

## 2022-07-24 RX ADMIN — PIPERACILLIN AND TAZOBACTAM 3375 MG: 3; .375 INJECTION, POWDER, FOR SOLUTION INTRAVENOUS at 15:39

## 2022-07-24 ASSESSMENT — PAIN SCALES - WONG BAKER: WONGBAKER_NUMERICALRESPONSE: 0

## 2022-07-24 NOTE — PLAN OF CARE
Problem: Discharge Planning  Goal: Discharge to home or other facility with appropriate resources  Outcome: Progressing     Problem: Pain  Goal: Verbalizes/displays adequate comfort level or baseline comfort level  Outcome: Progressing     Problem: Nutrition Deficit:  Goal: Optimize nutritional status  Outcome: Progressing Pt called back and scheduled BP check for 5/9/18 with reception. # 60 + 0 refills eprescribed to pharmacy per protocol.

## 2022-07-24 NOTE — CARE COORDINATION
D/C plan remains to return to [facility]. Patient will need new pre-cert due to insurance. Spoke with Cheri Mccain, admissions director at [facility]. Cheri Mccain to start pre-cert 5/52/34. Per ID, patient will need PICC line at d/c for extended IV ABX. CM/SW to continue to follow for d/c planning needs.

## 2022-07-24 NOTE — PROGRESS NOTES
Assessment completed this shift. Non verbal and not interactive. Withdraws from pain. Loose brown BM this am. Bathed, repositioned and linens changed. Changed dressings to PEG tube and Trach using split gauze. Suctioned trach as needed. Changed dressing to left hip wound. Cleansed with saline. Packed loosly with roll gauze moistened with saline and plurogel. Covered with ABD and tape. Patient tolerated well.   Electronically signed by Bijal Saravia RN on 7/24/2022 at 11:53 AM

## 2022-07-24 NOTE — PROGRESS NOTES
Patient: Jane Vale  Unit/Bed: J068/A583-92  YOB: 1955  MRN: [id number]  Acct: [de-identified]   Admitting Diagnosis: Septicemia ([facility] [address] [A41.9]  Elevated troponin [R77.8]  Sepsis ([facility] [address] [A41.9]  Decreased thyroid stimulating hormone (TSH) level [R79.89]  Osteomyelitis of other site, unspecified type [facility]) [M86.9]  Date:  7/21/2022  Hospital Day: 1        Chief Complaint:  Fevers     Reason of this consult  Management of bradycardia        History of Present Illness:  82-year-old male without prior cardiac history but significant for COPD, hypertension, hyperlipidemia, nonverbal obesity, diabetes, who is a resident of [facility], he was brought to the hospital for fevers. Currently undergoing management of sepsis left hip wound, and right heel infection  Cardiology consulted for management of bradycardia    On telemetry sinus rhythm with occasional rates in the 30s, no significant sinus pauses  No significant high degree blocks    7/23/2022: The patient is normal sinus rhythm on telemetry heart rate of 79 bpm.  7/24/2022: No new events overnight.   Patient with significant secretions     No Known Allergies     Current Facility-Administered Medications             Current Facility-Administered Medications   Medication Dose Route Frequency Provider Last Rate Last Admin    albuterol (PROVENTIL) nebulizer solution 2.5 mg  2.5 mg Nebulization Q6H PRN Claudia Bradshaw MD        atorvastatin (LIPITOR) tablet 20 mg  20 mg PEG Tube Nightly Claudia Bradshaw MD        [START ON 7/25/2022] cabergoline (DOSTINEX) tablet 0.5 mg  0.5 mg Oral Once per day on Mon Thu Claudia Bradshaw MD        hypromellose (ISOPTO TEARS) 0.5 % ophthalmic solution 2 drop  2 drop Both Eyes Daily Claudia Bradshaw MD        chlorhexidine (PERIDEX) 0.12 % solution 15 mL  15 mL Mouth/Throat TID Claudia Bradshaw MD        desmopressin (DDAVP) tablet 500 mcg  500 mcg PEG Tube BID Claudia Bradshaw MD        fenofibrate (TRIGLIDE) tablet 160 mg  160 mg Oral Daily Urban Ahumada, MD        levETIRAcetam (KEPPRA) 100 MG/ML solution 750 mg  750 mg PEG Tube BID Urban Ahumada, MD        polyethylene glycol (GLYCOLAX) packet 17 g  17 g Per G Tube BID Urban Ahumada, MD        sennosides-docusate sodium (SENOKOT-S) 8.6-50 MG tablet 1 tablet  1 tablet PEG Tube BID Urban Ahumada, MD        tiotropium-olodaterol (STIOLTO) 2.5-2.5 MCG/ACT inhaler 2 puff  2 puff Inhalation Daily Urban Ahumada, MD        lansoprazole suspension SUSP 30 mg  30 mg Per G Tube Daily Urban Ahumada, MD        sodium chloride flush 0.9 % injection 5-40 mL  5-40 mL IntraVENous 2 times per day MARICRUZ Trevino CNP   10 mL at 07/21/22 2113    sodium chloride flush 0.9 % injection 5-40 mL  5-40 mL IntraVENous PRN MARICRUZ Mistry CNP        0.9 % sodium chloride infusion   IntraVENous PRN MARICRUZ Trevino CNP        enoxaparin (LOVENOX) injection 40 mg  40 mg SubCUTAneous Daily MARICRUZ Mistry CNP   40 mg at 07/21/22 1119    ondansetron (ZOFRAN-ODT) disintegrating tablet 4 mg  4 mg Oral Q8H PRN MARICRUZ Mistry CNP         Or    ondansetron (ZOFRAN) injection 4 mg  4 mg IntraVENous Q6H PRN MARICRUZ Mistry CNP        polyethylene glycol (GLYCOLAX) packet 17 g  17 g Oral Daily PRN MARICRUZ Trevino CNP        acetaminophen (TYLENOL) tablet 650 mg  650 mg Oral Q6H PRN Mac Hernandez APRN - CNP   650 mg at 07/22/22 1152     Or    acetaminophen (TYLENOL) suppository 650 mg  650 mg Rectal Q6H PRN MARICRUZ Trevino CNP   650 mg at 07/21/22 1116    piperacillin-tazobactam (ZOSYN) 3,375 mg in dextrose 5 % 50 mL IVPB extended infusion (mini-bag)  3,375 mg IntraVENous Q8H MARICRUZ Mistry CNP 12.5 mL/hr at 07/22/22 1746 3,375 mg at 07/22/22 1746    vancomycin (VANCOCIN) intermittent dosing (placeholder)   Other RX Placeholder MARICRUZ Mistry - CNP        vancomycin (VANCOCIN) 1,250 mg in dextrose 5 % 250 mL IVPB (ADDAVIAL)  1,250 mg IntraVENous Q12H MARICRUZ Trevino CNP 166.7 mL/hr at 07/22/22 1748 1,250 mg at 07/22/22 1748    glucose chewable tablet 16 g  4 tablet Oral PRN Simin Burt MD        dextrose bolus 10% 125 mL  125 mL IntraVENous PRN Simin Burt MD         Or    dextrose bolus 10% 250 mL  250 mL IntraVENous PRN Simin Burt MD        glucagon (rDNA) injection 1 mg  1 mg SubCUTAneous PRN Simin Burt MD        dextrose 10 % infusion   IntraVENous Continuous PRN Simin Burt MD        insulin lispro (HUMALOG) injection vial 0-8 Units  0-8 Units SubCUTAneous 6 times per day Simin Burt MD        levothyroxine (SYNTHROID) tablet 150 mcg  150 mcg Oral Daily Pravin Barkley MD   150 mcg at 07/22/22 0545            PMHx:  Past Medical History        Past Medical History:   Diagnosis Date    Abdominal pain      Asthma      Bloating symptom      COPD (chronic obstructive pulmonary disease) (HCC)      COPD (chronic obstructive pulmonary disease) ([facility] [address]      Depression      Depression      Diarrhea      Drug-seeking behavior 02/03/2020    Emphysema/COPD ([facility] [address]      History of rectal bleeding      Hypercholesteremia       past trx > 5 yrs    Hypercholesteremia      Hypercholesteremia      Hypertension       meds > 3 yrs    Hypothyroid 07/21/2022    Lung disease      Nontraumatic subarachnoid hemorrhage ([facility] [address]       [facility] [facility] [address] PAPER[address]    RANDI on CPAP       patient relates he doesn't use cpap at this time    Osteoarthritis       left hip    Osteoarthritis      Osteoarthritis of left hip      Other hydrocephalus ([facility] [address]       [address] PAPERWORK    Physiological consequence of immobility 03/09/2022    Pituitary macroadenoma ([facility] [address]      Sleep apnea              PSHx:  Past Surgical History         Past Surgical History:   Procedure Laterality Date    COLONOSCOPY   5/2/14     Cori Eric    COLONOSCOPY N/A 12/12/2019     COLONOSCOPY DIAGNOSTIC performed by Dali Scott MD at [address], [address] [facility] Bilateral       cataracts    FINGER SURGERY Right 1999 ?      index finger laceration repair    HERNIA REPAIR   2004     repair [facility]    JOINT REPLACEMENT Left       left hip    [place] COLON CA SCRN NOT [place] [facility] IND N/A 5/17/2017     COLONOSCOPY performed by Ivet Galan MD at [address] Left 4/22/2019     LEFT HIP TOTAL HIP ARTHROPLASTY, LATERAL DECUB, GRACIE performed by Ferny Hernández MD at [address]   12/12/2019     EGD DIAGNOSTIC ONLY performed by Jerry Khalil MD at [address] [place] [address]  Hx:  Social History   Social History            Socioeconomic History    Marital status: Legally        Spouse name: None    Number of children: None    Years of education: None    Highest education level: None   Tobacco Use    Smoking status: Former       Packs/day: 1.50       Years: 46.00       Pack years: 69.00       Types: Cigarettes       Quit date: 3/20/2019       Years since quitting: 3.3    Smokeless tobacco: Never   Vaping Use    Vaping Use: Unknown   Substance and Sexual Activity    Alcohol use: Yes    Drug use: No            Family Hx:  Family History         Family History   Problem Relation Age of Onset    Colon Cancer Mother      Cancer Mother      Arthritis Mother      High Blood Pressure Father      Heart Disease Father      Diabetes Sister      No Known Problems Brother      No Known Problems Brother      No Known Problems Brother      Other Brother           Sepsis    Other Brother           Killed    No Known Problems Sister      No Known Problems Sister      No Known Problems Son              Review of Systems:   Review of Systems   Unable to perform ROS: Mental status change         Physical Examination:    /61   Pulse 91   Temp (!) 100.6 °F (38.1 °C)   Resp 22   Ht 5' 5\" (1.651 m)   Wt 201 lb 4.8 oz (91.3 kg)   SpO2 94%   BMI 33.50 kg/m²    Physical Exam  Vitals and nursing note reviewed. Constitutional:       Appearance: He is ill-appearing. HENT:     Head: Normocephalic and atraumatic. Mouth/Throat:     Mouth: Mucous membranes are moist.     Pharynx: Oropharynx is clear. Eyes:     Extraocular Movements: Extraocular movements intact. Conjunctiva/sclera: Conjunctivae normal.     Pupils: Pupils are equal, round, and reactive to light. Cardiovascular:     Rate and Rhythm: Normal rate and regular rhythm. Pulses: Normal pulses. Heart sounds: Normal heart sounds. Pulmonary:     Effort: Pulmonary effort is normal.     Breath sounds: Normal breath sounds. Abdominal:     General: Abdomen is flat. Bowel sounds are normal.     Palpations: Abdomen is soft. Musculoskeletal:     Cervical back: Normal range of motion and neck supple. Skin:     General: Skin is warm.          LABS:  CBC:        Lab Results   Component Value Date/Time     WBC 11.8 07/22/2022 09:58 AM     RBC 3.28 07/22/2022 09:58 AM     HGB 9.7 07/22/2022 09:58 AM     HCT 30.3 07/22/2022 09:58 AM     MCV 92.3 07/22/2022 09:58 AM     MCH 29.6 07/22/2022 09:58 AM     MCHC 32.1 07/22/2022 09:58 AM     RDW 14.8 07/22/2022 09:58 AM      07/22/2022 09:58 AM     MPV 8.7 04/17/2014 09:12 AM      CBC with Differential:         Lab Results   Component Value Date/Time     WBC 11.8 07/22/2022 09:58 AM     RBC 3.28 07/22/2022 09:58 AM     HGB 9.7 07/22/2022 09:58 AM     HCT 30.3 07/22/2022 09:58 AM      07/22/2022 09:58 AM     MCV 92.3 07/22/2022 09:58 AM     MCH 29.6 07/22/2022 09:58 AM     MCHC 32.1 07/22/2022 09:58 AM     RDW 14.8 07/22/2022 09:58 AM     LYMPHOPCT 15.6 07/22/2022 09:58 AM     MONOPCT 7.9 07/22/2022 09:58 AM     BASOPCT 0.4 07/22/2022 09:58 AM     MONOSABS 0.9 07/22/2022 09:58 AM     LYMPHSABS 1.8 07/22/2022 09:58 AM     EOSABS 0.5 07/22/2022 09:58 AM     BASOSABS 0.0 07/22/2022 09:58 AM      CMP:          Lab Results   Component Value Date/Time      07/22/2022 09:58 AM     K 4.2 07/22/2022 bradycardia. Sepsis  Hypothyroidism  COPD  Hypertension  Hyperlipidemia  Nonverbal  Obesity  Diabetes  Resident of [address] [place] [address],[address]  Nonambulatory  Pituitary macroadenoma  Abnormal LFTs     Plan:  Continue telemetry  Currently patient does not meet criteria for pacemaker placement  Avoid negative chronotropic agents   Please keep potassium between 4 and 5 magnesium above 2  Continue management of sepsis as per primary team  Recommend ACE inhibitor versus ARB once patient more hemodynamically stable  Hold statins in the setting of abnormal LFTs  Conservative management from cardiology standpoint given patient's chronic irreversible conditions  Recommend palliative care consult  No further cardiac recommendations. Please call with questions we will sign off.

## 2022-07-24 NOTE — PROGRESS NOTES
Hospitalist Progress Note      PCP: Maggie Shannon DO    Date of Admission: 7/21/2022    Chief Complaint:    Chief Complaint   Patient presents with    Wound Infection     Left hip wound, tunneled, fever       Subjective:  Patient is non verbal right now; unable to complete full 12 point ROS    Medications:  Reviewed    Infusion Medications    sodium chloride      dextrose       Scheduled Medications    atorvastatin  20 mg PEG Tube Nightly    [START ON 7/25/2022] cabergoline  0.5 mg Oral Once per day on Mon Thu    hypromellose  2 drop Both Eyes Daily    chlorhexidine  15 mL Mouth/Throat TID    desmopressin  500 mcg PEG Tube BID    fenofibrate  160 mg Oral Daily    levETIRAcetam  750 mg PEG Tube BID    polyethylene glycol  17 g Per G Tube BID    sennosides-docusate sodium  1 tablet PEG Tube BID    tiotropium-olodaterol  2 puff Inhalation Daily    lansoprazole  30 mg Per G Tube Daily    sodium chloride flush  5-40 mL IntraVENous 2 times per day    enoxaparin  40 mg SubCUTAneous Daily    piperacillin-tazobactam  3,375 mg IntraVENous Q8H    insulin lispro  0-8 Units SubCUTAneous 6 times per day    levothyroxine  150 mcg Oral Daily     PRN Meds: albuterol, sodium chloride flush, sodium chloride, ondansetron **OR** ondansetron, polyethylene glycol, acetaminophen **OR** acetaminophen, glucose, dextrose bolus **OR** dextrose bolus, glucagon (rDNA), dextrose      Intake/Output Summary (Last 24 hours) at 7/24/2022 1220  Last data filed at 7/24/2022 1011  Gross per 24 hour   Intake 825 ml   Output --   Net 825 ml         Exam:    /63   Pulse 76   Temp 98.6 °F (37 °C) (Oral)   Resp 18   Ht 5' 5\" (1.651 m)   Wt 201 lb 4.8 oz (91.3 kg)   SpO2 100%   BMI 33.50 kg/m²     General appearance: No apparent distress  HEENT:  Conjunctivae/corneas clear. Neck: on trach collar. Respiratory:  Normal respiratory effort. Clear to auscultation  Cardiovascular: Regular rate and rhythm  Abdomen: PEG in place.   Musculoskeletal: No clubbing, cyanosis or edema bilaterally  Neuro: lower extremities contracted. Capillary Refill: Brisk,< 3 seconds   Peripheral Pulses: +2 palpable, equal bilaterally   Skin:  Left hip tunneling wound with surrounding cellulitis     Labs:   Recent Labs     07/22/22  0958 07/23/22  0637 07/24/22  0609   WBC 11.8* 10.1 9.5   HGB 9.7* 9.8* 9.1*   HCT 30.3* 29.8* 27.9*    290 302       Recent Labs     07/22/22  0958 07/23/22  0637 07/24/22  0609    137 144   K 4.2 3.8 3.9   * 105 111*   CO2 21 21 20   BUN 12 14 12   CREATININE 0.55* 0.44* 0.41*   CALCIUM 9.0 8.7 8.9       Recent Labs     07/22/22  0958 07/23/22  0637 07/24/22  0609   AST 41* 57* 33   ALT 47* 60* 49*   BILITOT 1.1* 0.5 0.4   ALKPHOS 88 98 96       No results for input(s): INR in the last 72 hours. No results for input(s): Assunta [place]ington in the last 72 hours. Urinalysis:      Lab Results   Component Value Date/Time    NITRU Negative 07/21/2022 03:54 AM    WBCUA 3-5 02/13/2022 12:45 AM    BACTERIA RARE 02/13/2022 12:45 AM    RBCUA None seen 02/13/2022 12:45 AM    BLOODU Negative 07/21/2022 03:54 AM    SPECGRAV 1.017 07/21/2022 03:54 AM    GLUCOSEU Negative 07/21/2022 03:54 AM       Radiology:  XR HIP LEFT (1 VIEW)   Final Result   There are no acute osseous changes. Status post left hip arthroplasty. There is a lucency in the subcutaneous soft tissues of the left hip consistent with open wound or ulcer. If osteomyelitis is suspected may consider bone scan evaluation. XR CHEST PORTABLE   Final Result   NO ACUTE CARDIOPULMONARY ABNORMALITY. NO CHANGE.          MRI HIP LEFT WO CONTRAST    (Results Pending)       Assessment/Plan:    #sepsis secondary to suspected OM and TEJ Warneri bacteremia  POA    - MRI ordered of lef thip; probable OM POA    - daily blood cultures    - continue broad spec Abx    - ID consulted for abx       - yesterdays blood cultures negative so far; will need 4 weeks IV Abx on discharge    #Hypothryoidism     - per endo    #HTN    - resume home meds     #Schizophrenia    - continue home meds     Active Hospital Problems    Diagnosis Date Noted    Staphylococcus aureus bacteremia with sepsis ([address] [A41.01] 07/23/2022     Priority: Medium    Acute osteomyelitis of hip ([address] [M86.18] 07/23/2022     Priority: Medium    Gram-positive bacteremia [R78.81] 07/22/2022     Priority: Medium    Sepsis ([address] [A41.9] 07/21/2022     Priority: Medium    Hypothyroid [E03.9] 07/21/2022     Priority: Medium    Osteomyelitis ([address] [M86.9] 07/21/2022     Priority: Medium    Complicated open wound of left hip [S71.002A] 07/21/2022     Priority: Medium    Cellulitis of left hip [U67.390] 07/21/2022     Priority: Medium    Schizophrenia ([address] [F20.9] 03/13/2020    HTN (hypertension) [I10] 09/19/2019    Depression [F32. A]         Additional work up or/and treatment plan may be added today or then after based on clinical progression. I am managing a portion of pt care. Some medical issues are handled by other specialists. Additional work up and treatment should be done in out pt setting by pt PCP and other out pt providers. In addition to examining and evaluating pt, I spent additional time explaining care, normal and abnormal findings, and treatment plan. All of pt questions were answered. Counseling, diet and education were  provided. Case will be discussed with nursing staff when appropriate. Family will be updated if and when appropriate.       Diet: Diet NPO  ADULT TUBE FEEDING; PEG; Peptide Based; Continuous; 60; No; 50; Q 6 hours    Code Status: DNR-CCA    PT/OT Eval     Electronically signed by Simin Burt MD on 7/24/2022 at 12:20 PM

## 2022-07-24 NOTE — PROGRESS NOTES
Josh Moreno  1955  male  Medical Record Number: [id number]    Patient informed that I am an Infectious Disease physician and permission obtained from the patient to speak in front of any visitors prior to any discussion for HIPPA purposes. HPI:   Follow-up sepsis with bacteremia, infected stage IV left hip decubitus ulcer, on IV vancomycin and Zosyn, well-tolerated. Patient with nonverbal baseline with L hip wound that is infected   Decreasing fevers and tachycardia  Positive moist cough  Positive BC for Staph warnerii with pending susceptibility    Review of Systems: unable to provide ROS because of altered mentation      Past Medical History:   Diagnosis Date    Abdominal pain     Asthma     Bloating symptom     COPD (chronic obstructive pulmonary disease) ([facility])     COPD (chronic obstructive pulmonary disease) ([address]     Depression     Depression     Diarrhea     Drug-seeking behavior 02/03/2020    Emphysema/COPD ([address]     History of rectal bleeding     Hypercholesteremia     past trx > 5 yrs    Hypercholesteremia     Hypercholesteremia     Hypertension     meds > 3 yrs    Hypothyroid 07/21/2022    Lung disease     Nontraumatic subarachnoid hemorrhage ([facility]     OBTAINED FROM NURSING HOME PAPERWORK    RANDI on CPAP     patient relates he doesn't use cpap at this time    Osteoarthritis     left hip    Osteoarthritis     Osteoarthritis of left hip     Other hydrocephalus ([address]     [address] PAPERWORK    Physiological consequence of immobility 03/09/2022    Pituitary macroadenoma ([facility]     Sleep apnea        Past Surgical History:   Procedure Laterality Date    COLONOSCOPY  5/2/14    JENNIFER    COLONOSCOPY N/A 12/12/2019    COLONOSCOPY DIAGNOSTIC performed by Cindy Graves MD at [place] [address], DIAGNOSTIC      EYE SURGERY Bilateral     cataracts    [address] ?     index finger laceration repair    HERNIA REPAIR  2004    repair [facility] REPLACEMENT Left     left hip    MS COLON CA SCRN NOT HI RSK IND N/A 5/17/2017    COLONOSCOPY performed by Geno Beal MD at [facility] 4/22/2019    LEFT HIP TOTAL HIP ARTHROPLASTY, LATERAL DECUB, GRACIE performed by Nikhil Luz MD at [facility]  12/12/2019    EGD DIAGNOSTIC ONLY performed by Erika Puente MD at [address] [place] [address] History    Marital status: Legally      Spouse name: Not on file    Number of children: Not on file    Years of education: Not on file    Highest education level: Not on file   Occupational History    Not on file   Tobacco Use    Smoking status: Former     Packs/day: 1.50     Years: 46.00     Pack years: 69.00     Types: Cigarettes     Quit date: 3/20/2019     Years since quitting: 3.3    Smokeless tobacco: Never   Vaping Use    Vaping Use: Unknown   Substance and Sexual Activity    Alcohol use: Yes    Drug use: No    Sexual activity: Not on file   Other Topics Concern    Not on file   Social History Narrative    Not on file     Social Determinants of Health     Financial Resource Strain: Not on file   Food Insecurity: Not on file   Transportation Needs: Not on file   Physical Activity: Not on file   Stress: Not on file   Social Connections: Not on file   Intimate Partner Violence: Not on file   Housing Stability: Not on file       Family History   Problem Relation Age of Onset    Colon Cancer Mother     Cancer Mother     Arthritis Mother     High Blood Pressure Father     Heart Disease Father     Diabetes Sister     No Known Problems Brother     No Known Problems Brother     No Known Problems Brother     Other Brother         Sepsis    Other Brother         Killed    No Known Problems Sister     No Known Problems Sister     No Known Problems Son        No current facility-administered medications on file prior to encounter.      Current Outpatient Medications on File Prior to Encounter   Medication Sig Dispense Refill    Nutritional Supplements (ISOSOURCE 1.5 NUSRAT PO) 60 mLs by Percutaneous Endoscopic Gastrostomy route continuous      carboxymethylcellulose (ARTIFICIAL TEARS) 1 % ophthalmic solution Place 2 drops into both eyes daily      atorvastatin (LIPITOR) 20 MG tablet 20 mg by PEG Tube route nightly      Atropine Sulfate 0.01 % SOLN Place 1 drop under the tongue as needed (EVERY TWO HOURS FOR REDUCTION OF SECRETIONS)      sulfamethoxazole-trimethoprim (BACTRIM DS;SEPTRA DS) 800-160 MG per tablet 1 tablet by PEG Tube route in the morning and 1 tablet before bedtime. chlorhexidine (PERIDEX) 0.12 % solution Take 15 mLs by mouth in the morning, at noon, and at bedtime FOR USE WITH TOOTHETTES FOR MOUTH CARE      polyethylene glycol (MIRALAX) 17 g PACK packet 17 g by Per G Tube route in the morning and at bedtime      acetaminophen (TYLENOL) 325 MG tablet 650 mg by PEG Tube route every 4 hours as needed for Pain      sennosides-docusate sodium (SENOKOT-S) 8.6-50 MG tablet 1 tablet by PEG Tube route in the morning and at bedtime      desmopressin (DDAVP) 0.1 MG tablet 0.5 mg by PEG Tube route in the morning and at bedtime Give 0.05mg via PEG tube two times a day for clotting promoter      esomeprazole Magnesium (NEXIUM) 40 MG PACK 40 mg by Per G Tube route in the morning. levETIRAcetam (KEPPRA) 100 MG/ML solution 750 mg/kg by PEG Tube route 2 times daily      hyoscyamine (LEVSIN/SL) 125 MCG sublingual tablet Place 125 mcg under the tongue every 6 hours as needed for Cramping      LORazepam (ATIVAN) 0.5 MG tablet 0.5 mg by PEG Tube route every 4 hours as needed for Anxiety. morphine sulfate 20 MG/ML concentrated oral solution Take 5 mg by mouth every 2 hours as needed for Pain.      levothyroxine (SYNTHROID) 200 MCG tablet 200 mcg by PEG Tube route in the morning.  Give one tablet via PEG tube every Mon,Tue, Wed, Thu, Fri, Sat .      vitamin D (CHOLECALCIFEROL) 125 MCG (5000 UT) CAPS capsule Take 50,000 Units by mouth daily Give 1/25mg ([id number]) one capsule via peg tube one time a day every Monday for supplement      [DISCONTINUED] paliperidone (INVEGA) 6 MG extended release tablet Take 1 tablet by mouth daily 30 tablet 1    cabergoline (DOSTINEX) 0.5 MG tablet Take 1 tablet by mouth Twice a Week ON Monday and Thursday 8 tablet 3    [DISCONTINUED] furosemide (LASIX) 40 MG tablet Take 40 mg by mouth daily      fenofibrate 160 MG tablet Take 150 mg by mouth daily      aspirin 81 MG EC tablet Take 1 tablet by mouth 2 times daily (Patient taking differently: Take 81 mg by mouth in the morning. ADMINISTER VIA PEG TUBE.) 60 tablet 0    [DISCONTINUED] lisinopril (PRINIVIL;ZESTRIL) 20 MG tablet Take 2 tablets by mouth daily 30 tablet 3    Respiratory Therapy Supplies SANDRA Full face CPAP mask and supplies 1 Device 0    CPAP Machine MISC by Does not apply route New CPAP with 6 cm 1 each 0    albuterol (PROVENTIL) (2.5 MG/3ML) 0.083% nebulizer solution Take 3 mLs by nebulization every 6 hours as needed for Wheezing (Patient taking differently: Take 2.5 mg by nebulization every 6 hours as needed for Wheezing or Shortness of Breath) 120 each 5    STIOLTO RESPIMAT 2.5-2.5 MCG/ACT AERS Inhale 2 puff in AM 1 Inhaler 5       Physical Exam:  Vitals:    07/23/22 1555 07/23/22 1712 07/23/22 1849 07/24/22 0742   BP: 109/68  117/64 104/63   Pulse: 79  74 76   Resp: 20   18   Temp:  99.5 °F (37.5 °C) 99.5 °F (37.5 °C) 98.6 °F (37 °C)   TempSrc:  Axillary  Oral   SpO2: 99%  100% 100%   Weight:       Height:         General Appearance: Nonverbal and nonresponsive to verbal stimulation  Opens eyes spontaneously on occasion   in no acute distress  On trach collar  Skin: warm and dry, no rash. Head: normocephalic and atraumatic  Eyes: anicteric sclerae  ENT:  normal mucous membranes.    Lungs: normal respiratory effort, bilateral diffuse scattered rhonchi  Heart normal S1-S2 no murmur  Abdomen: soft, no distention or rigidity, intact PEG site, awaiting tube feeding  No leg edema  No erythema  Contracted bilateral lower extremities  Intact dressing on bilateral heels and left hip area  Right foot dressing with positive serous drainage  Labs: I have reviewed all lab results by electronic record, including most recent CBC, metabolic panel, and pertinent abnormalities were addressed from an infectious disease perspective. WBC trends are being monitored. Lab Results   Component Value Date/Time     07/24/2022 06:09 AM    K 3.9 07/24/2022 06:09 AM     07/24/2022 06:09 AM    CO2 20 07/24/2022 06:09 AM    BUN 12 07/24/2022 06:09 AM    CREATININE 0.41 07/24/2022 06:09 AM    GLUCOSE 109 07/24/2022 06:09 AM    CALCIUM 8.9 07/24/2022 06:09 AM      Lab Results   Component Value Date    WBC 9.5 07/24/2022    HGB 9.1 (L) 07/24/2022    HCT 27.9 (L) 07/24/2022    MCV 92.8 07/24/2022     07/24/2022       Radiology:   I have reviewed imaging results per electronic record and most pertinent abnormalities are being addressed from an infectious disease standpoint.    Culture, Blood ID Sensitivity [[id number]: 07/21/22 0322 Order Status: CompletedSpecimen: BloodUpdated: 07/22/22 1359 Culture, Blood Id Sensitivity-- Cult,Blood:  POSITIVE Blood Culture   Performed at [address]   [phone]   Narrative:  ORDER#: [id number]                          ORDERED BY: Nadia Caruso   SOURCE: Blood                              COLLECTED:  07/21/22 03:22   ANTIBIOTICS AT TOBIAS.:                      RECEIVED :  07/21/22 03:22   Gram stain aerobic bottle   Gram positive cocci in chains - resembling Strep   Gram positive cocci in clusters-resembling Staph   Gram stain aerobic bottle   Gram positive cocci in clusters-resembling Staph   2 out of 2 blood cultures     Culture, Wound [[id number]]Collected: 07/21/22 1707 Order Status: CompletedSpecimen: ButtockUpdated: 07/22/22 1501 WOUND/ABSCESS-- Direct Exam:  MODERATE NEUTROPHILS   Direct Exam:  FEW GRAM NEGATIVE RODS   Direct Exam:  RARE GRAM POSITIVE COCCI IN CLUSTERS   Cult,Wound:  PENDING   Performed at [facility] [address] [facility], [address] [place] [address]   ([phone]   Narrative:    Wound culture with Proteus species  Susceptibility      Staphylococcus warneri (2)    Antibiotic Interpretation Microscan  Method Status    clindamycin Sensitive <=0.25 mcg/mL BACTERIAL SUSCEPTIBILITY PANEL BY MANDEEP     erythromycin Resistant >=8 mcg/mL BACTERIAL SUSCEPTIBILITY PANEL BY MANDEEP     gentamicin Sensitive <=0.5 mcg/mL BACTERIAL SUSCEPTIBILITY PANEL BY MANDEEP      Gentamicin is used only in combination with other active agents that   test susceptible. inducible clindamycin resistance Sensitive NEGATIVE mcg/mL BACTERIAL SUSCEPTIBILITY PANEL BY MANDEEP     oxacillin Sensitive <=0.25 mcg/mL BACTERIAL SUSCEPTIBILITY PANEL BY MANDEEP      is a therapeutic consideration.        tetracycline Resistant >=16 mcg/mL BACTERIAL SUSCEPTIBILITY PANEL BY MANDEEP     trimethoprim-sulfamethoxazole Sensitive          Assessment:    Methicillin sensitive Staph warneri bacteremia and Sepsis   Acute osteomyelitis of left hip area   infected multiple DU - bilateral LEs and tunneling wound L hip - photos reviewed  Polymicrobial bacterial wound infection      Plan:  DC Vancomycin   Continue IV Zosyn pending sensitivity  Follow-up repeat daily blood cultures to ensure clearing of bacteremia  PICC line and prolonged course of IV antibiotics on discharge  Local wound care and follow-up with surgery/podiatry  Follow-up CBC BMP  Arrange for 4 weeks of IV antibiotics on discharge  Darylene Stallion, MD

## 2022-07-24 NOTE — PROGRESS NOTES
PODIATRIC MEDICINE AND SURGERY  CONSULT PROGRESS NOTE    Consulting Service:  Medicine  Requesting Provider: Arline Gonsales   Opinion/advice regarding: Bilateral foot wounds  Staff Doctor:  Dr. George Calipatria:  74 y.o. male with PMH significant for asthma, COPD, depression, hypercholesterolemia, hypertension, osteoarthritis, pituitary macroadenoma for who podiatry was consulted for bilateral foot wounds. Patient is seen in the wound care clinic the foot wounds appear stable, continue local wound care with iodoform packing to the right heel daily. PLAN AND RECOMMENDATIONS[de-identified]  Patient's case to be discussed with staff, Dr. Donn Feliciano, who will provide final recommendations going forward  Wound care: Iodoform packing into the right heel with dry sterile dressing daily, mepilex border to the left foot daily per nursing  Offload bilateral lower extremities with prevlon boots while resting   Elevate bilateral lower extremities at or above heart level while resting  Toe touch weight bearing to the right lower extremity in protective surgical shoe when ambulating. Antibiotic coverage per primary team   Pain management per primary team   Podiatry to follow while in house   Patient will need follow up with Dr. Donn Feliciano within 7-10 days of discharge      Interval Events  -Febrile, positive blood cultures   -Leukocytosis resolved   -Right heel wound remains stable. HPI: This 74y.o. year old male was seen today for bilateral chronic foot wounds. There is a wound on the right heel and a blister on the left foot, as per chart review. Unable to obtain history from patient as he has a trach and is not able to verbalize. No recent history of nausea, vomiting, diarrhea, fevers, chills, chest pain, shortness of breath, head ache, or calf pain. No other pedal complaints.      Past Medical History:   Diagnosis Date    Abdominal pain     Asthma     Bloating symptom     COPD (chronic obstructive pulmonary disease) ([facility] [address] COPD (chronic obstructive pulmonary disease) ([facility] [address]     Depression     Depression     Diarrhea     Drug-seeking behavior 02/03/2020    Emphysema/COPD ([facility] [address]     History of rectal bleeding     Hypercholesteremia     past trx > 5 yrs    Hypercholesteremia     Hypercholesteremia     Hypertension     meds > 3 yrs    Hypothyroid 07/21/2022    Lung disease     Nontraumatic subarachnoid hemorrhage ([facility] [address]     [address] PAPERWORK    RANDI on CPAP     patient relates he doesn't use cpap at this time    Osteoarthritis     left hip    Osteoarthritis     Osteoarthritis of left hip     Other hydrocephalus ([facility] [address]     [address] PAPERWORK    Physiological consequence of immobility 03/09/2022    Pituitary macroadenoma ([facility] [address]     Sleep apnea        Past Surgical History:   Procedure Laterality Date    COLONOSCOPY  5/2/14    JENNIFER    COLONOSCOPY N/A 12/12/2019    COLONOSCOPY DIAGNOSTIC performed by Cindy Graves MD at [place] [address], DIAGNOSTIC      EYE SURGERY Bilateral     cataracts    [address] 51 ? index finger laceration repair    HERNIA REPAIR  2004    repair [facility]     JOINT REPLACEMENT Left     left hip    SD COLON CA SCRN NOT HI RSK IND N/A 5/17/2017    COLONOSCOPY performed by Tessa Bettencourt MD at [address] [place] [address] Left 4/22/2019    LEFT HIP TOTAL HIP ARTHROPLASTY, LATERAL DECUB, GRACIE performed by Gaurav Bull MD at [address] [place] [address]  12/12/2019    EGD DIAGNOSTIC ONLY performed by Cindy Graves MD at [facility]       No current facility-administered medications on file prior to encounter.      Current Outpatient Medications on File Prior to Encounter   Medication Sig Dispense Refill    Nutritional Supplements (ISOSOURCE 1.5 NUSRAT PO) 60 mLs by Percutaneous Endoscopic Gastrostomy route continuous      carboxymethylcellulose (ARTIFICIAL TEARS) 1 % ophthalmic solution Place 2 drops into both eyes daily      atorvastatin (LIPITOR) 20 MG tablet 20 mg by PEG Tube route nightly      Atropine Sulfate 0.01 % SOLN Place 1 drop under the tongue as needed (EVERY TWO HOURS FOR REDUCTION OF SECRETIONS)      sulfamethoxazole-trimethoprim (BACTRIM DS;SEPTRA DS) 800-160 MG per tablet 1 tablet by PEG Tube route in the morning and 1 tablet before bedtime. chlorhexidine (PERIDEX) 0.12 % solution Take 15 mLs by mouth in the morning, at noon, and at bedtime FOR USE WITH TOOTHETTES FOR MOUTH CARE      polyethylene glycol (MIRALAX) 17 g PACK packet 17 g by Per G Tube route in the morning and at bedtime      acetaminophen (TYLENOL) 325 MG tablet 650 mg by PEG Tube route every 4 hours as needed for Pain      sennosides-docusate sodium (SENOKOT-S) 8.6-50 MG tablet 1 tablet by PEG Tube route in the morning and at bedtime      desmopressin (DDAVP) 0.1 MG tablet 0.5 mg by PEG Tube route in the morning and at bedtime Give 0.05mg via PEG tube two times a day for clotting promoter      esomeprazole Magnesium (NEXIUM) 40 MG PACK 40 mg by Per G Tube route in the morning. levETIRAcetam (KEPPRA) 100 MG/ML solution 750 mg/kg by PEG Tube route 2 times daily      hyoscyamine (LEVSIN/SL) 125 MCG sublingual tablet Place 125 mcg under the tongue every 6 hours as needed for Cramping      LORazepam (ATIVAN) 0.5 MG tablet 0.5 mg by PEG Tube route every 4 hours as needed for Anxiety. morphine sulfate 20 MG/ML concentrated oral solution Take 5 mg by mouth every 2 hours as needed for Pain.      levothyroxine (SYNTHROID) 200 MCG tablet 200 mcg by PEG Tube route in the morning.  Give one tablet via PEG tube every Mon,Tue, Wed, Thu, Fri, Sat .      vitamin D (CHOLECALCIFEROL) 125 MCG (5000 UT) CAPS capsule Take 50,000 Units by mouth daily Give 1/25mg ([id number]) one capsule via peg tube one time a day every Monday for supplement      [DISCONTINUED] paliperidone (INVEGA) 6 MG extended release tablet Take 1 tablet by mouth daily 30 tablet 1    cabergoline (DOSTINEX) 0.5 MG tablet Take 1 tablet by mouth Twice a Week ON Monday and Thursday 8 tablet 3    [DISCONTINUED] furosemide (LASIX) 40 MG tablet Take 40 mg by mouth daily      fenofibrate 160 MG tablet Take 150 mg by mouth daily      aspirin 81 MG EC tablet Take 1 tablet by mouth 2 times daily (Patient taking differently: Take 81 mg by mouth in the morning.  ADMINISTER VIA PEG TUBE.) 60 tablet 0    [DISCONTINUED] lisinopril (PRINIVIL;ZESTRIL) 20 MG tablet Take 2 tablets by mouth daily 30 tablet 3    Respiratory Therapy Supplies SANDRA Full face CPAP mask and supplies 1 Device 0    CPAP Machine MISC by Does not apply route New CPAP with 6 cm 1 each 0    albuterol (PROVENTIL) (2.5 MG/3ML) 0.083% nebulizer solution Take 3 mLs by nebulization every 6 hours as needed for Wheezing (Patient taking differently: Take 2.5 mg by nebulization every 6 hours as needed for Wheezing or Shortness of Breath) 120 each 5    STIOLTO RESPIMAT 2.5-2.5 MCG/ACT AERS Inhale 2 puff in AM 1 Inhaler 5       No Known Allergies    Family History   Problem Relation Age of Onset    Colon Cancer Mother     Cancer Mother     Arthritis Mother     High Blood Pressure Father     Heart Disease Father     Diabetes Sister     No Known Problems Brother     No Known Problems Brother     No Known Problems Brother     Other Brother         Sepsis    Other Brother         Killed    No Known Problems Sister     No Known Problems Sister     No Known Problems Son        Social History     Socioeconomic History    Marital status: Legally      Spouse name: Not on file    Number of children: Not on file    Years of education: Not on file    Highest education level: Not on file   Occupational History    Not on file   Tobacco Use    Smoking status: Former     Packs/day: 1.50     Years: 46.00     Pack years: 69.00     Types: Cigarettes     Quit date: 3/20/2019     Years since quitting: 3.3    Smokeless tobacco: Never   Vaping Use    Vaping Use: Unknown   Substance and Sexual Activity    Alcohol use: Yes    Drug use: No    Sexual activity: Not on file   Other Topics Concern    Not on file   Social History Narrative    Not on file     Social Determinants of Health     Financial Resource Strain: Not on file   Food Insecurity: Not on file   Transportation Needs: Not on file   Physical Activity: Not on file   Stress: Not on file   Social Connections: Not on file   Intimate Partner Violence: Not on file   Housing Stability: Not on file       Review of Systems  CONSTITUTIONAL: No fevers, chills, diaphoresis  HEENT: No epistaxis, tinnitus  EYES: No diplopia, blurry vision.   CARDIOVASCULAR:  No chest pain, palpitations, lower extremity edema  PULM: No dyspnea, tachypnea, wheezing  GI: No nausea, vomiting, constipation, diarrhea  : No dysuria, gross hematuria, or pyuria  NEURO:  No new balance problems, peripheral weakness, paresthesias, numbness  MSK: Contracted lower extremities   PSY: No concerns regarding depression, anxiety  INTEGUMENTARY:  open skin lesion to right heel     OBJECTIVE:  /63   Pulse 76   Temp 98.6 °F (37 °C) (Oral)   Resp 18   Ht 5' 5\" (1.651 m)   Wt 201 lb 4.8 oz (91.3 kg)   SpO2 100%   BMI 33.50 kg/m²   Patient is alert and oriented to person, place, and time    Vascular:   Non Palpable Dorsalis Pedis and Non Palpable Posterior Tibial Pulses B/L   Capillary Fill time < 3 seconds to B/L digits  Skin temperature warm to warm tibial tuberosity to the digits B/L  Hair growth absent to digits  mild edema  No varicosities     Neurological:   Sensation to light touch intact B/L    Musculoskeletal/Orthopaedic:   Structural Deformities: contracted lower extremities  5/5 muscle strength Dorsiflexion, Plantarflexion, Inversion, Eversion B/L  mild tenderness on palpation of right heel    Dermatological:   Skin appears well hydrated and supple with good temperature, texture, turgor   hyperkeratotic lesions noted  Nails 1-5 B/L appear within normal limits  Interspaces 1-4 B/L are clear and without debris  There is a superficial darkened blister noted at the fifth metatarsal head laterally on the left foot, wound is not open. There is crepitus, induration or acute signs of infection. Ulceration #1:   Location: right heel  Measurements: approx. 3.0 cm x 3.0 cm x 1.0 cm  Base: Granular/Healthy, with probe to periosteum at the 1 o clock aspect of the wound   Borders: appear healthy, no hyperkeratotic or macerated tissue   Exudate: no drainage  Comments: no periwound erythema or edema  wound borders with no evidence of ascending lymphangitis. Wound undermines probes to periosteum.        LABS:   Lab Results   Component Value Date    WBC 9.5 07/24/2022    HGB 9.1 (L) 07/24/2022    HCT 27.9 (L) 07/24/2022    MCV 92.8 07/24/2022     07/24/2022     Lab Results   Component Value Date/Time     07/24/2022 06:09 AM    K 3.9 07/24/2022 06:09 AM     07/24/2022 06:09 AM    CO2 20 07/24/2022 06:09 AM    BUN 12 07/24/2022 06:09 AM    CREATININE 0.41 07/24/2022 06:09 AM    GLUCOSE 109 07/24/2022 06:09 AM    CALCIUM 8.9 07/24/2022 06:09 AM      Lab Results   Component Value Date    LABALBU 2.5 (L) 07/24/2022     Lab Results   Component Value Date    SEDRATE 99 (H) 07/21/2022     Lab Results   Component Value Date    .9 (H) 07/21/2022     Lab Results   Component Value Date    LABA1C 5.6 02/21/2020       IMAGING:   No foot films         Jill Her DPM PGY-3  Podiatric Surgery Resident  Podiatry On Call Pager: [phone]    07/24/22  1:01 PM

## 2022-07-25 LAB
CULTURE, BLOOD ID SENSITIVITY: ABNORMAL
GLUCOSE BLD-MCNC: 101 MG/DL (ref 70–99)
GLUCOSE BLD-MCNC: 107 MG/DL (ref 70–99)
GLUCOSE BLD-MCNC: 109 MG/DL (ref 70–99)
GLUCOSE BLD-MCNC: 88 MG/DL (ref 70–99)
GLUCOSE BLD-MCNC: 97 MG/DL (ref 70–99)
GLUCOSE BLD-MCNC: 99 MG/DL (ref 70–99)
ORGANISM: ABNORMAL
PERFORMED ON: ABNORMAL
PERFORMED ON: NORMAL

## 2022-07-25 PROCEDURE — 99232 SBSQ HOSP IP/OBS MODERATE 35: CPT | Performed by: INTERNAL MEDICINE

## 2022-07-25 PROCEDURE — 6370000000 HC RX 637 (ALT 250 FOR IP): Performed by: INTERNAL MEDICINE

## 2022-07-25 PROCEDURE — 2580000003 HC RX 258

## 2022-07-25 PROCEDURE — 2700000000 HC OXYGEN THERAPY PER DAY

## 2022-07-25 PROCEDURE — 94761 N-INVAS EAR/PLS OXIMETRY MLT: CPT

## 2022-07-25 PROCEDURE — 6360000002 HC RX W HCPCS

## 2022-07-25 PROCEDURE — 1210000000 HC MED SURG R&B

## 2022-07-25 PROCEDURE — 6370000000 HC RX 637 (ALT 250 FOR IP)

## 2022-07-25 RX ADMIN — DESMOPRESSIN ACETATE 500 MCG: 0.1 TABLET ORAL at 08:12

## 2022-07-25 RX ADMIN — FENOFIBRATE 160 MG: 160 TABLET ORAL at 08:13

## 2022-07-25 RX ADMIN — LEVETIRACETAM 750 MG: 100 SOLUTION ORAL at 21:09

## 2022-07-25 RX ADMIN — ATORVASTATIN CALCIUM 20 MG: 20 TABLET, FILM COATED ORAL at 21:09

## 2022-07-25 RX ADMIN — ENOXAPARIN SODIUM 40 MG: 100 INJECTION SUBCUTANEOUS at 08:13

## 2022-07-25 RX ADMIN — SENNOSIDES AND DOCUSATE SODIUM 1 TABLET: 50; 8.6 TABLET ORAL at 08:12

## 2022-07-25 RX ADMIN — Medication 30 MG: at 09:00

## 2022-07-25 RX ADMIN — HYPROMELLOSE 2910 2 DROP: 5 SOLUTION OPHTHALMIC at 08:21

## 2022-07-25 RX ADMIN — 0.12% CHLORHEXIDINE GLUCONATE 15 ML: 1.2 RINSE ORAL at 21:09

## 2022-07-25 RX ADMIN — SODIUM CHLORIDE, PRESERVATIVE FREE 10 ML: 5 INJECTION INTRAVENOUS at 21:10

## 2022-07-25 RX ADMIN — 0.12% CHLORHEXIDINE GLUCONATE 15 ML: 1.2 RINSE ORAL at 08:12

## 2022-07-25 RX ADMIN — POLYETHYLENE GLYCOL 3350 17 G: 17 POWDER, FOR SOLUTION ORAL at 08:13

## 2022-07-25 RX ADMIN — 0.12% CHLORHEXIDINE GLUCONATE 15 ML: 1.2 RINSE ORAL at 14:40

## 2022-07-25 RX ADMIN — CABERGOLINE 0.5 MG: 0.5 TABLET ORAL at 08:13

## 2022-07-25 RX ADMIN — DESMOPRESSIN ACETATE 500 MCG: 0.1 TABLET ORAL at 21:42

## 2022-07-25 RX ADMIN — PIPERACILLIN AND TAZOBACTAM 3375 MG: 3; .375 INJECTION, POWDER, FOR SOLUTION INTRAVENOUS at 21:32

## 2022-07-25 RX ADMIN — LEVETIRACETAM 750 MG: 100 SOLUTION ORAL at 08:12

## 2022-07-25 RX ADMIN — ACETAMINOPHEN 650 MG: 325 TABLET ORAL at 18:58

## 2022-07-25 RX ADMIN — PIPERACILLIN AND TAZOBACTAM 3375 MG: 3; .375 INJECTION, POWDER, FOR SOLUTION INTRAVENOUS at 06:45

## 2022-07-25 RX ADMIN — LEVOTHYROXINE SODIUM 150 MCG: 0.15 TABLET ORAL at 06:45

## 2022-07-25 RX ADMIN — PIPERACILLIN AND TAZOBACTAM 3375 MG: 3; .375 INJECTION, POWDER, FOR SOLUTION INTRAVENOUS at 14:41

## 2022-07-25 ASSESSMENT — PAIN SCALES - WONG BAKER: WONGBAKER_NUMERICALRESPONSE: 0

## 2022-07-25 NOTE — PLAN OF CARE
Problem: Nutrition Deficit:  Goal: Optimize nutritional status  Outcome: Not Progressing  Flowsheets (Taken 7/21/2022 1401)  Nutrient intake appropriate for improving, restoring, or maintaining nutritional needs:   Assess nutritional status and recommend course of action   Monitor oral intake, labs, and treatment plans   Recommend appropriate diets, oral nutritional supplements, and vitamin/mineral supplements

## 2022-07-25 NOTE — PROGRESS NOTES
Drug-seeking behavior 02/03/2020    Emphysema/COPD ([facility] [address]     History of rectal bleeding     Hypercholesteremia     past trx > 5 yrs    Hypercholesteremia     Hypercholesteremia     Hypertension     meds > 3 yrs    Hypothyroid 07/21/2022    Lung disease     Nontraumatic subarachnoid hemorrhage ([facility] [address]     [address] PAPERWORK    RANDI on CPAP     patient relates he doesn't use cpap at this time    Osteoarthritis     left hip    Osteoarthritis     Osteoarthritis of left hip     Other hydrocephalus ([facility] [address]     [address] PAPERWORK    Physiological consequence of immobility 03/09/2022    Pituitary macroadenoma ([facility] [address]     Sleep apnea        Past Surgical History:   Procedure Laterality Date    COLONOSCOPY  5/2/14    JARMOSMARLOK    COLONOSCOPY N/A 12/12/2019    COLONOSCOPY DIAGNOSTIC performed by Christy Barrientos MD at [place] [address], DIAGNOSTIC      EYE SURGERY Bilateral     cataracts    [address] 51 ? index finger laceration repair    HERNIA REPAIR  2004    repair [facility]     JOINT REPLACEMENT Left     left hip    MI COLON CA SCRN NOT HI RSK IND N/A 5/17/2017    COLONOSCOPY performed by David Abrams MD at [address] [place] [address] 4/22/2019    LEFT HIP TOTAL HIP ARTHROPLASTY, LATERAL DECUB, GRACIE performed by Nancy Carlton MD at [address] [place] [address]  12/12/2019    EGD DIAGNOSTIC ONLY performed by Christy Barrientos MD at [facility]       No current facility-administered medications on file prior to encounter.      Current Outpatient Medications on File Prior to Encounter   Medication Sig Dispense Refill    Nutritional Supplements (ISOSOURCE 1.5 NUSRAT PO) 60 mLs by Percutaneous Endoscopic Gastrostomy route continuous      carboxymethylcellulose (ARTIFICIAL TEARS) 1 % ophthalmic solution Place 2 drops into both eyes daily      atorvastatin (LIPITOR) 20 MG tablet 20 mg by PEG Tube route nightly Atropine Sulfate 0.01 % SOLN Place 1 drop under the tongue as needed (EVERY TWO HOURS FOR REDUCTION OF SECRETIONS)      sulfamethoxazole-trimethoprim (BACTRIM DS;SEPTRA DS) 800-160 MG per tablet 1 tablet by PEG Tube route in the morning and 1 tablet before bedtime. chlorhexidine (PERIDEX) 0.12 % solution Take 15 mLs by mouth in the morning, at noon, and at bedtime FOR USE WITH TOOTHETTES FOR MOUTH CARE      polyethylene glycol (MIRALAX) 17 g PACK packet 17 g by Per G Tube route in the morning and at bedtime      acetaminophen (TYLENOL) 325 MG tablet 650 mg by PEG Tube route every 4 hours as needed for Pain      sennosides-docusate sodium (SENOKOT-S) 8.6-50 MG tablet 1 tablet by PEG Tube route in the morning and at bedtime      desmopressin (DDAVP) 0.1 MG tablet 0.5 mg by PEG Tube route in the morning and at bedtime Give 0.05mg via PEG tube two times a day for clotting promoter      esomeprazole Magnesium (NEXIUM) 40 MG PACK 40 mg by Per G Tube route in the morning. levETIRAcetam (KEPPRA) 100 MG/ML solution 750 mg/kg by PEG Tube route 2 times daily      hyoscyamine (LEVSIN/SL) 125 MCG sublingual tablet Place 125 mcg under the tongue every 6 hours as needed for Cramping      LORazepam (ATIVAN) 0.5 MG tablet 0.5 mg by PEG Tube route every 4 hours as needed for Anxiety. morphine sulfate 20 MG/ML concentrated oral solution Take 5 mg by mouth every 2 hours as needed for Pain.      levothyroxine (SYNTHROID) 200 MCG tablet 200 mcg by PEG Tube route in the morning.  Give one tablet via PEG tube every Mon,Tue, Wed, Thu, Fri, Sat .      vitamin D (CHOLECALCIFEROL) 125 MCG (5000 UT) CAPS capsule Take 50,000 Units by mouth daily Give 1/25mg ([id number]) one capsule via peg tube one time a day every Monday for supplement      [DISCONTINUED] paliperidone (INVEGA) 6 MG extended release tablet Take 1 tablet by mouth daily 30 tablet 1    cabergoline (DOSTINEX) 0.5 MG tablet Take 1 tablet by mouth Twice a Week ON Monday and Thursday 8 tablet 3    [DISCONTINUED] furosemide (LASIX) 40 MG tablet Take 40 mg by mouth daily      fenofibrate 160 MG tablet Take 150 mg by mouth daily      aspirin 81 MG EC tablet Take 1 tablet by mouth 2 times daily (Patient taking differently: Take 81 mg by mouth in the morning.  ADMINISTER VIA PEG TUBE.) 60 tablet 0    [DISCONTINUED] lisinopril (PRINIVIL;ZESTRIL) 20 MG tablet Take 2 tablets by mouth daily 30 tablet 3    Respiratory Therapy Supplies SANDRA Full face CPAP mask and supplies 1 Device 0    CPAP Machine MISC by Does not apply route New CPAP with 6 cm 1 each 0    albuterol (PROVENTIL) (2.5 MG/3ML) 0.083% nebulizer solution Take 3 mLs by nebulization every 6 hours as needed for Wheezing (Patient taking differently: Take 2.5 mg by nebulization every 6 hours as needed for Wheezing or Shortness of Breath) 120 each 5    STIOLTO RESPIMAT 2.5-2.5 MCG/ACT AERS Inhale 2 puff in AM 1 Inhaler 5       No Known Allergies    Family History   Problem Relation Age of Onset    Colon Cancer Mother     Cancer Mother     Arthritis Mother     High Blood Pressure Father     Heart Disease Father     Diabetes Sister     No Known Problems Brother     No Known Problems Brother     No Known Problems Brother     Other Brother         Sepsis    Other Brother         Killed    No Known Problems Sister     No Known Problems Sister     No Known Problems Son        Social History     Socioeconomic History    Marital status: Legally      Spouse name: Not on file    Number of children: Not on file    Years of education: Not on file    Highest education level: Not on file   Occupational History    Not on file   Tobacco Use    Smoking status: Former     Packs/day: 1.50     Years: 46.00     Pack years: 69.00     Types: Cigarettes     Quit date: 3/20/2019     Years since quitting: 3.3    Smokeless tobacco: Never   Vaping Use    Vaping Use: Unknown   Substance and Sexual Activity    Alcohol use: Yes    Drug use: No    Sexual activity: Not on file   Other Topics Concern    Not on file   Social History Narrative    Not on file     Social Determinants of Health     Financial Resource Strain: Not on file   Food Insecurity: Not on file   Transportation Needs: Not on file   Physical Activity: Not on file   Stress: Not on file   Social Connections: Not on file   Intimate Partner Violence: Not on file   Housing Stability: Not on file       Review of Systems  CONSTITUTIONAL: No fevers, chills, diaphoresis  HEENT: No epistaxis, tinnitus  EYES: No diplopia, blurry vision. CARDIOVASCULAR:  No chest pain, palpitations, lower extremity edema  PULM: No dyspnea, tachypnea, wheezing  GI: No nausea, vomiting, constipation, diarrhea  : No dysuria, gross hematuria, or pyuria  NEURO:  No new balance problems, peripheral weakness, paresthesias, numbness  MSK: Contracted lower extremities   PSY: No concerns regarding depression, anxiety  INTEGUMENTARY:  open skin lesion to right heel     OBJECTIVE:  /78   Pulse 88   Temp 99.3 °F (37.4 °C) (Oral)   Resp 18   Ht 5' 5\" (1.651 m)   Wt 201 lb 4.8 oz (91.3 kg)   SpO2 100%   BMI 33.50 kg/m²   Patient is resting comfortably in bed. Updated HPI was not obtained due to patient sleeping.     Vascular:   Non Palpable Dorsalis Pedis and Non Palpable Posterior Tibial Pulses B/L   Capillary Fill time < 3 seconds to B/L digits  Skin temperature warm to warm tibial tuberosity to the digits B/L  Hair growth absent to digits  mild edema  No varicosities     Neurological:   Sensation to light touch intact B/L    Musculoskeletal/Orthopaedic:   Structural Deformities: contracted lower extremities  5/5 muscle strength Dorsiflexion, Plantarflexion, Inversion, Eversion B/L  mild tenderness on palpation of right heel    Dermatological:   Skin appears well hydrated and supple with good temperature, texture, turgor   hyperkeratotic lesions noted  Nails 1-5 B/L appear within normal limits  Interspaces 1-4 B/L are clear and

## 2022-07-25 NOTE — PROGRESS NOTES
Comprehensive Nutrition Assessment    Type and Reason for Visit:  Reassess    Nutrition Recommendations/Plan:   Continue Current plan of care  Recommend daily MVI with minerals to aid in meeting micronutrient needs to support wound healing       Malnutrition Assessment:  Malnutrition Status: At risk for malnutrition (Comment) (07/21/22 8194)    Context:  Chronic Illness     Findings of the 6 clinical characteristics of malnutrition:  Energy Intake:  No significant decrease in energy intake  Weight Loss:  Mild weight loss (specify amount and time period) (14% x 1 year)     Body Fat Loss:  No significant body fat loss     Muscle Mass Loss:  Unable to assess (due due medical condition)    Fluid Accumulation:  Unable to assess     Strength:  Not Performed    Nutrition Assessment:    Pt at risk for malnutrition related to dependence on EN and with increased nutrient needs for wound healing.  Tube feeding started 7/22, as recommended and currently tolerating a goal rate, recommend addiition of daily MVI    Nutrition Related Findings:    Hx Trach/PEG, nonverbal, contracted, Needs PICC placement for IVF antibiotics, BM 7/24 ( loose), 1+ BLE edema per nursing, Meds include Synthroid, glycolax, sennokot s, Wound Type: Multiple, Pressure Injury (see wound documentation)       Current Nutrition Intake & Therapies:    Average Meal Intake: NPO  Average Supplements Intake: NPO  Current Tube Feeding (TF) Orders:  Feeding Route: PEG  Formula: Peptide Based ( Vital 1.2)  Schedule: Continuous  Feeding Regimen: @60ml/hr x 23 hrs  Additives/Modulars: None  Water Flushes: 50 ml q 6 hrs while on IV fluids  Current TF & Flush Orders Provides: @60ml/og=9721nhfm, 103g pro, 1419ml free water  Goal TF & Flush Orders Provides: @60ml/js=5303mlkd, 103g pro, 1419ml free water    Anthropometric Measures:  Height: 5' 5\" (165.1 cm)  Ideal Body Weight (IBW): 136 lbs (62 kg)    Admission Body Weight: 180 lb (81.6 kg) (est)  Current Body Weight: (UTD, pt poorly positioned in bed),   I Weight Source: Bed Scale  Current BMI (kg/m2): 33.4  Usual Body Weight: 234 lb (106.1 kg) (( 8/2021) 230# ( 2020))  % Weight Change (Calculated): -14.1                    BMI Categories: Obese Class 1 (BMI 30.0-34. 9)    Estimated Daily Nutrient Needs:  Energy Requirements Based On: Kcal/kg  Weight Used for Energy Requirements: Current  Energy (kcal/day): 6540-4062 kcals @ 15-18 kcal/kg  Weight Used for Protein Requirements: Ideal  Protein (g/day):  g protein @ 1.5-1.8 g/kg  Method Used for Fluid Requirements: 1 ml/kcal  Fluid (ml/day): ~1600    Nutrition Diagnosis:   Inadequate oral intake related to cognitive or neurological impairment as evidenced by NPO or clear liquid status due to medical condition, nutrition support - enteral nutrition  Increased nutrient needs related to increase demand for energy/nutrients as evidenced by wounds    Nutrition Interventions:   Food and/or Nutrient Delivery: Continue Current Tube Feeding  Nutrition Education/Counseling: Education not indicated  Coordination of Nutrition Care: Continue to monitor while inpatient       Goals:     Goals: Initiate nutrition support, by next RD assessment       Nutrition Monitoring and Evaluation:   Behavioral-Environmental Outcomes: None Identified  Food/Nutrient Intake Outcomes: Food and Nutrient Intake, Enteral Nutrition Intake/Tolerance  Physical Signs/Symptoms Outcomes: Biochemical Data, Skin, Weight, GI Status    Discharge Planning:    Enteral Nutrition     VIVIENNE ROMERO RD, LD

## 2022-07-25 NOTE — PROGRESS NOTES
cyanosis or edema bilaterally  Neuro: lower extremities contracted. Capillary Refill: Brisk,< 3 seconds   Peripheral Pulses: +2 palpable, equal bilaterally   Skin:  Left hip tunneling wound with surrounding cellulitis     Labs:   Recent Labs     07/23/22  0637 07/24/22  0609   WBC 10.1 9.5   HGB 9.8* 9.1*   HCT 29.8* 27.9*    302     Recent Labs     07/23/22  0637 07/24/22  0609    144   K 3.8 3.9    111*   CO2 21 20   BUN 14 12   CREATININE 0.44* 0.41*   CALCIUM 8.7 8.9     Recent Labs     07/23/22  0637 07/24/22  0609   AST 57* 33   ALT 60* 49*   BILITOT 0.5 0.4   ALKPHOS 98 96     No results for input(s): INR in the last 72 hours. No results for input(s): Kathyrn Belch in the last 72 hours. Urinalysis:      Lab Results   Component Value Date/Time    NITRU Negative 07/21/2022 03:54 AM    WBCUA 3-5 02/13/2022 12:45 AM    BACTERIA RARE 02/13/2022 12:45 AM    RBCUA None seen 02/13/2022 12:45 AM    BLOODU Negative 07/21/2022 03:54 AM    SPECGRAV 1.017 07/21/2022 03:54 AM    GLUCOSEU Negative 07/21/2022 03:54 AM       Radiology:  XR HIP LEFT (1 VIEW)   Final Result   There are no acute osseous changes. Status post left hip arthroplasty. There is a lucency in the subcutaneous soft tissues of the left hip consistent with open wound or ulcer. If osteomyelitis is suspected may consider bone scan evaluation. XR CHEST PORTABLE   Final Result   NO ACUTE CARDIOPULMONARY ABNORMALITY. NO CHANGE. MRI HIP LEFT WO CONTRAST    (Results Pending)       Assessment/Plan:    #sepsis secondary to suspected OM and TEJ Warneri bacteremia  POA    - MRI ordered of lef thip; probable OM POA    - daily blood cultures    - continue broad spec Abx    - ID consulted for abx       - yesterdays blood cultures negative so far; will need 4 weeks IV Abx on discharge   7/25 - cont plan for iv abx on dc. MRI not completed to this point.   Nursing supervisor attempting to resolve. #Hypothryoidism     - per endo    #HTN    - resume home meds     #Schizophrenia    - continue home meds     Active Hospital Problems    Diagnosis Date Noted    Staphylococcus aureus bacteremia with sepsis ([address] [A41.01] 07/23/2022     Priority: Medium    Acute osteomyelitis of hip ([address] [M86.18] 07/23/2022     Priority: Medium    Gram-positive bacteremia [R78.81] 07/22/2022     Priority: Medium    Sepsis ([address] [A41.9] 07/21/2022     Priority: Medium    Hypothyroid [E03.9] 07/21/2022     Priority: Medium    Osteomyelitis ([address] [M86.9] 07/21/2022     Priority: Medium    Complicated open wound of left hip [S71.002A] 07/21/2022     Priority: Medium    Cellulitis of left hip [A79.583] 07/21/2022     Priority: Medium    Schizophrenia ([facility] [F20.9] 03/13/2020    HTN (hypertension) [I10] 09/19/2019    Depression [F32. A]         Additional work up or/and treatment plan may be added today or then after based on clinical progression. I am managing a portion of pt care. Some medical issues are handled by other specialists. Additional work up and treatment should be done in out pt setting by pt PCP and other out pt providers. In addition to examining and evaluating pt, I spent additional time explaining care, normal and abnormal findings, and treatment plan. All of pt questions were answered. Counseling, diet and education were  provided. Case will be discussed with nursing staff when appropriate. Family will be updated if and when appropriate.       Diet: Diet NPO  ADULT TUBE FEEDING; PEG; Peptide Based; Continuous; 60; No; 50; Q 6 hours    Code Status: DNR-CCA    PT/OT Eval     Electronically signed by Deepika Torres MD on 7/25/2022 at 3:41 PM

## 2022-07-25 NOTE — CARE COORDINATION
Current ND plan remains [facility]. May need surgical eval for hip wounds. Osteomyelitis.  Plan for long term iv abx per I.D. will need picc when cleared per ID

## 2022-07-25 NOTE — PROGRESS NOTES
Pt is nonverbal and seemingly unable to communicate. Pt does make groans/moans when moved. Appears to relax once repositioned. Incontinent of bowel and bladder x 2 this shift. TF running vital AF continuously with flushes as ordered. Dressings changed per MD ordered. Suction to trach x2 this shift O2 at 100% via trach collar. No s/s of dyspnea or distress noted.

## 2022-07-25 NOTE — PROGRESS NOTES
Physiological consequence of immobility 03/09/2022    Pituitary macroadenoma [facility])     Sleep apnea        Past Surgical History:   Procedure Laterality Date    COLONOSCOPY  5/2/14    JENNIFER    COLONOSCOPY N/A 12/12/2019    COLONOSCOPY DIAGNOSTIC performed by Chen Gomes MD at [place] [address], DIAGNOSTIC      EYE SURGERY Bilateral     cataracts    [address] ?     index finger laceration repair    HERNIA REPAIR  2004    repair [facility]     JOINT REPLACEMENT Left     left hip    [place] COLON CA SCRN NOT HI RSK IND N/A 5/17/2017    COLONOSCOPY performed by Milton Hightower MD at [address] 4/22/2019    LEFT HIP TOTAL HIP ARTHROPLASTY, LATERAL DECUB, GRACIE performed by Ron Griard MD at [address] [place] [address]  12/12/2019    EGD DIAGNOSTIC ONLY performed by Chen Gomes MD at [address] [place] [address] History    Marital status: Legally      Spouse name: Not on file    Number of children: Not on file    Years of education: Not on file    Highest education level: Not on file   Occupational History    Not on file   Tobacco Use    Smoking status: Former     Packs/day: 1.50     Years: 46.00     Pack years: 69.00     Types: Cigarettes     Quit date: 3/20/2019     Years since quitting: 3.3    Smokeless tobacco: Never   Vaping Use    Vaping Use: Unknown   Substance and Sexual Activity    Alcohol use: Yes    Drug use: No    Sexual activity: Not on file   Other Topics Concern    Not on file   Social History Narrative    Not on file     Social Determinants of Health     Financial Resource Strain: Not on file   Food Insecurity: Not on file   Transportation Needs: Not on file   Physical Activity: Not on file   Stress: Not on file   Social Connections: Not on file   Intimate Partner Violence: Not on file   Housing Stability: Not on file       Family History   Problem Relation Age of Onset Colon Cancer Mother     Cancer Mother     Arthritis Mother     High Blood Pressure Father     Heart Disease Father     Diabetes Sister     No Known Problems Brother     No Known Problems Brother     No Known Problems Brother     Other Brother         Sepsis    Other Brother         Killed    No Known Problems Sister     No Known Problems Sister     No Known Problems Son        No current facility-administered medications on file prior to encounter. Current Outpatient Medications on File Prior to Encounter   Medication Sig Dispense Refill    Nutritional Supplements (ISOSOURCE 1.5 NUSRAT PO) 60 mLs by Percutaneous Endoscopic Gastrostomy route continuous      carboxymethylcellulose (ARTIFICIAL TEARS) 1 % ophthalmic solution Place 2 drops into both eyes daily      atorvastatin (LIPITOR) 20 MG tablet 20 mg by PEG Tube route nightly      Atropine Sulfate 0.01 % SOLN Place 1 drop under the tongue as needed (EVERY TWO HOURS FOR REDUCTION OF SECRETIONS)      sulfamethoxazole-trimethoprim (BACTRIM DS;SEPTRA DS) 800-160 MG per tablet 1 tablet by PEG Tube route in the morning and 1 tablet before bedtime. chlorhexidine (PERIDEX) 0.12 % solution Take 15 mLs by mouth in the morning, at noon, and at bedtime FOR USE WITH TOOTHETTES FOR MOUTH CARE      polyethylene glycol (MIRALAX) 17 g PACK packet 17 g by Per G Tube route in the morning and at bedtime      acetaminophen (TYLENOL) 325 MG tablet 650 mg by PEG Tube route every 4 hours as needed for Pain      sennosides-docusate sodium (SENOKOT-S) 8.6-50 MG tablet 1 tablet by PEG Tube route in the morning and at bedtime      desmopressin (DDAVP) 0.1 MG tablet 0.5 mg by PEG Tube route in the morning and at bedtime Give 0.05mg via PEG tube two times a day for clotting promoter      esomeprazole Magnesium (NEXIUM) 40 MG PACK 40 mg by Per G Tube route in the morning.       levETIRAcetam (KEPPRA) 100 MG/ML solution 750 mg/kg by PEG Tube route 2 times daily      hyoscyamine trach in place  Heart: S1 S2  Lungs: bilaterally clear to auscultation  Abdomen: soft, ND, NTTP, +BS  Extrem:contractures no calf pain  Neuro exam: CN II-XII intact  Left hip is very tender today. When palpated, he winces and startles. There is straw colored drainage on the bandage. Edema present and only mild pinkness to the area that actually is improved from prior exams. Labs: I have reviewed all lab results by electronic record, including most recent CBC, metabolic panel, and pertinent abnormalities were addressed from an infectious disease perspective. WBC trends are being monitored. Lab Results   Component Value Date/Time     07/24/2022 06:09 AM    K 3.9 07/24/2022 06:09 AM     07/24/2022 06:09 AM    CO2 20 07/24/2022 06:09 AM    BUN 12 07/24/2022 06:09 AM    CREATININE 0.41 07/24/2022 06:09 AM    GLUCOSE 109 07/24/2022 06:09 AM    CALCIUM 8.9 07/24/2022 06:09 AM      Lab Results   Component Value Date    WBC 9.5 07/24/2022    HGB 9.1 (L) 07/24/2022    HCT 27.9 (L) 07/24/2022    MCV 92.8 07/24/2022     07/24/2022       Radiology:   I have reviewed imaging results per electronic record and most pertinent abnormalities are being addressed from an infectious disease standpoint. Assessment:    Sepsis due to multiple wounds - bilateral LEs and tunneling wound L hip - photos reviewed. OM L hip strongly suspected. Proteus and E coli ( 7/21) light growth so far. Both sensitive to Zosyn. Can likely change to Ceftriaxone after 2 weeks of Zosyn. Zosyn is continued due to aspiration concern and concern due to possible bacteremia with enterococcus on blood culture for now. Fever and chills  Fever, tachy, leukocytosis, tachypnea ( resolved )   Bacteremia? - staph hominis, e faecalis, S epi, and S warneri. CONS likely contaminant but I worry about the enterococcus possibly being real  Polymicrobial wound infection  Possible aspiration?       Plan:  Left hip draining - obtain new culture  Rhoncherous - aspiration risk. Nurse is suctioning. Zosyn for now to cover for concern for aspiration and enterococcus bacteremia. After 2 weeks, can change to daily Rocephin for OM of the hip to finish the 6 week course. MRI L hip to evaluate extent of the OM when able. 6 weeks of IV abx planned total.     Sputum culture ordered  Repeat wound culture also ordered.      Catarina Arroyo D.O.

## 2022-07-26 ENCOUNTER — APPOINTMENT (OUTPATIENT)
Dept: INTERVENTIONAL RADIOLOGY/VASCULAR | Age: 67
DRG: 720 | End: 2022-07-26
Payer: COMMERCIAL

## 2022-07-26 LAB
GLUCOSE BLD-MCNC: 100 MG/DL (ref 70–99)
GLUCOSE BLD-MCNC: 101 MG/DL (ref 70–99)
GLUCOSE BLD-MCNC: 102 MG/DL (ref 70–99)
GLUCOSE BLD-MCNC: 84 MG/DL (ref 70–99)
GLUCOSE BLD-MCNC: 98 MG/DL (ref 70–99)
ORGANISM: ABNORMAL
ORGANISM: ABNORMAL
PERFORMED ON: ABNORMAL
PERFORMED ON: NORMAL
PERFORMED ON: NORMAL
REASON FOR REJECTION: NORMAL
REJECTED TEST: NORMAL
WOUND/ABSCESS: ABNORMAL

## 2022-07-26 PROCEDURE — 87076 CULTURE ANAEROBE IDENT EACH: CPT

## 2022-07-26 PROCEDURE — 99232 SBSQ HOSP IP/OBS MODERATE 35: CPT | Performed by: INTERNAL MEDICINE

## 2022-07-26 PROCEDURE — 87070 CULTURE OTHR SPECIMN AEROBIC: CPT

## 2022-07-26 PROCEDURE — 87186 SC STD MICRODIL/AGAR DIL: CPT

## 2022-07-26 PROCEDURE — 2709999900 IR PICC WO SQ PORT/PUMP > 5 YEARS

## 2022-07-26 PROCEDURE — 87075 CULTR BACTERIA EXCEPT BLOOD: CPT

## 2022-07-26 PROCEDURE — 2580000003 HC RX 258: Performed by: INTERNAL MEDICINE

## 2022-07-26 PROCEDURE — 6360000002 HC RX W HCPCS

## 2022-07-26 PROCEDURE — 6370000000 HC RX 637 (ALT 250 FOR IP): Performed by: INTERNAL MEDICINE

## 2022-07-26 PROCEDURE — 36573 INSJ PICC RS&I 5 YR+: CPT | Performed by: RADIOLOGY

## 2022-07-26 PROCEDURE — 36573 INSJ PICC RS&I 5 YR+: CPT

## 2022-07-26 PROCEDURE — 94761 N-INVAS EAR/PLS OXIMETRY MLT: CPT

## 2022-07-26 PROCEDURE — 2700000000 HC OXYGEN THERAPY PER DAY

## 2022-07-26 PROCEDURE — 2500000003 HC RX 250 WO HCPCS: Performed by: INTERNAL MEDICINE

## 2022-07-26 PROCEDURE — 2580000003 HC RX 258

## 2022-07-26 PROCEDURE — 1210000000 HC MED SURG R&B

## 2022-07-26 PROCEDURE — 87185 SC STD ENZYME DETCJ PER NZM: CPT

## 2022-07-26 PROCEDURE — 87077 CULTURE AEROBIC IDENTIFY: CPT

## 2022-07-26 RX ORDER — LIDOCAINE HYDROCHLORIDE 20 MG/ML
5 INJECTION, SOLUTION INFILTRATION; PERINEURAL ONCE
Status: COMPLETED | OUTPATIENT
Start: 2022-07-26 | End: 2022-07-26

## 2022-07-26 RX ORDER — SODIUM CHLORIDE 9 MG/ML
250 INJECTION, SOLUTION INTRAVENOUS ONCE
Status: COMPLETED | OUTPATIENT
Start: 2022-07-26 | End: 2022-07-26

## 2022-07-26 RX ORDER — SODIUM CHLORIDE 9 MG/ML
INJECTION, SOLUTION INTRAVENOUS PRN
Status: DISCONTINUED | OUTPATIENT
Start: 2022-07-26 | End: 2022-07-28 | Stop reason: HOSPADM

## 2022-07-26 RX ADMIN — 0.12% CHLORHEXIDINE GLUCONATE 15 ML: 1.2 RINSE ORAL at 22:14

## 2022-07-26 RX ADMIN — 0.12% CHLORHEXIDINE GLUCONATE 15 ML: 1.2 RINSE ORAL at 08:22

## 2022-07-26 RX ADMIN — POLYETHYLENE GLYCOL 3350 17 G: 17 POWDER, FOR SOLUTION ORAL at 22:15

## 2022-07-26 RX ADMIN — HYPROMELLOSE 2910 2 DROP: 5 SOLUTION OPHTHALMIC at 08:37

## 2022-07-26 RX ADMIN — ATORVASTATIN CALCIUM 20 MG: 20 TABLET, FILM COATED ORAL at 22:14

## 2022-07-26 RX ADMIN — SODIUM CHLORIDE 250 ML: 9 INJECTION, SOLUTION INTRAVENOUS at 16:07

## 2022-07-26 RX ADMIN — LEVOTHYROXINE SODIUM 150 MCG: 0.15 TABLET ORAL at 06:13

## 2022-07-26 RX ADMIN — ENOXAPARIN SODIUM 40 MG: 100 INJECTION SUBCUTANEOUS at 08:22

## 2022-07-26 RX ADMIN — 0.12% CHLORHEXIDINE GLUCONATE 15 ML: 1.2 RINSE ORAL at 17:50

## 2022-07-26 RX ADMIN — LIDOCAINE HYDROCHLORIDE 5 ML: 20 INJECTION, SOLUTION INFILTRATION; PERINEURAL at 16:06

## 2022-07-26 RX ADMIN — Medication 30 MG: at 08:22

## 2022-07-26 RX ADMIN — LEVETIRACETAM 750 MG: 100 SOLUTION ORAL at 22:13

## 2022-07-26 RX ADMIN — SODIUM CHLORIDE, PRESERVATIVE FREE 10 ML: 5 INJECTION INTRAVENOUS at 08:22

## 2022-07-26 RX ADMIN — PIPERACILLIN AND TAZOBACTAM 3375 MG: 3; .375 INJECTION, POWDER, FOR SOLUTION INTRAVENOUS at 06:14

## 2022-07-26 RX ADMIN — FENOFIBRATE 160 MG: 160 TABLET ORAL at 08:21

## 2022-07-26 RX ADMIN — PIPERACILLIN AND TAZOBACTAM 3375 MG: 3; .375 INJECTION, POWDER, FOR SOLUTION INTRAVENOUS at 22:53

## 2022-07-26 RX ADMIN — LEVETIRACETAM 750 MG: 100 SOLUTION ORAL at 08:21

## 2022-07-26 RX ADMIN — DESMOPRESSIN ACETATE 500 MCG: 0.1 TABLET ORAL at 10:23

## 2022-07-26 RX ADMIN — WATER: 1 INJECTION INTRAMUSCULAR; INTRAVENOUS; SUBCUTANEOUS at 10:12

## 2022-07-26 RX ADMIN — PIPERACILLIN AND TAZOBACTAM 3375 MG: 3; .375 INJECTION, POWDER, FOR SOLUTION INTRAVENOUS at 15:33

## 2022-07-26 RX ADMIN — SENNOSIDES AND DOCUSATE SODIUM 1 TABLET: 50; 8.6 TABLET ORAL at 22:15

## 2022-07-26 RX ADMIN — DESMOPRESSIN ACETATE 500 MCG: 0.1 TABLET ORAL at 22:14

## 2022-07-26 ASSESSMENT — PAIN SCALES - GENERAL: PAINLEVEL_OUTOF10: 0

## 2022-07-26 NOTE — PROGRESS NOTES
Tania Kraus  1955  male  Medical Record Number: [id number]    Patient informed that I am an Infectious Disease physician and permission obtained from the patient to speak in front of any visitors prior to any discussion for HIPPA purposes. HPI:     Patient with nonverbal baseline with L hip wound that is infected with surrounding cellulitis and purulent drainage. No cultures yet obtained. Discussed with nurse at bedside to obtain culture. Apparently had fever at snf    Positive [facility] 7/21 for polymicrobes: staph warnerii, staph epi, and enterococcus faecalis, and staph hominis. Contaminated with CONS? 2/2 cultures were positive - but both were for different organisms which makes me think this was a contaminant. There was one enterococcus which may or may not have been a contaminant. Wound cultures: proteus spp and e coli MDS albeit light growth. Repeat wound culture yesterday - so far, no growth    Downtrending leukocytosis overall, no fevers  MRI pending L hip to assess for OM.          Past Medical History:   Diagnosis Date    Abdominal pain     Asthma     Bloating symptom     COPD (chronic obstructive pulmonary disease) ([facility])     COPD (chronic obstructive pulmonary disease) ([facility])     Depression     Depression     Diarrhea     Drug-seeking behavior 02/03/2020    Emphysema/COPD ([address]     History of rectal bleeding     Hypercholesteremia     past trx > 5 yrs    Hypercholesteremia     Hypercholesteremia     Hypertension     meds > 3 yrs    Hypothyroid 07/21/2022    Lung disease     Nontraumatic subarachnoid hemorrhage ([address]     [address] [facility]    RANDI on CPAP     patient relates he doesn't use cpap at this time    Osteoarthritis     left hip    Osteoarthritis     Osteoarthritis of left hip     Other hydrocephalus ([address]     [address] PAPERWORK    Physiological consequence of immobility 03/09/2022    Pituitary macroadenoma ([address]     Sleep apnea        Past Surgical History:   Procedure Laterality Date    COLONOSCOPY  5/2/14    JENNIFER    COLONOSCOPY N/A 12/12/2019    COLONOSCOPY DIAGNOSTIC performed by Juju Ellis MD at [facility], DIAGNOSTIC      EYE SURGERY Bilateral     cataracts    [address] ?     index finger laceration repair    HERNIA REPAIR  2004    repair [facility]     JOINT REPLACEMENT Left     left hip    [place] COLON CA SCRN NOT HI RSK IND N/A 5/17/2017    COLONOSCOPY performed by Cassius Treadwell MD at [address] Left 4/22/2019    LEFT HIP TOTAL HIP ARTHROPLASTY, LATERAL DECUB, GRACIE performed by Trevor Sheehan MD at [address]  12/12/2019    EGD DIAGNOSTIC ONLY performed by Juju Ellis MD at [address] [place] [address] History    Marital status: Legally      Spouse name: Not on file    Number of children: Not on file    Years of education: Not on file    Highest education level: Not on file   Occupational History    Not on file   Tobacco Use    Smoking status: Former     Packs/day: 1.50     Years: 46.00     Pack years: 69.00     Types: Cigarettes     Quit date: 3/20/2019     Years since quitting: 3.3    Smokeless tobacco: Never   Vaping Use    Vaping Use: Unknown   Substance and Sexual Activity    Alcohol use: Yes    Drug use: No    Sexual activity: Not on file   Other Topics Concern    Not on file   Social History Narrative    Not on file     Social Determinants of Health     Financial Resource Strain: Not on file   Food Insecurity: Not on file   Transportation Needs: Not on file   Physical Activity: Not on file   Stress: Not on file   Social Connections: Not on file   Intimate Partner Violence: Not on file   Housing Stability: Not on file       Family History   Problem Relation Age of Onset    Colon Cancer Mother     Cancer Mother     Arthritis Mother     High Blood Pressure Father     Heart Disease Father 0.5 MG tablet 0.5 mg by PEG Tube route every 4 hours as needed for Anxiety. morphine sulfate 20 MG/ML concentrated oral solution Take 5 mg by mouth every 2 hours as needed for Pain.      levothyroxine (SYNTHROID) 200 MCG tablet 200 mcg by PEG Tube route in the morning. Give one tablet via PEG tube every Mon,Tue, Wed, Thu, Fri, Sat .      vitamin D (CHOLECALCIFEROL) 125 MCG (5000 UT) CAPS capsule Take 50,000 Units by mouth daily Give 1/25mg ([id number]) one capsule via peg tube one time a day every Monday for supplement      [DISCONTINUED] paliperidone (INVEGA) 6 MG extended release tablet Take 1 tablet by mouth daily 30 tablet 1    cabergoline (DOSTINEX) 0.5 MG tablet Take 1 tablet by mouth Twice a Week ON Monday and Thursday 8 tablet 3    [DISCONTINUED] furosemide (LASIX) 40 MG tablet Take 40 mg by mouth daily      fenofibrate 160 MG tablet Take 150 mg by mouth daily      aspirin 81 MG EC tablet Take 1 tablet by mouth 2 times daily (Patient taking differently: Take 81 mg by mouth in the morning. ADMINISTER VIA PEG TUBE.) 60 tablet 0    [DISCONTINUED] lisinopril (PRINIVIL;ZESTRIL) 20 MG tablet Take 2 tablets by mouth daily 30 tablet 3    Respiratory Therapy Supplies SANDRA Full face CPAP mask and supplies 1 Device 0    CPAP Machine MISC by Does not apply route New CPAP with 6 cm 1 each 0    albuterol (PROVENTIL) (2.5 MG/3ML) 0.083% nebulizer solution Take 3 mLs by nebulization every 6 hours as needed for Wheezing (Patient taking differently: Take 2.5 mg by nebulization every 6 hours as needed for Wheezing or Shortness of Breath) 120 each 5    STIOLTO RESPIMAT 2.5-2.5 MCG/ACT AERS Inhale 2 puff in AM 1 Inhaler 5       Physical Exam:  Resting but arousable to touch  General: Patient appears in no acute distress, opens eyes and raises eyebrows.  Does not track me  Skin: no new rashes   HEENT:  MMM, Neck is supple, trach in place, gurgling with agitation but has a better cough reflex today  Heart: S1 S2  Lungs: bilaterally clear to auscultation  Abdomen: soft, ND, NTTP, +BS  Extrem:contractures no calf pain  Neuro exam: CN II-XII intact  Left hip is ok - still very tender to palpation overall    Labs: I have reviewed all lab results by electronic record, including most recent CBC, metabolic panel, and pertinent abnormalities were addressed from an infectious disease perspective. WBC trends are being monitored. Lab Results   Component Value Date/Time     07/24/2022 06:09 AM    K 3.9 07/24/2022 06:09 AM     07/24/2022 06:09 AM    CO2 20 07/24/2022 06:09 AM    BUN 12 07/24/2022 06:09 AM    CREATININE 0.41 07/24/2022 06:09 AM    GLUCOSE 109 07/24/2022 06:09 AM    CALCIUM 8.9 07/24/2022 06:09 AM      Lab Results   Component Value Date    WBC 9.5 07/24/2022    HGB 9.1 (L) 07/24/2022    HCT 27.9 (L) 07/24/2022    MCV 92.8 07/24/2022     07/24/2022       Radiology:   I have reviewed imaging results per electronic record and most pertinent abnormalities are being addressed from an infectious disease standpoint. Assessment:    Sepsis due to multiple wounds - bilateral LEs and tunneling wound L hip - photos reviewed. OM L hip strongly suspected. Proteus and E coli ( 7/21) light growth so far. Both sensitive to Zosyn. Can likely change to Ceftriaxone after 2 weeks of Zosyn. Zosyn is continued due to aspiration concern and concern due to possible bacteremia with enterococcus on blood culture for now. Fever and chills  Fever, tachy, leukocytosis, tachypnea ( resolved )   Bacteremia? - staph hominis, e faecalis, S epi, and S warneri. CONS likely contaminant but I worry about the enterococcus possibly being real  Polymicrobial wound infection  Possible aspiration? Plan:  Order placed for picc and DC orders complete for when needed. FOllow up with ID in 4 weeks. Left hip draining - obtain new culture  Rhoncherous - aspiration risk. Nurse is suctioning.    Zosyn for now to cover for concern for aspiration and enterococcus bacteremia. After 2 weeks, can change to daily Rocephin for OM of the hip to finish the 6 week course. MRI L hip to evaluate extent of the OM when able. 6 weeks of IV abx planned total.     Suctioning as needed  Repeat wound culture also ordered.  So far negtive    Mayer Trisha D.O.

## 2022-07-26 NOTE — CARE COORDINATION
SPOKE WITH PATIENT'S WIFE VIA PHONE, CURRENT PLAN IS RETURN TO [facility] WHEN MEDICALLY STABLE.

## 2022-07-26 NOTE — PROGRESS NOTES
PODIATRIC MEDICINE AND SURGERY  CONSULT PROGRESS NOTE      ASSESSMENT: 74 y.o. male with PMH significant for asthma, COPD, depression, HLD/HTN, osteoarthritis, and pituitary macroadenoma. Podiatry consulted for bilateral foot wounds. Patient seen and evaluated this morning with Dr. Cedrick Pereira. Resting in bed comfortably, NAD, VSS, Nursing at bedside. All foot wounds appear stable, continue local wound care with iodoform packing to the RIGHT heel daily. PLAN AND RECOMMENDATIONS[de-identified]  - Patient's case to be discussed with staff, Dr. Cedrick Pereira, who will provide final recommendations going forward  - Wound care: Iodoform packing into the RIGHT heel with dry sterile dressing, Mepilex border to the LEFT foot daily per nursing  - Offload bilateral lower extremities with Prevalon boots while resting   - Elevate bilateral lower extremities at or above heart level while resting  - Toe touch weight bearing to the RLE in protective surgical shoe when ambulating.   - Antibiotic coverage per primary team   - Pain management per primary team   - Podiatry to follow while in house. Okay for discharge from Podiatry standpoint with close wound care follow up outpatient. - Patient will need follow up with Dr. Cedrick Pereira within 7-10 days of discharge        Interval Events  - Afebrile, positive blood cultures: Staph Hominis  - Leukocytosis resolved   - PICC placed today  - Right heel wound remains stable not likely source of positive blood cultures. - Expected discharge today 7/26      HPI: This 74y.o. year old male was seen for bilateral chronic foot wounds. There is a wound on the R heel and a blister on the L foot, as per chart review. Unable to obtain history from patient as he has a trach and is not able to verbalize. No recent history of nausea, vomiting, diarrhea, fevers, chills, chest pain, shortness of breath, head ache, or calf pain. No other pedal complaints.      Past Medical History:   Diagnosis Date    Abdominal pain Asthma     Bloating symptom     COPD (chronic obstructive pulmonary disease) (HCC)     COPD (chronic obstructive pulmonary disease) ([facility])     Depression     Depression     Diarrhea     Drug-seeking behavior 02/03/2020    Emphysema/COPD ([facility]     History of rectal bleeding     Hypercholesteremia     past trx > 5 yrs    Hypercholesteremia     Hypercholesteremia     Hypertension     meds > 3 yrs    Hypothyroid 07/21/2022    Lung disease     Nontraumatic subarachnoid hemorrhage ([facility]     [address] PAPER[address]    RANDI on CPAP     patient relates he doesn't use cpap at this time    Osteoarthritis     left hip    Osteoarthritis     Osteoarthritis of left hip     Other hydrocephalus ([facility]     [address] PAPERWORK    Physiological consequence of immobility 03/09/2022    Pituitary macroadenoma ([facility]     Sleep apnea        Past Surgical History:   Procedure Laterality Date    COLONOSCOPY  5/2/14    JARMOSEVEROK    COLONOSCOPY N/A 12/12/2019    COLONOSCOPY DIAGNOSTIC performed by Cindy Graves MD at [place] [address], DIAGNOSTIC      EYE SURGERY Bilateral     cataracts    [address] ? index finger laceration repair    HERNIA REPAIR  2004    repair [facility]     JOINT REPLACEMENT Left     left hip    NE COLON CA SCRN NOT HI RSK IND N/A 5/17/2017    COLONOSCOPY performed by Tessa Bettencourt MD at [facility] Left 4/22/2019    LEFT HIP TOTAL HIP ARTHROPLASTY, LATERAL DECUB, GRACIE performed by Gaurav Bull MD at [address]  12/12/2019    EGD DIAGNOSTIC ONLY performed by Cindy Graves MD at [facility]       No current facility-administered medications on file prior to encounter.      Current Outpatient Medications on File Prior to Encounter   Medication Sig Dispense Refill    Nutritional Supplements (ISOSOURCE 1.5 UNSRAT PO) 60 mLs by Percutaneous Endoscopic Gastrostomy route continuous carboxymethylcellulose (ARTIFICIAL TEARS) 1 % ophthalmic solution Place 2 drops into both eyes daily      atorvastatin (LIPITOR) 20 MG tablet 20 mg by PEG Tube route nightly      Atropine Sulfate 0.01 % SOLN Place 1 drop under the tongue as needed (EVERY TWO HOURS FOR REDUCTION OF SECRETIONS)      sulfamethoxazole-trimethoprim (BACTRIM DS;SEPTRA DS) 800-160 MG per tablet 1 tablet by PEG Tube route in the morning and 1 tablet before bedtime. chlorhexidine (PERIDEX) 0.12 % solution Take 15 mLs by mouth in the morning, at noon, and at bedtime FOR USE WITH TOOTHETTES FOR MOUTH CARE      polyethylene glycol (MIRALAX) 17 g PACK packet 17 g by Per G Tube route in the morning and at bedtime      acetaminophen (TYLENOL) 325 MG tablet 650 mg by PEG Tube route every 4 hours as needed for Pain      sennosides-docusate sodium (SENOKOT-S) 8.6-50 MG tablet 1 tablet by PEG Tube route in the morning and at bedtime      desmopressin (DDAVP) 0.1 MG tablet 0.5 mg by PEG Tube route in the morning and at bedtime Give 0.05mg via PEG tube two times a day for clotting promoter      esomeprazole Magnesium (NEXIUM) 40 MG PACK 40 mg by Per G Tube route in the morning. levETIRAcetam (KEPPRA) 100 MG/ML solution 750 mg/kg by PEG Tube route 2 times daily      hyoscyamine (LEVSIN/SL) 125 MCG sublingual tablet Place 125 mcg under the tongue every 6 hours as needed for Cramping      LORazepam (ATIVAN) 0.5 MG tablet 0.5 mg by PEG Tube route every 4 hours as needed for Anxiety. morphine sulfate 20 MG/ML concentrated oral solution Take 5 mg by mouth every 2 hours as needed for Pain.      levothyroxine (SYNTHROID) 200 MCG tablet 200 mcg by PEG Tube route in the morning.  Give one tablet via PEG tube every Mon,Tue, Wed, Thu, Fri, Sat .      vitamin D (CHOLECALCIFEROL) 125 MCG (5000 UT) CAPS capsule Take 50,000 Units by mouth daily Give 1/25mg ([id number]) one capsule via peg tube one time a day every Monday for supplement      [DISCONTINUED] paliperidone (INVEGA) 6 MG extended release tablet Take 1 tablet by mouth daily 30 tablet 1    cabergoline (DOSTINEX) 0.5 MG tablet Take 1 tablet by mouth Twice a Week ON Monday and Thursday 8 tablet 3    [DISCONTINUED] furosemide (LASIX) 40 MG tablet Take 40 mg by mouth daily      fenofibrate 160 MG tablet Take 150 mg by mouth daily      aspirin 81 MG EC tablet Take 1 tablet by mouth 2 times daily (Patient taking differently: Take 81 mg by mouth in the morning.  ADMINISTER VIA PEG TUBE.) 60 tablet 0    [DISCONTINUED] lisinopril (PRINIVIL;ZESTRIL) 20 MG tablet Take 2 tablets by mouth daily 30 tablet 3    Respiratory Therapy Supplies SANDRA Full face CPAP mask and supplies 1 Device 0    CPAP Machine MISC by Does not apply route New CPAP with 6 cm 1 each 0    albuterol (PROVENTIL) (2.5 MG/3ML) 0.083% nebulizer solution Take 3 mLs by nebulization every 6 hours as needed for Wheezing (Patient taking differently: Take 2.5 mg by nebulization every 6 hours as needed for Wheezing or Shortness of Breath) 120 each 5    STIOLTO RESPIMAT 2.5-2.5 MCG/ACT AERS Inhale 2 puff in AM 1 Inhaler 5       No Known Allergies    Family History   Problem Relation Age of Onset    Colon Cancer Mother     Cancer Mother     Arthritis Mother     High Blood Pressure Father     Heart Disease Father     Diabetes Sister     No Known Problems Brother     No Known Problems Brother     No Known Problems Brother     Other Brother         Sepsis    Other Brother         Killed    No Known Problems Sister     No Known Problems Sister     No Known Problems Son        Social History     Socioeconomic History    Marital status: Legally      Spouse name: Not on file    Number of children: Not on file    Years of education: Not on file    Highest education level: Not on file   Occupational History    Not on file   Tobacco Use    Smoking status: Former     Packs/day: 1.50     Years: 46.00     Pack years: 69.00     Types: Cigarettes     Quit date: 3/20/2019     Years since quitting: 3.3    Smokeless tobacco: Never   Vaping Use    Vaping Use: Unknown   Substance and Sexual Activity    Alcohol use: Yes    Drug use: No    Sexual activity: Not on file   Other Topics Concern    Not on file   Social History Narrative    Not on file     Social Determinants of Health     Financial Resource Strain: Not on file   Food Insecurity: Not on file   Transportation Needs: Not on file   Physical Activity: Not on file   Stress: Not on file   Social Connections: Not on file   Intimate Partner Violence: Not on file   Housing Stability: Not on file       Review of Systems  CONSTITUTIONAL: No fevers, chills, diaphoresis  HEENT: No epistaxis, tinnitus  EYES: No diplopia, blurry vision. CARDIOVASCULAR:  No chest pain, palpitations, lower extremity edema  PULM: No dyspnea, tachypnea, wheezing  GI: No nausea, vomiting, constipation, diarrhea  : No dysuria, gross hematuria, or pyuria  NEURO:  No new balance problems, peripheral weakness, paresthesias, numbness  MSK: Contracted lower extremities   PSY: No concerns regarding depression, anxiety  INTEGUMENTARY:  open skin lesion to right heel     OBJECTIVE:  /61   Pulse 58   Temp 99 °F (37.2 °C) (Oral)   Resp 18   Ht 5' 5\" (1.651 m)   Wt 201 lb 4.8 oz (91.3 kg)   SpO2 100%   BMI 33.50 kg/m²   Patient is resting comfortably in bed. Updated HPI was not obtained due to patient sleeping.     Vascular:   Non Palpable Dorsalis Pedis and Non Palpable Posterior Tibial Pulses B/L   Capillary Fill time < 3 seconds to B/L digits  Skin temperature warm to warm tibial tuberosity to the digits B/L  Hair growth absent to digits  mild edema  No varicosities     Neurological:   Sensation to light touch intact B/L    Musculoskeletal/Orthopaedic:   Structural Deformities: contracted lower extremities  5/5 muscle strength Dorsiflexion, Plantarflexion, Inversion, Eversion B/L  mild tenderness on palpation of right heel    Dermatological:   Skin appears well hydrated and supple with good temperature, texture, turgor  No hyperkeratotic lesions noted  Nails 1-5 B/L appear within normal limits  Interspaces 1-4 B/L are clear and without debris  There is a superficial darkened blister noted at the fifth metatarsal head laterally on the left foot, wound is not open. There is crepitus, induration or acute signs of infection. Ulceration #1:   Location: RIGHT heel  Measurements: approx. 2.5 cm x 2.5 cm x 0.9 cm  Base: Granular/healthy, with probe to periosteum at the 1 o clock aspect of the wound   Borders: Appear healthy, no hyperkeratotic or macerated tissue   Exudate: No drainage  Comments: No periwound erythema or edema  Wound borders with no evidence of ascending lymphangitis. Wound undermines and probes to periosteum. Patient Active Problem List   Diagnosis Code    Osteoarthritis M19.90    HTN (hypertension) I10    Hypercholesteremia E78.00    Depression F32. A    Asthma J45.909    Pulmonary emphysema (HCC) J43.9    Tobacco use Z72.0    Influenza J11.1    RANDI on CPAP G47.33, Z99.89    Unilateral primary osteoarthritis, left hip M16.12    Pituitary macroadenoma (HCC) D35.2    Gastritis without bleeding K29.70    Other specified soft tissue disorders M79.89    Adenomatous polyp of colon D12.6    Rectal bleeding K62.5    Grade I hemorrhoids K64.0    Aftercare following joint replacement surgery Z47.1    Blurry vision H53.8    Class 2 obesity in adult E66.9    Delirium R41.0    Other acute postprocedural pain G89.18    Pain in left lower leg M79.662    Paranoid schizophrenia (HCC) F20.0    Presence of left artificial hip joint Z96.642    Sprain of ribs, initial encounter S23.41XA    Suprasellar mass G93.89    Unilateral primary osteoarthritis, left knee M17.12    Hyponatremia E87.1    Schizoaffective disorder (HCC) F25.9    Hyperprolactinemia (HCC) E22.1    Psychogenic polydipsia R63.1, F54    Schizophrenia (HCC) F20.9    Physiological consequence of immobility Z74.09    Sepsis ([facility] [address] A41.9    Hypothyroid E03.9    Osteomyelitis ([facility]) G17.0    Complicated open wound of left hip S71.002A    Cellulitis of left hip L03.116    Gram-positive bacteremia R78.81    Staphylococcus aureus bacteremia with sepsis ([facility]) A41.01    Acute osteomyelitis of hip ([facility]) M86.18        Procedure Note  Indications: Based on my examination of this patient's wound(s)/ulcer(s) today, debridement is required to promote healing and evaluate the wound base. Performed by: Darnell Snyder DPM    Consent obtained:  No: Patient is non verbal    Time out taken:  Yes    Pain Control:   None needed    Debridement: Excisional Debridement    Using curette the wound(s)/ulcer(s) was/were sharply debrided down through and including the removal of subcutaneous tissue. Devitalized Tissue Debrided:  fibrin and biofilm    Pre Debridement Measurements: 2.0 x 2.5 x 0.4 cm    Wound/Ulcer #: 1    Post Debridement Measurements: 2.0 x 2.5 x 0.4 cm    Percent of Wound/Ulcer Debrided: 100%    Total Surface Area Debrided:  5 sq cm     Diabetic/Pressure/Non Pressure Ulcers:  Ulcer: Non-Pressure ulcer, fat layer exposed    Bleeding:  Minimal    Hemostasis Achieved:  by pressure    Procedural Pain:  0  / 10     Post Procedural Pain:  0 / 10     Response to treatment:  Well tolerated by patient.         LABS:   Lab Results   Component Value Date    WBC 9.5 07/24/2022    HGB 9.1 (L) 07/24/2022    HCT 27.9 (L) 07/24/2022    MCV 92.8 07/24/2022     07/24/2022     Lab Results   Component Value Date/Time     07/24/2022 06:09 AM    K 3.9 07/24/2022 06:09 AM     07/24/2022 06:09 AM    CO2 20 07/24/2022 06:09 AM    BUN 12 07/24/2022 06:09 AM    CREATININE 0.41 07/24/2022 06:09 AM    GLUCOSE 109 07/24/2022 06:09 AM    CALCIUM 8.9 07/24/2022 06:09 AM      Lab Results   Component Value Date    LABALBU 2.5 (L) 07/24/2022     Lab Results   Component Value Date    SEDRATE 99 (H) 07/21/2022     Lab Results Component Value Date    .9 (H) 07/21/2022     Lab Results   Component Value Date    LABA1C 5.6 02/21/2020       IMAGING:   N/A          Martin Sadler DPM PGY-2  Podiatric Surgery Resident  Podiatry On Call Pager: [phone]    07/26/22  4:48 PM

## 2022-07-26 NOTE — DISCHARGE INSTRUCTIONS
Wound care: Iodoform packing into the RIGHT heel with dry sterile dressing consisting of 4x4 gauze, ABD pad, and Kerlix  Mepilex border to the LEFT foot. Both dressings to be changed once daily.

## 2022-07-26 NOTE — PROGRESS NOTES
MRI called and states they are unable to perform MRI due to patient being contracted. Dr. María Yadav notified via perfect serve.

## 2022-07-27 LAB
BLOOD CULTURE, ROUTINE: NORMAL
GLUCOSE BLD-MCNC: 100 MG/DL (ref 70–99)
GLUCOSE BLD-MCNC: 101 MG/DL (ref 70–99)
GLUCOSE BLD-MCNC: 87 MG/DL (ref 70–99)
GLUCOSE BLD-MCNC: 87 MG/DL (ref 70–99)
GLUCOSE BLD-MCNC: 95 MG/DL (ref 70–99)
PERFORMED ON: ABNORMAL
PERFORMED ON: ABNORMAL
PERFORMED ON: NORMAL

## 2022-07-27 PROCEDURE — 6360000002 HC RX W HCPCS

## 2022-07-27 PROCEDURE — 94760 N-INVAS EAR/PLS OXIMETRY 1: CPT

## 2022-07-27 PROCEDURE — 2580000003 HC RX 258

## 2022-07-27 PROCEDURE — 6370000000 HC RX 637 (ALT 250 FOR IP): Performed by: INTERNAL MEDICINE

## 2022-07-27 PROCEDURE — 2700000000 HC OXYGEN THERAPY PER DAY

## 2022-07-27 PROCEDURE — 1210000000 HC MED SURG R&B

## 2022-07-27 PROCEDURE — 94761 N-INVAS EAR/PLS OXIMETRY MLT: CPT

## 2022-07-27 RX ADMIN — 0.12% CHLORHEXIDINE GLUCONATE 15 ML: 1.2 RINSE ORAL at 21:49

## 2022-07-27 RX ADMIN — HYPROMELLOSE 2910 2 DROP: 5 SOLUTION OPHTHALMIC at 10:40

## 2022-07-27 RX ADMIN — SENNOSIDES AND DOCUSATE SODIUM 1 TABLET: 50; 8.6 TABLET ORAL at 21:49

## 2022-07-27 RX ADMIN — POLYETHYLENE GLYCOL 3350 17 G: 17 POWDER, FOR SOLUTION ORAL at 21:47

## 2022-07-27 RX ADMIN — PIPERACILLIN AND TAZOBACTAM 3375 MG: 3; .375 INJECTION, POWDER, FOR SOLUTION INTRAVENOUS at 14:08

## 2022-07-27 RX ADMIN — 0.12% CHLORHEXIDINE GLUCONATE 15 ML: 1.2 RINSE ORAL at 17:39

## 2022-07-27 RX ADMIN — LEVOTHYROXINE SODIUM 150 MCG: 0.15 TABLET ORAL at 05:30

## 2022-07-27 RX ADMIN — SENNOSIDES AND DOCUSATE SODIUM 1 TABLET: 50; 8.6 TABLET ORAL at 11:38

## 2022-07-27 RX ADMIN — LEVETIRACETAM 750 MG: 100 SOLUTION ORAL at 10:55

## 2022-07-27 RX ADMIN — PIPERACILLIN AND TAZOBACTAM 3375 MG: 3; .375 INJECTION, POWDER, FOR SOLUTION INTRAVENOUS at 22:03

## 2022-07-27 RX ADMIN — DESMOPRESSIN ACETATE 500 MCG: 0.1 TABLET ORAL at 10:34

## 2022-07-27 RX ADMIN — 0.12% CHLORHEXIDINE GLUCONATE 15 ML: 1.2 RINSE ORAL at 10:34

## 2022-07-27 RX ADMIN — LEVETIRACETAM 750 MG: 100 SOLUTION ORAL at 21:47

## 2022-07-27 RX ADMIN — ATORVASTATIN CALCIUM 20 MG: 20 TABLET, FILM COATED ORAL at 21:49

## 2022-07-27 RX ADMIN — FENOFIBRATE 160 MG: 160 TABLET ORAL at 10:35

## 2022-07-27 RX ADMIN — POLYETHYLENE GLYCOL 3350 17 G: 17 POWDER, FOR SOLUTION ORAL at 11:38

## 2022-07-27 RX ADMIN — PIPERACILLIN AND TAZOBACTAM 3375 MG: 3; .375 INJECTION, POWDER, FOR SOLUTION INTRAVENOUS at 05:31

## 2022-07-27 RX ADMIN — DESMOPRESSIN ACETATE 500 MCG: 0.1 TABLET ORAL at 21:49

## 2022-07-27 RX ADMIN — ENOXAPARIN SODIUM 40 MG: 100 INJECTION SUBCUTANEOUS at 10:34

## 2022-07-27 RX ADMIN — Medication 30 MG: at 11:39

## 2022-07-27 ASSESSMENT — PAIN SCALES - GENERAL
PAINLEVEL_OUTOF10: 0
PAINLEVEL_OUTOF10: 0

## 2022-07-27 ASSESSMENT — PAIN SCALES - WONG BAKER
WONGBAKER_NUMERICALRESPONSE: 0
WONGBAKER_NUMERICALRESPONSE: 0

## 2022-07-27 NOTE — PROGRESS NOTES
Hospitalist Progress Note      PCP: Leonel Ramirez DO    Date of Admission: 7/21/2022    Chief Complaint:    Chief Complaint   Patient presents with    Wound Infection     Left hip wound, tunneled, fever       Subjective:  Patient is non verbal right now; unable to complete full 12 point ROS    Medications:  Reviewed    Infusion Medications    sodium chloride      sodium chloride      dextrose       Scheduled Medications    atorvastatin  20 mg PEG Tube Nightly    cabergoline  0.5 mg Oral Once per day on Mon Thu    hypromellose  2 drop Both Eyes Daily    chlorhexidine  15 mL Mouth/Throat TID    desmopressin  500 mcg PEG Tube BID    fenofibrate  160 mg Oral Daily    levETIRAcetam  750 mg PEG Tube BID    polyethylene glycol  17 g Per G Tube BID    sennosides-docusate sodium  1 tablet PEG Tube BID    tiotropium-olodaterol  2 puff Inhalation Daily    lansoprazole  30 mg Per G Tube Daily    enoxaparin  40 mg SubCUTAneous Daily    piperacillin-tazobactam  3,375 mg IntraVENous Q8H    insulin lispro  0-8 Units SubCUTAneous 6 times per day    levothyroxine  150 mcg Oral Daily     PRN Meds: sodium chloride, albuterol, sodium chloride, ondansetron **OR** ondansetron, polyethylene glycol, acetaminophen **OR** acetaminophen, glucose, dextrose bolus **OR** dextrose bolus, glucagon (rDNA), dextrose      Intake/Output Summary (Last 24 hours) at 7/27/2022 1602  Last data filed at 7/27/2022 0549  Gross per 24 hour   Intake 1000.72 ml   Output --   Net 1000.72 ml       Exam:    BP (!) 110/59   Pulse 73   Temp 99 °F (37.2 °C) (Oral)   Resp 16   Ht 5' 5\" (1.651 m)   Wt 201 lb 4.8 oz (91.3 kg)   SpO2 97%   BMI 33.50 kg/m²     General appearance: No apparent distress  HEENT:  Conjunctivae/corneas clear. Neck: on trach collar. Respiratory:  Normal respiratory effort. Clear to auscultation  Cardiovascular: Regular rate and rhythm  Abdomen: PEG in place.   Musculoskeletal: No clubbing, cyanosis or edema bilaterally  Neuro: lower extremities contracted. Capillary Refill: Brisk,< 3 seconds   Peripheral Pulses: +2 palpable, equal bilaterally   Skin:  Left hip tunneling wound with surrounding cellulitis     Labs:   No results for input(s): WBC, HGB, HCT, PLT in the last 72 hours. No results for input(s): NA, K, CL, CO2, BUN, CREATININE, CALCIUM, PHOS in the last 72 hours. Invalid input(s): MAGNES    No results for input(s): AST, ALT, BILIDIR, BILITOT, ALKPHOS in the last 72 hours. No results for input(s): INR in the last 72 hours. No results for input(s): [place] in the last 72 hours. Urinalysis:      Lab Results   Component Value Date/Time    NITRU Negative 07/21/2022 03:54 AM    WBCUA 3-5 02/13/2022 12:45 AM    BACTERIA RARE 02/13/2022 12:45 AM    RBCUA None seen 02/13/2022 12:45 AM    BLOODU Negative 07/21/2022 03:54 AM    SPECGRAV 1.017 07/21/2022 03:54 AM    GLUCOSEU Negative 07/21/2022 03:54 AM       Radiology:  IR PICC WO SQ PORT/PUMP > 5 YEARS   Final Result      XR HIP LEFT (1 VIEW)   Final Result   There are no acute osseous changes. Status post left hip arthroplasty. There is a lucency in the subcutaneous soft tissues of the left hip consistent with open wound or ulcer. If osteomyelitis is suspected may consider bone scan evaluation. XR CHEST PORTABLE   Final Result   NO ACUTE CARDIOPULMONARY ABNORMALITY. NO CHANGE. MRI HIP LEFT WO CONTRAST    (Results Pending)       Assessment/Plan:    #sepsis secondary to suspected OM and TEJ Warneri bacteremia  POA    - MRI ordered of ruben kirkpatrickp; probable OM POA    - daily blood cultures    - continue broad spec Abx    - ID consulted for abx       - yesterdays blood cultures negative so far; will need 4 weeks IV Abx on discharge   7/25 - cont plan for iv abx on dc. MRI not completed to this point. Nursing supervisor attempting to resolve. 7/26 - report that due to contracture, unable to get MRI.   ID recommending 6 weeks iv abx if osteomyelitis present. May need to treat despite no imaging. [facility].   7/27 - picc ordered for prolonged abx for presumed om. Unable to get mri. Pre-cert started. #Hypothryoidism     - per endo    #HTN    - resume home meds     #Schizophrenia    - continue home meds     Active Hospital Problems    Diagnosis Date Noted    Staphylococcus aureus bacteremia with sepsis ([address] [A41.01] 07/23/2022     Priority: Medium    Acute osteomyelitis of hip ([address] [M86.18] 07/23/2022     Priority: Medium    Gram-positive bacteremia [R78.81] 07/22/2022     Priority: Medium    Sepsis ([address] [A41.9] 07/21/2022     Priority: Medium    Hypothyroid [E03.9] 07/21/2022     Priority: Medium    Osteomyelitis ([address] [M86.9] 07/21/2022     Priority: Medium    Complicated open wound of left hip [S71.002A] 07/21/2022     Priority: Medium    Cellulitis of left hip [K49.453] 07/21/2022     Priority: Medium    Schizophrenia ([address] [F20.9] 03/13/2020    HTN (hypertension) [I10] 09/19/2019    Depression [F32. A]         Additional work up or/and treatment plan may be added today or then after based on clinical progression. I am managing a portion of pt care. Some medical issues are handled by other specialists. Additional work up and treatment should be done in out pt setting by pt PCP and other out pt providers. In addition to examining and evaluating pt, I spent additional time explaining care, normal and abnormal findings, and treatment plan. All of pt questions were answered. Counseling, diet and education were  provided. Case will be discussed with nursing staff when appropriate. Family will be updated if and when appropriate.       Diet: Diet NPO  ADULT TUBE FEEDING; PEG; Peptide Based; Continuous; 60; No; 50; Q 6 hours    Code Status: DNR-CCA    PT/OT Eval     Electronically signed by Leann Asher MD on 7/27/2022 at 4:02 PM

## 2022-07-27 NOTE — PROGRESS NOTES
Hospitalist Progress Note      PCP: Delma Lyon DO    Date of Admission: 7/21/2022    Chief Complaint:    Chief Complaint   Patient presents with    Wound Infection     Left hip wound, tunneled, fever       Subjective:  Patient is non verbal right now; unable to complete full 12 point ROS    Medications:  Reviewed    Infusion Medications    sodium chloride      sodium chloride      dextrose       Scheduled Medications    atorvastatin  20 mg PEG Tube Nightly    cabergoline  0.5 mg Oral Once per day on Mon Thu    hypromellose  2 drop Both Eyes Daily    chlorhexidine  15 mL Mouth/Throat TID    desmopressin  500 mcg PEG Tube BID    fenofibrate  160 mg Oral Daily    levETIRAcetam  750 mg PEG Tube BID    polyethylene glycol  17 g Per G Tube BID    sennosides-docusate sodium  1 tablet PEG Tube BID    tiotropium-olodaterol  2 puff Inhalation Daily    lansoprazole  30 mg Per G Tube Daily    enoxaparin  40 mg SubCUTAneous Daily    piperacillin-tazobactam  3,375 mg IntraVENous Q8H    insulin lispro  0-8 Units SubCUTAneous 6 times per day    levothyroxine  150 mcg Oral Daily     PRN Meds: sodium chloride, albuterol, sodium chloride, ondansetron **OR** ondansetron, polyethylene glycol, acetaminophen **OR** acetaminophen, glucose, dextrose bolus **OR** dextrose bolus, glucagon (rDNA), dextrose      Intake/Output Summary (Last 24 hours) at 7/27/2022 0030  Last data filed at 7/26/2022 2234  Gross per 24 hour   Intake 310.72 ml   Output --   Net 310.72 ml       Exam:    /68   Pulse 61   Temp 98.4 °F (36.9 °C) (Oral)   Resp 17   Ht 5' 5\" (1.651 m)   Wt 201 lb 4.8 oz (91.3 kg)   SpO2 100%   BMI 33.50 kg/m²     General appearance: No apparent distress  HEENT:  Conjunctivae/corneas clear. Neck: on trach collar. Respiratory:  Normal respiratory effort. Clear to auscultation  Cardiovascular: Regular rate and rhythm  Abdomen: PEG in place.   Musculoskeletal: No clubbing, cyanosis or edema bilaterally  Neuro: lower extremities contracted. Capillary Refill: Brisk,< 3 seconds   Peripheral Pulses: +2 palpable, equal bilaterally   Skin:  Left hip tunneling wound with surrounding cellulitis     Labs:   Recent Labs     07/24/22  0609   WBC 9.5   HGB 9.1*   HCT 27.9*        Recent Labs     07/24/22  0609      K 3.9   *   CO2 20   BUN 12   CREATININE 0.41*   CALCIUM 8.9     Recent Labs     07/24/22  0609   AST 33   ALT 49*   BILITOT 0.4   ALKPHOS 96     No results for input(s): INR in the last 72 hours. No results for input(s): [place] in the last 72 hours. Urinalysis:      Lab Results   Component Value Date/Time    NITRU Negative 07/21/2022 03:54 AM    WBCUA 3-5 02/13/2022 12:45 AM    BACTERIA RARE 02/13/2022 12:45 AM    RBCUA None seen 02/13/2022 12:45 AM    BLOODU Negative 07/21/2022 03:54 AM    SPECGRAV 1.017 07/21/2022 03:54 AM    GLUCOSEU Negative 07/21/2022 03:54 AM       Radiology:  XR HIP LEFT (1 VIEW)   Final Result   There are no acute osseous changes. Status post left hip arthroplasty. There is a lucency in the subcutaneous soft tissues of the left hip consistent with open wound or ulcer. If osteomyelitis is suspected may consider bone scan evaluation. XR CHEST PORTABLE   Final Result   NO ACUTE CARDIOPULMONARY ABNORMALITY. NO CHANGE. MRI HIP LEFT WO CONTRAST    (Results Pending)   IR PICC WO SQ PORT/PUMP > 5 YEARS    (Results Pending)       Assessment/Plan:    #sepsis secondary to suspected OM and TEJ Warneri bacteremia  POA    - MRI ordered of lef thip; probable OM POA    - daily blood cultures    - continue broad spec Abx    - ID consulted for abx       - yesterdays blood cultures negative so far; will need 4 weeks IV Abx on discharge   7/25 - cont plan for iv abx on dc. MRI not completed to this point. Nursing supervisor attempting to resolve. 7/26 - report that due to contracture, unable to get MRI.   ID recommending 6 weeks iv abx if osteomyelitis present. May need to treat despite no imaging. [facility] on [employer]. #Hypothryoidism     - per endo    #HTN    - resume home meds     #Schizophrenia    - continue home meds     Active Hospital Problems    Diagnosis Date Noted    Staphylococcus aureus bacteremia with sepsis ([address] [A41.01] 07/23/2022     Priority: Medium    Acute osteomyelitis of hip ([address] [M86.18] 07/23/2022     Priority: Medium    Gram-positive bacteremia [R78.81] 07/22/2022     Priority: Medium    Sepsis ([address] [A41.9] 07/21/2022     Priority: Medium    Hypothyroid [E03.9] 07/21/2022     Priority: Medium    Osteomyelitis ([address] [M86.9] 07/21/2022     Priority: Medium    Complicated open wound of left hip [S71.002A] 07/21/2022     Priority: Medium    Cellulitis of left hip [V22.665] 07/21/2022     Priority: Medium    Schizophrenia ([address] [F20.9] 03/13/2020    HTN (hypertension) [I10] 09/19/2019    Depression [F32. A]         Additional work up or/and treatment plan may be added today or then after based on clinical progression. I am managing a portion of pt care. Some medical issues are handled by other specialists. Additional work up and treatment should be done in out pt setting by pt PCP and other out pt providers. In addition to examining and evaluating pt, I spent additional time explaining care, normal and abnormal findings, and treatment plan. All of pt questions were answered. Counseling, diet and education were  provided. Case will be discussed with nursing staff when appropriate. Family will be updated if and when appropriate.       Diet: Diet NPO  ADULT TUBE FEEDING; PEG; Peptide Based; Continuous; 60; No; 50; Q 6 hours    Code Status: DNR-CCA    PT/OT Eval     Electronically signed by Justin Gilbert MD on 7/27/2022 at 12:30 AM

## 2022-07-27 NOTE — PROGRESS NOTES
Assessment documented. Vital signs stable. Patient is non-verbal, responds to voice/painful stimuli. Turned and repositioned Q 2 Hours. Tube feed (Vital) infusing at 60 ml's/hr and tolerating well, no residual, flushes easily. Suctioned x 1 this shift for increased secretions that are frothy and white in color, tolerated well. Incontinent of bowel and bladder. Needs anticipated.      /68   Pulse 61   Temp 98.4 °F (36.9 °C) (Oral)   Resp 17   Ht 5' 5\" (1.651 m)   Wt 201 lb 4.8 oz (91.3 kg)   SpO2 100%   BMI 33.50 kg/m²

## 2022-07-27 NOTE — CARE COORDINATION
BLAZEW meet with pt at bedside. Pt is a [address] at [facility]. Will need pre-cert to return. Will notify Efrain Smith when pt is ready, so Efrain Smith can start pre-cert.      Electronically signed by CLARY Milton on 7/27/2022 at 10:51 AM

## 2022-07-28 VITALS
BODY MASS INDEX: 33.54 KG/M2 | HEART RATE: 59 BPM | OXYGEN SATURATION: 99 % | DIASTOLIC BLOOD PRESSURE: 107 MMHG | RESPIRATION RATE: 17 BRPM | TEMPERATURE: 98.4 F | HEIGHT: 65 IN | WEIGHT: 201.3 LBS | SYSTOLIC BLOOD PRESSURE: 123 MMHG

## 2022-07-28 LAB
BLOOD CULTURE, ROUTINE: NORMAL
GLUCOSE BLD-MCNC: 105 MG/DL (ref 70–99)
GLUCOSE BLD-MCNC: 88 MG/DL (ref 70–99)
GLUCOSE BLD-MCNC: 90 MG/DL (ref 70–99)
GLUCOSE BLD-MCNC: 94 MG/DL (ref 70–99)
GLUCOSE BLD-MCNC: 99 MG/DL (ref 70–99)
PERFORMED ON: ABNORMAL
PERFORMED ON: NORMAL
SARS-COV-2, NAAT: NOT DETECTED

## 2022-07-28 PROCEDURE — 6360000002 HC RX W HCPCS

## 2022-07-28 PROCEDURE — 94761 N-INVAS EAR/PLS OXIMETRY MLT: CPT

## 2022-07-28 PROCEDURE — 6370000000 HC RX 637 (ALT 250 FOR IP): Performed by: INTERNAL MEDICINE

## 2022-07-28 PROCEDURE — 2580000003 HC RX 258

## 2022-07-28 PROCEDURE — 2700000000 HC OXYGEN THERAPY PER DAY

## 2022-07-28 PROCEDURE — 87635 SARS-COV-2 COVID-19 AMP PRB: CPT

## 2022-07-28 RX ORDER — LORAZEPAM 0.5 MG/1
0.5 TABLET ORAL EVERY 4 HOURS PRN
Qty: 5 TABLET | Refills: 0 | Status: SHIPPED | OUTPATIENT
Start: 2022-07-28 | End: 2022-08-02

## 2022-07-28 RX ORDER — LEVOTHYROXINE SODIUM 0.15 MG/1
150 TABLET ORAL DAILY
Qty: 30 TABLET | Refills: 3 | DISCHARGE
Start: 2022-07-29

## 2022-07-28 RX ADMIN — LEVOTHYROXINE SODIUM 150 MCG: 0.15 TABLET ORAL at 09:38

## 2022-07-28 RX ADMIN — CABERGOLINE 0.5 MG: 0.5 TABLET ORAL at 09:42

## 2022-07-28 RX ADMIN — HYPROMELLOSE 2910 2 DROP: 5 SOLUTION OPHTHALMIC at 09:43

## 2022-07-28 RX ADMIN — DESMOPRESSIN ACETATE 500 MCG: 0.1 TABLET ORAL at 09:37

## 2022-07-28 RX ADMIN — ENOXAPARIN SODIUM 40 MG: 100 INJECTION SUBCUTANEOUS at 09:38

## 2022-07-28 RX ADMIN — Medication 30 MG: at 09:49

## 2022-07-28 RX ADMIN — PIPERACILLIN AND TAZOBACTAM 3375 MG: 3; .375 INJECTION, POWDER, FOR SOLUTION INTRAVENOUS at 05:48

## 2022-07-28 RX ADMIN — LEVETIRACETAM 750 MG: 100 SOLUTION ORAL at 09:37

## 2022-07-28 RX ADMIN — POLYETHYLENE GLYCOL 3350 17 G: 17 POWDER, FOR SOLUTION ORAL at 09:39

## 2022-07-28 ASSESSMENT — PAIN SCALES - WONG BAKER: WONGBAKER_NUMERICALRESPONSE: 0

## 2022-07-28 NOTE — CARE COORDINATION
This BLAZEW called and spoke with Imelda Paulino at [facility] this am.  Quenten Gave has been obtained for patient to return to [facility]. Patient will transfer when medically ready. BLAZEW/ CRUZ to follow. Electronically signed by CAMELIA Guallpa LSW on 7/28/22 at 10:55 AM EDT   Discharge orders given. Patient will transfer to [facility] via [facility] at 4:30pm today. Imelda Paulino @ [address] [place] [address], patient notified. [id number] completed.   Electronically signed by CAMELIA Guallpa LSW on 7/28/22 at 3:38 PM EDT

## 2022-07-28 NOTE — FLOWSHEET NOTE
Patient helped repositoned  AND TRACHEOSTOMY WAS SUCTIONED WITH CREAMY THICK SPUTUM.patient breathing is effortless,his abdomen is soft with active bowel sounds,no residual return.

## 2022-07-28 NOTE — DISCHARGE SUMMARY
Physician Discharge Summary     Patient ID:  Cindy Giang  [id number]  52 y.o.  1955    Admit date: 7/21/2022    Discharge date : 07/28/22     Admitting Physician: Dana Rosas MD     Discharge Physician: Ave Francis MD     Admission Diagnoses: Septicemia ([facility] [A41.9]  Elevated troponin [R77.8]  Sepsis ([facility] [A41.9]  Decreased thyroid stimulating hormone (TSH) level [R79.89]  Osteomyelitis of other site, unspecified type [facility]) [M86.9]    Discharge Diagnoses: Sepsis 2/2 left hip osteomyelitis with methicillin sensitive staph warneri    Admission Condition: fair    Discharged Condition: good    Hospital Course:     #sepsis secondary to suspected OM and TEJ Warneri bacteremia  POA    - MRI ordered of lef thip; probable OM POA    - daily blood cultures    - continue broad spec Abx    - ID consulted for abx      - yesterdays blood cultures negative so far; will need 4 weeks IV Abx on discharge              7/25 - cont plan for iv abx on dc. MRI not completed to this point. Nursing supervisor attempting to resolve. 7/26 - report that due to contracture, unable to get MRI. ID recommending 6 weeks iv abx if osteomyelitis present. May need to treat despite no imaging. [facility] on [address] [facility].              7/27 - picc ordered for prolonged abx for presumed om. Unable to get mri. Pre-cert started.      #Hypothryoidism     - per endo     #HTN    - resume home meds     #Schizophrenia    - continue home meds    Consults: ID and orthopedic surgery    Significant Diagnostic Studies: as below    Discharge Exam:  BP (!) 123/107   Pulse 59   Temp 98.4 °F (36.9 °C) (Oral)   Resp 17   Ht 5' 5\" (1.651 m)   Wt 201 lb 4.8 oz (91.3 kg)   SpO2 99%   BMI 33.50 kg/m²   General appearance: alert, appears stated age, and cooperative  Lungs: clear to auscultation bilaterally  Heart: regular rate and rhythm, S1, S2 normal, no murmur, click, rub or gallop  Abdomen: soft, non-tender; bowel sounds normal; no masses,  no organomegaly  Extremities: extremities normal, atraumatic, no cyanosis or edema  Skin: Skin color, texture, turgor normal. No rashes or lesions    Labs:   No results for input(s): WBC, HGB, HCT, PLT in the last 72 hours. No results for input(s): NA, K, CL, CO2, BUN, CREATININE, CALCIUM, PHOS in the last 72 hours. Invalid input(s): MAGNES  No results for input(s): AST, ALT, BILIDIR, BILITOT, ALKPHOS in the last 72 hours. No results for input(s): INR in the last 72 hours. No results for input(s): Kathyrn Belch in the last 72 hours. Urinalysis:   Lab Results   Component Value Date/Time    NITRU Negative 07/21/2022 03:54 AM    WBCUA 3-5 02/13/2022 12:45 AM    BACTERIA RARE 02/13/2022 12:45 AM    RBCUA None seen 02/13/2022 12:45 AM    BLOODU Negative 07/21/2022 03:54 AM    SPECGRAV 1.017 07/21/2022 03:54 AM    GLUCOSEU Negative 07/21/2022 03:54 AM       Radiology:   Most recent    Chest CT      WITH CONTRAST:No results found for this or any previous visit. WITHOUT CONTRAST: No results found for this or any previous visit. CXR      2-view: Results for orders placed during the hospital encounter of 04/09/19    XR CHEST STANDARD (2 VW)    Narrative  EXAMINATION: XR CHEST (2 VW)    CLINICAL HISTORY: J40 Bronchitis, not specified as acute or chronic ICD10    COMPARISONS: March 21, 2018    FINDINGS:    Two views of the chest are submitted. The cardiac silhouette is of normal size configuration. The mediastinum is unremarkable. Pulmonary vascular unremarkable. Right sided trachea. No focal infiltrates. No effusions. No Pneumothoraces.     Impression  NO ACUTE ACTIVE CARDIOPULMONARY PROCESS      Results for orders placed during the hospital encounter of 03/21/18    XR CHEST STANDARD (2 VW)    Narrative  EXAMINATION: XR CHEST (2 VW)    CLINICAL HISTORY: J44.9 Chronic obstructive pulmonary disease, unspecified COPD type ([facility] ICD10    COMPARISONS: September 17, 2013    FINDINGS:    Two views of the chest are submitted. The cardiac silhouette is of normal size configuration. The mediastinum is unremarkable. Pulmonary vascular unremarkable. Right sided trachea. No focal infiltrates. No effusions. No Pneumothoraces. Impression  NO ACUTE ACTIVE CARDIOPULMONARY PROCESS       Portable: Results for orders placed during the hospital encounter of 07/21/22    XR CHEST PORTABLE    Narrative  EXAMINATION: Portable AP ERECT view of the chest.    CLINICAL HISTORY: Fever and left hip tunneling wound. DATE: 7/21/2022 3:20 AM    COMPARISONS: 2/13/2022    FINDINGS: The heart is mildly enlarged, unchanged. There are atherosclerotic calcifications in the aortic arch. Lungs are clear without consolidation. No pleural effusion or pneumothorax. There are mild degenerative changes in spine. End of endotracheal  tube lies 3 cm above the elvin. Impression  NO ACUTE CARDIOPULMONARY ABNORMALITY. NO CHANGE. Echo No results found for this or any previous visit. Disposition: [facility]    In process/preliminary results:  Outstanding Order Results       Date and Time Order Name Status Description    7/26/2022  2:50 AM Culture, Anaerobic and Aerobic Preliminary     7/24/2022  6:09 AM Culture, Blood 1 Preliminary             Patient Instructions:   Current Discharge Medication List        START taking these medications    Details   piperacillin-tazobactam (ZOSYN) infusion Infuse 3.375 g intravenously in the morning and 3.375 g at noon and 3.375 g in the evening. Do all this for 14 days. Compound per protocol. [place]: 142 g, Refills: 0      cefTRIAXone (ROCEPHIN) infusion Infuse 2,000 mg intravenously every 24 hours Compound per protocol  Qty: 76 g, Refills: 0           CONTINUE these medications which have CHANGED    Details   LORazepam (ATIVAN) 0.5 MG tablet 1 tablet by PEG Tube route every 4 hours as needed for Anxiety for up to 5 days.   Qty: 5 tablet, Refills: 0    Associated Diagnoses: Depression, unspecified depression type      levothyroxine (SYNTHROID) 150 MCG tablet Take 1 tablet by mouth in the morning. Qty: 30 tablet, Refills: 3           CONTINUE these medications which have NOT CHANGED    Details   Nutritional Supplements (ISOSOURCE 1.5 NUSRAT PO) 60 mLs by Percutaneous Endoscopic Gastrostomy route continuous      carboxymethylcellulose (ARTIFICIAL TEARS) 1 % ophthalmic solution Place 2 drops into both eyes daily      atorvastatin (LIPITOR) 20 MG tablet 20 mg by PEG Tube route nightly      Atropine Sulfate 0.01 % SOLN Place 1 drop under the tongue as needed (EVERY TWO HOURS FOR REDUCTION OF SECRETIONS)      chlorhexidine (PERIDEX) 0.12 % solution Take 15 mLs by mouth in the morning, at noon, and at bedtime FOR USE WITH TOOTHETTES FOR MOUTH CARE      polyethylene glycol (MIRALAX) 17 g PACK packet 17 g by Per G Tube route in the morning and at bedtime      acetaminophen (TYLENOL) 325 MG tablet 650 mg by PEG Tube route every 4 hours as needed for Pain      sennosides-docusate sodium (SENOKOT-S) 8.6-50 MG tablet 1 tablet by PEG Tube route in the morning and at bedtime      desmopressin (DDAVP) 0.1 MG tablet 0.5 mg by PEG Tube route in the morning and at bedtime Give 0.05mg via PEG tube two times a day for clotting promoter      esomeprazole Magnesium (NEXIUM) 40 MG PACK 40 mg by Per G Tube route in the morning.       levETIRAcetam (KEPPRA) 100 MG/ML solution 750 mg/kg by PEG Tube route 2 times daily      hyoscyamine (LEVSIN/SL) 125 MCG sublingual tablet Place 125 mcg under the tongue every 6 hours as needed for Cramping      morphine sulfate 20 MG/ML concentrated oral solution Take 5 mg by mouth every 2 hours as needed for Pain.      vitamin D (CHOLECALCIFEROL) 125 MCG (5000 UT) CAPS capsule Take 50,000 Units by mouth daily Give 1/25mg (26797IS) one capsule via peg tube one time a day every Monday for supplement      cabergoline (DOSTINEX) 0.5 MG tablet Take 1 tablet by mouth Twice a Week ON Monday and Thursday  Qty: 8 tablet, Refills: 3      fenofibrate 160 MG tablet Take 150 mg by mouth daily      aspirin 81 MG EC tablet Take 1 tablet by mouth 2 times daily  Qty: 60 tablet, Refills: 0      Respiratory Therapy Supplies SANDRA Full face CPAP mask and supplies  Qty: 1 Device, Refills: 0      CPAP Machine MISC by Does not apply route New CPAP with 6 cm  Qty: 1 each, Refills: 0      albuterol (PROVENTIL) (2.5 MG/3ML) 0.083% nebulizer solution Take 3 mLs by nebulization every 6 hours as needed for Wheezing  Qty: 120 each, Refills: 5      STIOLTO RESPIMAT 2.5-2.5 MCG/ACT AERS Inhale 2 puff in AM  Qty: 1 Inhaler, Refills: 5           STOP taking these medications       sulfamethoxazole-trimethoprim (BACTRIM DS;SEPTRA DS) 800-160 MG per tablet Comments:   Reason for Stopping:         lidocaine (LMX) 4 % cream Comments:   Reason for Stopping:         paliperidone (INVEGA) 6 MG extended release tablet Comments:   Reason for Stopping:         furosemide (LASIX) 40 MG tablet Comments:   Reason for Stopping:         meloxicam (MOBIC) 15 MG tablet Comments:   Reason for Stopping:         potassium chloride (MICRO-K) 10 MEQ extended release capsule Comments:   Reason for Stopping:         lisinopril (PRINIVIL;ZESTRIL) 20 MG tablet Comments:   Reason for Stopping:             Activity: bedrest  Diet: tube feed Vital 1.2, 60ml/hr continuous, water flush 50ml q6hrs  Wound Care: as directed    Follow-up with PCP 1-2 weeks.     DC time 35 minutes    Signed:  Electronically signed by Ismael Vargas MD on 7/28/2022 at 12:13 PM

## 2022-07-28 NOTE — PROGRESS NOTES
Assessment completed and charted on by this RN. VSS. Patient is nonverbal. New dressings applied to L hip and heel, and R heel per orders. Patient is a turn and is being repositioned q2. Tube feed going at goal rate of 60 ml/hr with water flushes being done per orders. Suctioned x2 this shift for increased secretions--frothy and white in color. Pt tolerated well. Incontinent of bowel and bladder. Safety maintained in room. Will continue rounds     [id number]: Report called to [facility]. All questions answered. AVS printed in chart. [facility] set up for [phone]: [facility] here to transport patient back to [facility] at this time.      Electronically signed by Mitesh Dawkins RN on 7/28/22 at 4:55 PM EDT

## 2022-07-28 NOTE — CARE COORDINATION
PHONE CALL PLACED TO Yoly Urrutia WHO ENSURED PATIENT WOULD RECEIVE APPROPRIATE MEDS WHEN DC'D TO FACILITY. DIAZ WILL SPEAK WITH MARCELINA

## 2022-07-29 LAB — BLOOD CULTURE, ROUTINE: NORMAL

## 2022-07-31 LAB
CULTURE WOUND: ABNORMAL
ORGANISM: ABNORMAL

## 2022-08-05 ENCOUNTER — TELEPHONE (OUTPATIENT)
Dept: WOUND CARE | Age: 67
End: 2022-08-05

## 2022-08-05 ENCOUNTER — HOSPITAL ENCOUNTER (OUTPATIENT)
Dept: WOUND CARE | Age: 67
Discharge: HOME | End: 2022-08-05

## 2022-08-05 NOTE — PROGRESS NOTES
Spoke with pt's spouse, Ashok Gordon, she states she would like pt to be seen by the facility doctor for wound management as transport is taxing on him. TC to facility, spoke with Douglas Johnson and updated regarding plan.

## 2022-08-05 NOTE — TELEPHONE ENCOUNTER
Spoke with pt's spouse, Nadja Davis, she states she would like pt to be seen by the facility doctor for wound management as transport is taxing on him. TC to facility, spoke with Jarrett Crook and updated regarding plan.

## 2022-08-12 ENCOUNTER — TELEPHONE (OUTPATIENT)
Dept: WOUND CARE | Age: 67
End: 2022-08-12

## 2022-08-12 NOTE — TELEPHONE ENCOUNTER
Carole Pandey from [facility] called to see when this patients follow up appt with wound care was, I told her it was my understanding that he wasn't going to be seen here anymore because the transport was too rough on him. Carole Pandey is going to call the wife and speak with the DN and get back to us, there is no [facility] provider at [facility]..

## 2022-09-16 ENCOUNTER — OFFICE VISIT (OUTPATIENT)
Dept: INFECTIOUS DISEASES | Age: 67
End: 2022-09-16
Payer: MEDICAID

## 2022-09-16 VITALS
HEART RATE: 68 BPM | RESPIRATION RATE: 16 BRPM | HEIGHT: 65 IN | TEMPERATURE: 97 F | OXYGEN SATURATION: 100 % | BODY MASS INDEX: 33.5 KG/M2

## 2022-09-16 DIAGNOSIS — R78.81 GRAM-POSITIVE BACTEREMIA: ICD-10-CM

## 2022-09-16 DIAGNOSIS — S71.002D COMPLICATED OPEN WOUND OF LEFT HIP, SUBSEQUENT ENCOUNTER: Primary | ICD-10-CM

## 2022-09-16 PROCEDURE — 1123F ACP DISCUSS/DSCN MKR DOCD: CPT | Performed by: INTERNAL MEDICINE

## 2022-09-16 PROCEDURE — 99213 OFFICE O/P EST LOW 20 MIN: CPT | Performed by: INTERNAL MEDICINE

## 2022-09-16 NOTE — PROGRESS NOTES
Subjective:      Patient ID: Israel Cesar is a 74 y.o. male who presents today for:  Chief Complaint   Patient presents with    Blood Infection     [facility] f/u- Sepsis and multiple wounds. Coccyx wound     Patient here for follow-up. I have seen in July concern for sepsis from multiple wounds had bacteremia that was possibly contaminated difficult be clear. I completed 6 weeks of antibiotics he has been off antibiotics his line has been removed the wound is improved in the left there is less tunneling he still getting local wound care. No fevers or chills are reported patient cannot give any viable history is nonverbal he is contracted  Past Medical History:   Diagnosis Date    Abdominal pain     Asthma     Bloating symptom     COPD (chronic obstructive pulmonary disease) ([facility]     COPD (chronic obstructive pulmonary disease) ([address]     Depression     Depression     Diarrhea     Drug-seeking behavior 02/03/2020    Emphysema/COPD ([address]     History of rectal bleeding     Hypercholesteremia     past trx > 5 yrs    Hypercholesteremia     Hypercholesteremia     Hypertension     meds > 3 yrs    Hypothyroid 07/21/2022    Lung disease     Nontraumatic subarachnoid hemorrhage ([address]     [facility]    RANDI on CPAP     patient relates he doesn't use cpap at this time    Osteoarthritis     left hip    Osteoarthritis     Osteoarthritis of left hip     Other hydrocephalus ([address]     [address] PAPER[address]    Physiological consequence of immobility 03/09/2022    Pituitary macroadenoma ([facility]     Sleep apnea      Past Surgical History:   Procedure Laterality Date    COLONOSCOPY  5/2/14    JENNIFER    COLONOSCOPY N/A 12/12/2019    COLONOSCOPY DIAGNOSTIC performed by Matilda Wills MD at [place] [address], DIAGNOSTIC      EYE SURGERY Bilateral     cataracts    [address] ?     index finger laceration repair    HERNIA REPAIR  2004    repair [facility] REPLACEMENT Left     left hip    WA COLON CA SCRN NOT HI RSK IND N/A 5/17/2017    COLONOSCOPY performed by Mary Frederick MD at [address] Left 4/22/2019    LEFT HIP TOTAL HIP ARTHROPLASTY, LATERAL DECUB, GRACIE performed by Leah Carranza MD at [address]  12/12/2019    EGD DIAGNOSTIC ONLY performed by Terence Garcia MD at [facility]     No Known Allergies      Review of Systems  See HPI    Objective:   Pulse 68   Temp 97 °F (36.1 °C) (Temporal)   Resp 16   Ht 5' 5\" (1.651 m)   SpO2 100%   BMI 33.50 kg/m²     General: Patient appears ok at the present time. NAD  Skin: no new rashes left hip ischial area is getting 2 cm x 2 cm wound tunneling 9 11:00  HEENT:  Neck is supple, No subconjunctival hemorrhages, no oral exudates    Abdomen: soft, ND, NTTP,   Back :no CVA tenderness  Extrem: No edema, non tender  Neuro exam: Contracted extremities  Psych: Does not follow commands      Labs: I have reviewed all lab results by electronic record, including most recent CBC, metabolic panel, and pertinent abnormalities were addressed from an infectious disease perspective. Trends are being monitored over time. Radiology:  I have reviewed imaging results per electronic record and most pertinent abnormalities are being addressed from an infectious disease standpoint  Assessment:       Diagnosis Orders   1. Complicated open wound of left hip, subsequent encounter        2. Gram-positive bacteremia        There is a hip wound he does have a prosthetic hip on the side x-rays did not show any lucency the time of hospitalization. Establishment of osteomyelitis was not made at the time MRI was not completed patient has very difficult issues with transport he is contracted. Apparently is not a surgical candidate. he has completed 6 weeks of antibiotics with improvement in the wound.   If there is still underlying osteomyelitis will probably not be medically curable      Plan:      Would continue wound care and following this in an expectant fashion.   Patient needs to be followed at the nursing home closely for any development of worsening skin and soft tissue or development of sepsis which require antibiotics            Elke Neff MD

## 2022-10-19 ENCOUNTER — OFFICE VISIT (OUTPATIENT)
Dept: WOUND CARE | Age: 67
End: 2022-10-19
Payer: MEDICAID

## 2022-10-19 DIAGNOSIS — L03.116 CELLULITIS OF HIP, LEFT: Primary | ICD-10-CM

## 2022-10-19 PROCEDURE — 99202 OFFICE O/P NEW SF 15 MIN: CPT | Performed by: NURSE PRACTITIONER

## 2022-10-19 PROCEDURE — 1123F ACP DISCUSS/DSCN MKR DOCD: CPT | Performed by: NURSE PRACTITIONER

## 2022-10-19 NOTE — PROGRESS NOTES
[address] [facility] to see Mr. Padgett Fellin  AGE: 74 y.o. GENDER: male  : 1955  EPISODE DATE:  10/19/2022    Subjective:     Open draining wound to left hip     HISTORY of PRESENT ILLNESS HPI     Eugenia Cordon is a 74 y.o. male who presents today for wound/ulcer evaluation.    History of Wound Context: left hip wound from sepsis and osteomyelitis with cellulitis  Wound/Ulcer Pain Timing/Severity: waxing and waning  Quality of pain: sharp  Severity:  5 / 10   Modifying Factors: Pain is relieved/improved with rest  Associated Signs/Symptoms: drainage    Ulcer Identification:  Ulcer Type:  unknown origin - now has 5.5cm tunnel into wound at 8 o'clock  Contributing Factors: diabetes and smoking    Wound:         PAST MEDICAL HISTORY left hip from cellulitis originally from osteo & sepsis        Diagnosis Date    Abdominal pain     Asthma     Bloating symptom     COPD (chronic obstructive pulmonary disease) ([facility])     COPD (chronic obstructive pulmonary disease) ([facility])     Depression     Depression     Diarrhea     Drug-seeking behavior 2020    Emphysema/COPD ([address]     History of rectal bleeding     Hypercholesteremia     past trx > 5 yrs    Hypercholesteremia     Hypercholesteremia     Hypertension     meds > 3 yrs    Hypothyroid 2022    Lung disease     Nontraumatic subarachnoid hemorrhage ([address]     [address] [facility] [address] PAPERWORK    RANDI on CPAP     patient relates he doesn't use cpap at this time    Osteoarthritis     left hip    Osteoarthritis     Osteoarthritis of left hip     Other hydrocephalus ([address]     [address] PAPERWORK    Physiological consequence of immobility 2022    Pituitary macroadenoma ([address]     Sleep apnea        PAST SURGICAL HISTORY    Past Surgical History:   Procedure Laterality Date    COLONOSCOPY  14    JENNIFER    COLONOSCOPY N/A 12/12/2019    COLONOSCOPY DIAGNOSTIC performed by Rosemarie Garcia MD at [facility], DIAGNOSTIC      EYE SURGERY Bilateral     cataracts    [address] 51 ? index finger laceration repair    HERNIA REPAIR  2004    repair [facility]     JOINT REPLACEMENT Left     left hip    MS COLON CA SCRN NOT HI RSK IND N/A 5/17/2017    COLONOSCOPY performed by Charle Sicard, MD at [address] Left 4/22/2019    LEFT HIP TOTAL HIP ARTHROPLASTY, LATERAL DECUB, GRACIE performed by Cris Sullivan MD at [address]  12/12/2019    EGD DIAGNOSTIC ONLY performed by Rosemarie Garcia MD at [place] [address] History   Problem Relation Age of Onset    Colon Cancer Mother     Cancer Mother     Arthritis Mother     High Blood Pressure Father     Heart Disease Father     Diabetes Sister     No Known Problems Brother     No Known Problems Brother     No Known Problems Brother     Other Brother         Sepsis    Other Brother         Killed    No Known Problems Sister     No Known Problems Sister     No Known Problems Son        SOCIAL HISTORY    Social History     Tobacco Use    Smoking status: Former     Packs/day: 1.50     Years: 46.00     Pack years: 69.00     Types: Cigarettes     Quit date: 3/20/2019     Years since quitting: 3.5    Smokeless tobacco: Never   Vaping Use    Vaping Use: Unknown   Substance Use Topics    Alcohol use: Yes    Drug use: No       ALLERGIES    No Known Allergies    MEDICATIONS    Current Outpatient Medications on File Prior to Visit   Medication Sig Dispense Refill    levothyroxine (SYNTHROID) 150 MCG tablet Take 1 tablet by mouth in the morning.  30 tablet 3    Nutritional Supplements (ISOSOURCE 1.5 NUSRAT PO) 60 mLs by Percutaneous Endoscopic Gastrostomy route continuous      carboxymethylcellulose (ARTIFICIAL TEARS) 1 % ophthalmic solution Place 2 drops into both eyes daily atorvastatin (LIPITOR) 20 MG tablet 20 mg by PEG Tube route nightly      Atropine Sulfate 0.01 % SOLN Place 1 drop under the tongue as needed (EVERY TWO HOURS FOR REDUCTION OF SECRETIONS)      chlorhexidine (PERIDEX) 0.12 % solution Take 15 mLs by mouth in the morning, at noon, and at bedtime FOR USE WITH TOOTHETTES FOR MOUTH CARE      polyethylene glycol (MIRALAX) 17 g PACK packet 17 g by Per G Tube route in the morning and at bedtime      acetaminophen (TYLENOL) 325 MG tablet 650 mg by PEG Tube route every 4 hours as needed for Pain      sennosides-docusate sodium (SENOKOT-S) 8.6-50 MG tablet 1 tablet by PEG Tube route in the morning and at bedtime      desmopressin (DDAVP) 0.1 MG tablet 0.5 mg by PEG Tube route in the morning and at bedtime Give 0.05mg via PEG tube two times a day for clotting promoter      esomeprazole Magnesium (NEXIUM) 40 MG PACK 40 mg by Per G Tube route in the morning. levETIRAcetam (KEPPRA) 100 MG/ML solution 750 mg/kg by PEG Tube route 2 times daily      hyoscyamine (LEVSIN/SL) 125 MCG sublingual tablet Place 125 mcg under the tongue every 6 hours as needed for Cramping      morphine sulfate 20 MG/ML concentrated oral solution Take 5 mg by mouth every 2 hours as needed for Pain.      vitamin D (CHOLECALCIFEROL) 125 MCG (5000 UT) CAPS capsule Take 50,000 Units by mouth daily Give 1/25mg (01412FQ) one capsule via peg tube one time a day every Monday for supplement      [DISCONTINUED] paliperidone (INVEGA) 6 MG extended release tablet Take 1 tablet by mouth daily 30 tablet 1    cabergoline (DOSTINEX) 0.5 MG tablet Take 1 tablet by mouth Twice a Week ON Monday and Thursday 8 tablet 3    [DISCONTINUED] furosemide (LASIX) 40 MG tablet Take 40 mg by mouth daily      fenofibrate 160 MG tablet Take 150 mg by mouth daily      aspirin 81 MG EC tablet Take 1 tablet by mouth 2 times daily (Patient taking differently: Take 81 mg by mouth in the morning.  ADMINISTER VIA PEG TUBE.) 60 tablet 0 [DISCONTINUED] lisinopril (PRINIVIL;ZESTRIL) 20 MG tablet Take 2 tablets by mouth daily 30 tablet 3    Respiratory Therapy Supplies SANDRA Full face CPAP mask and supplies 1 Device 0    CPAP Machine MISC by Does not apply route New CPAP with 6 cm 1 each 0    albuterol (PROVENTIL) (2.5 MG/3ML) 0.083% nebulizer solution Take 3 mLs by nebulization every 6 hours as needed for Wheezing 120 each 5    STIOLTO RESPIMAT 2.5-2.5 MCG/ACT AERS Inhale 2 puff in AM 1 Inhaler 5     No current facility-administered medications on file prior to visit. REVIEW OF SYSTEMS    Pertinent items are noted in HPI. Objective: There were no vitals taken for this visit. Wt Readings from Last 3 Encounters:   07/21/22 201 lb 4.8 oz (91.3 kg)   06/06/22 175 lb (79.4 kg)   02/13/22 180 lb (81.6 kg)       PHYSICAL EXAM    Constitutional:   Well nourished and well developed. Appears neat and clean. Patient is alert, oriented x3, and in no apparent distress. Respiratory:  Respiratory effort is easy and symmetric bilaterally. Rate is normal at rest and on room air. Vascular:  Pedal Pulses is palpable and audible with doppler. Capillary refill is <3 sec to digits bilateral.  Extremities negative for 0 pitting edema. Neurological:   Sensation is + to lower extremities. Dermatological:  Wound description noted in wound assessment. Psychiatric:  Judgement and insight intact. Short and long term memory intact. No evidence of depression, anxiety, or agitation. Patient is calm, cooperative, and communicative. Appropriate interactions and affect. Assessment:      There are no active hospital problems to display for this patient. Procedure Note  Indications:  Based on my examination of this patient's wound(s)/ulcer(s) today, debridement is not required to promote healing and evaluate the wound base.     Wound/Ulcer #: 1    Post Debridement Measurements:  Wound/Ulcer Descriptions are Pre Debridement except measurements:    Wound 06/29/22 Coccyx (Active)   Number of days: 112     Incision 04/23/19 Hip Anterior;Left;Proximal (Active)   Number of days: 1274       Media Information  Document Information    Wound Care Image:  Wound   Left hip   10/19/2022 12:06   Attached To: Office Visit on 10/19/22 with MARICRUZ Harding NP     Source Information    MARICRUZ Harding NP  [facility]     Measurements  3.2x2. 2x3.0cm with tunnel at 8 o'clock 5.5cm deep    Wound to left hip irrigated with saline, skin prepped periwound, packed lightly with 1/2\" nugauze, packed with calcium alginate cut into thick rope and covered with 5x5 mepilex bordered dressing    Had very dry kin to left foot and applied urea cream and sock after washing with soap and water. Plan: To continue wound care with 1/2\" plain nugauze packing into tunnel    Will f/u in one week.         Electronically signed by MARICRUZ Joseph NP on 10/19/2022 at 2:48 PM

## 2022-10-26 ENCOUNTER — OFFICE VISIT (OUTPATIENT)
Dept: WOUND CARE | Age: 67
End: 2022-10-26
Payer: MEDICAID

## 2022-10-26 DIAGNOSIS — L89.224 PRESSURE ULCER OF LEFT HIP, STAGE 4 (HCC): Primary | ICD-10-CM

## 2022-10-26 PROCEDURE — 1123F ACP DISCUSS/DSCN MKR DOCD: CPT | Performed by: NURSE PRACTITIONER

## 2022-10-26 PROCEDURE — 99308 SBSQ NF CARE LOW MDM 20: CPT | Performed by: NURSE PRACTITIONER

## 2022-10-26 NOTE — PROGRESS NOTES
[address] [address]                                                   Progress Note and Procedure Note      Star Spangler  MEDICAL RECORD NUMBER:  [id number]  AGE: 74 y.o. GENDER: male  : 1955  EPISODE DATE:  10/26/2022    Subjective:     No chief complaint on file. HTN    Seen TODAY for visit for wound care for pressure ulcers to right medial heel - stage 2 and left hip stage 4     HISTORY of PRESENT ILLNESS HPI     Star Spangler is a 74 y.o. male who presents today for wound/ulcer evaluation.    History of Wound Context: pressure  Wound/Ulcer Pain Timing/Severity: intermittent  Quality of pain: tender  Severity:  unable to verbalize / 10   Modifying Factors:  mobility compromised  Associated Signs/Symptoms: drainage and pain    Ulcer Identification:  Ulcer Type: pressure  Contributing Factors: decreased mobility    Wound:  Pressure aggravated by poor mobility        PAST MEDICAL HISTORY        Diagnosis Date    Abdominal pain     Asthma     Bloating symptom     COPD (chronic obstructive pulmonary disease) (H[facility])     COPD (chronic obstructive pulmonary disease) (HCC)     Depression     Depression     Diarrhea     Drug-seeking behavior 2020    Emphysema/COPD ([address]     History of rectal bleeding     Hypercholesteremia     past trx > 5 yrs    Hypercholesteremia     Hypercholesteremia     Hypertension     meds > 3 yrs    Hypothyroid 2022    Lung disease     Nontraumatic subarachnoid hemorrhage ([address]     [address] PAPERWORK    RANDI on CPAP     patient relates he doesn't use cpap at this time    Osteoarthritis     left hip    Osteoarthritis     Osteoarthritis of left hip     Other hydrocephalus ([address]     [address] PAPERWORK    Physiological consequence of immobility 2022    Pituitary macroadenoma [facility]     Sleep apnea        PAST SURGICAL HISTORY    Past Surgical History:   Procedure Laterality Date    COLONOSCOPY  14    JENNIFER    COLONOSCOPY N/A 12/12/2019    COLONOSCOPY DIAGNOSTIC performed by Key Maxwell MD at [facility], DIAGNOSTIC      EYE SURGERY Bilateral     cataracts    [address] 51 ? index finger laceration repair    HERNIA REPAIR  2004    repair [facility]     JOINT REPLACEMENT Left     left hip    KS COLON CA SCRN NOT HI RSK IND N/A 5/17/2017    COLONOSCOPY performed by Tulio Wills MD at [address] [facility] Left 4/22/2019    LEFT HIP TOTAL HIP ARTHROPLASTY, LATERAL DECUB, GRACIE performed by Karyle Smoker, MD at [address] [facility]  12/12/2019    EGD DIAGNOSTIC ONLY performed by Key Maxwell MD at [place] [address] History   Problem Relation Age of Onset    Colon Cancer Mother     Cancer Mother     Arthritis Mother     High Blood Pressure Father     Heart Disease Father     Diabetes Sister     No Known Problems Brother     No Known Problems Brother     No Known Problems Brother     Other Brother         Sepsis    Other Brother         Killed    No Known Problems Sister     No Known Problems Sister     No Known Problems Son        SOCIAL HISTORY    Social History     Tobacco Use    Smoking status: Former     Packs/day: 1.50     Years: 46.00     Pack years: 69.00     Types: Cigarettes     Quit date: 3/20/2019     Years since quitting: 3.6    Smokeless tobacco: Never   Vaping Use    Vaping Use: Unknown   Substance Use Topics    Alcohol use: Yes    Drug use: No       ALLERGIES    No Known Allergies    MEDICATIONS    Current Outpatient Medications on File Prior to Visit   Medication Sig Dispense Refill    levothyroxine (SYNTHROID) 150 MCG tablet Take 1 tablet by mouth in the morning.  30 tablet 3    Nutritional Supplements (ISOSOURCE 1.5 NUSRAT PO) 60 mLs by Percutaneous Endoscopic Gastrostomy route continuous      carboxymethylcellulose (ARTIFICIAL TEARS) 1 % ophthalmic solution Place 2 drops into both eyes daily atorvastatin (LIPITOR) 20 MG tablet 20 mg by PEG Tube route nightly      Atropine Sulfate 0.01 % SOLN Place 1 drop under the tongue as needed (EVERY TWO HOURS FOR REDUCTION OF SECRETIONS)      chlorhexidine (PERIDEX) 0.12 % solution Take 15 mLs by mouth in the morning, at noon, and at bedtime FOR USE WITH TOOTHETTES FOR MOUTH CARE      polyethylene glycol (MIRALAX) 17 g PACK packet 17 g by Per G Tube route in the morning and at bedtime      acetaminophen (TYLENOL) 325 MG tablet 650 mg by PEG Tube route every 4 hours as needed for Pain      sennosides-docusate sodium (SENOKOT-S) 8.6-50 MG tablet 1 tablet by PEG Tube route in the morning and at bedtime      desmopressin (DDAVP) 0.1 MG tablet 0.5 mg by PEG Tube route in the morning and at bedtime Give 0.05mg via PEG tube two times a day for clotting promoter      esomeprazole Magnesium (NEXIUM) 40 MG PACK 40 mg by Per G Tube route in the morning. levETIRAcetam (KEPPRA) 100 MG/ML solution 750 mg/kg by PEG Tube route 2 times daily      hyoscyamine (LEVSIN/SL) 125 MCG sublingual tablet Place 125 mcg under the tongue every 6 hours as needed for Cramping      morphine sulfate 20 MG/ML concentrated oral solution Take 5 mg by mouth every 2 hours as needed for Pain.      vitamin D (CHOLECALCIFEROL) 125 MCG (5000 UT) CAPS capsule Take 50,000 Units by mouth daily Give 1/25mg (68989JF) one capsule via peg tube one time a day every Monday for supplement      [DISCONTINUED] paliperidone (INVEGA) 6 MG extended release tablet Take 1 tablet by mouth daily 30 tablet 1    cabergoline (DOSTINEX) 0.5 MG tablet Take 1 tablet by mouth Twice a Week ON Monday and Thursday 8 tablet 3    [DISCONTINUED] furosemide (LASIX) 40 MG tablet Take 40 mg by mouth daily      fenofibrate 160 MG tablet Take 150 mg by mouth daily      aspirin 81 MG EC tablet Take 1 tablet by mouth 2 times daily (Patient taking differently: Take 81 mg by mouth in the morning.  ADMINISTER VIA PEG TUBE.) 60 tablet 0 [DISCONTINUED] lisinopril (PRINIVIL;ZESTRIL) 20 MG tablet Take 2 tablets by mouth daily 30 tablet 3    Respiratory Therapy Supplies SANDRA Full face CPAP mask and supplies 1 Device 0    CPAP Machine MISC by Does not apply route New CPAP with 6 cm 1 each 0    albuterol (PROVENTIL) (2.5 MG/3ML) 0.083% nebulizer solution Take 3 mLs by nebulization every 6 hours as needed for Wheezing 120 each 5    STIOLTO RESPIMAT 2.5-2.5 MCG/ACT AERS Inhale 2 puff in AM 1 Inhaler 5     No current facility-administered medications on file prior to visit. REVIEW OF SYSTEMS    Pertinent items are noted in HPI. Objective: There were no vitals taken for this visit. Wt Readings from Last 3 Encounters:   07/21/22 201 lb 4.8 oz (91.3 kg)   06/06/22 175 lb (79.4 kg)   02/13/22 180 lb (81.6 kg)       PHYSICAL EXAM    Constitutional:   Well nourished and well developed. Appears neat and clean. Patient is alert, oriented x3, and in no apparent distress. Respiratory:  Respiratory effort is easy and symmetric bilaterally. Rate is normal at rest and on room air. Vascular:  Pedal Pulses is palpable and audible with doppler. Capillary refill is <3 sec to digits bilateral.  Extremities positive for 0 pitting edema. Neurological:   Sensation is present to lower extremities. Dermatological:  Wound description noted in wound assessment. Psychiatric:  Judgement and insight intact. Short and long term memory intact. No evidence of depression, anxiety, or agitation. Patient is calm, cooperative, and communicative. Appropriate interactions and affect. Assessment:      There are no active hospital problems to display for this patient. Rt medial heel    Left hip     Procedure Note  Indications:  Based on my examination of this patient's wound(s)/ulcer(s) today, debridement is not required to promote healing and evaluate the wound base.     Measurements to left hio [id number] with 5,0 cm depth at 8 o'clock  Measurements right medial heel 1.9x2.0cm without depth beefy red    Left hip - Packing with calcium alginate rope with Ag  Right heel - calcium alginate square cut to fit wound bed which is beefy red  Skin dry to left foot extreme dryness - applied urea which is helping    Patient tolerated well    Plan:     Continue wound therapy ordered      Return in one week      Electronically signed by Ruel Kanner, APRN - NP on 10/26/2022 at 4:02 PM

## 2022-11-02 ENCOUNTER — OFFICE VISIT (OUTPATIENT)
Dept: WOUND CARE | Age: 67
End: 2022-11-02
Payer: MEDICAID

## 2022-11-02 DIAGNOSIS — L89.224 PRESSURE ULCER OF LEFT HIP, STAGE 4 (HCC): Primary | ICD-10-CM

## 2022-11-02 PROCEDURE — 99213 OFFICE O/P EST LOW 20 MIN: CPT | Performed by: NURSE PRACTITIONER

## 2022-11-02 PROCEDURE — 1123F ACP DISCUSS/DSCN MKR DOCD: CPT | Performed by: NURSE PRACTITIONER

## 2022-11-02 NOTE — PROGRESS NOTES
[facility] Wound Care Note                                       For Stephanie Loco RECORD NUMBER:  [id number]  AGE: 74 y.o. GENDER: male  : 1955  EPISODE DATE:  2022    Subjective:     No chief complaint on file. HISTORY of PRESENT ILLNESS HPI     Gissel Amador is a 74 y.o. male who presents today for wound/ulcer evaluation. History of Wound Context: Left hip wound and bilateral heel wounds which are healed. Wound/Ulcer Pain Timing/Severity: waxing and waning  Quality of pain: N/A  Severity:  hard to ascertain as he moves as though in pain, but non-responsive to us verbally / 10   Modifying Factors:  non ambulatory and confused  Associated Signs/Symptoms: drainage      Review of Systems:   Review of Systems      Physical Examination:    There were no vitals taken for this visit.    Physical Exam    LABS:  CBC:   Lab Results   Component Value Date/Time    WBC 5.7 09/15/2022 05:40 PM    RBC 3.62 09/15/2022 05:40 PM    HGB 11.3 09/15/2022 05:40 PM    HCT 34.1 09/15/2022 05:40 PM    MCV 94.1 09/15/2022 05:40 PM    MCH 31.2 09/15/2022 05:40 PM    MCHC 33.1 09/15/2022 05:40 PM    RDW 15.4 09/15/2022 05:40 PM     09/15/2022 05:40 PM    MPV 8.7 2014 09:12 AM     CBC with Differential:   Lab Results   Component Value Date/Time    WBC 5.7 09/15/2022 05:40 PM    RBC 3.62 09/15/2022 05:40 PM    HGB 11.3 09/15/2022 05:40 PM    HCT 34.1 09/15/2022 05:40 PM     09/15/2022 05:40 PM    MCV 94.1 09/15/2022 05:40 PM    MCH 31.2 09/15/2022 05:40 PM    MCHC 33.1 09/15/2022 05:40 PM    RDW 15.4 09/15/2022 05:40 PM    LYMPHOPCT 36.7 09/15/2022 05:40 PM    MONOPCT 8.2 09/15/2022 05:40 PM    BASOPCT 0.6 09/15/2022 05:40 PM    MONOSABS 0.5 09/15/2022 05:40 PM    LYMPHSABS 2.1 09/15/2022 05:40 PM    EOSABS 0.3 09/15/2022 05:40 PM    BASOSABS 0.0 09/15/2022 05:40 PM     CMP:    Lab Results   Component Value Date/Time    NA 139 09/15/2022 05:40 PM    K 4.2 09/15/2022 05:40 PM    K 3.9 07/24/2022 06:09 AM     09/15/2022 05:40 PM    CO2 26 09/15/2022 05:40 PM    BUN 21 09/15/2022 05:40 PM    CREATININE 0.42 09/15/2022 05:40 PM    GFRAA >60.0 09/15/2022 05:40 PM    LABGLOM >60.0 09/15/2022 05:40 PM    GLUCOSE 96 09/15/2022 05:40 PM    PROT 6.6 07/24/2022 06:09 AM    LABALBU 2.5 07/24/2022 06:09 AM    CALCIUM 9.5 09/15/2022 05:40 PM    BILITOT 0.4 07/24/2022 06:09 AM    ALKPHOS 96 07/24/2022 06:09 AM    AST 33 07/24/2022 06:09 AM    ALT 49 07/24/2022 06:09 AM     BMP:    Lab Results   Component Value Date/Time     09/15/2022 05:40 PM    K 4.2 09/15/2022 05:40 PM    K 3.9 07/24/2022 06:09 AM     09/15/2022 05:40 PM    CO2 26 09/15/2022 05:40 PM    BUN 21 09/15/2022 05:40 PM    LABALBU 2.5 07/24/2022 06:09 AM    CREATININE 0.42 09/15/2022 05:40 PM    CALCIUM 9.5 09/15/2022 05:40 PM    GFRAA >60.0 09/15/2022 05:40 PM    LABGLOM >60.0 09/15/2022 05:40 PM    GLUCOSE 96 09/15/2022 05:40 PM       Radiology:  No results found. Feet are basically healed - but will keep mepilex border dressings in place to protect with the urea cream  However, left hip wound is worse than last week      Assessment:     Hip wound is bigger than last visit and has some bleeding when measuring to a depth of 7.5cm tunnel @ 8 o'clock and overall, measures 3.0x1.5x2.5cm deep - was able to stop the bleeding using cut length of rope into the 7.5cm tunnel after irrigating thoroughly and skin prepping periwound. Plan:     Continue with the preventive therapy for the feet.   Continue to pack wound q2-3 days by cutting length of the calcium alginate Ag pad after irrigation with saline and skin prep to periwound and cover with rest of pad of the calcium aginate Ag and cover with 5x5 bordered mepilex dressing      Electronically signed by MARICRUZ Paez NP on 11/2/2022 at 3:31 PM

## 2022-11-09 ENCOUNTER — OFFICE VISIT (OUTPATIENT)
Dept: WOUND CARE | Age: 67
End: 2022-11-09
Payer: MEDICAID

## 2022-11-09 DIAGNOSIS — L89.226 PRESSURE INJURY OF DEEP TISSUE OF LEFT HIP: Primary | ICD-10-CM

## 2022-11-09 PROCEDURE — 1123F ACP DISCUSS/DSCN MKR DOCD: CPT | Performed by: NURSE PRACTITIONER

## 2022-11-09 PROCEDURE — 99213 OFFICE O/P EST LOW 20 MIN: CPT | Performed by: NURSE PRACTITIONER

## 2022-11-09 NOTE — PROGRESS NOTES
Violeta Craig 39                    Progress Note at [address] [facility] RECORD NUMBER:  [id number]  AGE: 74 y.o. GENDER: male  : 1955  EPISODE DATE:  2022    Subjective:   Patient is non-verbal, and is restless with thick phlegm from neck intubation and has a fever of 102.9 - culture to be obtained from his breathing tube. HISTORY of PRESENT ILLNESS HPI     Ashley Millan is a 74 y.o. male who presents today for wound/ulcer evaluation.    History of Wound Context: all three wounds are pressure related #1 to hip  Wound/Ulcer Pain Timing/Severity: patient is non-verbal and restless, likely in discomfort  Quality of pain: could be high as he is very restless and running a fever of 102.9  Severity:  unable to ascertain / 10   Modifying Factors:  fever and possible infection  Associated Signs/Symptoms: drainage and pain    Ulcer Identification:  Ulcer Type: pressure  Contributing Factors: chronic pressure    Wound:  has 3 wounds due to pressure and contractures to legs - #1 Left hip #2 Left heel #3 Right heel        PAST MEDICAL HISTORY        Diagnosis Date    Abdominal pain     Asthma     Bloating symptom     COPD (chronic obstructive pulmonary disease) (HCC)     COPD (chronic obstructive pulmonary disease) ([facility] [address]     Depression     Depression     Diarrhea     Drug-seeking behavior 2020    Emphysema/COPD ([facility] [address]     History of rectal bleeding     Hypercholesteremia     past trx > 5 yrs    Hypercholesteremia     Hypercholesteremia     Hypertension     meds > 3 yrs    Hypothyroid 2022    Lung disease     Nontraumatic subarachnoid hemorrhage ([facility] [address]     [facility] [address]    RANDI on CPAP     patient relates he doesn't use cpap at this time    Osteoarthritis     left hip    Osteoarthritis     Osteoarthritis of left hip     Other hydrocephalus ([facility] [address]     [address]    Physiological consequence of immobility 03/09/2022    Pituitary macroadenoma ([address]     Sleep apnea        PAST SURGICAL HISTORY    Past Surgical History:   Procedure Laterality Date    COLONOSCOPY  5/2/14    [facility]    COLONOSCOPY N/A 12/12/2019    COLONOSCOPY DIAGNOSTIC performed by Fany Puckett MD at [facility], DIAGNOSTIC      EYE SURGERY Bilateral     cataracts    [address] 51 ? index finger laceration repair    HERNIA REPAIR  2004    repair [facility]     JOINT REPLACEMENT Left     left hip    NE COLON CA SCRN NOT HI RSK IND N/A 5/17/2017    COLONOSCOPY performed by Rosemarie Rodriguez MD at [address] [facility] Left 4/22/2019    LEFT HIP TOTAL HIP ARTHROPLASTY, LATERAL DECUB, GRACIE performed by Duke Haynes MD at [address] [place] [address]  12/12/2019    EGD DIAGNOSTIC ONLY performed by Fany Puckett MD at [place] [address] History   Problem Relation Age of Onset    Colon Cancer Mother     Cancer Mother     Arthritis Mother     High Blood Pressure Father     Heart Disease Father     Diabetes Sister     No Known Problems Brother     No Known Problems Brother     No Known Problems Brother     Other Brother         Sepsis    Other Brother         Killed    No Known Problems Sister     No Known Problems Sister     No Known Problems Son        SOCIAL HISTORY    Social History     Tobacco Use    Smoking status: Former     Packs/day: 1.50     Years: 46.00     Pack years: 69.00     Types: Cigarettes     Quit date: 3/20/2019     Years since quitting: 3.6    Smokeless tobacco: Never   Vaping Use    Vaping Use: Unknown   Substance Use Topics    Alcohol use: Yes    Drug use: No       ALLERGIES    No Known Allergies    MEDICATIONS    Current Outpatient Medications on File Prior to Visit   Medication Sig Dispense Refill    levothyroxine (SYNTHROID) 150 MCG tablet Take 1 tablet by mouth in the morning.  30 tablet 3    Nutritional Supplements (ISOSOURCE 1.5 NUSRAT PO) 60 mLs by Percutaneous Endoscopic Gastrostomy route continuous      carboxymethylcellulose (ARTIFICIAL TEARS) 1 % ophthalmic solution Place 2 drops into both eyes daily      atorvastatin (LIPITOR) 20 MG tablet 20 mg by PEG Tube route nightly      Atropine Sulfate 0.01 % SOLN Place 1 drop under the tongue as needed (EVERY TWO HOURS FOR REDUCTION OF SECRETIONS)      chlorhexidine (PERIDEX) 0.12 % solution Take 15 mLs by mouth in the morning, at noon, and at bedtime FOR USE WITH TOOTHETTES FOR MOUTH CARE      polyethylene glycol (MIRALAX) 17 g PACK packet 17 g by Per G Tube route in the morning and at bedtime      acetaminophen (TYLENOL) 325 MG tablet 650 mg by PEG Tube route every 4 hours as needed for Pain      sennosides-docusate sodium (SENOKOT-S) 8.6-50 MG tablet 1 tablet by PEG Tube route in the morning and at bedtime      desmopressin (DDAVP) 0.1 MG tablet 0.5 mg by PEG Tube route in the morning and at bedtime Give 0.05mg via PEG tube two times a day for clotting promoter      esomeprazole Magnesium (NEXIUM) 40 MG PACK 40 mg by Per G Tube route in the morning.       levETIRAcetam (KEPPRA) 100 MG/ML solution 750 mg/kg by PEG Tube route 2 times daily      hyoscyamine (LEVSIN/SL) 125 MCG sublingual tablet Place 125 mcg under the tongue every 6 hours as needed for Cramping      morphine sulfate 20 MG/ML concentrated oral solution Take 5 mg by mouth every 2 hours as needed for Pain.      vitamin D (CHOLECALCIFEROL) 125 MCG (5000 UT) CAPS capsule Take 50,000 Units by mouth daily Give 1/25mg ([id number]) one capsule via peg tube one time a day every Monday for supplement      [DISCONTINUED] paliperidone (INVEGA) 6 MG extended release tablet Take 1 tablet by mouth daily 30 tablet 1    cabergoline (DOSTINEX) 0.5 MG tablet Take 1 tablet by mouth Twice a Week ON Monday and Thursday 8 tablet 3    [DISCONTINUED] furosemide (LASIX) 40 MG tablet Take 40 mg by mouth daily      fenofibrate 160 MG tablet Take 150 mg by mouth daily      aspirin 81 MG EC tablet Take 1 tablet by mouth 2 times daily (Patient taking differently: Take 81 mg by mouth in the morning. ADMINISTER VIA PEG TUBE.) 60 tablet 0    [DISCONTINUED] lisinopril (PRINIVIL;ZESTRIL) 20 MG tablet Take 2 tablets by mouth daily 30 tablet 3    Respiratory Therapy Supplies SANDRA Full face CPAP mask and supplies 1 Device 0    CPAP Machine MISC by Does not apply route New CPAP with 6 cm 1 each 0    albuterol (PROVENTIL) (2.5 MG/3ML) 0.083% nebulizer solution Take 3 mLs by nebulization every 6 hours as needed for Wheezing 120 each 5    STIOLTO RESPIMAT 2.5-2.5 MCG/ACT AERS Inhale 2 puff in AM 1 Inhaler 5     No current facility-administered medications on file prior to visit. REVIEW OF SYSTEMS    Pertinent items are noted in HPI. Objective: There were no vitals taken for this visit. Wt Readings from Last 3 Encounters:   07/21/22 201 lb 4.8 oz (91.3 kg)   06/06/22 175 lb (79.4 kg)   02/13/22 180 lb (81.6 kg)       PHYSICAL EXAM    Constitutional:   Well nourished and well developed. Appears neat and clean. Patient is alert, oriented x3, and in no apparent distress. Respiratory:  Respiratory effort is easy and symmetric bilaterally. Rate is normal at rest and on room air. Vascular:  Pedal Pulses is palpable and audible with doppler. Capillary refill is <3 sec to digits bilateral.  Extremities positive for 0 pitting edema. Neurological:   Sensation is positive to lower extremities.     Dermatological:  Wound description is stable to feet, but worsening in left hip    Psychiatric:  Unable to ascertain due to immobile with contractures, non-verbal and condition is worsening with fever    Assessment:      Wound#1 left hip juancho 1.4x1.0x4.0cm       Wound #2 Left Heel juancho 1.3x6.0x0.1cm      Wound #3 Right Heel juancho 1.0x3.0x0.1cm    Procedure Note  Indications:  Based on my examination of this patient's wound(s)/ulcer(s) today, debridement is not required to promote healing and evaluate the wound base. Performed by: MARICRUZ Castillo NP    Consent obtained:  Yes    Time out taken:  Yes    Pain Control:pain control list: Other unable to ascertain    Wound 97/82/19 Coccyx (Active)   Number of days: 133     Incision 04/23/19 Hip Anterior;Left;Proximal (Active)   Number of days: 1295     Plan:   Continue dressing changes to bilateral heels and left hip    Pack left hip with calcium alginate Ag strips and cover with island dressing  Cover heels with border dressing after applying skin prep and calcium alginate Ag dressing and wrapping with gauze and putting heels into cushioned foot offloading.     Electronically signed by MARICRUZ Castillo NP on 11/9/2022 at 2:31 PM

## 2022-11-16 ENCOUNTER — OFFICE VISIT (OUTPATIENT)
Dept: WOUND CARE | Age: 67
End: 2022-11-16
Payer: MEDICAID

## 2022-11-16 DIAGNOSIS — L89.224 PRESSURE INJURY OF LEFT HIP, STAGE 4 (HCC): Primary | ICD-10-CM

## 2022-11-16 PROCEDURE — 1123F ACP DISCUSS/DSCN MKR DOCD: CPT | Performed by: NURSE PRACTITIONER

## 2022-11-16 PROCEDURE — 99214 OFFICE O/P EST MOD 30 MIN: CPT | Performed by: NURSE PRACTITIONER

## 2022-11-16 NOTE — PROGRESS NOTES
Violeta Craig 39                                    Progress Note at [address] [facility] RECORD NUMBER:  [id number]  AGE: 74 y.o. GENDER: male  : 1955  EPISODE DATE:  2022    Subjective:     No chief complaint on file. Reason for visit: Wound care assessment and dressing change to multiple wounds     HISTORY of PRESENT ILLNESS HPI     Clayton Gee is a 74 y.o. male who presents today for wound/ulcer evaluation.    History of Wound Context: pressure injuries due to immobility and contractures  Wound/Ulcer Pain Timing/Severity: intermittent  Quality of pain: N/A  Severity:  0 / 10 does not verbalize and does not appear in pain  Modifying Factors: None  Associated Signs/Symptoms:  non ambulatory and contractured    Ulcer Identification:  Ulcer Type: pressure  Contributing Factors: decreased mobility    Wound:  pressure and immobility        PAST MEDICAL HISTORY        Diagnosis Date    Abdominal pain     Asthma     Bloating symptom     COPD (chronic obstructive pulmonary disease) (HCC)     COPD [facility]chronic obstructive pulmonary disease) ([facility]     Depression     Depression     Diarrhea     Drug-seeking behavior 2020    Emphysema/COPD ([facility] [address]     History of rectal bleeding     Hypercholesteremia     past trx > 5 yrs    Hypercholesteremia     Hypercholesteremia     Hypertension     meds > 3 yrs    Hypothyroid 2022    Lung disease     Nontraumatic subarachnoid hemorrhage ([address]     [facility] [address] [facility]    RANDI on CPAP     patient relates he doesn't use cpap at this time    Osteoarthritis     left hip    Osteoarthritis     Osteoarthritis of left hip     Other hydrocephalus ([address]     [address] PAPERWORK    Physiological consequence of immobility 2022    Pituitary macroadenoma ([facility] [address]     Sleep apnea        PAST SURGICAL HISTORY    Past Surgical History:   Procedure Laterality Date    COLONOSCOPY  16    [facility] COLONOSCOPY N/A 12/12/2019    COLONOSCOPY DIAGNOSTIC performed by Yaneli Ramachandran MD at [facility], DIAGNOSTIC      EYE SURGERY Bilateral     cataracts    [address] 51 ? index finger laceration repair    HERNIA REPAIR  2004    repair [facility]     JOINT REPLACEMENT Left     left hip    CO COLON CA SCRN NOT HI RSK IND N/A 5/17/2017    COLONOSCOPY performed by Isaiah Pepe MD at [address] Left 4/22/2019    LEFT HIP TOTAL HIP ARTHROPLASTY, LATERAL DECUB, GRACIE performed by Linnette Sears MD at [facility]  12/12/2019    EGD DIAGNOSTIC ONLY performed by Yaneli Ramachandran MD at [place] [address] History   Problem Relation Age of Onset    Colon Cancer Mother     Cancer Mother     Arthritis Mother     High Blood Pressure Father     Heart Disease Father     Diabetes Sister     No Known Problems Brother     No Known Problems Brother     No Known Problems Brother     Other Brother         Sepsis    Other Brother         Killed    No Known Problems Sister     No Known Problems Sister     No Known Problems Son        SOCIAL HISTORY    Social History     Tobacco Use    Smoking status: Former     Packs/day: 1.50     Years: 46.00     Pack years: 69.00     Types: Cigarettes     Quit date: 3/20/2019     Years since quitting: 3.6    Smokeless tobacco: Never   Vaping Use    Vaping Use: Unknown   Substance Use Topics    Alcohol use: Yes    Drug use: No       ALLERGIES    No Known Allergies    MEDICATIONS    Current Outpatient Medications on File Prior to Visit   Medication Sig Dispense Refill    levothyroxine (SYNTHROID) 150 MCG tablet Take 1 tablet by mouth in the morning.  30 tablet 3    Nutritional Supplements (ISOSOURCE 1.5 NUSRAT PO) 60 mLs by Percutaneous Endoscopic Gastrostomy route continuous      carboxymethylcellulose (ARTIFICIAL TEARS) 1 % ophthalmic solution Place 2 drops into both eyes daily      atorvastatin (LIPITOR) 20 MG tablet 20 mg by PEG Tube route nightly      Atropine Sulfate 0.01 % SOLN Place 1 drop under the tongue as needed (EVERY TWO HOURS FOR REDUCTION OF SECRETIONS)      chlorhexidine (PERIDEX) 0.12 % solution Take 15 mLs by mouth in the morning, at noon, and at bedtime FOR USE WITH TOOTHETTES FOR MOUTH CARE      polyethylene glycol (MIRALAX) 17 g PACK packet 17 g by Per G Tube route in the morning and at bedtime      acetaminophen (TYLENOL) 325 MG tablet 650 mg by PEG Tube route every 4 hours as needed for Pain      sennosides-docusate sodium (SENOKOT-S) 8.6-50 MG tablet 1 tablet by PEG Tube route in the morning and at bedtime      desmopressin (DDAVP) 0.1 MG tablet 0.5 mg by PEG Tube route in the morning and at bedtime Give 0.05mg via PEG tube two times a day for clotting promoter      esomeprazole Magnesium (NEXIUM) 40 MG PACK 40 mg by Per G Tube route in the morning. levETIRAcetam (KEPPRA) 100 MG/ML solution 750 mg/kg by PEG Tube route 2 times daily      hyoscyamine (LEVSIN/SL) 125 MCG sublingual tablet Place 125 mcg under the tongue every 6 hours as needed for Cramping      morphine sulfate 20 MG/ML concentrated oral solution Take 5 mg by mouth every 2 hours as needed for Pain.      vitamin D (CHOLECALCIFEROL) 125 MCG (5000 UT) CAPS capsule Take 50,000 Units by mouth daily Give 1/25mg ([id number]) one capsule via peg tube one time a day every Monday for supplement      [DISCONTINUED] paliperidone (INVEGA) 6 MG extended release tablet Take 1 tablet by mouth daily 30 tablet 1    cabergoline (DOSTINEX) 0.5 MG tablet Take 1 tablet by mouth Twice a Week ON Monday and Thursday 8 tablet 3    [DISCONTINUED] furosemide (LASIX) 40 MG tablet Take 40 mg by mouth daily      fenofibrate 160 MG tablet Take 150 mg by mouth daily      aspirin 81 MG EC tablet Take 1 tablet by mouth 2 times daily (Patient taking differently: Take 81 mg by mouth in the morning.  ADMINISTER VIA PEG TUBE.) 60 tablet 0    [DISCONTINUED] lisinopril (PRINIVIL;ZESTRIL) 20 MG tablet Take 2 tablets by mouth daily 30 tablet 3    Respiratory Therapy Supplies SANDRA Full face CPAP mask and supplies 1 Device 0    CPAP Machine MISC by Does not apply route New CPAP with 6 cm 1 each 0    albuterol (PROVENTIL) (2.5 MG/3ML) 0.083% nebulizer solution Take 3 mLs by nebulization every 6 hours as needed for Wheezing 120 each 5    STIOLTO RESPIMAT 2.5-2.5 MCG/ACT AERS Inhale 2 puff in AM 1 Inhaler 5     No current facility-administered medications on file prior to visit. REVIEW OF SYSTEMS    Pertinent items are noted in HPI. Objective: There were no vitals taken for this visit. Wt Readings from Last 3 Encounters:   07/21/22 201 lb 4.8 oz (91.3 kg)   06/06/22 175 lb (79.4 kg)   02/13/22 180 lb (81.6 kg)     PHYSICAL EXAM    Constitutional:   Well nourished and well developed. Appears neat and clean. Patient is alert, oriented x3, and in no apparent distress. Respiratory:  Respiratory effort is easy and symmetric bilaterally. Rate is normal at rest and on room air. Vascular:  Pedal Pulses is palpable and audible with doppler. Capillary refill is <3 sec to digits bilateral.  Extremities negative for 0 pitting edema. Neurological:   Sensation is present to lower extremities. Dermatological:  Wound description noted in wound assessment with several depths of pressure injuries    Psychiatric:  Judgment and insight iunknown. Patient is calm. Assessment:      There are no active hospital problems to display for this patient.     Pressure injury wounds     Right anterior leg 4.3 x 2.0 with superficial depth    Right lateral heel 1.3 x 2.2 cm with superficial depth    Left lateral foot 1.2 x 2.0 cm with superficial depth    Left hip - 3.0 x 1.2 x 3.0 cm x 5.5 cm tunnel at 7 o'clock w/satellite lesions    Procedure Note  Indications:  Based on my examination of this patient's wound(s)/ulcer(s) today, debridement is not required to promote healing and evaluate the wound base.     Plan:     Continue current poc with packing of calcium alginate ag to hip wound with border dressing and hydrocolloid to rest of wounds    Electronically signed by MARICRUZ [place] NP on 11/16/2022 at 1:59 PM

## 2022-11-23 ENCOUNTER — OFFICE VISIT (OUTPATIENT)
Dept: WOUND CARE | Age: 67
End: 2022-11-23
Payer: MEDICAID

## 2022-11-23 DIAGNOSIS — L89.224 PRESSURE INJURY OF LEFT HIP, STAGE 4 (HCC): Primary | ICD-10-CM

## 2022-11-23 PROCEDURE — 99212 OFFICE O/P EST SF 10 MIN: CPT | Performed by: NURSE PRACTITIONER

## 2022-11-23 PROCEDURE — 1123F ACP DISCUSS/DSCN MKR DOCD: CPT | Performed by: NURSE PRACTITIONER

## 2022-11-26 NOTE — PROGRESS NOTES
[address] @ [address] [facility] RECORD NUMBER:  [id number]  AGE: 74 y.o. GENDER: male  : 1955  EPISODE DATE:  2022    Subjective:     No chief complaint on file. Being seen for current wounds to body due to pressure and contractures with immobility     HISTORY of PRESENT ILLNESS HPI     Ashley Millan is a 74 y.o. male who presents today for wound/ulcer evaluation.    History of Wound Context: wounds from pressure due to being bedbound and inability to move on own with contractures  Wound/Ulcer Pain Timing/Severity: intermittent  Quality of pain: not able to voice  Severity:  unable to ascertain / 10   Modifying Factors:  immobility and pressure  Associated Signs/Symptoms: drainage    Ulcer Identification:  Ulcer Type: pressure  Contributing Factors: decreased mobility    Wound:   #1 is left hip stage 4 measures 3.0x2.3 x 2.3 and tunnels 7 cm at 7 o'clock  are superficial wounds         PAST MEDICAL HISTORY        Diagnosis Date    Abdominal pain     Asthma     Bloating symptom     COPD (chronic obstructive pulmonary disease) (HCC)     COPD (chronic obstructive pulmonary disease) (HCC)     Depression     Depression     Diarrhea     Drug-seeking behavior 2020    Emphysema/COPD ([facility] [address]     History of rectal bleeding     Hypercholesteremia     past trx > 5 yrs    Hypercholesteremia     Hypercholesteremia     Hypertension     meds > 3 yrs    Hypothyroid 2022    Lung disease     Nontraumatic subarachnoid hemorrhage ([facility] [address]     [facility]    RANDI on CPAP     patient relates he doesn't use cpap at this time    Osteoarthritis     left hip    Osteoarthritis     Osteoarthritis of left hip     Other hydrocephalus ([facility] [address]     [address] PAPERWORK    Physiological consequence of immobility 2022    Pituitary macroadenoma (HCC)     Sleep apnea        PAST SURGICAL HISTORY    Past Surgical History:   Procedure Laterality Date    COLONOSCOPY  5/2/14    PRATEEKLETY    COLONOSCOPY N/A 12/12/2019    COLONOSCOPY DIAGNOSTIC performed by Thao Henson MD at [facility], DIAGNOSTIC      EYE SURGERY Bilateral     cataracts    Masina 49 ? index finger laceration repair    HERNIA REPAIR  2004    repair [facility]     JOINT REPLACEMENT Left     left hip    ND COLON CA SCRN NOT HI RSK IND N/A 5/17/2017    COLONOSCOPY performed by Sherren Conner, MD at [address] [facility] Left 4/22/2019    LEFT HIP TOTAL HIP ARTHROPLASTY, LATERAL DECUB, GRACIE performed by Annita Moreira MD at [facility]  12/12/2019    EGD DIAGNOSTIC ONLY performed by Thao Henson MD at [place] [address] History   Problem Relation Age of Onset    Colon Cancer Mother     Cancer Mother     Arthritis Mother     High Blood Pressure Father     Heart Disease Father     Diabetes Sister     No Known Problems Brother     No Known Problems Brother     No Known Problems Brother     Other Brother         Sepsis    Other Brother         Killed    No Known Problems Sister     No Known Problems Sister     No Known Problems Son        SOCIAL HISTORY    Social History     Tobacco Use    Smoking status: Former     Packs/day: 1.50     Years: 46.00     Pack years: 69.00     Types: Cigarettes     Quit date: 3/20/2019     Years since quitting: 3.6    Smokeless tobacco: Never   Vaping Use    Vaping Use: Unknown   Substance Use Topics    Alcohol use: Yes    Drug use: No       ALLERGIES    No Known Allergies    MEDICATIONS    Current Outpatient Medications on File Prior to Visit   Medication Sig Dispense Refill    levothyroxine (SYNTHROID) 150 MCG tablet Take 1 tablet by mouth in the morning.  30 tablet 3    Nutritional Supplements (ISOSOURCE 1.5 NUSRAT PO) 60 mLs by Percutaneous Endoscopic Gastrostomy route continuous      carboxymethylcellulose (ARTIFICIAL TEARS) 1 % ophthalmic solution Place 2 drops into both eyes daily      atorvastatin (LIPITOR) 20 MG tablet 20 mg by PEG Tube route nightly      Atropine Sulfate 0.01 % SOLN Place 1 drop under the tongue as needed (EVERY TWO HOURS FOR REDUCTION OF SECRETIONS)      chlorhexidine (PERIDEX) 0.12 % solution Take 15 mLs by mouth in the morning, at noon, and at bedtime FOR USE WITH TOOTHETTES FOR MOUTH CARE      polyethylene glycol (MIRALAX) 17 g PACK packet 17 g by Per G Tube route in the morning and at bedtime      acetaminophen (TYLENOL) 325 MG tablet 650 mg by PEG Tube route every 4 hours as needed for Pain      sennosides-docusate sodium (SENOKOT-S) 8.6-50 MG tablet 1 tablet by PEG Tube route in the morning and at bedtime      desmopressin (DDAVP) 0.1 MG tablet 0.5 mg by PEG Tube route in the morning and at bedtime Give 0.05mg via PEG tube two times a day for clotting promoter      esomeprazole Magnesium (NEXIUM) 40 MG PACK 40 mg by Per G Tube route in the morning.       levETIRAcetam (KEPPRA) 100 MG/ML solution 750 mg/kg by PEG Tube route 2 times daily      hyoscyamine (LEVSIN/SL) 125 MCG sublingual tablet Place 125 mcg under the tongue every 6 hours as needed for Cramping      morphine sulfate 20 MG/ML concentrated oral solution Take 5 mg by mouth every 2 hours as needed for Pain.      vitamin D (CHOLECALCIFEROL) 125 MCG (5000 UT) CAPS capsule Take 50,000 Units by mouth daily Give 1/25mg ([id number]) one capsule via peg tube one time a day every Monday for supplement      [DISCONTINUED] paliperidone (INVEGA) 6 MG extended release tablet Take 1 tablet by mouth daily 30 tablet 1    cabergoline (DOSTINEX) 0.5 MG tablet Take 1 tablet by mouth Twice a Week ON Monday and Thursday 8 tablet 3    [DISCONTINUED] furosemide (LASIX) 40 MG tablet Take 40 mg by mouth daily      fenofibrate 160 MG tablet Take 150 mg by mouth daily      aspirin 81 MG EC tablet Take 1 tablet by mouth 2 times daily (Patient taking differently: Take 81 mg by mouth in the morning. ADMINISTER VIA PEG TUBE.) 60 tablet 0    [DISCONTINUED] lisinopril (PRINIVIL;ZESTRIL) 20 MG tablet Take 2 tablets by mouth daily 30 tablet 3    Respiratory Therapy Supplies SANDRA Full face CPAP mask and supplies 1 Device 0    CPAP Machine MISC by Does not apply route New CPAP with 6 cm 1 each 0    albuterol (PROVENTIL) (2.5 MG/3ML) 0.083% nebulizer solution Take 3 mLs by nebulization every 6 hours as needed for Wheezing 120 each 5    STIOLTO RESPIMAT 2.5-2.5 MCG/ACT AERS Inhale 2 puff in AM 1 Inhaler 5     No current facility-administered medications on file prior to visit. REVIEW OF SYSTEMS    Pertinent items are noted in HPI. Objective: There were no vitals taken for this visit. Wt Readings from Last 3 Encounters:   07/21/22 201 lb 4.8 oz (91.3 kg)   06/06/22 175 lb (79.4 kg)   02/13/22 180 lb (81.6 kg)       PHYSICAL EXAM    Constitutional:   Well nourished and well developed. Appears neat and clean. Patient is alert, oriented x3, and in no apparent distress. Respiratory:  Respiratory effort is easy and symmetric bilaterally. Rate is normal at rest and on room air. Vascular:  Pedal Pulses is palpable and audible with doppler. Capillary refill is <3 sec to digits bilateral.  Extremities negative for 0 pitting edema. Neurological:   Sensation is unknown to lower extremities. Dermatological:  Wound description noted in wound assessment. Psychiatric:  unknown    See list and measurements of 4 wounds above    Assessment:     Procedure Note  Indications:  Based on my examination of this patient's wound(s)/ulcer(s) today, debridement is not required to promote healing and evaluate the wound base.       Document Information    Wound Care Image:  Wound   Left hip 3.0 x 2.3 x 2.3 and tunnel at 7 oclock is 7 cm   11/23/2022 09:13   Attached To:   Appointment on 11/23/22 with MARICRUZ Patel NP Source Information    MARICRUZ Torres NP  [facility]   Media Information  Document Information    Wound Care Image:  Wound   0.6 x 0.5 left lateral 5th metatarsal   11/23/2022 09:05   Attached To:   Appointment on 11/23/22 with MARICRUZ Torres NP     Source Information    MARICRUZ Torres NP  [facility]   Media Information  Document Information    Wound Care Image:  Wound   . 6 x 0.5  cm left heel    11/23/2022 09:04   Attached To:   Appointment on 11/23/22 with MARICRUZ Torres NP     Source Information    MARICRUZ Torres NP  [facility]   Media Information  Document Information    Wound Care Image:  Wound   4.4 x 4.2 cm   11/23/2022 08:56   Attached To:   Appointment on 11/23/22 with MARICRUZ Torres NP     Source Information    MARICRUZ Torres NP  [facility]     Plan:   Continue packing of #1 wound with calcium alginate rope and skin prep to periwound and cover packing with mepilex border  #2, #3 & #4 cleanse with saline, skin prep to periwound and wounds that are closed and protect with mepilex border dressings    Continue until next week rounds

## 2022-12-07 ENCOUNTER — OFFICE VISIT (OUTPATIENT)
Dept: WOUND CARE | Age: 67
End: 2022-12-07

## 2022-12-07 DIAGNOSIS — L89.224 PRESSURE INJURY OF LEFT HIP, STAGE 4 (HCC): Primary | Chronic | ICD-10-CM

## 2022-12-07 NOTE — PROGRESS NOTES
Violeta Craig [address]                                   Progress Note at [address] [facility] RECORD NUMBER:  [id number]  AGE: 74 y.o. GENDER: male  : 1955  EPISODE DATE:  2022    Subjective:     No chief complaint on file. Currently seen for multiple pressure injuries as a result of immobility with contractures     HISTORY of PRESENT ILLNESS DELVIN Spangler is a 74 y.o. male who presents today for wound/ulcer evaluation.    History of Wound Context: has hx of immobility with contractures, contributing to open pressure injuries to body  Wound/Ulcer Pain Timing/Severity: unknown - when moved appears to be in minor discoomfort, but is non-verbal  Quality of pain: N/A  Severity:  0 / 10   Modifying Factors:  non-mobile with contractures  Associated Signs/Symptoms: pain    Ulcer Identification:  Ulcer Type: pressure  Contributing Factors: decreased mobility and chronic pressure even with offloading     Wound:  immobility and constant pressure causing continued pressure - at a fair level except for left hip with is a stage 4  and not as off-loadable        PAST MEDICAL HISTORY        Diagnosis Date    Abdominal pain     Asthma     Bloating symptom     COPD (chronic obstructive pulmonary disease) ([facility])     COPD (chronic obstructive pulmonary disease) ([facility])     Depression     Depression     Diarrhea     Drug-seeking behavior 2020    Emphysema/COPD ([facility] [address]     History of rectal bleeding     Hypercholesteremia     past trx > 5 yrs    Hypercholesteremia     Hypercholesteremia     Hypertension     meds > 3 yrs    Hypothyroid 2022    Lung disease     Nontraumatic subarachnoid hemorrhage ([facility] [address]     Ranjana Evans [address] PAPERWORK    RANDI on CPAP     patient relates he doesn't use cpap at this time    Osteoarthritis     left hip    Osteoarthritis     Osteoarthritis of left hip     Other hydrocephalus ([facility] [address]     [address] PAPERWORK Physiological consequence of immobility 03/09/2022    Pituitary macroadenoma [facility])     Sleep apnea      PAST SURGICAL HISTORY    Past Surgical History:   Procedure Laterality Date    COLONOSCOPY  5/2/14    JENNIFER    COLONOSCOPY N/A 12/12/2019    COLONOSCOPY DIAGNOSTIC performed by Ivelisse Garcia MD at [facility], DIAGNOSTIC      EYE SURGERY Bilateral     cataracts    [address] 51 ? index finger laceration repair    HERNIA REPAIR  2004    repair [facility]     JOINT REPLACEMENT Left     left hip    IL COLON CA SCRN NOT HI RSK IND N/A 5/17/2017    COLONOSCOPY performed by Maria Isabel Mace MD at [address] [facility] Left 4/22/2019    LEFT HIP TOTAL HIP ARTHROPLASTY, LATERAL DECUB, GRACIE performed by Fabrizio Hsu MD at [address] [facility]  12/12/2019    EGD DIAGNOSTIC ONLY performed by Ivelisse Garcia MD at [address] History   Problem Relation Age of Onset    Colon Cancer Mother     Cancer Mother     Arthritis Mother     High Blood Pressure Father     Heart Disease Father     Diabetes Sister     No Known Problems Brother     No Known Problems Brother     No Known Problems Brother     Other Brother         Sepsis    Other Brother         Killed    No Known Problems Sister     No Known Problems Sister     No Known Problems Son      SOCIAL HISTORY    Social History     Tobacco Use    Smoking status: Former     Packs/day: 1.50     Years: 46.00     Pack years: 69.00     Types: Cigarettes     Quit date: 3/20/2019     Years since quitting: 3.7    Smokeless tobacco: Never   Vaping Use    Vaping Use: Unknown   Substance Use Topics    Alcohol use: Yes    Drug use: No     ALLERGIES    No Known Allergies    MEDICATIONS    Current Outpatient Medications on File Prior to Visit   Medication Sig Dispense Refill    levothyroxine (SYNTHROID) 150 MCG tablet Take 1 tablet by mouth in the morning.  30 tablet 3    Nutritional Supplements (ISOSOURCE 1.5 NUSRAT PO) 60 mLs by Percutaneous Endoscopic Gastrostomy route continuous      carboxymethylcellulose (ARTIFICIAL TEARS) 1 % ophthalmic solution Place 2 drops into both eyes daily      atorvastatin (LIPITOR) 20 MG tablet 20 mg by PEG Tube route nightly      Atropine Sulfate 0.01 % SOLN Place 1 drop under the tongue as needed (EVERY TWO HOURS FOR REDUCTION OF SECRETIONS)      chlorhexidine (PERIDEX) 0.12 % solution Take 15 mLs by mouth in the morning, at noon, and at bedtime FOR USE WITH TOOTHETTES FOR MOUTH CARE      polyethylene glycol (MIRALAX) 17 g PACK packet 17 g by Per G Tube route in the morning and at bedtime      acetaminophen (TYLENOL) 325 MG tablet 650 mg by PEG Tube route every 4 hours as needed for Pain      sennosides-docusate sodium (SENOKOT-S) 8.6-50 MG tablet 1 tablet by PEG Tube route in the morning and at bedtime      desmopressin (DDAVP) 0.1 MG tablet 0.5 mg by PEG Tube route in the morning and at bedtime Give 0.05mg via PEG tube two times a day for clotting promoter      esomeprazole Magnesium (NEXIUM) 40 MG PACK 40 mg by Per G Tube route in the morning.       levETIRAcetam (KEPPRA) 100 MG/ML solution 750 mg/kg by PEG Tube route 2 times daily      hyoscyamine (LEVSIN/SL) 125 MCG sublingual tablet Place 125 mcg under the tongue every 6 hours as needed for Cramping      morphine sulfate 20 MG/ML concentrated oral solution Take 5 mg by mouth every 2 hours as needed for Pain.      vitamin D (CHOLECALCIFEROL) 125 MCG (5000 UT) CAPS capsule Take 50,000 Units by mouth daily Give 1/25mg ([id number]) one capsule via peg tube one time a day every Monday for supplement      [DISCONTINUED] paliperidone (INVEGA) 6 MG extended release tablet Take 1 tablet by mouth daily 30 tablet 1    cabergoline (DOSTINEX) 0.5 MG tablet Take 1 tablet by mouth Twice a Week ON Monday and Thursday 8 tablet 3    [DISCONTINUED] furosemide (LASIX) 40 MG tablet Take 40 mg by mouth daily fenofibrate 160 MG tablet Take 150 mg by mouth daily      aspirin 81 MG EC tablet Take 1 tablet by mouth 2 times daily (Patient taking differently: Take 81 mg by mouth in the morning. ADMINISTER VIA PEG TUBE.) 60 tablet 0    [DISCONTINUED] lisinopril (PRINIVIL;ZESTRIL) 20 MG tablet Take 2 tablets by mouth daily 30 tablet 3    Respiratory Therapy Supplies SANDRA Full face CPAP mask and supplies 1 Device 0    CPAP Machine MISC by Does not apply route New CPAP with 6 cm 1 each 0    albuterol (PROVENTIL) (2.5 MG/3ML) 0.083% nebulizer solution Take 3 mLs by nebulization every 6 hours as needed for Wheezing 120 each 5    STIOLTO RESPIMAT 2.5-2.5 MCG/ACT AERS Inhale 2 puff in AM 1 Inhaler 5     No current facility-administered medications on file prior to visit. REVIEW OF SYSTEMS    Pertinent items are noted in HPI. Objective: There were no vitals taken for this visit. Wt Readings from Last 3 Encounters:   07/21/22 201 lb 4.8 oz (91.3 kg)   06/06/22 175 lb (79.4 kg)   02/13/22 180 lb (81.6 kg)     PHYSICAL EXAM    Constitutional:   Well nourished and well developed. Appears neat and clean. Patient is alert, oriented x3, and in no apparent distress. Respiratory:  Respiratory effort is easy and symmetric bilaterally. Rate is normal at rest and on room air. Vascular:  Pedal Pulses is palpable and audible with doppler. Capillary refill is <3 sec to digits bilateral.  Extremities negative for 0 pitting edema. Neurological:   Sensation is not ascertained due to lack of speech, but healing to lower extremities. Dermatological:  Wound description noted in wound assessment to left foot, right foot (superficial) and left hip (deep tissue)    Psychiatric:  Judgment and insight intact. Short and long term memory intact. No evidence of depression, anxiety, or agitation. Patient is calm, cooperative, and communicative.      Assessment:      There are no active hospital problems to display for this patient. Note superficial foot wounds - left foot 0.5 x 0.5 cm, right foot 2.5 x 2.0 cm  But left hip - outer skin is improved, but measures 3.0 x 2.0 x 3.2 cm with a 6 cm depth at 7 o'clock. Drainage is small to moderate amount of serous - packing with calcium alglinate ag strips and mepilex border dressing after irrigating with saline and skin prepping periwound    Procedure Note  Indications:  Based on my examination of this patient's wound(s)/ulcer(s) today, debridement is not required to promote healing and evaluate the wound base. Wound 06/29/22 Coccyx (Active)   Number of days: 161     Incision 04/23/19 Hip Anterior;Left;Proximal (Active)   Number of days: 1323     Plan:     Continue offloading feet using aquaphor to skin.  Continue to offload left hip and do the packing with strip of calcium alginate    Visit again next week

## 2022-12-09 LAB
ALBUMIN SERPL-MCNC: 3.4 G/DL (ref 3.5–4.6)
ALP BLD-CCNC: 129 U/L (ref 35–104)
ALT SERPL-CCNC: 20 U/L (ref 0–41)
ANION GAP SERPL CALCULATED.3IONS-SCNC: 10 MEQ/L (ref 9–15)
AST SERPL-CCNC: 23 U/L (ref 0–40)
BASOPHILS ABSOLUTE: 0 K/UL (ref 0–0.2)
BASOPHILS RELATIVE PERCENT: 0.6 %
BILIRUB SERPL-MCNC: 0.6 MG/DL (ref 0.2–0.7)
BUN BLDV-MCNC: 20 MG/DL (ref 8–23)
CALCIUM SERPL-MCNC: 9 MG/DL (ref 8.5–9.9)
CHLORIDE BLD-SCNC: 102 MEQ/L (ref 95–107)
CO2: 24 MEQ/L (ref 20–31)
CREAT SERPL-MCNC: 0.36 MG/DL (ref 0.7–1.2)
EOSINOPHILS ABSOLUTE: 0.4 K/UL (ref 0–0.7)
EOSINOPHILS RELATIVE PERCENT: 6 %
GFR SERPL CREATININE-BSD FRML MDRD: >60 ML/MIN/{1.73_M2}
GLOBULIN: 3.7 G/DL (ref 2.3–3.5)
GLUCOSE BLD-MCNC: 92 MG/DL (ref 70–99)
HCT VFR BLD CALC: 35.2 % (ref 42–52)
HEMOGLOBIN: 11.9 G/DL (ref 14–18)
LYMPHOCYTES ABSOLUTE: 2 K/UL (ref 1–4.8)
LYMPHOCYTES RELATIVE PERCENT: 30.2 %
MCH RBC QN AUTO: 31.7 PG (ref 27–31.3)
MCHC RBC AUTO-ENTMCNC: 33.7 % (ref 33–37)
MCV RBC AUTO: 94 FL (ref 79–92.2)
MONOCYTES ABSOLUTE: 0.6 K/UL (ref 0.2–0.8)
MONOCYTES RELATIVE PERCENT: 9.2 %
NEUTROPHILS ABSOLUTE: 3.5 K/UL (ref 1.4–6.5)
NEUTROPHILS RELATIVE PERCENT: 54 %
PDW BLD-RTO: 15.4 % (ref 11.5–14.5)
PLATELET # BLD: 188 K/UL (ref 130–400)
POTASSIUM SERPL-SCNC: 4.4 MEQ/L (ref 3.4–4.9)
RBC # BLD: 3.74 M/UL (ref 4.7–6.1)
SODIUM BLD-SCNC: 136 MEQ/L (ref 135–144)
TOTAL PROTEIN: 7.1 G/DL (ref 6.3–8)
TSH SERPL DL<=0.05 MIU/L-ACNC: 0.01 UIU/ML (ref 0.44–3.86)
WBC # BLD: 6.5 K/UL (ref 4.8–10.8)

## 2022-12-14 ENCOUNTER — OFFICE VISIT (OUTPATIENT)
Dept: WOUND CARE | Age: 67
End: 2022-12-14
Payer: MEDICAID

## 2022-12-14 DIAGNOSIS — L89.892 PRESSURE INJURY OF DORSUM OF LEFT FOOT, STAGE 2 (HCC): ICD-10-CM

## 2022-12-14 DIAGNOSIS — L89.892 PRESSURE INJURY OF DORSUM OF RIGHT FOOT, STAGE 2 (HCC): ICD-10-CM

## 2022-12-14 DIAGNOSIS — L89.224 PRESSURE INJURY OF LEFT HIP, STAGE 4 (HCC): Primary | ICD-10-CM

## 2022-12-14 PROCEDURE — 99308 SBSQ NF CARE LOW MDM 20: CPT | Performed by: NURSE PRACTITIONER

## 2022-12-14 PROCEDURE — 1123F ACP DISCUSS/DSCN MKR DOCD: CPT | Performed by: NURSE PRACTITIONER

## 2022-12-14 NOTE — PROGRESS NOTES
Violeta Craig 39                              Progress Note at [address] [facility] RECORD NUMBER:  [id number]  AGE: 74 y.o. GENDER: male  : 1955  EPISODE DATE:  2022    Subjective:     No chief complaint on file. Present Visit: Wounds to hip and bilateral feet    HISTORY of PRESENT ILLNESS DELVIN Zhu is a 74 y.o. male who presents today for wound/ulcer evaluation.    History of Wound Context: developed thru immobility and contractures  Wound/Ulcer Pain Timing/Severity: not know other than patient moans at times  Quality of pain: unable to ascertain  Severity:  x / 10   Modifying Factors: immobility  Associated Signs/Symptoms: contractures    Ulcer Identification:  Ulcer Type: pressure  Contributing Factors: decreased mobility    Wound: N/A        PAST MEDICAL HISTORY        Diagnosis Date    Abdominal pain     Asthma     Bloating symptom     COPD (chronic obstructive pulmonary disease) (HCC)     COPD (chronic obstructive pulmonary disease) ([facility])     Depression     Depression     Diarrhea     Drug-seeking behavior 2020    Emphysema/COPD ([address]     History of rectal bleeding     Hypercholesteremia     past trx > 5 yrs    Hypercholesteremia     Hypercholesteremia     Hypertension     meds > 3 yrs    Hypothyroid 2022    Lung disease     Nontraumatic subarachnoid hemorrhage ([address]     [facility] [address] [facility]    RANDI on CPAP     patient relates he doesn't use cpap at this time    Osteoarthritis     left hip    Osteoarthritis     Osteoarthritis of left hip     Other hydrocephalus ([address]     [address] PAPERWORK    Physiological consequence of immobility 2022    Pituitary macroadenoma ([facility]     Sleep apnea        PAST SURGICAL HISTORY    Past Surgical History:   Procedure Laterality Date    COLONOSCOPY  14    JENNIFER    COLONOSCOPY N/A 2019    COLONOSCOPY DIAGNOSTIC performed by Jaci Salamanca MD at [facility]    ENDOSCOPY, COLON, DIAGNOSTIC      EYE SURGERY Bilateral     cataracts    FINGER SURGERY Right 1999 ? index finger laceration repair    HERNIA REPAIR  2004    repair [facility]     JOINT REPLACEMENT Left     left hip    DE COLON CA SCRN NOT HI RSK IND N/A 5/17/2017    COLONOSCOPY performed by Oral Medrano MD at [address] Left 4/22/2019    LEFT HIP TOTAL HIP ARTHROPLASTY, LATERAL DECUB, GRACIE performed by Nelda Stanton MD at [address]  12/12/2019    EGD DIAGNOSTIC ONLY [facility] History   Problem Relation Age of Onset    Colon Cancer Mother     Cancer Mother     Arthritis Mother     High Blood Pressure Father     Heart Disease Father     Diabetes Sister     No Known Problems Brother     No Known Problems Brother     No Known Problems Brother     Other Brother         Sepsis    Other Brother         Killed    No Known Problems Sister     No Known Problems Sister     No Known Problems Son        SOCIAL HISTORY    Social History     Tobacco Use    Smoking status: Former     Packs/day: 1.50     Years: 46.00     Pack years: 69.00     Types: Cigarettes     Quit date: 3/20/2019     Years since quitting: 3.7    Smokeless tobacco: Never   Vaping Use    Vaping Use: Unknown   Substance Use Topics    Alcohol use: Yes    Drug use: No       ALLERGIES    No Known Allergies    MEDICATIONS    Current Outpatient Medications on File Prior to Visit   Medication Sig Dispense Refill    levothyroxine (SYNTHROID) 150 MCG tablet Take 1 tablet by mouth in the morning.  30 tablet 3    Nutritional Supplements (ISOSOURCE 1.5 NUSRAT PO) 60 mLs by Percutaneous Endoscopic Gastrostomy route continuous      carboxymethylcellulose (ARTIFICIAL TEARS) 1 % ophthalmic solution Place 2 drops into both eyes daily      atorvastatin (LIPITOR) 20 MG tablet 20 mg by PEG Tube route nightly Atropine Sulfate 0.01 % SOLN Place 1 drop under the tongue as needed (EVERY TWO HOURS FOR REDUCTION OF SECRETIONS)      chlorhexidine (PERIDEX) 0.12 % solution Take 15 mLs by mouth in the morning, at noon, and at bedtime FOR USE WITH TOOTHETTES FOR MOUTH CARE      polyethylene glycol (MIRALAX) 17 g PACK packet 17 g by Per G Tube route in the morning and at bedtime      acetaminophen (TYLENOL) 325 MG tablet 650 mg by PEG Tube route every 4 hours as needed for Pain      sennosides-docusate sodium (SENOKOT-S) 8.6-50 MG tablet 1 tablet by PEG Tube route in the morning and at bedtime      desmopressin (DDAVP) 0.1 MG tablet 0.5 mg by PEG Tube route in the morning and at bedtime Give 0.05mg via PEG tube two times a day for clotting promoter      esomeprazole Magnesium (NEXIUM) 40 MG PACK 40 mg by Per G Tube route in the morning. levETIRAcetam (KEPPRA) 100 MG/ML solution 750 mg/kg by PEG Tube route 2 times daily      hyoscyamine (LEVSIN/SL) 125 MCG sublingual tablet Place 125 mcg under the tongue every 6 hours as needed for Cramping      morphine sulfate 20 MG/ML concentrated oral solution Take 5 mg by mouth every 2 hours as needed for Pain.      vitamin D (CHOLECALCIFEROL) 125 MCG (5000 UT) CAPS capsule Take 50,000 Units by mouth daily Give 1/25mg ([id number]) one capsule via peg tube one time a day every Monday for supplement      [DISCONTINUED] paliperidone (INVEGA) 6 MG extended release tablet Take 1 tablet by mouth daily 30 tablet 1    cabergoline (DOSTINEX) 0.5 MG tablet Take 1 tablet by mouth Twice a Week ON Monday and Thursday 8 tablet 3    [DISCONTINUED] furosemide (LASIX) 40 MG tablet Take 40 mg by mouth daily      fenofibrate 160 MG tablet Take 150 mg by mouth daily      aspirin 81 MG EC tablet Take 1 tablet by mouth 2 times daily (Patient taking differently: Take 81 mg by mouth in the morning.  ADMINISTER VIA PEG TUBE.) 60 tablet 0    [DISCONTINUED] lisinopril (PRINIVIL;ZESTRIL) 20 MG tablet Take 2 tablets by mouth daily 30 tablet 3    Respiratory Therapy Supplies SANDRA Full face CPAP mask and supplies 1 Device 0    CPAP Machine MISC by Does not apply route New CPAP with 6 cm 1 each 0    albuterol (PROVENTIL) (2.5 MG/3ML) 0.083% nebulizer solution Take 3 mLs by nebulization every 6 hours as needed for Wheezing 120 each 5    STIOLTO RESPIMAT 2.5-2.5 MCG/ACT AERS Inhale 2 puff in AM 1 Inhaler 5     No current facility-administered medications on file prior to visit. REVIEW OF SYSTEMS    Pertinent items are noted in HPI. Objective: There were no vitals taken for this visit. Wt Readings from Last 3 Encounters:   07/21/22 201 lb 4.8 oz (91.3 kg)   06/06/22 175 lb (79.4 kg)   02/13/22 180 lb (81.6 kg)     PHYSICAL EXAM    Constitutional:   Well nourished and well developed. Appears neat and clean. Patient is alert, oriented x3, and in no apparent distress. Respiratory:  Respiratory effort is easy and symmetric bilaterally. Rate is normal at rest and on room air. Vascular:  Pedal Pulses is palpable and audible with doppler. Capillary refill is <3 sec to digits bilateral.  Extremities negative for 0 pitting edema. Though has severe contractures to legs    Neurological:   Sensation is lessened to lower extremities. Dermatological:  Wound description noted in wound assessment. Psychiatric:  Judgment and insight intact. Short and long term memory intact. No evidence of depression, anxiety, or agitation. Patient is calm, cooperative, and communicative. Appropriate interactions and affect. Assessment:      There are no active hospital problems to display for this patient. Lt Lateral Foot - 0.4 x 0.5 cm    Lt Medial Heel 0.5 x 0.6 cm    Lt Hip 3.2 x 3.5 x 0.6 & 6.0 cm tunnel at 7 o'clock    Procedure Note  Indications:  Based on my examination of this patient's wound(s)/ulcer(s) today, debridement is not required to promote healing and evaluate the wound base. Plan:    To continue wound care with irrigation, aquaphor to right foot and calcium alginate to left foot wound and to hip by cutting strips and packing.  Cover hip and right foot wound with mepilex border dressing after skin prepping periwound    Return in one week    Electronically signed by MARICRUZ Gurrola NP on 12/14/2022 at 1:35 PM

## 2022-12-21 ENCOUNTER — OFFICE VISIT (OUTPATIENT)
Dept: WOUND CARE | Age: 67
End: 2022-12-21

## 2022-12-21 DIAGNOSIS — L89.224 PRESSURE INJURY OF LEFT HIP, STAGE 4 (HCC): ICD-10-CM

## 2022-12-21 DIAGNOSIS — L89.224 DECUBITUS ULCER OF LEFT HIP, STAGE 4 (HCC): Primary | Chronic | ICD-10-CM

## 2022-12-21 NOTE — PROGRESS NOTES
Violeta Craig 39                                                   Progress Note at [address] [facility] RECORD NUMBER:  [id number]  AGE: 74 y.o. GENDER: male  : 1955  EPISODE DATE:  2022    Subjective:     No chief complaint on file. HISTORY of PRESENT ILLNESS HPI     Idalia Wattident is a 74 y.o. male who presents today for wound/ulcer evaluation.    History of Wound Context: stage 4 hip wound  Wound/Ulcer Pain Timing/Severity: intermittent  Quality of pain: aching  Severity:  not verbal, but can observe grimaces / 10   Modifying Factors:  is bedbound - hurts when packing wound  Associated Signs/Symptoms:  moans    Ulcer Identification:  Ulcer Type: pressure  Contributing Factors: decreased mobility    Wound: NA        PAST MEDICAL HISTORY        Diagnosis Date    Abdominal pain     Asthma     Bloating symptom     COPD (chronic obstructive pulmonary disease) (HCC)     COPD (chronic obstructive pulmonary disease) ([facility])     Depression     Depression     Diarrhea     Drug-seeking behavior 2020    Emphysema/COPD ([facility]     History of rectal bleeding     Hypercholesteremia     past trx > 5 yrs    Hypercholesteremia     Hypercholesteremia     Hypertension     meds > 3 yrs    Hypothyroid 2022    Lung disease     Nontraumatic subarachnoid hemorrhage ([facility] [address]     [facility] [address] [facility]    RANDI on CPAP     patient relates he doesn't use cpap at this time    Osteoarthritis     left hip    Osteoarthritis     Osteoarthritis of left hip     Other hydrocephalus ([facility] [address]     [address] PAPERWORK    Physiological consequence of immobility 2022    Pituitary macroadenoma ([facility]     Sleep apnea      PAST SURGICAL HISTORY    Past Surgical History:   Procedure Laterality Date    COLONOSCOPY  14    JENNIFER    COLONOSCOPY N/A 2019    COLONOSCOPY DIAGNOSTIC performed by Speedy Garcia MD at [place] [address], DIAGNOSTIC      EYE SURGERY Bilateral     cataracts    FINGER SURGERY Right 1999 ? index finger laceration repair    HERNIA REPAIR  2004    repair [facility]     JOINT REPLACEMENT Left     left hip    OH COLON CA SCRN NOT HI RSK IND N/A 5/17/2017    COLONOSCOPY performed by Benoit Calvillo MD at [address] Left 4/22/2019    LEFT HIP TOTAL HIP ARTHROPLASTY, LATERAL DECUB, GRACIE performed by Baljeet Duncan MD at [address]  12/12/2019    EGD DIAGNOSTIC ONLY performed by Agnes Rosario MD at [address] [address], [facility] History   Problem Relation Age of Onset    Colon Cancer Mother     Cancer Mother     Arthritis Mother     High Blood Pressure Father     Heart Disease Father     Diabetes Sister     No Known Problems Brother     No Known Problems Brother     No Known Problems Brother     Other Brother         Sepsis    Other Brother         Killed    No Known Problems Sister     No Known Problems Sister     No Known Problems Son      SOCIAL HISTORY    Social History     Tobacco Use    Smoking status: Former     Packs/day: 1.50     Years: 46.00     Pack years: 69.00     Types: Cigarettes     Quit date: 3/20/2019     Years since quitting: 3.7    Smokeless tobacco: Never   Vaping Use    Vaping Use: Unknown   Substance Use Topics    Alcohol use: Yes    Drug use: No     ALLERGIES    No Known Allergies    MEDICATIONS    Current Outpatient Medications on File Prior to Visit   Medication Sig Dispense Refill    levothyroxine (SYNTHROID) 150 MCG tablet Take 1 tablet by mouth in the morning.  30 tablet 3    Nutritional Supplements (ISOSOURCE 1.5 NUSRAT PO) 60 mLs by Percutaneous Endoscopic Gastrostomy route continuous      carboxymethylcellulose (ARTIFICIAL TEARS) 1 % ophthalmic solution Place 2 drops into both eyes daily      atorvastatin (LIPITOR) 20 MG tablet 20 mg by PEG Tube route nightly      Atropine Sulfate 0.01 % SOLN Place 1 drop under the tongue as needed (EVERY TWO HOURS FOR REDUCTION OF SECRETIONS)      chlorhexidine (PERIDEX) 0.12 % solution Take 15 mLs by mouth in the morning, at noon, and at bedtime FOR USE WITH TOOTHETTES FOR MOUTH CARE      polyethylene glycol (MIRALAX) 17 g PACK packet 17 g by Per G Tube route in the morning and at bedtime      acetaminophen (TYLENOL) 325 MG tablet 650 mg by PEG Tube route every 4 hours as needed for Pain      sennosides-docusate sodium (SENOKOT-S) 8.6-50 MG tablet 1 tablet by PEG Tube route in the morning and at bedtime      desmopressin (DDAVP) 0.1 MG tablet 0.5 mg by PEG Tube route in the morning and at bedtime Give 0.05mg via PEG tube two times a day for clotting promoter      esomeprazole Magnesium (NEXIUM) 40 MG PACK 40 mg by Per G Tube route in the morning. levETIRAcetam (KEPPRA) 100 MG/ML solution 750 mg/kg by PEG Tube route 2 times daily      hyoscyamine (LEVSIN/SL) 125 MCG sublingual tablet Place 125 mcg under the tongue every 6 hours as needed for Cramping      morphine sulfate 20 MG/ML concentrated oral solution Take 5 mg by mouth every 2 hours as needed for Pain.      vitamin D (CHOLECALCIFEROL) 125 MCG (5000 UT) CAPS capsule Take 50,000 Units by mouth daily Give 1/25mg ([id number]) one capsule via peg tube one time a day every Monday for supplement      [DISCONTINUED] paliperidone (INVEGA) 6 MG extended release tablet Take 1 tablet by mouth daily 30 tablet 1    cabergoline (DOSTINEX) 0.5 MG tablet Take 1 tablet by mouth Twice a Week ON Monday and Thursday 8 tablet 3    [DISCONTINUED] furosemide (LASIX) 40 MG tablet Take 40 mg by mouth daily      fenofibrate 160 MG tablet Take 150 mg by mouth daily      aspirin 81 MG EC tablet Take 1 tablet by mouth 2 times daily (Patient taking differently: Take 81 mg by mouth in the morning.  ADMINISTER VIA PEG TUBE.) 60 tablet 0    [DISCONTINUED] lisinopril (PRINIVIL;ZESTRIL) 20 MG tablet Take 2 tablets by mouth daily 30 tablet 3    Respiratory Therapy Supplies SANDRA Full face CPAP mask and supplies 1 Device 0    CPAP Machine MISC by Does not apply route New CPAP with 6 cm 1 each 0    albuterol (PROVENTIL) (2.5 MG/3ML) 0.083% nebulizer solution Take 3 mLs by nebulization every 6 hours as needed for Wheezing 120 each 5    STIOLTO RESPIMAT 2.5-2.5 MCG/ACT AERS Inhale 2 puff in AM 1 Inhaler 5     No current facility-administered medications on file prior to visit. REVIEW OF SYSTEMS    Pertinent items are noted in HPI. Still has 6 cm tunnel @ 1 o'clock with 4.4 cm length and 2.0 cm width with 3 cm depth    Objective: There were no vitals taken for this visit. Wt Readings from Last 3 Encounters:   07/21/22 201 lb 4.8 oz (91.3 kg)   06/06/22 175 lb (79.4 kg)   02/13/22 180 lb (81.6 kg)     PHYSICAL EXAM    Constitutional:   Well nourished and well developed. Appears neat and clean. Patient is alert, oriented x3, and in no apparent distress. Respiratory:  Respiratory effort is easy and symmetric bilaterally. Rate is normal at rest and on room air. Vascular:  Pedal Pulses is palpable and audible with doppler. Capillary refill is <3 sec to digits bilateral.  Extremities negative for 0 pitting edema. Neurological:   Sensation is positive to lower extremities, but highly contractured. Dermatological:  Wound description noted in wound assessment. Psychiatric:  Judgment and insight intact. Short and long term memory intact. No evidence of depression, anxiety, or agitation. Patient is calm, cooperative, and communicative. Appropriate interactions and affect. Assessment:      There are no active hospital problems to display for this patient.     Procedure Note  Indications:  Based on my examination of this patient's wound(s)/ulcer(s) today, debridement is not required to promote healing and evaluate the wound base    Discovered none of the photos downloaded onto [facility]     Plan:   Pack wound to left hip after irrigating with saline and skin prepping periwound, then packing with calcium alginate rope and cover with border dressing.     Applied aquaphor to bilateral feet after washing and then padded and wrapped with kerlix bilaterally      Electronically signed by MARICRUZ Dickens NP on 12/21/2022 at 2:00 PM

## 2023-01-04 ENCOUNTER — OFFICE VISIT (OUTPATIENT)
Dept: WOUND CARE | Age: 68
End: 2023-01-04
Payer: MEDICAID

## 2023-01-04 DIAGNOSIS — L89.224 PRESSURE INJURY OF LEFT HIP, STAGE 4 (HCC): Primary | ICD-10-CM

## 2023-01-04 PROCEDURE — 1123F ACP DISCUSS/DSCN MKR DOCD: CPT | Performed by: NURSE PRACTITIONER

## 2023-01-04 PROCEDURE — 99212 OFFICE O/P EST SF 10 MIN: CPT | Performed by: NURSE PRACTITIONER

## 2023-01-05 NOTE — PROGRESS NOTES
[facility] Nursing                                                         Progress Note      Lilly Edouard  MEDICAL RECORD NUMBER:  [id number]  AGE: 74 y.o. GENDER: male  : 1955  EPISODE DATE:  2023    Subjective:     No chief complaint on file. Today visit dx: pressure ulcer stage 2 right heel & stage 4 left hip    HISTORY of PRESENT ILLNESS HPI     Lilly Edouard is a 74 y.o. male who presents today for wound/ulcer evaluation.    History of Wound Context: pressure areas to right medial heel state 2 and to left hip stage 4  Wound/Ulcer Pain Timing/Severity: is in some pain during movement we put him through when doing wound care  Quality of pain: unable to pinpoint - talking to him soothingly helps  Severity:  uknown / 10   Modifying Factors:  immobility and non-verbalization  Associated Signs/Symptoms:  skin breakdown        PAST MEDICAL HISTORY        Diagnosis Date    Abdominal pain     Asthma     Bloating symptom     COPD (chronic obstructive pulmonary disease) ([facility])     COPD (chronic obstructive pulmonary disease) ([address]     Depression     Depression     Diarrhea     Drug-seeking behavior 2020    Emphysema/COPD ([address]     History of rectal bleeding     Hypercholesteremia     past trx > 5 yrs    Hypercholesteremia     Hypercholesteremia     Hypertension     meds > 3 yrs    Hypothyroid 2022    Lung disease     Nontraumatic subarachnoid hemorrhage ([address]     [facility] [address] [facility]    RANDI on CPAP     patient relates he doesn't use cpap at this time    Osteoarthritis     left hip    Osteoarthritis     Osteoarthritis of left hip     Other hydrocephalus ([address]     [address] PAPERWORK    Physiological consequence of immobility 2022    Pituitary macroadenoma ([address]     Sleep apnea      PAST SURGICAL HISTORY    Past Surgical History:   Procedure Laterality Date    COLONOSCOPY  14    JENNIFER    COLONOSCOPY N/A 2019    COLONOSCOPY DIAGNOSTIC performed by Amador Becerra MD at [facility], DIAGNOSTIC      EYE SURGERY Bilateral     cataracts    [address] 51 ? index finger laceration repair    HERNIA REPAIR  2004    repair [facility]     JOINT REPLACEMENT Left     left hip    IL COLON CA SCRN NOT HI RSK IND N/A 5/17/2017    COLONOSCOPY performed by Foreign Carmen MD at [address] Left 4/22/2019    LEFT HIP TOTAL HIP ARTHROPLASTY, LATERAL DECUB, GRACIE performed by Tad Montes MD at [facility]  12/12/2019    EGD DIAGNOSTIC ONLY performed by Amador Becerra MD at [address] History   Problem Relation Age of Onset    Colon Cancer Mother     Cancer Mother     Arthritis Mother     High Blood Pressure Father     Heart Disease Father     Diabetes Sister     No Known Problems Brother     No Known Problems Brother     No Known Problems Brother     Other Brother         Sepsis    Other Brother         Killed    No Known Problems Sister     No Known Problems Sister     No Known Problems Son      SOCIAL HISTORY    Social History     Tobacco Use    Smoking status: Former     Packs/day: 1.50     Years: 46.00     Pack years: 69.00     Types: Cigarettes     Quit date: 3/20/2019     Years since quitting: 3.8    Smokeless tobacco: Never   Vaping Use    Vaping Use: Unknown   Substance Use Topics    Alcohol use: Yes    Drug use: No     ALLERGIES    No Known Allergies    MEDICATIONS    Current Outpatient Medications on File Prior to Visit   Medication Sig Dispense Refill    levothyroxine (SYNTHROID) 150 MCG tablet Take 1 tablet by mouth in the morning.  30 tablet 3    Nutritional Supplements (ISOSOURCE 1.5 NUSRAT PO) 60 mLs by Percutaneous Endoscopic Gastrostomy route continuous      carboxymethylcellulose (ARTIFICIAL TEARS) 1 % ophthalmic solution Place 2 drops into both eyes daily      atorvastatin (LIPITOR) 20 MG tablet 20 mg by PEG Tube route nightly      Atropine Sulfate 0.01 % SOLN Place 1 drop under the tongue as needed (EVERY TWO HOURS FOR REDUCTION OF SECRETIONS)      chlorhexidine (PERIDEX) 0.12 % solution Take 15 mLs by mouth in the morning, at noon, and at bedtime FOR USE WITH TOOTHETTES FOR MOUTH CARE      polyethylene glycol (MIRALAX) 17 g PACK packet 17 g by Per G Tube route in the morning and at bedtime      acetaminophen (TYLENOL) 325 MG tablet 650 mg by PEG Tube route every 4 hours as needed for Pain      sennosides-docusate sodium (SENOKOT-S) 8.6-50 MG tablet 1 tablet by PEG Tube route in the morning and at bedtime      desmopressin (DDAVP) 0.1 MG tablet 0.5 mg by PEG Tube route in the morning and at bedtime Give 0.05mg via PEG tube two times a day for clotting promoter      esomeprazole Magnesium (NEXIUM) 40 MG PACK 40 mg by Per G Tube route in the morning. levETIRAcetam (KEPPRA) 100 MG/ML solution 750 mg/kg by PEG Tube route 2 times daily      hyoscyamine (LEVSIN/SL) 125 MCG sublingual tablet Place 125 mcg under the tongue every 6 hours as needed for Cramping      morphine sulfate 20 MG/ML concentrated oral solution Take 5 mg by mouth every 2 hours as needed for Pain.      vitamin D (CHOLECALCIFEROL) 125 MCG (5000 UT) CAPS capsule Take 50,000 Units by mouth daily Give 1/25mg ([id number]) one capsule via peg tube one time a day every Monday for supplement      [DISCONTINUED] paliperidone (INVEGA) 6 MG extended release tablet Take 1 tablet by mouth daily 30 tablet 1    cabergoline (DOSTINEX) 0.5 MG tablet Take 1 tablet by mouth Twice a Week ON Monday and Thursday 8 tablet 3    [DISCONTINUED] furosemide (LASIX) 40 MG tablet Take 40 mg by mouth daily      fenofibrate 160 MG tablet Take 150 mg by mouth daily      aspirin 81 MG EC tablet Take 1 tablet by mouth 2 times daily (Patient taking differently: Take 81 mg by mouth in the morning.  ADMINISTER VIA PEG TUBE.) 60 tablet 0    [DISCONTINUED] lisinopril (PRINIVIL;ZESTRIL) 20 MG tablet Take 2 tablets by mouth daily 30 tablet 3    Respiratory Therapy Supplies SANDRA Full face CPAP mask and supplies 1 Device 0    CPAP Machine MISC by Does not apply route New CPAP with 6 cm 1 each 0    albuterol (PROVENTIL) (2.5 MG/3ML) 0.083% nebulizer solution Take 3 mLs by nebulization every 6 hours as needed for Wheezing 120 each 5    STIOLTO RESPIMAT 2.5-2.5 MCG/ACT AERS Inhale 2 puff in AM 1 Inhaler 5     No current facility-administered medications on file prior to visit. REVIEW OF SYSTEMS    Pertinent items are noted in HPI. Objective: There were no vitals taken for this visit. Wt Readings from Last 3 Encounters:   07/21/22 201 lb 4.8 oz (91.3 kg)   06/06/22 175 lb (79.4 kg)   02/13/22 180 lb (81.6 kg)     PHYSICAL EXAM    Constitutional:   Well nourished and well developed. Appears neat and clean. Patient is alert, oriented x3, and in no apparent distress. Respiratory:  Respiratory effort is easy and symmetric bilaterally. Rate is normal at rest and on room air. Vascular:  Pedal Pulses is palpable and audible with doppler. Capillary refill is <3 sec to digits bilateral.  Extremities negative for 0 pitting edema. Dermatological:  Wound description - superficial stage 2 pressure injury to right medial heel & stage 4 to left hip with moderation amount of drainage    Psychiatric:  Judgment and insight intact. Short and long term memory intact. No evidence of depression, anxiety, or agitation. Patient is calm, cooperative, and communicative. Appropriate interactions and affect    Assessment:      There are no active hospital problems to display for this patient. Right medial heel 2.5 x 2.0 cm     Procedure Note  Indications:  Based on my examination of this patient's wound(s)/ulcer(s) today, debridement is not required to promote healing and evaluate the wound base.     Wound 06/29/22 Coccyx (Active)   Number of days: 190     Incision 04/23/19 Hip Anterior;Left;Proximal (Active)   Number of days: 1352     Plan:     Packed left hip wound with long strip of calcium alginate rope and covered with 6x6 mepilex border dressing after irrigating with saline and skin prepping periwound    Washed feet and applied padding with ABD after skin prepping right heel wound and then wrapped with gauze and protected with heel protector cushions    SOCIAL HISTORY    Social History     Tobacco Use    Smoking status: Former     Packs/day: 1.50     Years: 46.00     Pack years: 69.00     Types: Cigarettes     Quit date: 3/20/2019     Years since quitting: 3.8    Smokeless tobacco: Never   Vaping Use    Vaping Use: Unknown   Substance Use Topics    Alcohol use: Yes    Drug use: No     ALLERGIES    No Known Allergies    MEDICATIONS    Current Outpatient Medications on File Prior to Visit   Medication Sig Dispense Refill    levothyroxine (SYNTHROID) 150 MCG tablet Take 1 tablet by mouth in the morning.  30 tablet 3    Nutritional Supplements (ISOSOURCE 1.5 NUSRAT PO) 60 mLs by Percutaneous Endoscopic Gastrostomy route continuous      carboxymethylcellulose (ARTIFICIAL TEARS) 1 % ophthalmic solution Place 2 drops into both eyes daily      atorvastatin (LIPITOR) 20 MG tablet 20 mg by PEG Tube route nightly      Atropine Sulfate 0.01 % SOLN Place 1 drop under the tongue as needed (EVERY TWO HOURS FOR REDUCTION OF SECRETIONS)      chlorhexidine (PERIDEX) 0.12 % solution Take 15 mLs by mouth in the morning, at noon, and at bedtime FOR USE WITH TOOTHETTES FOR MOUTH CARE      polyethylene glycol (MIRALAX) 17 g PACK packet 17 g by Per G Tube route in the morning and at bedtime      acetaminophen (TYLENOL) 325 MG tablet 650 mg by PEG Tube route every 4 hours as needed for Pain      sennosides-docusate sodium (SENOKOT-S) 8.6-50 MG tablet 1 tablet by PEG Tube route in the morning and at bedtime      desmopressin (DDAVP) 0.1 MG tablet 0.5 mg by PEG Tube route in the morning and at bedtime Give 0.05mg via PEG tube two times a day for clotting promoter      esomeprazole Magnesium (NEXIUM) 40 MG PACK 40 mg by Per G Tube route in the morning. levETIRAcetam (KEPPRA) 100 MG/ML solution 750 mg/kg by PEG Tube route 2 times daily      hyoscyamine (LEVSIN/SL) 125 MCG sublingual tablet Place 125 mcg under the tongue every 6 hours as needed for Cramping      morphine sulfate 20 MG/ML concentrated oral solution Take 5 mg by mouth every 2 hours as needed for Pain.      vitamin D (CHOLECALCIFEROL) 125 MCG (5000 UT) CAPS capsule Take 50,000 Units by mouth daily Give 1/25mg ([id number]) one capsule via peg tube one time a day every Monday for supplement      [DISCONTINUED] paliperidone (INVEGA) 6 MG extended release tablet Take 1 tablet by mouth daily 30 tablet 1    cabergoline (DOSTINEX) 0.5 MG tablet Take 1 tablet by mouth Twice a Week ON Monday and Thursday 8 tablet 3    [DISCONTINUED] furosemide (LASIX) 40 MG tablet Take 40 mg by mouth daily      fenofibrate 160 MG tablet Take 150 mg by mouth daily      aspirin 81 MG EC tablet Take 1 tablet by mouth 2 times daily (Patient taking differently: Take 81 mg by mouth in the morning. ADMINISTER VIA PEG TUBE.) 60 tablet 0    [DISCONTINUED] lisinopril (PRINIVIL;ZESTRIL) 20 MG tablet Take 2 tablets by mouth daily 30 tablet 3    Respiratory Therapy Supplies SANDRA Full face CPAP mask and supplies 1 Device 0    CPAP Machine MISC by Does not apply route New CPAP with 6 cm 1 each 0    albuterol (PROVENTIL) (2.5 MG/3ML) 0.083% nebulizer solution Take 3 mLs by nebulization every 6 hours as needed for Wheezing 120 each 5    STIOLTO RESPIMAT 2.5-2.5 MCG/ACT AERS Inhale 2 puff in AM 1 Inhaler 5     No current facility-administered medications on file prior to visit. REVIEW OF SYSTEMS    Pertinent items are noted in HPI. Objective: There were no vitals taken for this visit.     Wt Readings from Last 3 Encounters:   07/21/22 201 lb 4.8 oz (91.3 kg)   06/06/22 175 lb (79.4 kg) 02/13/22 180 lb (81.6 kg)       PHYSICAL EXAM    Constitutional:   Well nourished and well developed. Appears neat and clean. Patient is alert, oriented x3, and in no apparent distress. Respiratory:  Respiratory effort is easy and symmetric bilaterally. Rate is normal at rest and on room air. Vascular:  Pedal Pulses is palpable and audible with doppler. Capillary refill is <3 sec to digits bilateral.  Extremities negative for 0 pitting edema. Neurological:   Sensation is unknown to lower extremities. Dermatological:  Wound description noted in wound assessment. Psychiatric:  Judgement and insight intact. Short and long term memory intact. No evidence of depression, anxiety, or agitation. Patient is calm, cooperative, and communicative. Appropriate interactions and affect. Assessment:      There are no active hospital problems to display for this patient. Procedure Note  Indications:  Based on my examination of this patient's wound(s)/ulcer(s) today, debridement is not required to promote healing and evaluate the wound base. Wound 06/29/22 Coccyx (Active)   Number of days: 190     Incision 04/23/19 Hip Anterior;Left;Proximal (Active)   Number of days: 1352     Plan:   Main goal is maintain comfort level and avoid more pressure areas. Patient has contractures to legs, has a trach tube, is non-mobile and non-verbal.    Wounds are about the same with a 7.7 cm tunnel at 7 o'clock to left hip and right heel medial is merely a stage 2. Pad heels and keep knees .       Electronically signed by MARICRUZ Camarena NP on 1/5/2023 at 2:31 PM

## 2023-01-11 ENCOUNTER — OFFICE VISIT (OUTPATIENT)
Dept: WOUND CARE | Age: 68
End: 2023-01-11
Payer: MEDICAID

## 2023-01-11 DIAGNOSIS — L89.224 PRESSURE INJURY OF LEFT HIP, STAGE 4 (HCC): Primary | Chronic | ICD-10-CM

## 2023-01-11 PROCEDURE — 99211 OFF/OP EST MAY X REQ PHY/QHP: CPT | Performed by: NURSE PRACTITIONER

## 2023-01-11 PROCEDURE — 1123F ACP DISCUSS/DSCN MKR DOCD: CPT | Performed by: NURSE PRACTITIONER

## 2023-01-11 PROCEDURE — 99212 OFFICE O/P EST SF 10 MIN: CPT | Performed by: NURSE PRACTITIONER

## 2023-01-11 NOTE — PROGRESS NOTES
[facility] Nursing                                                  Progress wound note      One Lisa Neal RECORD NUMBER:  [id number]  AGE: 74 y.o. GENDER: male  : 1955  EPISODE DATE:  2023    Subjective:     No chief complaint on file. Present reason: contractures - stage 4 wound to left hip, right medial heel wound and left lateral foot wound    HISTORY of PRESENT ILLNESS DELVIN Pimentel is a 74 y.o. male who presents today for wound/ulcer evaluation.    History of Wound Context: developed thru immobility and contractures  Wound/Ulcer Pain Timing/Severity: not know other than patient moans at times  Quality of pain: unable to ascertain  Severity:  x / 10   Modifying Factors: immobility  Associated Signs/Symptoms: contractures     Ulcer Identification:  Ulcer Type: pressure  Contributing Factors: bedbound, non-verbal        PAST MEDICAL HISTORY        Diagnosis Date    Abdominal pain     Asthma     Bloating symptom     COPD (chronic obstructive pulmonary disease) (H[facility])     COPD (chronic obstructive pulmonary disease) ([facility])     Depression     Depression     Diarrhea     Drug-seeking behavior 2020    Emphysema/COPD ([address]     History of rectal bleeding     Hypercholesteremia     past trx > 5 yrs    Hypercholesteremia     Hypercholesteremia     Hypertension     meds > 3 yrs    Hypothyroid 2022    Lung disease     Nontraumatic subarachnoid hemorrhage ([address]     [address] PAPERWORK    RANDI on CPAP     patient relates he doesn't use cpap at this time    Osteoarthritis     left hip    Osteoarthritis     Osteoarthritis of left hip     Other hydrocephalus ([address]     [address] PAPERWORK    Physiological consequence of immobility 2022    Pituitary macroadenoma [facility]     Sleep apnea      PAST SURGICAL HISTORY    Past Surgical History:   Procedure Laterality Date    COLONOSCOPY  14    JENNIFER    COLONOSCOPY N/A 2019    COLONOSCOPY DIAGNOSTIC performed by Mateo Bear MD at [facility], DIAGNOSTIC      EYE SURGERY Bilateral     cataracts    [address] ? index finger laceration repair    HERNIA REPAIR  2004    repair [facility]     JOINT REPLACEMENT Left     left hip    AL COLON CA SCRN NOT HI RSK IND N/A 5/17/2017    COLONOSCOPY performed by Dinorah Basilio MD at [address] Left 4/22/2019    LEFT HIP TOTAL HIP ARTHROPLASTY, LATERAL DECUB, GRACIE performed by Richard Schwab MD at [address]  12/12/2019    EGD DIAGNOSTIC ONLY performed by Mateo Bear MD at [address] History   Problem Relation Age of Onset    Colon Cancer Mother     Cancer Mother     Arthritis Mother     High Blood Pressure Father     Heart Disease Father     Diabetes Sister     No Known Problems Brother     No Known Problems Brother     No Known Problems Brother     Other Brother         Sepsis    Other Brother         Killed    No Known Problems Sister     No Known Problems Sister     No Known Problems Son      SOCIAL HISTORY    Social History     Tobacco Use    Smoking status: Former     Packs/day: 1.50     Years: 46.00     Pack years: 69.00     Types: Cigarettes     Quit date: 3/20/2019     Years since quitting: 3.8    Smokeless tobacco: Never   Vaping Use    Vaping Use: Unknown   Substance Use Topics    Alcohol use: Yes    Drug use: No     ALLERGIES    No Known Allergies    MEDICATIONS    Current Outpatient Medications on File Prior to Visit   Medication Sig Dispense Refill    levothyroxine (SYNTHROID) 150 MCG tablet Take 1 tablet by mouth in the morning.  30 tablet 3    Nutritional Supplements (ISOSOURCE 1.5 NUSRAT PO) 60 mLs by Percutaneous Endoscopic Gastrostomy route continuous      carboxymethylcellulose (ARTIFICIAL TEARS) 1 % ophthalmic solution Place 2 drops into both eyes daily      atorvastatin (LIPITOR) 20 MG tablet 20 mg by PEG Tube route nightly      Atropine Sulfate 0.01 % SOLN Place 1 drop under the tongue as needed (EVERY TWO HOURS FOR REDUCTION OF SECRETIONS)      chlorhexidine (PERIDEX) 0.12 % solution Take 15 mLs by mouth in the morning, at noon, and at bedtime FOR USE WITH TOOTHETTES FOR MOUTH CARE      polyethylene glycol (MIRALAX) 17 g PACK packet 17 g by Per G Tube route in the morning and at bedtime      acetaminophen (TYLENOL) 325 MG tablet 650 mg by PEG Tube route every 4 hours as needed for Pain      sennosides-docusate sodium (SENOKOT-S) 8.6-50 MG tablet 1 tablet by PEG Tube route in the morning and at bedtime      desmopressin (DDAVP) 0.1 MG tablet 0.5 mg by PEG Tube route in the morning and at bedtime Give 0.05mg via PEG tube two times a day for clotting promoter      esomeprazole Magnesium (NEXIUM) 40 MG PACK 40 mg by Per G Tube route in the morning. levETIRAcetam (KEPPRA) 100 MG/ML solution 750 mg/kg by PEG Tube route 2 times daily      hyoscyamine (LEVSIN/SL) 125 MCG sublingual tablet Place 125 mcg under the tongue every 6 hours as needed for Cramping      morphine sulfate 20 MG/ML concentrated oral solution Take 5 mg by mouth every 2 hours as needed for Pain.      vitamin D (CHOLECALCIFEROL) 125 MCG (5000 UT) CAPS capsule Take 50,000 Units by mouth daily Give 1/25mg ([id number]) one capsule via peg tube one time a day every Monday for supplement      [DISCONTINUED] paliperidone (INVEGA) 6 MG extended release tablet Take 1 tablet by mouth daily 30 tablet 1    cabergoline (DOSTINEX) 0.5 MG tablet Take 1 tablet by mouth Twice a Week ON Monday and Thursday 8 tablet 3    [DISCONTINUED] furosemide (LASIX) 40 MG tablet Take 40 mg by mouth daily      fenofibrate 160 MG tablet Take 150 mg by mouth daily      aspirin 81 MG EC tablet Take 1 tablet by mouth 2 times daily (Patient taking differently: Take 81 mg by mouth in the morning.  ADMINISTER VIA PEG TUBE.) 60 tablet 0    [DISCONTINUED] lisinopril (PRINIVIL;ZESTRIL) 20 MG tablet Take 2 tablets by mouth daily 30 tablet 3    Respiratory Therapy Supplies SANDRA Full face CPAP mask and supplies 1 Device 0    CPAP Machine MISC by Does not apply route New CPAP with 6 cm 1 each 0    albuterol (PROVENTIL) (2.5 MG/3ML) 0.083% nebulizer solution Take 3 mLs by nebulization every 6 hours as needed for Wheezing 120 each 5    STIOLTO RESPIMAT 2.5-2.5 MCG/ACT AERS Inhale 2 puff in AM 1 Inhaler 5     No current facility-administered medications on file prior to visit. REVIEW OF SYSTEMS    Pertinent items are noted in HPI. Objective: There were no vitals taken for this visit. Wt Readings from Last 3 Encounters:   07/21/22 201 lb 4.8 oz (91.3 kg)   06/06/22 175 lb (79.4 kg)   02/13/22 180 lb (81.6 kg)     PHYSICAL EXAM    Constitutional:   Well nourished and well developed. Appears neat and clean. Patient is alert, oriented x3, and in no apparent distress. Respiratory:  Respiratory effort is easy and symmetric bilaterally. Rate is normal at rest and on room air. Vascular:  Pedal Pulses is palpable and audible with doppler. Capillary refill is <3 sec to digits bilateral.  Extremities negative for 0 pitting edema. Neurological:   Sensation is unknown to lower extremities. Severely contractured    Dermatological:  Wound description noted in wound assessment. Psychiatric:  Judgment and insight intact. Short and long term memory intact. No evidence of depression, anxiety, or agitation. Patient is calm, cooperative, and communicative. Appropriate interactions and affect. Assessment:      There are no active hospital problems to display for this patient. 4.0 cm deep tunnel 0.6 x 0.4 cm    28 x 4.2 right posterior literal    Left  medial foot 0.3 x 0.3 cm    Procedure Note  Indications:  Based on my examination of this patient's wound(s)/ulcer(s) today, debridement is not required to promote healing and evaluate the wound base.     Post Debridement Measurements:  Wound/Ulcer Descriptions are Pre Debridement except measurements:    Wound 06/29/22 Coccyx (Active)   Number of days: 196     Incision 04/23/19 Hip Anterior;Left;Proximal (Active)   Number of days: 6900     Plan:   Patient is non-communicable a trach, immobility, contractures, so prognosis is poor.     Electronically signed by MARICRUZ Faria NP on 1/11/2023 at 1:48 PM

## 2023-01-15 NOTE — DISCHARGE INSTR - COC
Continuity of Care Form    Patient Name: Alec Shaw   :  1955  MRN:  [id number]    Admit date:  2022  Discharge date:  2022    Code Status Order: DNR-CCA   Advance Directives:     Admitting Physician: Dania Quiles MD  PCP: Maggie Shannon DO    Discharging Nurse: [facility] Unit/Room#: J220/C813-41  Discharging Unit Phone Number: [phone]    Emergency Contact:   Extended Emergency Contact Information  Primary Emergency Contact: [address] [facility] Phone: [phone]  Mobile Phone: [phone]  Relation: Other   needed? No  Secondary Emergency Contact: [address]  Phone: [phone]  Mobile Phone: [phone]  Relation: Brother/Sister   needed? No    Past Surgical History:  Past Surgical History:   Procedure Laterality Date    COLONOSCOPY  14    JENNIFER    COLONOSCOPY N/A 2019    COLONOSCOPY DIAGNOSTIC performed by Dali Scott MD at [place] [address], DIAGNOSTIC      EYE SURGERY Bilateral     cataracts    [place] 51 ?     index finger laceration repair    HERNIA REPAIR  2004    repair [facility]     JOINT REPLACEMENT Left     left hip    TX COLON CA SCRN NOT HI RSK IND N/A 2017    COLONOSCOPY performed by Racquel Morrison MD at [address] [place] [address] 2019    LEFT HIP TOTAL HIP ARTHROPLASTY, LATERAL DECUB, GRACIE performed by Victoriano Low MD at [address] [place] [address]  2019    EGD DIAGNOSTIC ONLY performed by Dali Scott MD at [facility]       Immunization History:   Immunization History   Administered Date(s) Administered    Influenza Virus Vaccine 10/15/2013, 2014, 2015, 09/10/2016    Influenza, Ammon Forget, IM, PF (6 mo and older Fluzone, Flulaval, Fluarix, and 3 yrs and older Afluria) 2018, 2019    Influenza, Triv, inactivated, subunit, adjuvanted, IM (Fluad 65 yrs and older) 2017 Pneumococcal Polysaccharide ([id number]) 12/12/2019    Tdap (Boostrix, Adacel) 09/12/2013       Active Problems:  Patient Active Problem List   Diagnosis Code    Osteoarthritis M19.90    HTN (hypertension) I10    Hypercholesteremia E78.00    Depression F32. 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    Gastritis without bleeding K29.70    Other specified soft tissue disorders M79.89    Adenomatous polyp of colon D12.6    Rectal bleeding K62.5    Grade I hemorrhoids K64.0    Aftercare following joint replacement surgery Z47.1    Blurry vision H53.8    Class 2 obesity in adult E66.9    Delirium R41.0    Other acute postprocedural pain G89.18    Pain in left lower leg M79.662    Paranoid schizophrenia ([facility]) F20.0    Presence of left artificial hip joint Z96.642    Sprain of ribs, initial encounter S23.41XA    Suprasellar mass G93.89    Unilateral primary osteoarthritis, left knee M17.12    Hyponatremia E87.1    Schizoaffective disorder ([facility]) F25.9    Hyperprolactinemia ([facility]) E22.1    Psychogenic polydipsia R63.1, F54    Schizophrenia ([facility]) F20.9    Physiological consequence of immobility Z74.09    Sepsis ([facility]) A41.9    Hypothyroid E03.9    Osteomyelitis ([facility]) G67.8    Complicated open wound of left hip S71.002A    Cellulitis of left hip L03.116    Gram-positive bacteremia R78.81    Staphylococcus aureus bacteremia with sepsis ([facility]) A41.01    Acute osteomyelitis of hip ([facility]) M86.18       Isolation/Infection:   Isolation            No Isolation          Patient Infection Status       Infection Onset Added Last Indicated Last Indicated By Review Planned Expiration Resolved Resolved By    None active    Resolved    COVID-19 (Rule Out) 02/13/22 02/13/22 02/13/22 COVID-19, Rapid (Ordered)   02/13/22 Rule-Out Test Resulted            Nurse Assessment:  Last Vital Signs: BP (!) 123/107   Pulse 59   Temp 98.4 °F (36.9 °C) (Oral)   Resp 17   Ht 5' 5\" (1.651 m)   Wt 201 lb 4.8 oz (91.3 kg)   SpO2 99%   BMI 33.50 kg/m²     Last documented pain score (0-10 scale): Pain Level: 0  Last Weight:   Wt Readings from Last 1 Encounters:   07/21/22 201 lb 4.8 oz (91.3 kg)     Mental Status:   nonverbal    IV Access:  - PICC - site  R Upper Arm, insertion date: 7/26/2022    Nursing Mobility/ADLs:  Walking   Dependent  Transfer  Dependent  Bathing  Dependent  Dressing  Dependent  Toileting  Dependent  Feeding  Dependent  Med Admin  Dependent  Med Delivery    Crushed in PEG tube     Wound Care Documentation and Therapy:  Wound 06/29/22 Coccyx (Active)   Wound Image   06/29/22 1002   Wound Etiology Pressure Stage 2 07/28/22 0057   Dressing Status Clean;Dry; Intact 07/28/22 0057   Wound Cleansed Soap and water 07/27/22 1901   Dressing/Treatment Zinc paste 07/27/22 1901   Dressing Change Due 07/27/22 07/26/22 1730   Wound Length (cm) 1 cm 07/21/22 1025   Wound Width (cm) 0.5 cm 07/21/22 1025   Wound Depth (cm) 0.1 cm 07/21/22 1025   Wound Surface Area (cm^2) 0.5 cm^2 07/21/22 1025   Change in Wound Size % (l*w) -25 07/21/22 1025   Wound Volume (cm^3) 0.05 cm^3 07/21/22 1025   Wound Healing % -25 07/21/22 1025   Wound Assessment Pink/red 07/27/22 1901   Drainage Amount None 07/27/22 1901   Drainage Description Serosanguinous 07/27/22 1901   Odor None 07/27/22 1901   Wendy-wound Assessment Blanchable erythema 07/27/22 1901   Margins Defined edges 07/27/22 1901   Number of days: 29       Incision 04/23/19 Hip Anterior;Left;Proximal (Active)   Wound Image   07/21/22 1025   Dressing Status Clean;Dry; Intact 07/28/22 0057   Dressing Change Due 07/27/22 07/26/22 1730   Incision Cleansed Cleansed with saline 07/26/22 1730   Dressing/Treatment Moist to dry; Roll gauze; Other (comment);ABD pad 07/26/22 1730   Incision Length (cm) 3 07/21/22 1025   Incision Width (cm) 3 cm 07/21/22 1025   Incision Depth (cm) 4 cm 07/21/22 1025   Drainage Amount Large 07/27/22 1901   Drainage Description Purulent;Yellow 07/27/22 1901   Odor Malodorous/putrid 07/27/22 1901   Wendy-incision Assessment Blanchable erythema 07/27/22 1901   Number of days: 1191      Iodoform packing into the RIGHT heel with dry sterile dressing, Mepilex border to the LEFT foot daily    Left hip: 1) cleanse/irriagte woudn with saline, dry with 4x4s , 2) Moisten roll gauze with saline and plurogel , 3) lightly pack wound bed with the packing ,4) cover with ABD. Change dressing daily. Elimination:  Continence: Bowel: No  Bladder: No  Urinary Catheter: None   Colostomy/Ileostomy/Ileal Conduit: No       Date of Last BM: 7/28/2022    Intake/Output Summary (Last 24 hours) at 7/28/2022 1029  Last data filed at 7/28/2022 0558  Gross per 24 hour   Intake 762 ml   Output --   Net 762 ml     I/O last 3 completed shifts: In: 1712.7 [I.V.:110; NG/GT:1472; IV Piggyback:130.7]  Out: -     Safety Concerns:     History of Falls (last 30 days), At Risk for Falls, and Aspiration Risk    Impairments/Disabilities:      Contractures - BLE and BUE and nonverbal    Nutrition Therapy:  Current Nutrition Therapy:   - Tube Feedings:  Peptide Based continuous at 60 ml     Routes of Feeding: Gastrostomy Tube  Liquids: NPO  Daily Fluid Restriction: no  Last Modified Barium Swallow with Video (Video Swallowing Test): not done    Treatments at the Time of Hospital Discharge:   Respiratory Treatments: suctioning as needed   Oxygen Therapy:   6 L Trach mask   Ventilator:    - No ventilator support    Rehab Therapies: none;  Contracture  Weight Bearing Status/Restrictions: No weight bearing restrictions  Other Medical Equipment (for information only, NOT a DME order):  hospital bed  Other Treatments: none     Patient's personal belongings (please select all that are sent with patient):  None    RN SIGNATURE:  Electronically signed by Nakia Erickson RN on 7/28/22 at 3:47 PM EDT    CASE MANAGEMENT/SOCIAL WORK SECTION    Inpatient Status Date: ***    Readmission Risk Assessment Score:  Readmission Risk              Risk of Unplanned Readmission:  [id number]           Discharging to Facility/ Agency   Name: [facility]uy[facility]INE[facility]  Address:  Phone: [phone]  Fax:    Dialysis Facility (if applicable)   Name:  Address:  Dialysis Schedule:  Phone:  Fax:    / signature: Electronically signed by CAMELIA Rocha LSW on 7/28/22 at 10:56 AM EDT     PHYSICIAN SECTION    Prognosis: Fair    Condition at Discharge: Stable    Rehab Potential (if transferring to Rehab): Fair    Recommended Labs or Other Treatments After Discharge: none    Physician Certification: I certify the above information and transfer of Adri Fraser  is necessary for the continuing treatment of the diagnosis listed and that he requires [place] for less 30 days.      Update Admission H&P: No change in H&P    PHYSICIAN SIGNATURE:  Electronically signed by Sharee Joseph MD on 7/28/22 at 12:13 PM EDT I personally performed the service described in the documentation recorded by the scribe in my presence, and it accurately and completely records my words and actions.

## 2023-01-18 ENCOUNTER — OFFICE VISIT (OUTPATIENT)
Dept: WOUND CARE | Age: 68
End: 2023-01-18
Payer: MEDICAID

## 2023-01-18 DIAGNOSIS — L89.224 PRESSURE INJURY OF LEFT HIP, STAGE 4 (HCC): Primary | ICD-10-CM

## 2023-01-18 PROCEDURE — 1123F ACP DISCUSS/DSCN MKR DOCD: CPT | Performed by: NURSE PRACTITIONER

## 2023-01-18 PROCEDURE — 99212 OFFICE O/P EST SF 10 MIN: CPT | Performed by: NURSE PRACTITIONER

## 2023-01-18 NOTE — PROGRESS NOTES
[facility] Nursing                                                         Progress Note       Kwaku Mirza  MEDICAL RECORD NUMBER:  [id number]  AGE: 74 y.o. GENDER: male  : 1955  EPISODE DATE:  2023    Subjective:     No chief complaint on file. 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 male who presents today for wound/ulcer evaluation.    History of Wound Context: developed thru immobility and contractures  Wound/Ulcer Pain Timing/Severity: not know other than patient moans at times  Quality of pain: unable to ascertain  Severity:  x / 10   Modifying Factors: immobility  Associated Signs/Symptoms: contractures     Ulcer Identification:  Ulcer Type: pressure  Contributing Factors: decreased mobility    Wound: N/A        PAST MEDICAL HISTORY        Diagnosis Date    Abdominal pain     Asthma     Bloating symptom     COPD (chronic obstructive pulmonary disease) (HCC)     COPD (chronic obstructive pulmonary disease) (HCC)     Depression     Depression     Diarrhea     Drug-seeking behavior 2020    Emphysema/COPD ([address]     History of rectal bleeding     Hypercholesteremia     past trx > 5 yrs    Hypercholesteremia     Hypercholesteremia     Hypertension     meds > 3 yrs    Hypothyroid 2022    Lung disease     Nontraumatic subarachnoid hemorrhage ([address]     [address] [facility] [address]    RANDI on CPAP     patient relates he doesn't use cpap at this time    Osteoarthritis     left hip    Osteoarthritis     Osteoarthritis of left hip     Other hydrocephalus ([address]     [address] PAPERWORK    Physiological consequence of immobility 2022    Pituitary macroadenoma ([address]     Sleep apnea      PAST SURGICAL HISTORY    Past Surgical History:   Procedure Laterality Date    COLONOSCOPY  14    JENNIFER    COLONOSCOPY N/A 2019    COLONOSCOPY DIAGNOSTIC performed by Nicko Shaffer MD at [facility], DIAGNOSTIC      EYE SURGERY Bilateral     cataracts    [place] Right 1999 ? index finger laceration repair    HERNIA REPAIR  2004    repair [facility]     JOINT REPLACEMENT Left     left hip    CA COLON CA SCRN NOT HI RSK IND N/A 5/17/2017    COLONOSCOPY performed by Lashawn Buckley MD at [address] Left 4/22/2019    LEFT HIP TOTAL HIP ARTHROPLASTY, LATERAL DECUB, GRACIE performed by Karen Wallace MD at [address] [facility]  12/12/2019    EGD DIAGNOSTIC ONLY performed by Jemma Li MD at [address] History   Problem Relation Age of Onset    Colon Cancer Mother     Cancer Mother     Arthritis Mother     High Blood Pressure Father     Heart Disease Father     Diabetes Sister     No Known Problems Brother     No Known Problems Brother     No Known Problems Brother     Other Brother         Sepsis    Other Brother         Killed    No Known Problems Sister     No Known Problems Sister     No Known Problems Son      SOCIAL HISTORY    Social History     Tobacco Use    Smoking status: Former     Packs/day: 1.50     Years: 46.00     Pack years: 69.00     Types: Cigarettes     Quit date: 3/20/2019     Years since quitting: 3.8    Smokeless tobacco: Never   Vaping Use    Vaping Use: Unknown   Substance Use Topics    Alcohol use: Yes    Drug use: No     ALLERGIES    No Known Allergies    MEDICATIONS    Current Outpatient Medications on File Prior to Visit   Medication Sig Dispense Refill    levothyroxine (SYNTHROID) 150 MCG tablet Take 1 tablet by mouth in the morning.  30 tablet 3    Nutritional Supplements (ISOSOURCE 1.5 NUSRAT PO) 60 mLs by Percutaneous Endoscopic Gastrostomy route continuous      carboxymethylcellulose (ARTIFICIAL TEARS) 1 % ophthalmic solution Place 2 drops into both eyes daily      atorvastatin (LIPITOR) 20 MG tablet 20 mg by PEG Tube route nightly Atropine Sulfate 0.01 % SOLN Place 1 drop under the tongue as needed (EVERY TWO HOURS FOR REDUCTION OF SECRETIONS)      chlorhexidine (PERIDEX) 0.12 % solution Take 15 mLs by mouth in the morning, at noon, and at bedtime FOR USE WITH TOOTHETTES FOR MOUTH CARE      polyethylene glycol (MIRALAX) 17 g PACK packet 17 g by Per G Tube route in the morning and at bedtime      acetaminophen (TYLENOL) 325 MG tablet 650 mg by PEG Tube route every 4 hours as needed for Pain      sennosides-docusate sodium (SENOKOT-S) 8.6-50 MG tablet 1 tablet by PEG Tube route in the morning and at bedtime      desmopressin (DDAVP) 0.1 MG tablet 0.5 mg by PEG Tube route in the morning and at bedtime Give 0.05mg via PEG tube two times a day for clotting promoter      esomeprazole Magnesium (NEXIUM) 40 MG PACK 40 mg by Per G Tube route in the morning. levETIRAcetam (KEPPRA) 100 MG/ML solution 750 mg/kg by PEG Tube route 2 times daily      hyoscyamine (LEVSIN/SL) 125 MCG sublingual tablet Place 125 mcg under the tongue every 6 hours as needed for Cramping      morphine sulfate 20 MG/ML concentrated oral solution Take 5 mg by mouth every 2 hours as needed for Pain.      vitamin D (CHOLECALCIFEROL) 125 MCG (5000 UT) CAPS capsule Take 50,000 Units by mouth daily Give 1/25mg ([id number]) one capsule via peg tube one time a day every Monday for supplement      [DISCONTINUED] paliperidone (INVEGA) 6 MG extended release tablet Take 1 tablet by mouth daily 30 tablet 1    cabergoline (DOSTINEX) 0.5 MG tablet Take 1 tablet by mouth Twice a Week ON Monday and Thursday 8 tablet 3    [DISCONTINUED] furosemide (LASIX) 40 MG tablet Take 40 mg by mouth daily      fenofibrate 160 MG tablet Take 150 mg by mouth daily      aspirin 81 MG EC tablet Take 1 tablet by mouth 2 times daily (Patient taking differently: Take 81 mg by mouth in the morning.  ADMINISTER VIA PEG TUBE.) 60 tablet 0    [DISCONTINUED] lisinopril (PRINIVIL;ZESTRIL) 20 MG tablet Take 2 tablets by mouth daily 30 tablet 3    Respiratory Therapy Supplies SANDRA Full face CPAP mask and supplies 1 Device 0    CPAP Machine MISC by Does not apply route New CPAP with 6 cm 1 each 0    albuterol (PROVENTIL) (2.5 MG/3ML) 0.083% nebulizer solution Take 3 mLs by nebulization every 6 hours as needed for Wheezing 120 each 5    STIOLTO RESPIMAT 2.5-2.5 MCG/ACT AERS Inhale 2 puff in AM 1 Inhaler 5     No current facility-administered medications on file prior to visit. REVIEW OF SYSTEMS    Pertinent items are noted in HPI. Objective: There were no vitals taken for this visit. Wt Readings from Last 3 Encounters:   07/21/22 201 lb 4.8 oz (91.3 kg)   06/06/22 175 lb (79.4 kg)   02/13/22 180 lb (81.6 kg)     PHYSICAL EXAM    Constitutional:   Well nourished and well developed. Appears neat and clean. Patient is alert, oriented x3, and in no apparent distress. Respiratory:  Respiratory effort is easy and symmetric bilaterally. Rate is normal at rest and on room air. Vascular:  Pedal Pulses is palpable and audible with doppler. Capillary refill is <3 sec to digits bilateral.  Extremities negative for 0 pitting edema. Neurological:   Sensation is present to lower extremities. Dermatological:  Wound description noted in wound assessment. Psychiatric:  Difficult to  as patient has trach tube    Assessment:      There are no active hospital problems to display for this patient. leftLeft hip pressure injury stage 4 - 2.0 x 3.0 x 3.0 cm deep at 7 o'clock    Right medial foot wound 1.5 x 1.5 cm beefy red      Left foot wound healed  Procedure Note  Indications:  Based on my examination of this patient's wound(s)/ulcer(s) today, debridement is not required to promote healing and evaluate the wound base.     Wound 06/29/22 Coccyx (Active)   Number of days: 203     Incision 04/23/19 Hip Anterior;Left;Proximal (Active)   Number of days: 5565     Plan:     Continue wound care of packing left hip ulcer after cleansing with saline with calcium alginate Ag rope and covering with mepilex border dressing. Wash feet and apply dressing to right medial heel. Wrap both with abd pads and kerlix and placed in booties.

## 2023-01-19 NOTE — CARE COORDINATION
Await psych consult.  Electronically signed by Lolis Yadav on 2/19/20 at 12:22 PM Called pt to review intakes - pt states she is eating lighter the past couple days (3x/day - 2 light meals and 1 snack) and drinking about 48 oz water daily. Pt states she has not been taking her vitamins since late November as they occasionally made her nauseous. Pt feels she can restart now. Pt reached out to PCP and states they took notes and would call her back about openings. They recommended urgent care if symptoms get worse. Recommended pt shoot for 64 oz fluid and take MVI with a meal/snack.

## 2023-01-25 ENCOUNTER — OFFICE VISIT (OUTPATIENT)
Dept: WOUND CARE | Age: 68
End: 2023-01-25
Payer: MEDICAID

## 2023-01-25 DIAGNOSIS — L89.224 PRESSURE INJURY OF LEFT HIP, STAGE 4 (HCC): Primary | ICD-10-CM

## 2023-01-25 PROCEDURE — 99212 OFFICE O/P EST SF 10 MIN: CPT | Performed by: NURSE PRACTITIONER

## 2023-01-25 PROCEDURE — 1123F ACP DISCUSS/DSCN MKR DOCD: CPT | Performed by: NURSE PRACTITIONER

## 2023-01-25 NOTE — PROGRESS NOTES
[facility]                                                    Progress Wound Care Note        Kayla Adame  MEDICAL RECORD NUMBER:  [id number]  AGE: 74 y.o. GENDER: male  : 1955  EPISODE DATE:  2023    Subjective:     No chief complaint on file. Present for wound to left hip, and comfort a preventive care to feet    HISTORY of PRESENT ILLNESS HPI     Kayla Adame is a 74 y.o. male who presents today for wound/ulcer evaluation. History of Wound Context: developed thru immobility and contractures  Wound/Ulcer Pain Timing/Severity: not know other than patient moans at times  Quality of pain: unable to ascertain  Severity:  x / 10   Modifying Factors: immobility  Associated Signs/Symptoms: arun is a 74 y.o. male who presents today for wound/ulcer evaluation.      Ulcer Identification:  Ulcer Type: pressure  Contributing Factors: decreased mobility    Wound: N/A        PAST MEDICAL HISTORY        Diagnosis Date    Abdominal pain     Asthma     Bloating symptom     COPD (chronic obstructive pulmonary disease) (HCC)     COPD (chronic obstructive pulmonary disease) (HCC)     Depression     Depression     Diarrhea     Drug-seeking behavior 2020    Emphysema/COPD ([address]     History of rectal bleeding     Hypercholesteremia     past trx > 5 yrs    Hypercholesteremia     Hypercholesteremia     Hypertension     meds > 3 yrs    Hypothyroid 2022    Lung disease     Nontraumatic subarachnoid hemorrhage ([address]     [facility] [address] PAPERWORK    RANDI on CPAP     patient relates he doesn't use cpap at this time    Osteoarthritis     left hip    Osteoarthritis     Osteoarthritis of left hip     Other hydrocephalus ([address]     [address] PAPERWORK    Physiological consequence of immobility 2022    Pituitary macroadenoma ([address]     Sleep apnea      PAST SURGICAL HISTORY    Past Surgical History:   Procedure Laterality Date    COLONOSCOPY  14    [facility] COLONOSCOPY N/A 12/12/2019    COLONOSCOPY DIAGNOSTIC performed by Griselda Downs MD at [facility], DIAGNOSTIC      EYE SURGERY Bilateral     cataracts    [address] 51 ? index finger laceration repair    HERNIA REPAIR  2004    repair [facility]     JOINT REPLACEMENT Left     left hip    [place] COLON CA SCRN NOT HI RSK IND N/A 5/17/2017    COLONOSCOPY performed by Julian Roberts MD at [address] Left 4/22/2019    LEFT HIP TOTAL HIP ARTHROPLASTY, LATERAL DECUB, GRACIE performed by Russell Garay MD at [address]  12/12/2019    EGD DIAGNOSTIC ONLY performed by Griselda Downs MD at [address] History   Problem Relation Age of Onset    Colon Cancer Mother     Cancer Mother     Arthritis Mother     High Blood Pressure Father     Heart Disease Father     Diabetes Sister     No Known Problems Brother     No Known Problems Brother     No Known Problems Brother     Other Brother         Sepsis    Other Brother         Killed    No Known Problems Sister     No Known Problems Sister     No Known Problems Son      SOCIAL HISTORY    Social History     Tobacco Use    Smoking status: Former     Packs/day: 1.50     Years: 46.00     Pack years: 69.00     Types: Cigarettes     Quit date: 3/20/2019     Years since quitting: 3.8    Smokeless tobacco: Never   Vaping Use    Vaping Use: Unknown   Substance Use Topics    Alcohol use: Yes    Drug use: No     ALLERGIES    No Known Allergies    MEDICATIONS    Current Outpatient Medications on File Prior to Visit   Medication Sig Dispense Refill    levothyroxine (SYNTHROID) 150 MCG tablet Take 1 tablet by mouth in the morning.  30 tablet 3    Nutritional Supplements (ISOSOURCE 1.5 NUSRAT PO) 60 mLs by Percutaneous Endoscopic Gastrostomy route continuous      carboxymethylcellulose (ARTIFICIAL TEARS) 1 % ophthalmic solution Place 2 drops into both eyes daily      atorvastatin (LIPITOR) 20 MG tablet 20 mg by PEG Tube route nightly      Atropine Sulfate 0.01 % SOLN Place 1 drop under the tongue as needed (EVERY TWO HOURS FOR REDUCTION OF SECRETIONS)      chlorhexidine (PERIDEX) 0.12 % solution Take 15 mLs by mouth in the morning, at noon, and at bedtime FOR USE WITH TOOTHETTES FOR MOUTH CARE      polyethylene glycol (MIRALAX) 17 g PACK packet 17 g by Per G Tube route in the morning and at bedtime      acetaminophen (TYLENOL) 325 MG tablet 650 mg by PEG Tube route every 4 hours as needed for Pain      sennosides-docusate sodium (SENOKOT-S) 8.6-50 MG tablet 1 tablet by PEG Tube route in the morning and at bedtime      desmopressin (DDAVP) 0.1 MG tablet 0.5 mg by PEG Tube route in the morning and at bedtime Give 0.05mg via PEG tube two times a day for clotting promoter      esomeprazole Magnesium (NEXIUM) 40 MG PACK 40 mg by Per G Tube route in the morning. levETIRAcetam (KEPPRA) 100 MG/ML solution 750 mg/kg by PEG Tube route 2 times daily      hyoscyamine (LEVSIN/SL) 125 MCG sublingual tablet Place 125 mcg under the tongue every 6 hours as needed for Cramping      morphine sulfate 20 MG/ML concentrated oral solution Take 5 mg by mouth every 2 hours as needed for Pain.      vitamin D (CHOLECALCIFEROL) 125 MCG (5000 UT) CAPS capsule Take 50,000 Units by mouth daily Give 1/25mg (44103SE) one capsule via peg tube one time a day every Monday for supplement      [DISCONTINUED] paliperidone (INVEGA) 6 MG extended release tablet Take 1 tablet by mouth daily 30 tablet 1    cabergoline (DOSTINEX) 0.5 MG tablet Take 1 tablet by mouth Twice a Week ON Monday and Thursday 8 tablet 3    [DISCONTINUED] furosemide (LASIX) 40 MG tablet Take 40 mg by mouth daily      fenofibrate 160 MG tablet Take 150 mg by mouth daily      aspirin 81 MG EC tablet Take 1 tablet by mouth 2 times daily (Patient taking differently: Take 81 mg by mouth in the morning.  ADMINISTER VIA PEG TUBE.) 60 tablet 0    [DISCONTINUED] lisinopril (PRINIVIL;ZESTRIL) 20 MG tablet Take 2 tablets by mouth daily 30 tablet 3    Respiratory Therapy Supplies SANDRA Full face CPAP mask and supplies 1 Device 0    CPAP Machine MISC by Does not apply route New CPAP with 6 cm 1 each 0    albuterol (PROVENTIL) (2.5 MG/3ML) 0.083% nebulizer solution Take 3 mLs by nebulization every 6 hours as needed for Wheezing 120 each 5    STIOLTO RESPIMAT 2.5-2.5 MCG/ACT AERS Inhale 2 puff in AM 1 Inhaler 5     No current facility-administered medications on file prior to visit. REVIEW OF SYSTEMS    Pertinent items are noted in HPI. Objective: There were no vitals taken for this visit. Wt Readings from Last 3 Encounters:   07/21/22 201 lb 4.8 oz (91.3 kg)   06/06/22 175 lb (79.4 kg)   02/13/22 180 lb (81.6 kg)     PHYSICAL EXAM    Constitutional:   Well nourished and well developed. Appears neat and clean. Patient is alert, oriented x3, and in no apparent distress. Respiratory:  Respiratory effort is easy and symmetric bilaterally. Rate is normal at rest and on room air. Vascular:  Pedal Pulses is palpable and audible with doppler. Capillary refill is <3 sec to digits bilateral.  Extremities negative for 0 pitting edema. Neurological:   Sensation is unknown to lower extremities - has severe contractures to legs    Dermatological:  Wound description noted in wound assessment. Psychiatric:  Judgment and insight intact. Short and long term memory intact. No evidence of depression, anxiety, or agitation. Patient is calm, cooperative, and communicative. Appropriate interactions and affect. Assessment:      There are no active hospital problems to display for this patient.     Right great toe post procedure by podiatrist 1.0 x 0.3 cm      Right medial heel superficial & left foot intact - just protection for both    Left hip stage 4 pressure injury 3.0 x 2.5 x 3.5 cm @ 7 o'clock     Procedure Note  Indications:  Based on my examination of this patient's wound(s)/ulcer(s) today, debridement is not required to promote healing and evaluate the wound base. Post Debridement Measurements:  Wound/Ulcer Descriptions are Pre Debridement except measurements:    Wound 06/29/22 Coccyx (Active)   Number of days: 210     Incision 04/23/19 Hip Anterior;Left;Proximal (Active)   Number of days: 1372     Plan:   Continue with comfort care and left hip wound care.      Electronically signed by MARICRUZ Nunez NP on 1/25/2023 at 3:05 PM

## 2023-01-27 ENCOUNTER — HOSPITAL ENCOUNTER (EMERGENCY)
Age: 68
Discharge: HOME | End: 2023-01-27
Attending: EMERGENCY MEDICINE
Payer: MEDICAID

## 2023-01-27 VITALS
OXYGEN SATURATION: 100 % | RESPIRATION RATE: 18 BRPM | SYSTOLIC BLOOD PRESSURE: 141 MMHG | DIASTOLIC BLOOD PRESSURE: 92 MMHG | HEART RATE: 70 BPM | TEMPERATURE: 98.6 F

## 2023-01-27 DIAGNOSIS — R68.89 COMPLAINT ASSOCIATED WITH GASTRIC TUBE (HCC): Primary | ICD-10-CM

## 2023-01-27 DIAGNOSIS — Z93.1 COMPLAINT ASSOCIATED WITH GASTRIC TUBE (HCC): Primary | ICD-10-CM

## 2023-01-27 PROCEDURE — 99283 EMERGENCY DEPT VISIT LOW MDM: CPT

## 2023-01-27 ASSESSMENT — PAIN - FUNCTIONAL ASSESSMENT: PAIN_FUNCTIONAL_ASSESSMENT: NONE - DENIES PAIN

## 2023-01-27 NOTE — ED NOTES
Pt arrived to ED with c/o of tear in peg tube. Pt does not appear to be in acute distress during assessment. Pt is at baseline.  Dr Maureen Pereira at bedside     Kiera Paulino RN  01/27/23 [id number]

## 2023-01-27 NOTE — ED PROVIDER NOTES
[facility] ED  EMERGENCY DEPARTMENT ENCOUNTER      Pt Name: Eddie Denton  MRN: [id number]  Birthdate 1955  Date of evaluation: 1/27/2023  Provider: Tiara Dougherty DO    CHIEF COMPLAINT       Chief Complaint   Patient presents with    Other     Peg tube replacement         HISTORY OF PRESENT ILLNESS   (Location/Symptom, Timing/Onset, Context/Setting, Quality, Duration, Modifying Factors, Severity)  Note limiting factors.   Eddie Denton is a 67 y.o. male who presents to the emergency department .  Brought in from nursing home because PEG tube had a hole in it.  Here only for replacement of PEG tube.    HPI    Nursing Notes were reviewed.    REVIEW OF SYSTEMS    (2-9 systems for level 4, 10 or more for level 5)     Review of Systems   Unable to perform ROS: Patient nonverbal     Except as noted above the remainder of the review of systems was reviewed and negative.       PAST MEDICAL HISTORY     Past Medical History:   Diagnosis Date    Abdominal pain     Asthma     Bloating symptom     COPD (chronic obstructive pulmonary disease) (HCC)     COPD (chronic obstructive pulmonary disease) (HCC)     Depression     Depression     Diarrhea     Drug-seeking behavior 02/03/2020    Emphysema/COPD (HCC)     History of rectal bleeding     Hypercholesteremia     past trx > 5 yrs    Hypercholesteremia     Hypercholesteremia     Hypertension     meds > 3 yrs    Hypothyroid 07/21/2022    Lung disease     Nontraumatic subarachnoid hemorrhage (HCC)     OBTAINED FROM NURSING HOME PAPERWORK    RANDI on CPAP     patient relates he doesn't use cpap at this time    Osteoarthritis     left hip    Osteoarthritis     Osteoarthritis of left hip     Other hydrocephalus (HCC)     DX OBTAINED FROM NURSING HOME PAPERWORK    Physiological consequence of immobility 03/09/2022    Pituitary macroadenoma (HCC)     Sleep apnea          SURGICAL HISTORY       Past Surgical History:   Procedure Laterality Date    COLONOSCOPY  5/2/14     Cait Roachr    COLONOSCOPY N/A 12/12/2019    COLONOSCOPY DIAGNOSTIC performed by Eric Young MD at [facility], DIAGNOSTIC      EYE SURGERY Bilateral     cataracts    [address] ? index finger laceration repair    HERNIA REPAIR  2004    repair [facility]     JOINT REPLACEMENT Left     left hip    [place] COLON CA SCRN NOT HI RSK IND N/A 5/17/2017    COLONOSCOPY performed by Donice Bence, MD at [address] Left 4/22/2019    LEFT HIP TOTAL HIP ARTHROPLASTY, LATERAL DECUB, GRACIE performed by Merlyn Chapman MD at [facility]  12/12/2019    EGD DIAGNOSTIC ONLY performed by Eric Young MD at [address]       Previous Medications    ACETAMINOPHEN (TYLENOL) 325 MG TABLET    650 mg by PEG Tube route every 4 hours as needed for Pain    ALBUTEROL (PROVENTIL) (2.5 MG/3ML) 0.083% NEBULIZER SOLUTION    Take 3 mLs by nebulization every 6 hours as needed for Wheezing    ASPIRIN 81 MG EC TABLET    Take 1 tablet by mouth 2 times daily    ATORVASTATIN (LIPITOR) 20 MG TABLET    20 mg by PEG Tube route nightly    ATROPINE SULFATE 0.01 % SOLN    Place 1 drop under the tongue as needed (EVERY TWO HOURS FOR REDUCTION OF SECRETIONS)    CABERGOLINE (DOSTINEX) 0.5 MG TABLET    Take 1 tablet by mouth Twice a Week ON Monday and Thursday    CARBOXYMETHYLCELLULOSE (ARTIFICIAL TEARS) 1 % OPHTHALMIC SOLUTION    Place 2 drops into both eyes daily    CHLORHEXIDINE (PERIDEX) 0.12 % SOLUTION    Take 15 mLs by mouth in the morning, at noon, and at bedtime FOR USE WITH TOOTHETTES FOR MOUTH CARE    CPAP MACHINE MISC    by Does not apply route New CPAP with 6 cm    DESMOPRESSIN (DDAVP) 0.1 MG TABLET    0.5 mg by PEG Tube route in the morning and at bedtime Give 0.05mg via PEG tube two times a day for clotting promoter    ESOMEPRAZOLE MAGNESIUM (NEXIUM) 40 MG PACK    40 mg by Per G Tube route in the morning. FENOFIBRATE 160 MG TABLET    Take 150 mg by mouth daily    HYOSCYAMINE (LEVSIN/SL) 125 MCG SUBLINGUAL TABLET    Place 125 mcg under the tongue every 6 hours as needed for Cramping    LEVETIRACETAM (KEPPRA) 100 MG/ML SOLUTION    750 mg/kg by PEG Tube route 2 times daily    LEVOTHYROXINE (SYNTHROID) 150 MCG TABLET    Take 1 tablet by mouth in the morning.    MORPHINE SULFATE 20 MG/ML CONCENTRATED ORAL SOLUTION    Take 5 mg by mouth every 2 hours as needed for Pain.    NUTRITIONAL SUPPLEMENTS (ISOSOURCE 1.5 NUSRAT PO)    60 mLs by Percutaneous Endoscopic Gastrostomy route continuous    POLYETHYLENE GLYCOL (MIRALAX) 17 G PACK PACKET    17 g by Per G Tube route in the morning and at bedtime    RESPIRATORY THERAPY SUPPLIES SANDRA    Full face CPAP mask and supplies    SENNOSIDES-DOCUSATE SODIUM (SENOKOT-S) 8.6-50 MG TABLET    1 tablet by PEG Tube route in the morning and at bedtime    STIOLTO RESPIMAT 2.5-2.5 MCG/ACT AERS    Inhale 2 puff in AM    VITAMIN D (CHOLECALCIFEROL) 125 MCG (5000 UT) CAPS CAPSULE    Take 50,000 Units by mouth daily Give 1/25mg ([id number]) one capsule via peg tube one time a day every Monday for supplement       ALLERGIES     Patient has no known allergies.    FAMILY HISTORY       Family History   Problem Relation Age of Onset    Colon Cancer Mother     Cancer Mother     Arthritis Mother     High Blood Pressure Father     Heart Disease Father     Diabetes Sister     No Known Problems Brother     No Known Problems Brother     No Known Problems Brother     Other Brother         Sepsis    Other Brother         Killed    No Known Problems Sister     No Known Problems Sister     No Known Problems Son           SOCIAL HISTORY       Social History     Socioeconomic History    Marital status: Legally    Tobacco Use    Smoking status: Former     Packs/day: 1.50     Years: 46.00     Pack years: 69.00     Types: Cigarettes     Quit date: 3/20/2019     Years since quitting: 3.8    Smokeless tobacco: Never  Vaping Use    Vaping Use: Unknown   Substance and Sexual Activity    Alcohol use: Yes    Drug use: No       SCREENINGS                        PHYSICAL EXAM    (up to 7 for level 4, 8 or more for level 5)     ED Triage Vitals   BP Temp Temp src Pulse Resp SpO2 Height Weight   -- -- -- -- -- -- -- --       Physical Exam  Constitutional:       General: He is not in acute distress. Appearance: He is well-developed. He is not diaphoretic. HENT:      Head: Normocephalic and atraumatic. Right Ear: External ear normal.      Left Ear: External ear normal.      Nose: Nose normal.      Mouth/Throat:      Mouth: Mucous membranes are moist.      Pharynx: No oropharyngeal exudate. Eyes:      Extraocular Movements: Extraocular movements intact. Conjunctiva/sclera: Conjunctivae normal.      Pupils: Pupils are equal, round, and reactive to light. Neck:      Thyroid: No thyromegaly. Vascular: No JVD. Trachea: No tracheal deviation. Cardiovascular:      Rate and Rhythm: Normal rate. Heart sounds: Normal heart sounds. No murmur heard. Pulmonary:      Effort: Pulmonary effort is normal. No respiratory distress. Breath sounds: Normal breath sounds. No wheezing. Abdominal:      General: Bowel sounds are normal.      Palpations: Abdomen is soft. Tenderness: There is no abdominal tenderness. There is no guarding. Comments: PEG tube in place large pleural at one of the ports   Musculoskeletal:         General: Normal range of motion. Cervical back: Normal range of motion and neck supple. Right lower leg: No edema. Left lower leg: No edema. Skin:     General: Skin is warm and dry. Findings: No rash. Neurological:      Mental Status: He is alert and oriented to person, place, and time. Cranial Nerves: No cranial nerve deficit.    Psychiatric:         Behavior: Behavior normal.       DIAGNOSTIC RESULTS     EKG: All EKG's are interpreted by the Emergency Department Physician who either signs or Co-signs this chart in the absence of a cardiologist.        RADIOLOGY:   Non-plain film images such as CT, Ultrasound and MRI are read by the radiologist. Plain radiographic images are visualized and preliminarily interpreted by the emergency physician with the below findings:        Interpretation per the Radiologist below, if available at the time of this note:    No orders to display         ED BEDSIDE ULTRASOUND:   Performed by ED Physician - none    LABS:  Labs Reviewed - No data to display    All other labs were within normal range or not returned as of this dictation. EMERGENCY DEPARTMENT COURSE and DIFFERENTIAL DIAGNOSIS/MDM:   Vitals: There were no vitals filed for this visit. PEG tube replaced easily. Gastric contents coming out of ports before they were capped. PEG tube in good position and can be used discharged back to [address] time was 0 minutes, excluding separately reportable procedures. There was a high probability of clinically significant/life threatening deterioration in the patient's condition which required my urgent intervention. CONSULTS:  None    PROCEDURES:  Unless otherwise noted below, none     Feeding Tube    Date/Time: 1/27/2023 1:45 PM  Performed by: Arvind Galan DO  Authorized by: Arvind Galan DO     Consent:     Consent obtained:  Emergent situation  Universal protocol:     Procedure explained and questions answered to patient or proxy's satisfaction: no      Relevant documents present and verified: yes      Test results available: yes      Imaging studies available: yes      Required blood products, implants, devices, and special equipment available: yes      Site/side marked: yes      Immediately prior to procedure, a time out was called: yes      Patient identity confirmed:  Arm band  Pre-procedure details:      Old tube type:  Gastrostomy    Old tube size:  16 Fr  Sedation:     Sedation type:  None  Anesthesia:     Anesthesia method:  None  Procedure details:     Patient position:  Supine    Procedure type:  Replacement    Tube type:  Gastrostomy    Tube size:  16 Fr    Bulb inflation volume:  5    Bulb inflation fluid:  Normal saline  Post-procedure details:     Placement/position confirmation:  Auscultation and gastric contents aspirated    Placement difficulty:  None    Bleeding:  Minimal    Procedure completion:  Tolerated        FINAL IMPRESSION      1. Complaint associated with gastric tube [facility])          DISPOSITION/PLAN   DISPOSITION Decision To Discharge 01/27/2023 01:40:55 PM      PATIENT REFERRED TO:  Chrissy Byrne, [address] [facility] [address] [facility]  [address] [facility]  [phone]      As needed      DISCHARGE MEDICATIONS:  New Prescriptions    No medications on file     Controlled Substances Monitoring:     RX Monitoring 6/6/2019   Periodic Controlled Substance Monitoring Possible medication side effects, risk of tolerance/dependence & alternative treatments discussed. ;Potential drug abuse or diversion identified, see note documentation.;Obtaining appropriate analgesic effect of treatment.        (Please note that portions of this note were completed with a voice recognition program.  Efforts were made to edit the dictations but occasionally words are mis-transcribed.)    Kita Hayden DO (electronically signed)  Attending Emergency Physician            Kita Hayden DO  01/27/23 0090

## 2023-02-01 ENCOUNTER — OFFICE VISIT (OUTPATIENT)
Dept: WOUND CARE | Age: 68
End: 2023-02-01
Payer: MEDICAID

## 2023-02-01 DIAGNOSIS — L89.224 PRESSURE INJURY OF LEFT HIP, STAGE 4 (HCC): Primary | ICD-10-CM

## 2023-02-01 PROCEDURE — 99213 OFFICE O/P EST LOW 20 MIN: CPT | Performed by: NURSE PRACTITIONER

## 2023-02-01 PROCEDURE — 1123F ACP DISCUSS/DSCN MKR DOCD: CPT | Performed by: NURSE PRACTITIONER

## 2023-02-01 NOTE — PROGRESS NOTES
[facility] Nursing                                           Wound Progress Note      Miguel Jane  MEDICAL RECORD NUMBER:  [id number]  AGE: 74 y.o. GENDER: male  : 1955  EPISODE DATE:  2023    Subjective:     No chief complaint on file. HISTORY of PRESENT ILLNESS HPI     Miguel Jane is a 74 y.o. male who presents today for wound/ulcer evaluation.    History of Wound Context: patient is not mobile, contractured, with trach tube - all wounds to feet and left hip are due to pressure  Wound/Ulcer Pain Timing/Severity: unable to assess  Quality of pain: unable to assess  Severity:  unknown / 10   Modifying Factors:  ability to verbalize and move  Associated Signs/Symptoms:  contractures    Ulcer Identification:  Ulcer Type: pressure  Contributing Factors: decreased mobility    Wound: N/A        PAST MEDICAL HISTORY        Diagnosis Date    Abdominal pain     Asthma     Bloating symptom     COPD (chronic obstructive pulmonary disease) (HCC)     COPD (chronic obstructive pulmonary disease) (HCC)     Depression     Depression     Diarrhea     Drug-seeking behavior 2020    Emphysema/COPD ([address]     History of rectal bleeding     Hypercholesteremia     past trx > 5 yrs    Hypercholesteremia     Hypercholesteremia     Hypertension     meds > 3 yrs    Hypothyroid 2022    Lung disease     Nontraumatic subarachnoid hemorrhage ([address]     [address] PAPERWORK    RANDI on CPAP     patient relates he doesn't use cpap at this time    Osteoarthritis     left hip    Osteoarthritis     Osteoarthritis of left hip     Other hydrocephalus ([address]     [address] PAPERWORK    Physiological consequence of immobility 2022    Pituitary macroadenoma ([facility]     Sleep apnea        PAST SURGICAL HISTORY    Past Surgical History:   Procedure Laterality Date    COLONOSCOPY  14    JENNIFER    COLONOSCOPY N/A 2019    COLONOSCOPY DIAGNOSTIC performed [place] [facility]    ENDOSCOPY, COLON, DIAGNOSTIC      EYE SURGERY Bilateral     cataracts    FINGER SURGERY Right 1999 ? index finger laceration repair    HERNIA REPAIR  2004    repair [facility]     JOINT REPLACEMENT Left     left hip    SC COLON CA SCRN NOT HI RSK IND N/A 5/17/2017    COLONOSCOPY performed by Jesse Jiménez MD at [address] Left 4/22/2019    LEFT HIP TOTAL HIP ARTHROPLASTY, LATERAL DECUB, GRACIE performed by Beto Kohler MD at [facility]  12/12/2019    EGD DIAGNOSTIC ONLY performed by Yony Aguirre MD at [place] [address] History   Problem Relation Age of Onset    Colon Cancer Mother     Cancer Mother     Arthritis Mother     High Blood Pressure Father     Heart Disease Father     Diabetes Sister     No Known Problems Brother     No Known Problems Brother     No Known Problems Brother     Other Brother         Sepsis    Other Brother         Killed    No Known Problems Sister     No Known Problems Sister     No Known Problems Son        SOCIAL HISTORY    Social History     Tobacco Use    Smoking status: Former     Packs/day: 1.50     Years: 46.00     Pack years: 69.00     Types: Cigarettes     Quit date: 3/20/2019     Years since quitting: 3.8    Smokeless tobacco: Never   Vaping Use    Vaping Use: Unknown   Substance Use Topics    Alcohol use: Yes    Drug use: No       ALLERGIES    No Known Allergies    MEDICATIONS    Current Outpatient Medications on File Prior to Visit   Medication Sig Dispense Refill    levothyroxine (SYNTHROID) 150 MCG tablet Take 1 tablet by mouth in the morning.  30 tablet 3    Nutritional Supplements (ISOSOURCE 1.5 NUSRAT PO) 60 mLs by Percutaneous Endoscopic Gastrostomy route continuous      carboxymethylcellulose (ARTIFICIAL TEARS) 1 % ophthalmic solution Place 2 drops into both eyes daily      atorvastatin (LIPITOR) 20 MG tablet 20 mg by PEG Tube route nightly Atropine Sulfate 0.01 % SOLN Place 1 drop under the tongue as needed (EVERY TWO HOURS FOR REDUCTION OF SECRETIONS)      chlorhexidine (PERIDEX) 0.12 % solution Take 15 mLs by mouth in the morning, at noon, and at bedtime FOR USE WITH TOOTHETTES FOR MOUTH CARE      polyethylene glycol (MIRALAX) 17 g PACK packet 17 g by Per G Tube route in the morning and at bedtime      acetaminophen (TYLENOL) 325 MG tablet 650 mg by PEG Tube route every 4 hours as needed for Pain      sennosides-docusate sodium (SENOKOT-S) 8.6-50 MG tablet 1 tablet by PEG Tube route in the morning and at bedtime      desmopressin (DDAVP) 0.1 MG tablet 0.5 mg by PEG Tube route in the morning and at bedtime Give 0.05mg via PEG tube two times a day for clotting promoter      esomeprazole Magnesium (NEXIUM) 40 MG PACK 40 mg by Per G Tube route in the morning. levETIRAcetam (KEPPRA) 100 MG/ML solution 750 mg/kg by PEG Tube route 2 times daily      hyoscyamine (LEVSIN/SL) 125 MCG sublingual tablet Place 125 mcg under the tongue every 6 hours as needed for Cramping      morphine sulfate 20 MG/ML concentrated oral solution Take 5 mg by mouth every 2 hours as needed for Pain.      vitamin D (CHOLECALCIFEROL) 125 MCG (5000 UT) CAPS capsule Take 50,000 Units by mouth daily Give 1/25mg (06899DT) one capsule via peg tube one time a day every Monday for supplement      [DISCONTINUED] paliperidone (INVEGA) 6 MG extended release tablet Take 1 tablet by mouth daily 30 tablet 1    cabergoline (DOSTINEX) 0.5 MG tablet Take 1 tablet by mouth Twice a Week ON Monday and Thursday 8 tablet 3    [DISCONTINUED] furosemide (LASIX) 40 MG tablet Take 40 mg by mouth daily      fenofibrate 160 MG tablet Take 150 mg by mouth daily      aspirin 81 MG EC tablet Take 1 tablet by mouth 2 times daily (Patient taking differently: Take 81 mg by mouth in the morning.  ADMINISTER VIA PEG TUBE.) 60 tablet 0    [DISCONTINUED] lisinopril (PRINIVIL;ZESTRIL) 20 MG tablet Take 2 tablets by mouth daily 30 tablet 3    Respiratory Therapy Supplies SANDRA Full face CPAP mask and supplies 1 Device 0    CPAP Machine MISC by Does not apply route New CPAP with 6 cm 1 each 0    albuterol (PROVENTIL) (2.5 MG/3ML) 0.083% nebulizer solution Take 3 mLs by nebulization every 6 hours as needed for Wheezing 120 each 5    STIOLTO RESPIMAT 2.5-2.5 MCG/ACT AERS Inhale 2 puff in AM 1 Inhaler 5     No current facility-administered medications on file prior to visit. REVIEW OF SYSTEMS    Pertinent items are noted in HPI. Objective: There were no vitals taken for this visit. Wt Readings from Last 3 Encounters:   07/21/22 201 lb 4.8 oz (91.3 kg)   06/06/22 175 lb (79.4 kg)   02/13/22 180 lb (81.6 kg)       PHYSICAL EXAM    Constitutional:   Well nourished and well developed. Appears neat and clean. Patient is alert, oriented x3, and in no apparent distress. Respiratory:  Respiratory effort is easy and symmetric bilaterally. Rate is normal at rest and on room air. Vascular:  Pedal Pulses is palpable and audible with doppler. Capillary refill is <3 sec to digits bilateral.  Extremities negative for 0 pitting edema. Neurological:   Sensation is there but hard to know level due to contractures to lower extremities. Dermatological:  Wound description noted in wound assessment. Psychiatric:  Judgment and insight unknown but does have agitation. Patient is calm, cooperative, and communicative. Appropriate interactions and affect. Assessment:      There are no active hospital problems to display for this patient. Lt hip Stage 4 - 3 x 2 x 3 cm x 6 cm Tunnel @ 7 o'clock    Right heel 3x3 cm    Right great toe from podiatry visit 0.5 x 0.3 cm    Procedure Note  Indications:  Based on my examination of this patient's wound(s)/ulcer(s) today, debridement is not required to promote healing and evaluate the wound base.     Wound 06/29/22 Coccyx (Active)   Number of days: 217     Incision 04/23/19 Hip Anterior;Left;Proximal (Active)   Number of days: 1379     Plan:     Wound care done as previous - pack hip wound with calcium alginate Ag strip and cover with 5x5 mepilex border dressing - applied bactroban to right foot wounds and covered and wrapped with kerlix for protection to both feet.       Electronically signed by MARICRUZ Zabala NP on 2/1/2023 at 2:53 PM

## 2023-02-15 ENCOUNTER — OFFICE VISIT (OUTPATIENT)
Dept: WOUND CARE | Age: 68
End: 2023-02-15
Payer: MEDICAID

## 2023-02-15 ENCOUNTER — HOSPITAL ENCOUNTER (EMERGENCY)
Age: 68
Discharge: HOME | End: 2023-02-15
Attending: STUDENT IN AN ORGANIZED HEALTH CARE EDUCATION/TRAINING PROGRAM
Payer: MEDICAID

## 2023-02-15 ENCOUNTER — APPOINTMENT (OUTPATIENT)
Dept: GENERAL RADIOLOGY | Age: 68
End: 2023-02-15
Payer: MEDICAID

## 2023-02-15 VITALS
TEMPERATURE: 97.6 F | DIASTOLIC BLOOD PRESSURE: 84 MMHG | SYSTOLIC BLOOD PRESSURE: 127 MMHG | RESPIRATION RATE: 18 BRPM | WEIGHT: 200 LBS | BODY MASS INDEX: 33.32 KG/M2 | OXYGEN SATURATION: 97 % | HEIGHT: 65 IN | HEART RATE: 61 BPM

## 2023-02-15 DIAGNOSIS — R68.89 COMPLAINT ASSOCIATED WITH GASTRIC TUBE (HCC): Primary | ICD-10-CM

## 2023-02-15 DIAGNOSIS — Z93.1 COMPLAINT ASSOCIATED WITH GASTRIC TUBE (HCC): Primary | ICD-10-CM

## 2023-02-15 DIAGNOSIS — L89.224 PRESSURE INJURY OF LEFT HIP, STAGE 4 (HCC): Primary | ICD-10-CM

## 2023-02-15 PROCEDURE — 99283 EMERGENCY DEPT VISIT LOW MDM: CPT

## 2023-02-15 PROCEDURE — 74018 RADEX ABDOMEN 1 VIEW: CPT

## 2023-02-15 PROCEDURE — 43762 RPLC GTUBE NO REVJ TRC: CPT

## 2023-02-15 PROCEDURE — 6360000004 HC RX CONTRAST MEDICATION: Performed by: STUDENT IN AN ORGANIZED HEALTH CARE EDUCATION/TRAINING PROGRAM

## 2023-02-15 PROCEDURE — 99212 OFFICE O/P EST SF 10 MIN: CPT | Performed by: NURSE PRACTITIONER

## 2023-02-15 PROCEDURE — 1123F ACP DISCUSS/DSCN MKR DOCD: CPT | Performed by: NURSE PRACTITIONER

## 2023-02-15 RX ADMIN — DIATRIZOATE MEGLUMINE AND DIATRIZOATE SODIUM 30 ML: 660; 100 LIQUID ORAL; RECTAL at 12:34

## 2023-02-15 ASSESSMENT — ENCOUNTER SYMPTOMS
COUGH: 0
VOMITING: 0

## 2023-02-15 ASSESSMENT — PAIN - FUNCTIONAL ASSESSMENT: PAIN_FUNCTIONAL_ASSESSMENT: NONE - DENIES PAIN

## 2023-02-15 NOTE — DISCHARGE INSTRUCTIONS
Physician replaced the gastric tube with a 16 Ukrainian gastric tube with 5 cc balloon of saline. X-ray confirmed displacement with contrast was injected into the intestinal lumen.

## 2023-02-15 NOTE — ED PROVIDER NOTES
[address] [facility] ED  eMERGENCY dEPARTMENT eNCOUnter      Pt Name: Danilo Ibarra  MRN: [id number]  [place] 1955  Date of evaluation: 2/15/2023  Provider: Taty Arenas, [facility]       Chief Complaint   Patient presents with    G Tube Complications     Per staff at [facility], patient displaced his G tube. Needs replaced. HISTORY OF PRESENT ILLNESS   (Location/Symptom, Timing/Onset,Context/Setting, Quality, Duration, Modifying Factors, Severity)  Note limiting factors. Danilo Ibarra is a 74 y.o. male who presents to the emergency department that he pulled out his G-tube. They are not sure when. They would like a G-tube replaced. Patient is nonverbal.  Further history is not able to be obtained. The history is provided by the EMS personnel and the nursing home. NursingNotes were reviewed. REVIEW OF SYSTEMS    (2-9 systems for level 4, 10 or more for level 5)     Review of Systems   Unable to perform ROS: Other (non-Verbal)   Constitutional:  Negative for fever. Respiratory:  Negative for cough. Gastrointestinal:  Negative for vomiting. Except as noted above the remainder of the review of systems was reviewed and negative.        PAST MEDICAL HISTORY     Past Medical History:   Diagnosis Date    Abdominal pain     Asthma     Bloating symptom     COPD (chronic obstructive pulmonary disease) ([facility])     COPD (chronic obstructive pulmonary disease) [facility]     Depression     Depression     Diarrhea     Drug-seeking behavior 02/03/2020    Emphysema/COPD ([facility]     History of rectal bleeding     Hypercholesteremia     past trx > 5 yrs    Hypercholesteremia     Hypercholesteremia     Hypertension     meds > 3 yrs    Hypothyroid 07/21/2022    Lung disease     Nontraumatic subarachnoid hemorrhage ([facility]     [facility]    RANDI on CPAP     patient relates he doesn't use cpap at this time    Osteoarthritis     left hip    Osteoarthritis     Osteoarthritis of left hip     Other hydrocephalus ([facility]     [address] PAPERWORK    Physiological consequence of immobility 03/09/2022    Pituitary macroadenoma ([facility]     Sleep apnea          SURGICALHISTORY       Past Surgical History:   Procedure Laterality Date    COLONOSCOPY  5/2/14    PRATEEKK    COLONOSCOPY N/A 12/12/2019    COLONOSCOPY DIAGNOSTIC performed by Hood Lloyd MD at [facility], DIAGNOSTIC      EYE SURGERY Bilateral     cataracts    [address] ?     index finger laceration repair    HERNIA REPAIR  2004    repair [facility]     JOINT REPLACEMENT Left     left hip    GA COLON CA SCRN NOT HI RSK IND N/A 5/17/2017    COLONOSCOPY performed by Mayuri Mann MD at [address] Left 4/22/2019    LEFT HIP TOTAL HIP ARTHROPLASTY, LATERAL DECUB, GRACIE performed by Alvina Aguilar MD at [address]  12/12/2019    EGD DIAGNOSTIC ONLY performed by Hood Lloyd MD at [address]       Previous Medications    ACETAMINOPHEN (TYLENOL) 325 MG TABLET    650 mg by PEG Tube route every 4 hours as needed for Pain    ALBUTEROL (PROVENTIL) (2.5 MG/3ML) 0.083% NEBULIZER SOLUTION    Take 3 mLs by nebulization every 6 hours as needed for Wheezing    ASPIRIN 81 MG EC TABLET    Take 1 tablet by mouth 2 times daily    ATORVASTATIN (LIPITOR) 20 MG TABLET    20 mg by PEG Tube route nightly    ATROPINE SULFATE 0.01 % SOLN    Place 1 drop under the tongue as needed (EVERY TWO HOURS FOR REDUCTION OF SECRETIONS)    CABERGOLINE (DOSTINEX) 0.5 MG TABLET    Take 1 tablet by mouth Twice a Week ON Monday and Thursday    CARBOXYMETHYLCELLULOSE (ARTIFICIAL TEARS) 1 % OPHTHALMIC SOLUTION    Place 2 drops into both eyes daily    CHLORHEXIDINE (PERIDEX) 0.12 % SOLUTION    Take 15 mLs by mouth in the morning, at noon, and at bedtime FOR USE WITH TOOTHETTES FOR MOUTH CARE    CPAP MACHINE MISC    by Does not apply route New CPAP with 6 cm    DESMOPRESSIN (DDAVP) 0.1 MG TABLET    0.5 mg by PEG Tube route in the morning and at bedtime Give 0.05mg via PEG tube two times a day for clotting promoter    ESOMEPRAZOLE MAGNESIUM (NEXIUM) 40 MG PACK    40 mg by Per G Tube route in the morning. FENOFIBRATE 160 MG TABLET    Take 150 mg by mouth daily    HYOSCYAMINE (LEVSIN/SL) 125 MCG SUBLINGUAL TABLET    Place 125 mcg under the tongue every 6 hours as needed for Cramping    LEVETIRACETAM (KEPPRA) 100 MG/ML SOLUTION    750 mg/kg by PEG Tube route 2 times daily    LEVOTHYROXINE (SYNTHROID) 150 MCG TABLET    Take 1 tablet by mouth in the morning. MORPHINE SULFATE 20 MG/ML CONCENTRATED ORAL SOLUTION    Take 5 mg by mouth every 2 hours as needed for Pain. NUTRITIONAL SUPPLEMENTS (ISOSOURCE 1.5 NUSRAT PO)    60 mLs by Percutaneous Endoscopic Gastrostomy route continuous    POLYETHYLENE GLYCOL (MIRALAX) 17 G PACK PACKET    17 g by Per G Tube route in the morning and at bedtime    RESPIRATORY THERAPY SUPPLIES SANDRA    Full face CPAP mask and supplies    SENNOSIDES-DOCUSATE SODIUM (SENOKOT-S) 8.6-50 MG TABLET    1 tablet by PEG Tube route in the morning and at bedtime    STIOLTO RESPIMAT 2.5-2.5 MCG/ACT AERS    Inhale 2 puff in AM    VITAMIN D (CHOLECALCIFEROL) 125 MCG (5000 UT) CAPS CAPSULE    Take 50,000 Units by mouth daily Give 1/25mg (06062SQ) one capsule via peg tube one time a day every Monday for supplement       ALLERGIES     Patient has no known allergies.     FAMILY HISTORY       Family History   Problem Relation Age of Onset    Colon Cancer Mother     Cancer Mother     Arthritis Mother     High Blood Pressure Father     Heart Disease Father     Diabetes Sister     No Known Problems Brother     No Known Problems Brother     No Known Problems Brother     Other Brother         Sepsis    Other Brother         Killed    No Known Problems Sister     No Known Problems Sister     No Known Problems Son           SOCIAL HISTORY       Social History     Socioeconomic History    Marital status: Legally      Spouse name: None    Number of children: None    Years of education: None    Highest education level: None   Tobacco Use    Smoking status: Former     Packs/day: 1.50     Years: 46.00     Pack years: 69.00     Types: Cigarettes     Quit date: 3/20/2019     Years since quitting: 3.9    Smokeless tobacco: Never   Vaping Use    Vaping Use: Unknown   Substance and Sexual Activity    Alcohol use: Yes    Drug use: No       SCREENINGS    Hardik Coma Scale  Eye Opening: Spontaneous  Best Verbal Response: None  Best Motor Response: Localizes pain  Sawyer Coma Scale Score: 10 @FLOW(52564088)@      PHYSICAL EXAM    (up to 7 for level 4, 8 or more for level 5)     ED Triage Vitals [02/15/23 1036]   BP Temp Temp Source Heart Rate Resp SpO2 Height Weight   127/84 97.6 °F (36.4 °C) Oral 61 18 97 % 5' 5\" (1.651 m) 200 lb (90.7 kg)       Physical Exam  Vitals and nursing note reviewed. Constitutional:       General: He is not in acute distress. Appearance: He is well-developed. He is not diaphoretic. HENT:      Head: Normocephalic and atraumatic. No Taylor's sign. Right Ear: External ear normal.      Left Ear: External ear normal.      Nose: Nose normal.      Mouth/Throat:      Pharynx: No oropharyngeal exudate. Eyes:      General: No scleral icterus. Right eye: No foreign body. Conjunctiva/sclera: Conjunctivae normal.      Left eye: No exudate. Pupils: Pupils are equal, round, and reactive to light. Neck:      Vascular: No JVD. Trachea: No tracheal deviation. Cardiovascular:      Rate and Rhythm: Normal rate and regular rhythm. Pulses: Normal pulses. Heart sounds: Normal heart sounds. Heart sounds not distant. No murmur heard. No friction rub. No gallop. Pulmonary:      Effort: Pulmonary effort is normal. No respiratory distress. Breath sounds: Normal breath sounds.  No stridor. No wheezing. Abdominal:      General: Bowel sounds are normal. There is no distension. Palpations: Abdomen is soft. Tenderness: There is no abdominal tenderness. There is no guarding or rebound. Musculoskeletal:         General: No tenderness or deformity. Normal range of motion. Cervical back: Normal range of motion and neck supple. No rigidity. Lymphadenopathy:      Head:      Right side of head: No submental adenopathy. Left side of head: No submental adenopathy. Skin:     General: Skin is warm and dry. Capillary Refill: Capillary refill takes less than 2 seconds. Findings: No erythema or rash. Neurological:      General: No focal deficit present. Mental Status: He is alert. Mental status is at baseline. Cranial Nerves: No cranial nerve deficit. Coordination: Coordination normal.      Deep Tendon Reflexes: Reflexes are normal and symmetric. Psychiatric:         Behavior: Behavior normal.         Thought Content: Thought content normal.         Judgment: Judgment normal.       DIAGNOSTIC RESULTS     EKG: All EKG's are interpreted by the Emergency [id number] who either signs or Co-signsthis chart in the absence of a cardiologist.        RADIOLOGY:   Nolene Bears such as CT, Ultrasound and MRI are read by the radiologist. [facility] radiographic images are visualized and preliminarily interpreted by the emergency physician with the below findings:  KUB with contrast: Contrast is in the intestinal lumen confirming gastric tube placement. Interpretation per the Radiologist below, if available at the time ofthis note:    No orders to display         ED BEDSIDE ULTRASOUND:   Performed by ED Physician - none    LABS:  Labs Reviewed - No data to display    All other labs were within normal range or not returned as of this dictation.     EMERGENCY DEPARTMENT COURSE and DIFFERENTIAL DIAGNOSIS/MDM:   Vitals:    Vitals:    02/15/23 1036 BP: 127/84   Pulse: 61   Resp: 18   Temp: 97.6 °F (36.4 °C)   TempSrc: Oral   SpO2: 97%   Weight: 200 lb (90.7 kg)   Height: 5' 5\" (1.651 m)           MDM  G-tube was replaced. Confirmed with contrast x-ray that shows contrast in the GI lumen. CONSULTS:  None    PROCEDURES:  Unless otherwise noted below, none     Feeding Tube    Date/Time: 2/15/2023 12:00 PM  Performed by: Shawn Cavazos DO  Authorized by: Shawn Cavazos DO     Consent:     Consent obtained:  Emergent situation    Consent given by:  Healthcare agent    Alternatives discussed:  Referral (2 to 3 days for the patient to have interventional radiology place)  Universal protocol:     Patient identity confirmed:  Arm band  Pre-procedure details: Old tube type:  Gastrostomy    Old tube size:  16 Fr  Sedation:     Sedation type:  None  Anesthesia:     Anesthesia method:  None  Procedure details:     Patient position:  Sitting    Procedure type:  Replacement    Tube type:  Gastrostomy    Tube size:  16 Fr    Bulb inflation volume:  5    Bulb inflation fluid:  Normal saline  Post-procedure details:     Placement/position confirmation:  Gastric contents aspirated    Placement difficulty:  None    Bleeding:  Minimal    Procedure completion:  Tolerated well, no immediate complications    FINAL IMPRESSION    No diagnosis found. DISPOSITION/PLAN   DISPOSITION        PATIENT REFERRED TO:  No follow-up provider specified.     DISCHARGE MEDICATIONS:  New Prescriptions    No medications on file          (Please note that portions of this note were completed with a voice recognition program.  Efforts were made to edit the dictations but occasionally words are mis-transcribed.)    Shawn Cavazos DO (electronically signed)  Attending Emergency Physician          Shawn Cavazos DO  02/15/23 [id number]

## 2023-02-16 NOTE — PROGRESS NOTES
[facility]                                              Progress Note       Marcia Archibald  MEDICAL RECORD NUMBER:  [id number]  AGE: 74 y.o. GENDER: male  : 1955  EPISODE DATE:  2/15/2023    Subjective:     No chief complaint on file. HISTORY of PRESENT ILLNESS HPI     Marcia Archibald is a 74 y.o. male who presents today for wound/ulcer evaluation. History of Wound Context: patient bedbound and contractured - does react to pain and movement - only wound is left hip with some minor foot issues.   Wound/Ulcer Pain Timing/Severity: unable  Quality of pain: N/A  Severity:  0 / 10   Modifying Factors: None  Associated Signs/Symptoms: none    Ulcer Identification:  Ulcer Type: pressure  Contributing Factors:  bedbound non verbal with trach    Wound: N/A        PAST MEDICAL HISTORY        Diagnosis Date    Abdominal pain     Asthma     Bloating symptom     COPD (chronic obstructive pulmonary disease) (HCC)     COPD (chronic obstructive pulmonary disease) (HCC)     Depression     Depression     Diarrhea     Drug-seeking behavior 2020    Emphysema/COPD ([address]     History of rectal bleeding     Hypercholesteremia     past trx > 5 yrs    Hypercholesteremia     Hypercholesteremia     Hypertension     meds > 3 yrs    Hypothyroid 2022    Lung disease     Nontraumatic subarachnoid hemorrhage ([address]     [facility] [address] [facility]    RANDI on CPAP     patient relates he doesn't use cpap at this time    Osteoarthritis     left hip    Osteoarthritis     Osteoarthritis of left hip     Other hydrocephalus ([address]     [address] PAPERWORK    Physiological consequence of immobility 2022    Pituitary macroadenoma ([facility] [address]     Sleep apnea        PAST SURGICAL HISTORY    Past Surgical History:   Procedure Laterality Date    COLONOSCOPY  14    JENNIFER    COLONOSCOPY N/A 2019    COLONOSCOPY DIAGNOSTIC performed by Ravinder Siegel MD at [address] [place] [address] COLON, DIAGNOSTIC      EYE SURGERY Bilateral     cataracts    FINGER SURGERY Right 1999 ? index finger laceration repair    HERNIA REPAIR  2004    repair [facility]     JOINT REPLACEMENT Left     left hip    [place] COLON CA SCRN NOT HI RSK IND N/A 5/17/2017    COLONOSCOPY performed by David Abrams MD at [address] Left 4/22/2019    LEFT HIP TOTAL HIP ARTHROPLASTY, LATERAL DECUB, GRACIE performed by Nancy Carlton MD at [address] [facility]  12/12/2019    EGD DIAGNOSTIC ONLY performed by Christy Barrientos MD at [place] [address] History   Problem Relation Age of Onset    Colon Cancer Mother     Cancer Mother     Arthritis Mother     High Blood Pressure Father     Heart Disease Father     Diabetes Sister     No Known Problems Brother     No Known Problems Brother     No Known Problems Brother     Other Brother         Sepsis    Other Brother         Killed    No Known Problems Sister     No Known Problems Sister     No Known Problems Son        SOCIAL HISTORY    Social History     Tobacco Use    Smoking status: Former     Packs/day: 1.50     Years: 46.00     Pack years: 69.00     Types: Cigarettes     Quit date: 3/20/2019     Years since quitting: 3.9    Smokeless tobacco: Never   Vaping Use    Vaping Use: Unknown   Substance Use Topics    Alcohol use: Yes    Drug use: No       ALLERGIES    No Known Allergies    MEDICATIONS    Current Outpatient Medications on File Prior to Visit   Medication Sig Dispense Refill    levothyroxine (SYNTHROID) 150 MCG tablet Take 1 tablet by mouth in the morning.  30 tablet 3    Nutritional Supplements (ISOSOURCE 1.5 NUSRAT PO) 60 mLs by Percutaneous Endoscopic Gastrostomy route continuous      carboxymethylcellulose (ARTIFICIAL TEARS) 1 % ophthalmic solution Place 2 drops into both eyes daily      atorvastatin (LIPITOR) 20 MG tablet 20 mg by PEG Tube route nightly      Atropine Sulfate 0.01 % SOLN Place 1 drop under the tongue as needed (EVERY TWO HOURS FOR REDUCTION OF SECRETIONS)      chlorhexidine (PERIDEX) 0.12 % solution Take 15 mLs by mouth in the morning, at noon, and at bedtime FOR USE WITH TOOTHETTES FOR MOUTH CARE      polyethylene glycol (MIRALAX) 17 g PACK packet 17 g by Per G Tube route in the morning and at bedtime      acetaminophen (TYLENOL) 325 MG tablet 650 mg by PEG Tube route every 4 hours as needed for Pain      sennosides-docusate sodium (SENOKOT-S) 8.6-50 MG tablet 1 tablet by PEG Tube route in the morning and at bedtime      desmopressin (DDAVP) 0.1 MG tablet 0.5 mg by PEG Tube route in the morning and at bedtime Give 0.05mg via PEG tube two times a day for clotting promoter      esomeprazole Magnesium (NEXIUM) 40 MG PACK 40 mg by Per G Tube route in the morning. levETIRAcetam (KEPPRA) 100 MG/ML solution 750 mg/kg by PEG Tube route 2 times daily      hyoscyamine (LEVSIN/SL) 125 MCG sublingual tablet Place 125 mcg under the tongue every 6 hours as needed for Cramping      morphine sulfate 20 MG/ML concentrated oral solution Take 5 mg by mouth every 2 hours as needed for Pain.      vitamin D (CHOLECALCIFEROL) 125 MCG (5000 UT) CAPS capsule Take 50,000 Units by mouth daily Give 1/25mg (01209DT) one capsule via peg tube one time a day every Monday for supplement      [DISCONTINUED] paliperidone (INVEGA) 6 MG extended release tablet Take 1 tablet by mouth daily 30 tablet 1    cabergoline (DOSTINEX) 0.5 MG tablet Take 1 tablet by mouth Twice a Week ON Monday and Thursday 8 tablet 3    [DISCONTINUED] furosemide (LASIX) 40 MG tablet Take 40 mg by mouth daily      fenofibrate 160 MG tablet Take 150 mg by mouth daily      aspirin 81 MG EC tablet Take 1 tablet by mouth 2 times daily (Patient taking differently: Take 81 mg by mouth in the morning.  ADMINISTER VIA PEG TUBE.) 60 tablet 0    [DISCONTINUED] lisinopril (PRINIVIL;ZESTRIL) 20 MG tablet Take 2 tablets by mouth daily 30 tablet 3 Respiratory Therapy Supplies SANDRA Full face CPAP mask and supplies 1 Device 0    CPAP Machine MISC by Does not apply route New CPAP with 6 cm 1 each 0    albuterol (PROVENTIL) (2.5 MG/3ML) 0.083% nebulizer solution Take 3 mLs by nebulization every 6 hours as needed for Wheezing 120 each 5    STIOLTO RESPIMAT 2.5-2.5 MCG/ACT AERS Inhale 2 puff in AM 1 Inhaler 5     No current facility-administered medications on file prior to visit. REVIEW OF SYSTEMS    Pertinent items are noted in HPI. Objective: There were no vitals taken for this visit. Wt Readings from Last 3 Encounters:   02/15/23 200 lb (90.7 kg)   07/21/22 201 lb 4.8 oz (91.3 kg)   06/06/22 175 lb (79.4 kg)     PHYSICAL EXAM    Constitutional:   Well nourished and well developed. Appears neat and clean. Patient is alert, oriented x3, and in no apparent distress. Respiratory:  Respiratory effort is easy and symmetric bilaterally. Rate is normal at rest and on room air. Vascular:  Pedal Pulses is palpable and audible with doppler. Capillary refill is <3 sec to digits bilateral.  Extremities negative for 0 pitting edema. Patient's legs are quite contractured    Neurological:   Sensation is positive to lower extremities. Dermatological:  Wound description noted in wound assessment. Psychiatric:  Judgment and insight intact. Short and long term memory intact. No evidence of depression, anxiety, or agitation. Patient is calm, cooperative, and communicative. Appropriate interactions and affect. Assessment:      There are no active hospital problems to display for this patient. Procedure Note  Indications:  Based on my examination of this patient's wound(s)/ulcer(s) today, debridement is not required to promote healing and evaluate the wound base.     Wound/Ulcer #: 1    Post Debridement Measurements:  Wound/Ulcer Descriptions are Pre Debridement except measurements:    Wound 06/29/22 Coccyx (Active)   Number of days: 232 Incision 04/23/19 Hip Anterior;Left;Proximal (Active)   Number of days: 1394     Plan:   Continue with protect and moisturize feet - done today. Hydrocolloid to left foot helping foot well. ABD pad, kerlix and boots to offload reapplied. Staff Nurse will finish with hip wound change as ambulance came to take patient to replace feeding tube.      Electronically signed by MARICRUZ Boucher NP on 2/16/2023 at 2:41 PM

## 2023-02-22 ENCOUNTER — OFFICE VISIT (OUTPATIENT)
Dept: WOUND CARE | Age: 68
End: 2023-02-22

## 2023-02-22 DIAGNOSIS — L89.224 PRESSURE INJURY OF LEFT HIP, STAGE 4 (HCC): Primary | ICD-10-CM

## 2023-02-22 NOTE — PROGRESS NOTES
[facility]  Wound Progress Note     Carlos Anna  MEDICAL RECORD NUMBER:  [id number]  AGE: 74 y.o. GENDER: male  : 1955  EPISODE DATE:  2023    Subjective:     No chief complaint on file. Since today for feet and left hip wound    HISTORY of PRESENT ILLNESS HPI     Carlos Anna is a 74 y.o. male who presents today for wound/ulcer evaluation.    History of Wound Context: patient has had this wound for a year now and is quite contractured with trach and feeding tube - dependent for everything, yet is quite alert  Wound/Ulcer Pain Timing/Severity: intermittent  Quality of pain: radiating to hip(s) on left  Severity:  unknown due to inability to verbalize / 10   Modifying Factors:  is non-mobile  Associated Signs/Symptoms: pain    Ulcer Identification:  Ulcer Type:  pressure  Contributing Factors: decreased mobility    Wound: N/A        PAST MEDICAL HISTORY        Diagnosis Date    Abdominal pain     Asthma     Bloating symptom     COPD (chronic obstructive pulmonary disease) (HCC)     COPD (chronic obstructive pulmonary disease) ([address]     Depression     Depression     Diarrhea     Drug-seeking behavior 2020    Emphysema/COPD ([address]     History of rectal bleeding     Hypercholesteremia     past trx > 5 yrs    Hypercholesteremia     Hypercholesteremia     Hypertension     meds > 3 yrs    Hypothyroid 2022    Lung disease     Nontraumatic subarachnoid hemorrhage ([address]     [address] PAPER[address]    RANDI on CPAP     patient relates he doesn't use cpap at this time    Osteoarthritis     left hip    Osteoarthritis     Osteoarthritis of left hip     Other hydrocephalus ([address]     [address] PAPERWORK    Physiological consequence of immobility 2022    Pituitary macroadenoma ([facility] [address]     Sleep apnea      PAST SURGICAL HISTORY    Past Surgical History:   Procedure Laterality Date    COLONOSCOPY  14    JENNIFER    COLONOSCOPY N/A 2019 COLONOSCOPY DIAGNOSTIC performed by Debi Blackburn MD at [facility], DIAGNOSTIC      EYE SURGERY Bilateral     cataracts    [address] ? index finger laceration repair    HERNIA REPAIR  2004    repair [facility]     JOINT REPLACEMENT Left     left hip    [place] COLON CA SCRN NOT HI RSK IND N/A 5/17/2017    COLONOSCOPY performed by Idalia Titus MD at [address] [facility] Left 4/22/2019    LEFT HIP TOTAL HIP ARTHROPLASTY, LATERAL DECUB, GRACIE performed by August Epley, MD at [address] [facility]  12/12/2019    EGD DIAGNOSTIC ONLY performed by Debi Blackburn MD at [address] History   Problem Relation Age of Onset    Colon Cancer Mother     Cancer Mother     Arthritis Mother     High Blood Pressure Father     Heart Disease Father     Diabetes Sister     No Known Problems Brother     No Known Problems Brother     No Known Problems Brother     Other Brother         Sepsis    Other Brother         Killed    No Known Problems Sister     No Known Problems Sister     No Known Problems Son      SOCIAL HISTORY    Social History     Tobacco Use    Smoking status: Former     Packs/day: 1.50     Years: 46.00     Pack years: 69.00     Types: Cigarettes     Quit date: 3/20/2019     Years since quitting: 3.9    Smokeless tobacco: Never   Vaping Use    Vaping Use: Unknown   Substance Use Topics    Alcohol use: Yes    Drug use: No     ALLERGIES    No Known Allergies    MEDICATIONS    Current Outpatient Medications on File Prior to Visit   Medication Sig Dispense Refill    levothyroxine (SYNTHROID) 150 MCG tablet Take 1 tablet by mouth in the morning.  30 tablet 3    Nutritional Supplements (ISOSOURCE 1.5 NUSRAT PO) 60 mLs by Percutaneous Endoscopic Gastrostomy route continuous      carboxymethylcellulose (ARTIFICIAL TEARS) 1 % ophthalmic solution Place 2 drops into both eyes daily      atorvastatin (LIPITOR) 20 MG tablet 20 mg by PEG Tube route nightly      Atropine Sulfate 0.01 % SOLN Place 1 drop under the tongue as needed (EVERY TWO HOURS FOR REDUCTION OF SECRETIONS)      chlorhexidine (PERIDEX) 0.12 % solution Take 15 mLs by mouth in the morning, at noon, and at bedtime FOR USE WITH TOOTHETTES FOR MOUTH CARE      polyethylene glycol (MIRALAX) 17 g PACK packet 17 g by Per G Tube route in the morning and at bedtime      acetaminophen (TYLENOL) 325 MG tablet 650 mg by PEG Tube route every 4 hours as needed for Pain      sennosides-docusate sodium (SENOKOT-S) 8.6-50 MG tablet 1 tablet by PEG Tube route in the morning and at bedtime      desmopressin (DDAVP) 0.1 MG tablet 0.5 mg by PEG Tube route in the morning and at bedtime Give 0.05mg via PEG tube two times a day for clotting promoter      esomeprazole Magnesium (NEXIUM) 40 MG PACK 40 mg by Per G Tube route in the morning. levETIRAcetam (KEPPRA) 100 MG/ML solution 750 mg/kg by PEG Tube route 2 times daily      hyoscyamine (LEVSIN/SL) 125 MCG sublingual tablet Place 125 mcg under the tongue every 6 hours as needed for Cramping      morphine sulfate 20 MG/ML concentrated oral solution Take 5 mg by mouth every 2 hours as needed for Pain.      vitamin D (CHOLECALCIFEROL) 125 MCG (5000 UT) CAPS capsule Take 50,000 Units by mouth daily Give 1/25mg (15823EO) one capsule via peg tube one time a day every Monday for supplement      [DISCONTINUED] paliperidone (INVEGA) 6 MG extended release tablet Take 1 tablet by mouth daily 30 tablet 1    cabergoline (DOSTINEX) 0.5 MG tablet Take 1 tablet by mouth Twice a Week ON Monday and Thursday 8 tablet 3    [DISCONTINUED] furosemide (LASIX) 40 MG tablet Take 40 mg by mouth daily      fenofibrate 160 MG tablet Take 150 mg by mouth daily      aspirin 81 MG EC tablet Take 1 tablet by mouth 2 times daily (Patient taking differently: Take 81 mg by mouth in the morning.  ADMINISTER VIA PEG TUBE.) 60 tablet 0    [DISCONTINUED] lisinopril (PRINIVIL;ZESTRIL) 20 MG tablet Take 2 tablets by mouth daily 30 tablet 3    Respiratory Therapy Supplies SANDRA Full face CPAP mask and supplies 1 Device 0    CPAP Machine MISC by Does not apply route New CPAP with 6 cm 1 each 0    albuterol (PROVENTIL) (2.5 MG/3ML) 0.083% nebulizer solution Take 3 mLs by nebulization every 6 hours as needed for Wheezing 120 each 5    STIOLTO RESPIMAT 2.5-2.5 MCG/ACT AERS Inhale 2 puff in AM 1 Inhaler 5     No current facility-administered medications on file prior to visit. REVIEW OF SYSTEMS    Pertinent items are noted in HPI. Objective: There were no vitals taken for this visit. Wt Readings from Last 3 Encounters:   02/15/23 200 lb (90.7 kg)   07/21/22 201 lb 4.8 oz (91.3 kg)   06/06/22 175 lb (79.4 kg)     PHYSICAL EXAM    Constitutional:   Well nourished and well developed. Appears neat and clean. Patient is alert, oriented x3, and in no apparent distress. Respiratory:  Respiratory effort is easy and symmetric bilaterally. Rate is normal at rest and on room air. Vascular:  Pedal Pulses is palpable and audible with doppler. Capillary refill is <3 sec to digits bilateral.  Extremities positive for 0 pitting edema. Neurological:   Sensation is positive to lower extremities but is severely contractured    Dermatological:  Wound description noted in wound assessment. Psychiatric:  Judgment and insight intact. Short and long term memory intact. No evidence of depression, anxiety, or agitation. Patient is calm, cooperative, and communicative. Appropriate interactions and affect. Unable to verbalize due to trach    Assessment:      There are no active hospital problems to display for this patient. Procedure Note  Indications:  Based on my examination of this patient's wound(s)/ulcer(s) today, debridement is not required to promote healing and evaluate the wound base.     Lt hip wound 1.5 x 3.0 x 2.4 cm with 2.5 cm tunnel at 7 o'clock    Left lateral foot 1.0 x 1.0 cm    Right medial heel 2.0 x 1.0 cm    Plan:     Hydrocolloid to foot wounds and abd pad wrapped with kerlix after urea applied to skin. Hip pack with calcium alginate rope and covered with large mepilex border dressing.     Electronically signed by Alfreida Meigs, APRN - NP on 2/22/2023 at 3:00 PM

## 2023-03-09 ENCOUNTER — OFFICE VISIT (OUTPATIENT)
Dept: WOUND CARE | Age: 68
End: 2023-03-09
Payer: MEDICAID

## 2023-03-09 DIAGNOSIS — L89.224 PRESSURE INJURY OF LEFT HIP, STAGE 4 (HCC): Primary | ICD-10-CM

## 2023-03-09 PROCEDURE — 1123F ACP DISCUSS/DSCN MKR DOCD: CPT | Performed by: NURSE PRACTITIONER

## 2023-03-09 PROCEDURE — 99212 OFFICE O/P EST SF 10 MIN: CPT | Performed by: NURSE PRACTITIONER

## 2023-03-10 NOTE — PROGRESS NOTES
[facility] Nursing                                             Wound Progress Note       Giselle Coon  MEDICAL RECORD NUMBER:  [id number]  AGE: 74 y.o. GENDER: male  : 1955  EPISODE DATE:  3/9/2023    Subjective:     No chief complaint on file. HISTORY of PRESENT ILLNESS HPI     Giselle oCon is a 74 y.o. male who presents today for wound/ulcer evaluation. History of Wound Context: left hip & stage 1 right medial heel - long-time bedbound patient with trach tube, feeding tube and catheter. Legs are contracted.   Wound/Ulcer Pain Timing/Severity: unable to assess  Quality of pain: unable to assess  Severity:  unknown   Modifying Factors:  worsens with wound change mainly due to contractures than wounds themselves  Associated Signs/Symptoms:  coughs around trach tube    Ulcer Identification:  Ulcer Type: pressure  Contributing Factors: decreased mobility    Wound: N/A        PAST MEDICAL HISTORY        Diagnosis Date    Abdominal pain     Asthma     Bloating symptom     COPD (chronic obstructive pulmonary disease) (HCC)     COPD (chronic obstructive pulmonary disease) (HCC)     Depression     Depression     Diarrhea     Drug-seeking behavior 2020    Emphysema/COPD ([address]     History of rectal bleeding     Hypercholesteremia     past trx > 5 yrs    Hypercholesteremia     Hypercholesteremia     Hypertension     meds > 3 yrs    Hypothyroid 2022    Lung disease     Nontraumatic subarachnoid hemorrhage ([address]     [address] [facility] [address] PAPER[address]    RANDI on CPAP     patient relates he doesn't use cpap at this time    Osteoarthritis     left hip    Osteoarthritis     Osteoarthritis of left hip     Other hydrocephalus ([address]     [address] PAPERWORK    Physiological consequence of immobility 2022    Pituitary macroadenoma ([address]     Sleep apnea        PAST SURGICAL HISTORY    Past Surgical History:   Procedure Laterality Date    COLONOSCOPY  16    [facility] COLONOSCOPY N/A 12/12/2019    COLONOSCOPY DIAGNOSTIC performed by Kimber Briggs MD at [facility], DIAGNOSTIC      EYE SURGERY Bilateral     cataracts    [address] ? index finger laceration repair    HERNIA REPAIR  2004    repair [facility]     JOINT REPLACEMENT Left     left hip    MO COLON CA SCRN NOT HI [facility] IND N/A 5/17/2017    COLONOSCOPY performed by Farheen Bailey MD at [address] Left 4/22/2019    LEFT HIP TOTAL HIP ARTHROPLASTY, LATERAL DECUB, GRACIE performed by Kenny Mendez MD at [address]  12/12/2019    EGD DIAGNOSTIC ONLY performed by Kimber Briggs MD at [place] [address] History   Problem Relation Age of Onset    Colon Cancer Mother     Cancer Mother     Arthritis Mother     High Blood Pressure Father     Heart Disease Father     Diabetes Sister     No Known Problems Brother     No Known Problems Brother     No Known Problems Brother     Other Brother         Sepsis    Other Brother         Killed    No Known Problems Sister     No Known Problems Sister     No Known Problems Son        SOCIAL HISTORY    Social History     Tobacco Use    Smoking status: Former     Packs/day: 1.50     Years: 46.00     Pack years: 69.00     Types: Cigarettes     Quit date: 3/20/2019     Years since quitting: 3.9    Smokeless tobacco: Never   Vaping Use    Vaping Use: Unknown   Substance Use Topics    Alcohol use: Yes    Drug use: No       ALLERGIES    No Known Allergies    MEDICATIONS    Current Outpatient Medications on File Prior to Visit   Medication Sig Dispense Refill    levothyroxine (SYNTHROID) 150 MCG tablet Take 1 tablet by mouth in the morning.  30 tablet 3    Nutritional Supplements (ISOSOURCE 1.5 NUSRAT PO) 60 mLs by Percutaneous Endoscopic Gastrostomy route continuous      carboxymethylcellulose (ARTIFICIAL TEARS) 1 % ophthalmic solution Place 2 drops into both eyes daily      atorvastatin (LIPITOR) 20 MG tablet 20 mg by PEG Tube route nightly      Atropine Sulfate 0.01 % SOLN Place 1 drop under the tongue as needed (EVERY TWO HOURS FOR REDUCTION OF SECRETIONS)      chlorhexidine (PERIDEX) 0.12 % solution Take 15 mLs by mouth in the morning, at noon, and at bedtime FOR USE WITH TOOTHETTES FOR MOUTH CARE      polyethylene glycol (MIRALAX) 17 g PACK packet 17 g by Per G Tube route in the morning and at bedtime      acetaminophen (TYLENOL) 325 MG tablet 650 mg by PEG Tube route every 4 hours as needed for Pain      sennosides-docusate sodium (SENOKOT-S) 8.6-50 MG tablet 1 tablet by PEG Tube route in the morning and at bedtime      desmopressin (DDAVP) 0.1 MG tablet 0.5 mg by PEG Tube route in the morning and at bedtime Give 0.05mg via PEG tube two times a day for clotting promoter      esomeprazole Magnesium (NEXIUM) 40 MG PACK 40 mg by Per G Tube route in the morning. levETIRAcetam (KEPPRA) 100 MG/ML solution 750 mg/kg by PEG Tube route 2 times daily      hyoscyamine (LEVSIN/SL) 125 MCG sublingual tablet Place 125 mcg under the tongue every 6 hours as needed for Cramping      morphine sulfate 20 MG/ML concentrated oral solution Take 5 mg by mouth every 2 hours as needed for Pain.      vitamin D (CHOLECALCIFEROL) 125 MCG (5000 UT) CAPS capsule Take 50,000 Units by mouth daily Give 1/25mg ([id number]) one capsule via peg tube one time a day every Monday for supplement      [DISCONTINUED] paliperidone (INVEGA) 6 MG extended release tablet Take 1 tablet by mouth daily 30 tablet 1    cabergoline (DOSTINEX) 0.5 MG tablet Take 1 tablet by mouth Twice a Week ON Monday and Thursday 8 tablet 3    [DISCONTINUED] furosemide (LASIX) 40 MG tablet Take 40 mg by mouth daily      fenofibrate 160 MG tablet Take 150 mg by mouth daily      aspirin 81 MG EC tablet Take 1 tablet by mouth 2 times daily (Patient taking differently: Take 81 mg by mouth in the morning.  ADMINISTER VIA PEG TUBE.) 60 tablet 0    [DISCONTINUED] lisinopril (PRINIVIL;ZESTRIL) 20 MG tablet Take 2 tablets by mouth daily 30 tablet 3    Respiratory Therapy Supplies SANDRA Full face CPAP mask and supplies 1 Device 0    CPAP Machine MISC by Does not apply route New CPAP with 6 cm 1 each 0    albuterol (PROVENTIL) (2.5 MG/3ML) 0.083% nebulizer solution Take 3 mLs by nebulization every 6 hours as needed for Wheezing 120 each 5    STIOLTO RESPIMAT 2.5-2.5 MCG/ACT AERS Inhale 2 puff in AM 1 Inhaler 5     No current facility-administered medications on file prior to visit. REVIEW OF SYSTEMS    Pertinent items are noted in HPI. Objective: There were no vitals taken for this visit. Wt Readings from Last 3 Encounters:   02/15/23 200 lb (90.7 kg)   07/21/22 201 lb 4.8 oz (91.3 kg)   06/06/22 175 lb (79.4 kg)     PHYSICAL EXAM    Constitutional:   Well nourished and well developed. Appears neat and clean. Patient is alert, oriented x3, and in no apparent distress. Unknowable due to trach tube    Respiratory:  Respiratory effort is easy and symmetric bilaterally. Rate is normal at rest and on room air. Vascular:  Pedal Pulses is palpable and audible with doppler. Capillary refill is <3 sec to digits bilateral.  Extremities negative for 0 pitting edema. Neurological:   Sensation is unknown to lower extremities but moves around when trying to straighten legs from contractures    Dermatological:  Wound description noted in wound assessment - amazing that he does so well as his two wounds are improving    Psychiatric:  Judgment and insight intact. Short and long term memory intact. No evidence of depression, anxiety, or agitation. Patient is calm, cooperative, and communicative. Appropriate interactions and affect. Assessment:      There are no active hospital problems to display for this patient.     Left hip 3.2 x 1.9 cm with 2.8 cm tunnel at 7 o'clock    Right medial heel    Procedure Note  Indications:  Based on my examination of this patient's wound(s)/ulcer(s) today, debridement is not required to promote healing and evaluate the wound base. Wound 06/29/22 Coccyx (Active)   Number of days: 254     Incision 04/23/19 Hip Anterior;Left;Proximal (Active)   Number of days: 1416     Plan:     Continue as previous.

## 2023-03-15 ENCOUNTER — PRE-PROCEDURE TELEPHONE (OUTPATIENT)
Dept: INTERVENTIONAL RADIOLOGY/VASCULAR | Age: 68
End: 2023-03-15

## 2023-03-15 DIAGNOSIS — Z93.1 GASTROSTOMY TUBE DEPENDENT (HCC): ICD-10-CM

## 2023-03-15 DIAGNOSIS — R13.10 DYSPHAGIA, UNSPECIFIED TYPE: Primary | ICD-10-CM

## 2023-03-15 NOTE — FLOWSHEET NOTE
Spoke with Dillon Schwarz the [address] at [facility] Confirmed the they are unable to place a pratt catheter into the existing G tube tract. Will move the procedure to be CT guided. Pt is a DNR CC on hospice. Pt family will be present to sign consent. Pt will be NPO,   Transport via cot by Beth Hawthorne.

## 2023-03-16 ENCOUNTER — HOSPITAL ENCOUNTER (OUTPATIENT)
Dept: INTERVENTIONAL RADIOLOGY/VASCULAR | Age: 68
Discharge: HOME | End: 2023-03-18
Payer: MEDICAID

## 2023-03-16 ENCOUNTER — HOSPITAL ENCOUNTER (OUTPATIENT)
Dept: CT IMAGING | Age: 68
Discharge: HOME | End: 2023-03-18
Payer: MEDICAID

## 2023-03-16 VITALS
OXYGEN SATURATION: 96 % | HEART RATE: 66 BPM | RESPIRATION RATE: 13 BRPM | SYSTOLIC BLOOD PRESSURE: 111 MMHG | DIASTOLIC BLOOD PRESSURE: 66 MMHG

## 2023-03-16 VITALS
DIASTOLIC BLOOD PRESSURE: 52 MMHG | OXYGEN SATURATION: 100 % | RESPIRATION RATE: 18 BRPM | HEART RATE: 79 BPM | SYSTOLIC BLOOD PRESSURE: 101 MMHG

## 2023-03-16 DIAGNOSIS — R13.10 DYSPHAGIA, UNSPECIFIED TYPE: ICD-10-CM

## 2023-03-16 DIAGNOSIS — Z93.1 GASTROSTOMY TUBE DEPENDENT (HCC): ICD-10-CM

## 2023-03-16 PROCEDURE — 2709999900 IR FLUORO GUIDED GASTROSTOMY TUBE INSERTION PERC W CONTRAST

## 2023-03-16 PROCEDURE — 49450 REPLACE G/C TUBE PERC: CPT

## 2023-03-16 PROCEDURE — 2500000003 HC RX 250 WO HCPCS: Performed by: RADIOLOGY

## 2023-03-16 PROCEDURE — 2580000003 HC RX 258: Performed by: RADIOLOGY

## 2023-03-16 PROCEDURE — 6360000004 HC RX CONTRAST MEDICATION: Performed by: RADIOLOGY

## 2023-03-16 RX ORDER — MAGNESIUM HYDROXIDE 1200 MG/15ML
LIQUID ORAL
Status: COMPLETED | OUTPATIENT
Start: 2023-03-16 | End: 2023-03-16

## 2023-03-16 RX ORDER — 0.9 % SODIUM CHLORIDE 0.9 %
INTRAVENOUS SOLUTION INTRAVENOUS CONTINUOUS PRN
Status: COMPLETED | OUTPATIENT
Start: 2023-03-16 | End: 2023-03-16

## 2023-03-16 RX ORDER — LIDOCAINE HYDROCHLORIDE 20 MG/ML
INJECTION, SOLUTION INFILTRATION; PERINEURAL
Status: COMPLETED | OUTPATIENT
Start: 2023-03-16 | End: 2023-03-16

## 2023-03-16 RX ADMIN — SODIUM CHLORIDE 250 ML: 9 INJECTION, SOLUTION INTRAVENOUS at 13:10

## 2023-03-16 RX ADMIN — WATER 7 ML: 100 IRRIGANT IRRIGATION at 13:47

## 2023-03-16 RX ADMIN — IOPAMIDOL 100 ML: 755 INJECTION, SOLUTION INTRAVENOUS at 13:32

## 2023-03-16 RX ADMIN — LIDOCAINE HYDROCHLORIDE 6 ML: 20 INJECTION, SOLUTION INFILTRATION; PERINEURAL at 13:45

## 2023-03-16 NOTE — FLOWSHEET NOTE
1402 - Received patient back to CT holding via cart. Remains in same non-responsive state, eyes open but non-verbal. No distress noted. VS remain stable, on trach collar 28% / 4L oxygen. Dressing to abdominal site clean / dry / intact. Patient for [facility]  at 1500 to return to nursing home. [id number] - Patient nephew / Cherie Morsedemond called and updates given. 1421 - Report called to nursing staff, Romain Hemphill, at [facility]. 1500 - Patient resting on cart, no distress noted. VS remain stable. 32 [phone] 16 - Patient picked up by Zaira Collins to return to [facility] via cot. Provided with [employer] discharge kit of supplies. IV removed, monitors off and abdominal binder applied prior to discharge.  Electronically signed by Stuart Arroyo RN on 3/16/2023 at 3:36 PM

## 2023-03-16 NOTE — FLOWSHEET NOTE
1215 - Patient arrived to CT holding via [address] cot. Patient is not responsive, eyes open, non-verbal. Report received from EMT's, patient accompanied by his nephew, Porfirio who is POA. Transferred to cart. VS obtained, stable. Currently on 100% NRB mask over trach ([facility] stated this was for transport). Report from nursing home prior states patient was on 28% trach collar. Resp therapist called for trach collar supplies. Patient with large scab to previous Gtube site on left side of abdomen. Area cleansed / soaked with NS and scab removed. Initially an 8F corepack placed in tract without difficulty. Patient tolerated well. 1240 - Corepack removed and 8 fr pratt cath placed in existing Gtube tract without difficulty. Patient placed on 28% trach collar / 4L oxygen as provided by Resp Therapy. No distress noted. Patient's appointment adjusted to special procedures. 1250 - Procedure explained and consent signed by nephew / Arie Lees. Patient is currently on hospice care with [facility] status. Nephew opting to suspend this during the perioperative period. Addendum also signed. 1305 - To special procedures via cart.  Electronically signed by Soraya Lo RN on 3/16/2023 at 1:05 PM

## 2023-03-16 NOTE — OR NURSING
GASTROSTOMY TUBE INSERTION - NO SEDATION ADMINISTERED     1307 - Patient arrived to special procedures dept via cart. Consents confirmed - signed by POA. Patient assisted to procedure table and vitals monitor reapplied, VSS at this time. On 28% trach collar - 4L oxygen. No distress noted. Patient has current 8F pratt cath in the existing tract on left side of abdomen. 1322 - Abdomen site prepped with Chloroprep and patient then draped with sterile towels and drape. [address] Timeout completed for procedure. 1332 - Contrast hand injected by Dr. Penny Griffin through the pratt catheter. 1334 - Bentson wire inserted through pratt catheter and fluoro images obtained to confirm placement. Pratt cath then removed. 1336 - Attempted to load gastrostomy tube over Bentson wire - unable to advance. [facility] 4F catheter loaded over Bentson wire, then wire removed. 1339 - Additional contrast hand injected by Dr. Penny Griffin. 1340 - Amplatz wire inserted through the Berenstein catheter. [address] catheter removed once wire in place. Lidocaine 2% administered at abdominal site. 1346 - Entuit Gastrostomy BR Feeding Tube (Lot # [id number], expires: 11/02/2025) inserted over the Amplatz wire to the stomach. Amplatz wire removed. Contrast injected through the Gtube to verify placement. Dr. Penny Griffin states bile / gastric contents noted in Gtube. 1347 - 7 ml of sterile water instilled into the gastrostomy tube balloon by Dr. Penny Griffin. 1350 - Gtube site dressed with split guaze and tegaderm by [employer] [facility].      0328 - Patient assisted from the CT table to cart, patient taken to CT Holding for short term recovery before returning to room.   Electronically signed by Earline Holbrook RN on 3/16/2023 at 1:56 PM

## 2023-03-16 NOTE — DISCHARGE INSTRUCTIONS
DISCHARGE INSTRUCTIONS FOR  GASTROSTOMY TUBE INSERTION    Rest today. NO heavy lifting, bending, stretching or pulling. There may be some tenderness at the site, this will be normal for a few days. If you experience any drainage or bleeding at the site, hold pressure for 10 minutes. If the bleeding persists, does not stop and seems excessive, call your physician or proceed to to the ER. Watch out for signs of infection such as fever, chills, drainage or redness at the site. No aspirin or blood thinner mediation for 24 hours. You may take mild pain relievers or your prescribed pain medication for any discomfort. You may shower or sponge bathe, be cautious not to soak the dressing. No soaking in a bath, hot tube, or swimming pool. Your PCP will instruct on the use of your gastrostomy tube, you may have a home health nurse following your care. Keep your dressing site clean and dry. If you have any questions or concerns, please contact [facility] radiology at [phone] and ask to speak to an RN and or Dr. Gerri Gabriel office at [phone].

## 2023-03-22 ENCOUNTER — OFFICE VISIT (OUTPATIENT)
Dept: WOUND CARE | Age: 68
End: 2023-03-22
Payer: MEDICAID

## 2023-03-22 DIAGNOSIS — L89.224 PRESSURE INJURY OF LEFT HIP, STAGE 4 (HCC): Primary | ICD-10-CM

## 2023-03-22 PROCEDURE — 1123F ACP DISCUSS/DSCN MKR DOCD: CPT | Performed by: NURSE PRACTITIONER

## 2023-03-22 PROCEDURE — 99212 OFFICE O/P EST SF 10 MIN: CPT | Performed by: NURSE PRACTITIONER

## 2023-03-27 NOTE — PROGRESS NOTES
Pituitary macroadenoma [facility])     Sleep apnea      PAST SURGICAL HISTORY    Past Surgical History:   Procedure Laterality Date    COLONOSCOPY  2014    [place]    COLONOSCOPY N/A 2019    COLONOSCOPY DIAGNOSTIC performed by Asa Ormond, MD at [facility] 99, DIAGNOSTIC      EYE SURGERY Bilateral     cataracts    [address] ?     index finger laceration repair    GASTROSTOMY TUBE PLACEMENT Left 2023    Entuit Gastrostomy BR Feeding Tube 16F  (Lot # [id number], expires: 2025) placed by Dr. Nia Blanca  2004    repair [facility]     IR GASTROSTOMY TUBE PLACEMENT PERCUTANEOUS  3/16/2023    IR GASTROSTOMY TUBE PLACEMENT PERCUTANEOUS 3/16/2023 [facility] SPECIAL PROCEDURE    JOINT REPLACEMENT Left     left hip    [place] COLON CA SCRN NOT  W [facility] IND N/A 2017    COLONOSCOPY performed by Lianet Chance MD at [facility] 2019    LEFT HIP TOTAL HIP ARTHROPLASTY, LATERAL DECUB, GRACIE performed by Neftali Ramirez MD at [address] [place] [address]  2019    EGD DIAGNOSTIC ONLY performed by Asa Ormond, MD at [address] History   Problem Relation Age of Onset    Colon Cancer Mother     Cancer Mother     Arthritis Mother     High Blood Pressure Father     Heart Disease Father     Diabetes Sister     No Known Problems Brother     No Known Problems Brother     No Known Problems Brother     Other Brother         Sepsis    Other Brother         Killed    No Known Problems Sister     No Known Problems Sister     No Known Problems Son      SOCIAL HISTORY    Social History     Tobacco Use    Smoking status: Former     Packs/day: 1.50     Years: 46.00     Pack years: 69.00     Types: Cigarettes     Quit date: 3/20/2019     Years since quittin.0    Smokeless tobacco: Never   Vaping Use    Vaping Use: Unknown   Substance Use Topics    Alcohol use: Yes    Drug use:

## 2023-03-29 ENCOUNTER — OFFICE VISIT (OUTPATIENT)
Dept: WOUND CARE | Age: 68
End: 2023-03-29
Payer: MEDICAID

## 2023-03-29 DIAGNOSIS — L89.224 PRESSURE ULCER OF LEFT HIP, STAGE 4 (HCC): Primary | ICD-10-CM

## 2023-03-29 PROCEDURE — 1123F ACP DISCUSS/DSCN MKR DOCD: CPT | Performed by: NURSE PRACTITIONER

## 2023-03-29 PROCEDURE — 99212 OFFICE O/P EST SF 10 MIN: CPT | Performed by: NURSE PRACTITIONER

## 2023-03-29 NOTE — PROGRESS NOTES
[facility] Nursing                                                              Wound Progress Note        Dennise Goyal  MEDICAL RECORD NUMBER:  [id number]  AGE: 74 y.o. GENDER: male  : 1955  EPISODE DATE:  3/29/2023    Subjective:     No chief complaint on file. HISTORY of PRESENT ILLNESS HPI     Dennise Goyal is a 74 y.o. male who presents today for wound/ulcer evaluation. History of Wound Context: Long time resident of [facility] with left hip & stage 1 right medial heel - long-time bedbound patient with trach tube, feeding tube and catheter. Legs are contracted.  Prevention dressings to feet wrapped in ABDs and kerlix rolls and in feet protectors  Wound/Ulcer Pain Timing/Severity: unknown level, but he is in pain by moaning and thrashing  Quality of pain: unable to determine though expresses moans and thrashing  Severity:  unknown / 10   Modifying Factors:  contractures  Associated Signs/Symptoms: drainage    Ulcer Identification:  Ulcer Type: pressure  Contributing Factors: decreased mobility & contractures to bilateral legs    Wound: N/A        PAST MEDICAL HISTORY        Diagnosis Date    Abdominal pain     Asthma     Bloating symptom     COPD (chronic obstructive pulmonary disease) ([facility])     COPD (chronic obstructive pulmonary disease) ([facility])     Depression     Depression     Diarrhea     Drug-seeking behavior 2020    Emphysema/COPD ([facility]     History of rectal bleeding     Hypercholesteremia     past trx > 5 yrs    Hypercholesteremia     Hypercholesteremia     Hypertension     meds > 3 yrs    Hypothyroid 2022    Lung disease     Nontraumatic subarachnoid hemorrhage ([address]     [address] [facility] [address] PAP[address]    RANDI on CPAP     patient relates he doesn't use cpap at this time    Osteoarthritis     left hip    Osteoarthritis     Osteoarthritis of left hip     Other hydrocephalus ([address]     [address] PAPERWORK    Physiological consequence of immobility 2022

## 2023-04-19 ENCOUNTER — OFFICE VISIT (OUTPATIENT)
Dept: WOUND CARE | Age: 68
End: 2023-04-19

## 2023-04-19 DIAGNOSIS — L89.224 PRESSURE ULCER OF LEFT HIP, STAGE 4 (HCC): Primary | ICD-10-CM

## 2023-04-21 NOTE — PROGRESS NOTES
insight intact. Short and long term memory intact. No evidence of depression, anxiety, or agitation. Patient is calm, cooperative, and communicative. Appropriate interactions and affect. Assessment:      There are no active hospital problems to display for this patient. Procedure Note  Indications:  Based on my examination of this patient's wound(s)/ulcer(s) today, debridement is not required to promote healing and evaluate the wound base. Wound 06/29/22 Coccyx (Active)   Number of days: 296     Incision 04/23/19 Hip Anterior;Left;Proximal (Active)   Number of days: 1458     Plan:   Pack with thin strips of calcium alginate Ag after irrigating well with saline, skin prepping periwound. Cover with TXU Grayson dressing. Will obtain C&S next visit as it has no been done in [facility].     Electronically signed by MARICRUZ Lopez NP on 4/21/2023 at 3:02 PM

## 2023-04-26 ENCOUNTER — OFFICE VISIT (OUTPATIENT)
Dept: WOUND CARE | Age: 68
End: 2023-04-26
Payer: MEDICAID

## 2023-04-26 DIAGNOSIS — L89.224 PRESSURE ULCER OF LEFT HIP, STAGE 4 (HCC): Primary | ICD-10-CM

## 2023-04-26 PROCEDURE — 1123F ACP DISCUSS/DSCN MKR DOCD: CPT | Performed by: NURSE PRACTITIONER

## 2023-04-26 PROCEDURE — 99212 OFFICE O/P EST SF 10 MIN: CPT | Performed by: NURSE PRACTITIONER

## 2023-05-03 ENCOUNTER — OFFICE VISIT (OUTPATIENT)
Dept: WOUND CARE | Age: 68
End: 2023-05-03
Payer: MEDICAID

## 2023-05-03 DIAGNOSIS — L89.224 PRESSURE ULCER OF LEFT HIP, STAGE 4 (HCC): Primary | ICD-10-CM

## 2023-05-03 PROCEDURE — 99212 OFFICE O/P EST SF 10 MIN: CPT | Performed by: NURSE PRACTITIONER

## 2023-05-03 PROCEDURE — 1123F ACP DISCUSS/DSCN MKR DOCD: CPT | Performed by: NURSE PRACTITIONER

## 2023-05-04 NOTE — PROGRESS NOTES
assessment. Psychiatric:  Judgment and insight intact. Short and long term memory intact. No evidence of depression, anxiety, or agitation. Patient is calm, cooperative, and communicative. Appropriate interactions and affect. Assessment:      There are no active hospital problems to display for this patient. Procedure Note  Indications:  Based on my examination of this patient's wound(s)/ulcer(s) today, debridement is not required to promote healing and evaluate the wound base. Wound/Ulcer #: 2    3.4 x 1.5 x 1.5 cm Left hip    Wound 06/29/22 Coccyx (Active)   Number of days: 309     Incision 04/23/19 Hip Anterior;Left;Proximal (Active)   Number of days: 6172     Plan:   Continue all the preventative care to feet which are woundless, and packed left hip wound with calcium alginate Ag strip with bactroban and covered with island dressing.

## 2023-05-10 ENCOUNTER — OFFICE VISIT (OUTPATIENT)
Dept: WOUND CARE | Age: 68
End: 2023-05-10
Payer: MEDICAID

## 2023-05-10 DIAGNOSIS — L89.224 PRESSURE ULCER OF LEFT HIP, STAGE 4 (HCC): Primary | ICD-10-CM

## 2023-05-10 PROCEDURE — 1123F ACP DISCUSS/DSCN MKR DOCD: CPT | Performed by: NURSE PRACTITIONER

## 2023-05-10 PROCEDURE — 99212 OFFICE O/P EST SF 10 MIN: CPT | Performed by: NURSE PRACTITIONER

## 2023-05-17 ENCOUNTER — OFFICE VISIT (OUTPATIENT)
Dept: WOUND CARE | Age: 68
End: 2023-05-17
Payer: MEDICAID

## 2023-05-17 DIAGNOSIS — L89.224 PRESSURE ULCER OF LEFT HIP, STAGE 4 (HCC): Primary | ICD-10-CM

## 2023-05-17 PROCEDURE — 99212 OFFICE O/P EST SF 10 MIN: CPT | Performed by: NURSE PRACTITIONER

## 2023-05-17 PROCEDURE — 1123F ACP DISCUSS/DSCN MKR DOCD: CPT | Performed by: NURSE PRACTITIONER

## 2023-05-21 ENCOUNTER — HOSPITAL ENCOUNTER (EMERGENCY)
Age: 68
Discharge: OTHER INSTITUTION | End: 2023-05-21
Attending: GENERAL PRACTICE
Payer: MEDICAID

## 2023-05-21 ENCOUNTER — APPOINTMENT (OUTPATIENT)
Dept: GENERAL RADIOLOGY | Age: 68
End: 2023-05-21
Payer: MEDICAID

## 2023-05-21 ENCOUNTER — HOSPITAL ENCOUNTER (EMERGENCY)
Age: 68
Discharge: OTHER INSTITUTION | End: 2023-05-22
Attending: GENERAL PRACTICE
Payer: MEDICAID

## 2023-05-21 VITALS
RESPIRATION RATE: 16 BRPM | HEIGHT: 65 IN | TEMPERATURE: 98.1 F | BODY MASS INDEX: 33.32 KG/M2 | SYSTOLIC BLOOD PRESSURE: 109 MMHG | OXYGEN SATURATION: 100 % | WEIGHT: 200 LBS | DIASTOLIC BLOOD PRESSURE: 69 MMHG | HEART RATE: 66 BPM

## 2023-05-21 DIAGNOSIS — T85.528A DISLODGED GASTROSTOMY TUBE: Primary | ICD-10-CM

## 2023-05-21 DIAGNOSIS — K94.23 GASTROSTOMY TUBE OBSTRUCTION (HCC): Primary | ICD-10-CM

## 2023-05-21 PROCEDURE — 99283 EMERGENCY DEPT VISIT LOW MDM: CPT

## 2023-05-21 PROCEDURE — 6360000004 HC RX CONTRAST MEDICATION: Performed by: GENERAL PRACTICE

## 2023-05-21 PROCEDURE — 49465 FLUORO EXAM OF G/COLON TUBE: CPT

## 2023-05-21 PROCEDURE — 43762 RPLC GTUBE NO REVJ TRC: CPT

## 2023-05-21 RX ADMIN — DIATRIZOATE MEGLUMINE AND DIATRIZOATE SODIUM 20 ML: 660; 100 LIQUID ORAL; RECTAL at 22:44

## 2023-05-21 ASSESSMENT — PAIN - FUNCTIONAL ASSESSMENT
PAIN_FUNCTIONAL_ASSESSMENT: 0-10
PAIN_FUNCTIONAL_ASSESSMENT: 0-10

## 2023-05-21 ASSESSMENT — PAIN SCALES - GENERAL
PAINLEVEL_OUTOF10: 1
PAINLEVEL_OUTOF10: 1

## 2023-05-22 VITALS
RESPIRATION RATE: 17 BRPM | WEIGHT: 200 LBS | SYSTOLIC BLOOD PRESSURE: 129 MMHG | OXYGEN SATURATION: 100 % | BODY MASS INDEX: 33.32 KG/M2 | HEART RATE: 81 BPM | TEMPERATURE: 98.1 F | HEIGHT: 65 IN | DIASTOLIC BLOOD PRESSURE: 93 MMHG

## 2023-05-22 NOTE — ED NOTES
Attempt made to call [facility] and give report     Marycarmen Du RN  05/21/23 2041

## 2023-05-22 NOTE — ED NOTES
Pt to xray to verify peg tube placement.  Respiratory at bedside     Chinedu Boles RN  05/21/23 6108

## 2023-05-22 NOTE — ED PROVIDER NOTES
[address] [facility] ED  Emergency Department Encounter  EmergencyMedicine Resident     Pt Name:Eddie Denton  MRN: [id number]  [place] 1955  Date of evaluation: 5/21/23  PCP:  Cheyanne Patel DO    CHIEF COMPLAINT       Chief Complaint   Patient presents with    Other     Feeding tube change       HISTORY OF PRESENT ILLNESS  (Location/Symptom, Timing/Onset, Context/Setting, Quality, Duration, Modifying Factors, Severity.)      Melvin Aguilar is a 74 y.o. male who presents with dislodged G-tube. A Zaragoza catheter was placed into the os to maintain patency. Sending facility also requesting that we deep suction patient from his chronic trach    PAST MEDICAL / SURGICAL / SOCIAL / FAMILY HISTORY      has a past medical history of Abdominal pain, Asthma, Bloating symptom, COPD (chronic obstructive pulmonary disease) ([address] COPD (chronic obstructive pulmonary disease) ([address] Depression, Depression, Diarrhea, Drug-seeking behavior, Emphysema/COPD ([address] History of rectal bleeding, Hypercholesteremia, Hypercholesteremia, Hypercholesteremia, Hypertension, Hypothyroid, Lung disease, Nontraumatic subarachnoid hemorrhage ([address] RANDI on CPAP, Osteoarthritis, Osteoarthritis, Osteoarthritis of left hip, Other hydrocephalus ([address] Physiological consequence of immobility, Pituitary macroadenoma ([address] and Sleep apnea. Denies further past medical hx     has a past surgical history that includes Colonoscopy (05/02/2014); Finger surgery (Right, 1999 ?); pr colon ca scrn not hi rsk ind (N/A, 05/17/2017); Endoscopy, colon, diagnostic; eye surgery (Bilateral); hernia repair (2004); Total hip arthroplasty (Left, 04/22/2019); joint replacement (Left); Colonoscopy (N/A, 12/12/2019); Upper gastrointestinal endoscopy (12/12/2019); Gastrostomy tube placement (Left, 03/16/2023); and IR FLUORO GUIDED GASTROSTOMY TUBE INSERTION PERC W CONTRAST (3/16/2023).   Denies further past surgical hx    Social History     Socioeconomic History

## 2023-05-22 NOTE — ED NOTES
[facility] at bedside to take pt to [facility]. Pt stab el, 0 sob, 0 distress, 0 problems. Skin w/d/pink, 0 cough noted.      Lupe Cox RN  05/22/23 3650

## 2023-05-22 NOTE — ED NOTES
Taking over the care of pt. Pt resting in bed, 0 distress, 0 sob noted, skin w/d/pink, pulses palp.      Willistine Angelucci, JANIE  05/22/23 007

## 2023-05-23 NOTE — PROGRESS NOTES
tobacco: Never   Vaping Use    Vaping Use: Unknown   Substance Use Topics    Alcohol use: Yes    Drug use: No     ALLERGIES    No Known Allergies    MEDICATIONS    Current Outpatient Medications on File Prior to Visit   Medication Sig Dispense Refill    levothyroxine (SYNTHROID) 150 MCG tablet Take 1 tablet by mouth in the morning. 30 tablet 3    Nutritional Supplements (ISOSOURCE 1.5 NUSRAT PO) 60 mLs by Percutaneous Endoscopic Gastrostomy route continuous      carboxymethylcellulose (ARTIFICIAL TEARS) 1 % ophthalmic solution Place 2 drops into both eyes daily      atorvastatin (LIPITOR) 20 MG tablet 20 mg by PEG Tube route nightly      Atropine Sulfate 0.01 % SOLN Place 1 drop under the tongue as needed (EVERY TWO HOURS FOR REDUCTION OF SECRETIONS)      chlorhexidine (PERIDEX) 0.12 % solution Take 15 mLs by mouth in the morning, at noon, and at bedtime FOR USE WITH TOOTHETTES FOR MOUTH CARE      polyethylene glycol (MIRALAX) 17 g PACK packet 17 g by Per G Tube route in the morning and at bedtime      acetaminophen (TYLENOL) 325 MG tablet 650 mg by PEG Tube route every 4 hours as needed for Pain      sennosides-docusate sodium (SENOKOT-S) 8.6-50 MG tablet 1 tablet by PEG Tube route in the morning and at bedtime      desmopressin (DDAVP) 0.1 MG tablet 0.5 mg by PEG Tube route in the morning and at bedtime Give 0.05mg via PEG tube two times a day for clotting promoter      esomeprazole Magnesium (NEXIUM) 40 MG PACK 40 mg by Per G Tube route in the morning.       levETIRAcetam (KEPPRA) 100 MG/ML solution 750 mg/kg by PEG Tube route 2 times daily      hyoscyamine (LEVSIN/SL) 125 MCG sublingual tablet Place 125 mcg under the tongue every 6 hours as needed for Cramping      morphine sulfate 20 MG/ML concentrated oral solution Take 5 mg by mouth every 2 hours as needed for Pain.      vitamin D (CHOLECALCIFEROL) 125 MCG (5000 UT) CAPS capsule Take 50,000 Units by mouth daily Give 1/25mg ([id number]) one capsule via peg tube one

## 2023-05-23 NOTE — PROGRESS NOTES
[facility] Nursing                                                Progress Note and Procedure Note      Aziza Villa  MEDICAL RECORD NUMBER:  [id number]  AGE: 74 y.o. GENDER: male  : 1955  EPISODE DATE:  2023    Subjective:     No chief complaint on file. HISTORY of PRESENT ILLNESS HPI     Aziza Villa is a 74 y.o. male who presents today for wound/ulcer evaluation. History of Wound Context: Long time resident of [facility] with left hip stage 4 pressure injury - long-time bedbound patient with trach tube, feeding tube and catheter. Legs are contracted. Prevention dressings to feet wrapped in ABDs and kerlix rolls and in feet protectors after urea cream applied - pressure areas to left lateral foot and right medial foot have healed.   Wound/Ulcer Pain Timing/Severity: unknown level, but he is in pain by moaning and thrashing  Quality of pain: unable to determine though expresses moans and thrashing  Severity:  unknown / 10   Modifying Factors:  contractures, lack of mobility  Associated Signs/Symptoms: drainage     Ulcer Identification:  Ulcer Type: pressure  Contributing Factors: chronic pressure and decreased mobility     Wound: N/A        PAST MEDICAL HISTORY        Diagnosis Date    Abdominal pain     Asthma     Bloating symptom     COPD (chronic obstructive pulmonary disease) ([facility])     COPD (chronic obstructive pulmonary disease) ([facility])     Depression     Depression     Diarrhea     Drug-seeking behavior 2020    Emphysema/COPD ([facility] [address]     History of rectal bleeding     Hypercholesteremia     past trx > 5 yrs    Hypercholesteremia     Hypercholesteremia     Hypertension     meds > 3 yrs    Hypothyroid 2022    Lung disease     Nontraumatic subarachnoid hemorrhage ([facility] [address]     [facility]    RANDI on CPAP     patient relates he doesn't use cpap at this time    Osteoarthritis     left hip    Osteoarthritis     Osteoarthritis of left hip     Other hydrocephalus ([facility] [address]

## 2023-05-24 ENCOUNTER — OFFICE VISIT (OUTPATIENT)
Dept: WOUND CARE | Age: 68
End: 2023-05-24
Payer: MEDICAID

## 2023-05-24 DIAGNOSIS — L89.224 PRESSURE ULCER OF LEFT HIP, STAGE 4 (HCC): Primary | ICD-10-CM

## 2023-05-24 PROCEDURE — 1123F ACP DISCUSS/DSCN MKR DOCD: CPT | Performed by: NURSE PRACTITIONER

## 2023-05-24 PROCEDURE — 99212 OFFICE O/P EST SF 10 MIN: CPT | Performed by: NURSE PRACTITIONER

## 2023-05-25 NOTE — PROGRESS NOTES
[facility] Nursing                                                Progress Note and Procedure Note      Colt Cao  MEDICAL RECORD NUMBER:  [id number]  AGE: 74 y.o. GENDER: male  : 1955  EPISODE DATE:  2023    Subjective:     No chief complaint on file. HISTORY of PRESENT ILLNESS HPI     Colt Cao is a 74 y.o. male who presents today for wound/ulcer evaluation. History of Wound Context: Long time resident of [facility] with left hip stage 4 pressure injury - long-time bedbound patient with trach tube, feeding tube and catheter. Legs are contracted. Prevention dressings to feet wrapped in ABDs and kerlix rolls and in feet protectors after urea cream applied - pressure areas to left lateral foot and right medial foot have healed. Still has the hip wound, but progressing nicely with lowering measurements.   Wound/Ulcer Pain Timing/Severity: unknown level, but he is in pain by moaning and thrashing  Quality of pain: unable to determine though expresses moans and thrashing  Severity:  unknown / 10   Modifying Factors:  contractures, lack of mobility  Associated Signs/Symptoms: drainage     Ulcer Identification:  Ulcer Type: pressure  Contributing Factors: chronic pressure and decreased mobility     Wound: N/A        PAST MEDICAL HISTORY        Diagnosis Date    Abdominal pain     Asthma     Bloating symptom     COPD (chronic obstructive pulmonary disease) ([facility])     COPD (chronic obstructive pulmonary disease) ([facility])     Depression     Depression     Diarrhea     Drug-seeking behavior 2020    Emphysema/COPD ([facility] [address]     History of rectal bleeding     Hypercholesteremia     past trx > 5 yrs    Hypercholesteremia     Hypercholesteremia     Hypertension     meds > 3 yrs    Hypothyroid 2022    Lung disease     Nontraumatic subarachnoid hemorrhage ([facility] [address]     [facility]    RANDI on CPAP     patient relates he doesn't use cpap at this time    Osteoarthritis     left hip

## 2023-07-11 ENCOUNTER — HOSPITAL ENCOUNTER (EMERGENCY)
Age: 68
Discharge: NURSING, REHAB OR LONG-TERM CARE | End: 2023-07-11
Attending: EMERGENCY MEDICINE
Payer: MEDICAID

## 2023-07-11 ENCOUNTER — APPOINTMENT (OUTPATIENT)
Dept: GENERAL RADIOLOGY | Age: 68
End: 2023-07-11
Payer: MEDICAID

## 2023-07-11 VITALS
DIASTOLIC BLOOD PRESSURE: 82 MMHG | BODY MASS INDEX: 31.07 KG/M2 | RESPIRATION RATE: 18 BRPM | HEART RATE: 73 BPM | WEIGHT: 205 LBS | SYSTOLIC BLOOD PRESSURE: 112 MMHG | HEIGHT: 68 IN | OXYGEN SATURATION: 100 % | TEMPERATURE: 97.8 F

## 2023-07-11 DIAGNOSIS — Z43.1 ATTENTION TO G-TUBE (HCC): Primary | ICD-10-CM

## 2023-07-11 PROCEDURE — 99285 EMERGENCY DEPT VISIT HI MDM: CPT

## 2023-07-11 PROCEDURE — 74018 RADEX ABDOMEN 1 VIEW: CPT

## 2023-07-11 PROCEDURE — 43762 RPLC GTUBE NO REVJ TRC: CPT

## 2023-07-11 PROCEDURE — 6360000004 HC RX CONTRAST MEDICATION: Performed by: EMERGENCY MEDICINE

## 2023-07-11 RX ADMIN — DIATRIZOATE MEGLUMINE AND DIATRIZOATE SODIUM 60 ML: 660; 100 LIQUID ORAL; RECTAL at 17:28

## 2023-07-11 ASSESSMENT — PAIN - FUNCTIONAL ASSESSMENT: PAIN_FUNCTIONAL_ASSESSMENT: WONG-BAKER FACES

## 2023-07-11 ASSESSMENT — PAIN SCALES - GENERAL: PAINLEVEL_OUTOF10: 0

## 2023-07-11 NOTE — ED TRIAGE NOTES
Pt here via EMS from nursing home. Per nursing staff they want another tube placed so they are able to give medications, stating they can only use the tube he has for feedings. They also report that it has been leaking a little. Patient A&Ox1(baseline) VSS.

## (undated) DEVICE — SUTURE VCRL SZ 0 L36IN ABSRB UD L36MM CT-1 1/2 CIR J946H

## (undated) DEVICE — CHLORAPREP 26ML ORANGE

## (undated) DEVICE — BLADE SAW W098XL276IN THK0025IN CUT THK0039IN REPL SAG

## (undated) DEVICE — GLOVE SURG SZ 7 L12IN FNGR THK94MIL TRNSLUC YEL LTX HYDRGEL

## (undated) DEVICE — SHEET,DRAPE,53X77,STERILE: Brand: MEDLINE

## (undated) DEVICE — PACK PROCEDURE SURG TOT HIP DRP

## (undated) DEVICE — T4 HOOD

## (undated) DEVICE — GLOVE SURG SZ 8 L12IN FNGR THK13MIL BRN LTX SYN POLYMER W

## (undated) DEVICE — ELECTRODE PT RET AD L9FT HI MOIST COND ADH HYDRGEL CORDED

## (undated) DEVICE — SUTURE RETRIEVER

## (undated) DEVICE — PILLOW POS W15XH6XL22IN RASPBERRY FOAM ABD W/ STRP DISP FOR

## (undated) DEVICE — GLOVE SURG SZ 65 THK91MIL LTX FREE SYN POLYISOPRENE

## (undated) DEVICE — NEEDLE SPNL 18GA L3.5IN W/ QNCKE SHARPER BVL DURA CLICK

## (undated) DEVICE — 3M™ STERI-DRAPE™ U-DRAPE 1015: Brand: STERI-DRAPE™

## (undated) DEVICE — DRAPE,HIP,W/POUCHES,STERILE: Brand: MEDLINE

## (undated) DEVICE — SUTURE PDS II SZ 1 L96IN ABSRB VLT XLH L70MM 1/2 CIR Z881G

## (undated) DEVICE — Z DISCONTINUED PER MEDLINE USE 2741944 DRESSING AQUACEL 12 IN SURG W9XL30CM SIL CVR WTRPRF VIR BACT BARR ANTIMIC

## (undated) DEVICE — LABEL MED MINI W/ MARKER

## (undated) DEVICE — 3M™ STERI-STRIP™ REINFORCED ADHESIVE SKIN CLOSURES, R1547, 1/2 IN X 4 IN (12 MM X 100 MM), 6 STRIPS/ENVELOPE: Brand: 3M™ STERI-STRIP™

## (undated) DEVICE — SYRINGE MED 50ML LUERLOCK TIP

## (undated) DEVICE — DRAPE,U/ SHT,SPLIT,PLAS,STERIL: Brand: MEDLINE

## (undated) DEVICE — GLOVE ORANGE PI 8   MSG9080

## (undated) DEVICE — SUTURE ETHBND EXCEL SZ 5 L30IN NONABSORBABLE GRN L48MM V-40 MB46G

## (undated) DEVICE — SIPS DUAL 2 MINUTE TIP

## (undated) DEVICE — Z DISCONTINUED PER MEDLINE USE 2741943 DRESSING AQUACEL 10 IN ALG W9XL25CM SIL CVR WTRPRF VIR BACT BARR ANTIMIC

## (undated) DEVICE — SUTURE MCRYL + SZ 3-0 L36IN ABSRB UD CT-1 L36MM 1/2 CIR MCP944H